# Patient Record
Sex: MALE | Race: WHITE | NOT HISPANIC OR LATINO | Employment: FULL TIME | URBAN - METROPOLITAN AREA
[De-identification: names, ages, dates, MRNs, and addresses within clinical notes are randomized per-mention and may not be internally consistent; named-entity substitution may affect disease eponyms.]

---

## 2017-01-02 ENCOUNTER — THERAPY VISIT (OUTPATIENT)
Dept: PHYSICAL THERAPY | Facility: CLINIC | Age: 42
End: 2017-01-02
Payer: COMMERCIAL

## 2017-01-02 ENCOUNTER — OFFICE VISIT (OUTPATIENT)
Dept: ORTHOPEDICS | Facility: CLINIC | Age: 42
End: 2017-01-02
Payer: COMMERCIAL

## 2017-01-02 VITALS
DIASTOLIC BLOOD PRESSURE: 76 MMHG | SYSTOLIC BLOOD PRESSURE: 122 MMHG | HEIGHT: 70 IN | BODY MASS INDEX: 25.4 KG/M2 | WEIGHT: 177.4 LBS

## 2017-01-02 DIAGNOSIS — M75.02 ADHESIVE CAPSULITIS OF LEFT SHOULDER: Primary | ICD-10-CM

## 2017-01-02 PROCEDURE — 20610 DRAIN/INJ JOINT/BURSA W/O US: CPT | Mod: LT | Performed by: FAMILY MEDICINE

## 2017-01-02 PROCEDURE — 97161 PT EVAL LOW COMPLEX 20 MIN: CPT | Mod: GP | Performed by: PHYSICAL THERAPIST

## 2017-01-02 PROCEDURE — 97110 THERAPEUTIC EXERCISES: CPT | Mod: GP | Performed by: PHYSICAL THERAPIST

## 2017-01-02 PROCEDURE — 99214 OFFICE O/P EST MOD 30 MIN: CPT | Mod: 25 | Performed by: FAMILY MEDICINE

## 2017-01-02 RX ORDER — CYCLOBENZAPRINE HCL 5 MG
TABLET ORAL
Qty: 42 TABLET | Refills: 0 | Status: SHIPPED | OUTPATIENT
Start: 2017-01-02 | End: 2017-02-17

## 2017-01-02 RX ORDER — METHYLPREDNISOLONE ACETATE 40 MG/ML
40 INJECTION, SUSPENSION INTRA-ARTICULAR; INTRALESIONAL; INTRAMUSCULAR; SOFT TISSUE ONCE
Qty: 1 ML | Refills: 0 | OUTPATIENT
Start: 2017-01-02 | End: 2017-01-02

## 2017-01-02 ASSESSMENT — ENCOUNTER SYMPTOMS: FOCAL WEAKNESS: 0

## 2017-01-02 NOTE — NURSING NOTE
"Chief Complaint   Patient presents with     Shoulder Pain     Left        Initial /76 mmHg  Ht 5' 9.75\" (1.772 m)  Wt 177 lb 6.4 oz (80.468 kg)  BMI 25.63 kg/m2 Estimated body mass index is 25.63 kg/(m^2) as calculated from the following:    Height as of this encounter: 5' 9.75\" (1.772 m).    Weight as of this encounter: 177 lb 6.4 oz (80.468 kg).  BP completed using cuff size: asiya Lara ATC      "

## 2017-01-02 NOTE — PROGRESS NOTES
Subjective:    Christiano Molina is a 41 year old male with a left shoulder condition.  Condition occurred with:  Unknown cause (maybe side effect of flue shot).  Condition occurred: other.  This is a new condition  September 2016  .    Patient reports pain:  Anterior, lateral and upper arm.    Pain is described as aching and is intermittent and reported as 2/10.  Associated symptoms:  Loss of motion/stiffness and loss of strength. Pain is the same all the time.  Symptoms are exacerbated by using arm behind back, using arm at shoulder level and using arm overhead and relieved by rest.  Since onset symptoms are gradually improving.    Previous treatment: 2 steroid injections.    General health as reported by patient is good.  Pertinent medical history includes:  None.      Medication history: Ibuprofen.  Current occupation is   .    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                              Objective:    Standing Alignment:      Shoulder/UE:  Rounded shoulders and scapular winging L                  Flexibility/Screens:   Positive screens:  Shoulder                             Shoulder Evaluation:  ROM:  AROM:    Flexion:  Left:  110    Right:  170    Abduction:  Left: 115   Right:  180    Internal Rotation:  Left:  L3    Right:  T12  External Rotation:  Left:  35    Right:  80                PROM:    Flexion:  Left:  110          Abduction:  Left:  65        Internal Rotation:  Left:  22      External Rotation:  Left:  25                        Strength:    Flexion: Left:4+/5   Pain:        Abduction:  Left: 4+/5  Pain:        Internal Rotation:  Left:5/5     Pain:      External Rotation:   Left:4+/5     Pain:                 Special Tests:    Left shoulder positive for the following special tests:  Impingement  Left shoulder negative for the following special tests:  Rotator cuff tear    Palpation:  not assessed                                          General     ROS    Assessment/Plan:      Patient is a 41 year old male with left side shoulder complaints.    Patient has the following significant findings with corresponding treatment plan.                Diagnosis 1:  Left Shoulder Adhesive Capsulitis  Pain -  hot/cold therapy, electric stimulation, manual therapy, splint/taping/bracing/orthotics, self management, education and home program  Decreased ROM/flexibility - manual therapy and therapeutic exercise  Decreased strength - therapeutic exercise and therapeutic activities  Impaired muscle performance - neuro re-education  Decreased function - therapeutic activities  Impaired posture - neuro re-education    Therapy Evaluation Codes:   1) History comprised of:   Personal factors that impact the plan of care:      None.    Comorbidity factors that impact the plan of care are:      None.     Medications impacting care: None.  2) Examination of Body Systems comprised of:   Body structures and functions that impact the plan of care:      Shoulder.   Activity limitations that impact the plan of care are:      Bathing, Cooking, Driving, Dressing, Lifting, Reading/Computer work and Sleeping.   Clinical presentation characteristics are:    Stable/Uncomplicated.  3) Presentation comprised of:   Presentation scored as Low complexity with uncomplicated characteristics..  4) Decision-Making    Low complexity using standardized patient assessment instrument and/or measureable assessment of functional outcome.  Cumulative Therapy Evaluation is: Low complexity.    Previous and current functional limitations:  (See Goal Flow Sheet for this information)    Short term and Long term goals: (See Goal Flow Sheet for this information)     Communication ability:  Patient appears to be able to clearly communicate and understand verbal and written communication and follow directions correctly.  Treatment Explanation - The following has been discussed with the patient:   RX  ordered/plan of care  Anticipated outcomes  Possible risks and side effects  This patient would benefit from PT intervention to resume normal activities.   Rehab potential is good.    Frequency:  1 X week, once daily  Duration:  for 6 weeks tapering to 2 x month for 3 months  Discharge Plan:  Achieve all LTG.  Independent in home treatment program.  Reach maximal therapeutic benefit.    Please refer to the daily flowsheet for treatment today, total treatment time and time spent performing 1:1 timed codes.

## 2017-01-02 NOTE — PROGRESS NOTES
[facility]   Follow-up Visit s Jan 2, 2017    PCP: Layton Weir      Subjective:  Christiano is a 41 year old male who is seen in follow up for evaluation of   Chief Complaint   Patient presents with     Shoulder Pain     Left      His last visit was on 10/21/2016.  Since that time, symptoms have been unchanged. Stretching has not helped and patient feels it worsened condition. Patient stated prednisone did not change symptoms. Left shoulder feels looser that right. ROM limited in flexion, abduction, external rotation, internal rotation. Has consistent pain 7 days a week. Pain is 8/10 at worst and 2/10 at best. Reported clicking/popping with movement.     Patient's past medical, surgical, social and family histories are reviewed today.    Injury: Patient describes injury as 20 years ago, skiing injury.  Fell on outstretched arm and reports posterior dislocation.  Has had some persistent weakness with weightbearing, i.e. Yoga.  Got a flu shot about 2 weeks prior to onset and had persistent soreness, has had 2 fevers over that time and now has pain with overhead motions, limited ROM, and pain with rotational motions against resistance.    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      Social History     Social History     Marital Status: Single     Spouse Name: N/A     Number of Children: N/A     Years of Education: N/A     Social History Main Topics     Smoking status: Never Smoker      Smokeless tobacco: Not on file     Alcohol Use: Not on  "file     PAST SURGICAL HISTORY  No surgeries reported.     Review of Systems   Musculoskeletal: Positive for joint pain.   Neurological: Negative for focal weakness.   All other systems reviewed and are negative.        Physical Exam  /76 mmHg  Ht 5' 9.75\" (1.772 m)  Wt 177 lb 6.4 oz (80.468 kg)  BMI 25.63 kg/m2  Constitutional:well-developed, well-nourished, and in no distress.   Cardiovascular: Intact distal pulses.    Neurological: alert. Gait normal.   Skin: Skin is warm and dry.   Psychiatric: Mood and affect normal.   Respiratory: unlabored, speaks in full sentences  Lymph: no LAD, no lymphangitis      Left Shoulder Exam     Tenderness   None    Range of Motion   Active Abduction:                       160  Passive Abduction:                    90  Extension:                                  Normal  Forward Flexion:                        90  External Rotation:                      30  Internal Rotation 0 degrees:      Sacrum  Internal Rotation 90 degrees:    N/t    Muscle Strength   Abduction:            5/5  Internal Rotation:  5/5  External Rotation: 5/5  Supraspinatus:     5/5  Subscapularis:     5/5  Biceps:                 5/5    Tests   Impingement:   Positive  Norton:          Positive  Cross Arm:      Negative  Drop Arm:        Negative  Apprehension: Negative  Sulcus:            Negative    Comments:  Active pt motion intact, absolute strength testing limited by pain.      Right Shoulder Exam     Tenderness   None    Range of Motion   Active Abduction:                       Normal  Passive Abduction:                    Normal  Extension:                                  Normal  Forward Flexion:                        Normal  External Rotation:                      Normal  Internal Rotation 0 degrees:      Normal  Internal Rotation 90 degrees:    Normal    Muscle Strength   Abduction:            5/5  Internal Rotation:  5/5  External Rotation: 5/5  Supraspinatus:     5/5  Subscapularis:     " 5/5  Biceps:                 5/5    Tests   Impingement:   Negative  Norton:          Negative  Cross Arm:      Negative  Drop Arm:        Negative  Apprehension: Negative  Sulcus:            Negative            ICD-10-CM    1. Adhesive capsulitis of left shoulder M75.02 cyclobenzaprine (FLEXERIL) 5 MG tablet     [facility], [facility]IR[facility]RACT[facility] REFERRAL     More clearly evident to be frozen shoulder today, cause idiopathic, but correlation to flu vaccine curious.   Overall more restricted today than 2 months ago.     lengthy discussion regarding difficult nature of this. Discussed natural history. Reviewed treatment options which are limited. Discussed physical therapy home exercises, medication, and cortisone injections. Noted gradual resolution over several months.  He elected to proceed with cortisone injection today, followed by formal PT.     Pt opts for left shoulder glenohumeral injection.  PROCEDURE:  JOINT INJECTION.         After a discussion of risks, benefits and side effects of procedure, informed patient consent was obtained, and form signed by patient and physician.       The left shoulder was prepped with Chloroprep.      INJECTION:  Using 9 cc of 1% lidocaine mixed with 40 mg of depomedrol, the glenohumeral space was successfully injected using a postero-lateral approach with the patient lying on the unaffected side, without complication.  Patient tolerated the procedure well with minimal discomfort. Bandaid applied.

## 2017-01-02 NOTE — MR AVS SNAPSHOT
After Visit Summary   1/2/2017    Christiano Molina    MRN: [id number]           Patient Information     Date Of Birth          1975        Visit Information        Provider Department      1/2/2017 8:20 AM Isaac Osborne MD [facility] [facility]        Today's Diagnoses     Adhesive capsulitis of left shoulder    -  1        Follow-ups after your visit        Additional Services     [facility] HAND, AND CHIROPRACTIC REFERRAL       **This order will print in the [facility] Scheduling Office**    Physical Therapy, Hand Therapy and Chiropractic Care are available through:    *[facility]  *[facility]  *[facility]    Call one number to schedule at any of the above locations: [phone].    Your provider has referred you to: Physical Therapy at [facility] or [facility]    Indication/Reason for Referral: Adhesive capsulitis of left shoulder    Onset of Illness:   Therapy Orders: Evaluate and Treat  Special Programs: None  Special Request: None    Amy Reyes      Additional Comments for the Therapist or Chiropractor:     Please be aware that coverage of these services is subject to the terms and limitations of your health insurance plan.  Call member services at your health plan with any benefit or coverage questions.      Please bring the following to your appointment:    *Your personal calendar for scheduling future appointments  *Comfortable clothing                  Your next 10 appointments already scheduled     Jan 09, 2017  7:40 AM   [facility] Extremity with Ephraim Campbell PT   [facility] [place] [facility] ([facility] [place])    [address] [place] [address]  [address]  [place] [address]   [phone]            Jan 16, 2017  7:40 AM   CINDA Extremity with Ephraim Campbell PT   [facility] [place] [facility] ([facility] [place])    [address] [place] [address]  [address]  [place]  "[place] [address]   [phone]            2017  7:40 AM   CINDA Extremity with Ephraim Campbell PT   [facility] [place] [facility] [place]    [address] [place] [address]  [address]  [place] [address]   [phone]              Who to contact     If you have questions or need follow up information about today's clinic visit or your schedule please contact [facility] directly at [phone].  Normal or non-critical lab and imaging results will be communicated to you by [email or web address], letter or phone within 4 business days after the clinic has received the results. If you do not hear from us within 7 days, please contact the clinic through [email or web address] or phone. If you have a critical or abnormal lab result, we will notify you by phone as soon as possible.  Submit refill requests through [employer] or call your pharmacy and they will forward the refill request to us. Please allow 3 business days for your refill to be completed.          Additional Information About Your Visit        [email or web address] Information     [employer] lets you send messages to your doctor, view your test results, renew your prescriptions, schedule appointments and more. To sign up, go to [email or web address] . Click on \"Log in\" on the left side of the screen, which will take you to the Welcome page. Then click on \"Sign up Now\" on the right side of the page.     You will be asked to enter the access code listed below, as well as some personal information. Please follow the directions to create your username and password.     Your access code is: [email or web address]  Expires: 2017 11:22 AM     Your access code will  in 90 days. If you need help or a new code, please call your [place] clinic or [phone].        Your Vitals Were     Height BMI (Body Mass Index)                5' 9.75\" (1.772 m) 25.63 kg/m2           Blood Pressure from Last 3 Encounters:   17 " 122/76   10/21/16 115/78   10/31/05 108/66    Weight from Last 3 Encounters:   01/02/17 177 lb 6.4 oz (80.468 kg)   10/21/16 180 lb (81.647 kg)   10/31/05 178 lb 12 oz (81.08 kg)              We Performed the Following     Depo Medrol 10 MG,  20 MG , or 40 MG    [] (Same charge for all MG)     [facility] PT, HAND, AND CHIROPRACTIC REFERRAL     Large Joint/Bursa injection and/or drainage (Shoulder, Knee)     Lidocaine 1% or 2%     []          Today's Medication Changes          These changes are accurate as of: 1/2/17 11:22 AM.  If you have any questions, ask your nurse or doctor.               Start taking these medicines.        Dose/Directions    cyclobenzaprine 5 MG tablet   Commonly known as:  FLEXERIL   Used for:  Adhesive capsulitis of left shoulder   Started by:  Isaac Osborne MD        Take 1-2 tabs as needed at bedtime for pain interrupting sleep   Quantity:  42 tablet   Refills:  0       methylPREDNISolone acetate 40 MG/ML injection   Commonly known as:  DEPO-MEDROL   Used for:  Adhesive capsulitis of left shoulder   Started by:  Isaac Osborne MD        Dose:  40 mg   1 mL (40 mg) by INTRA-ARTICULAR route once for 1 dose   Quantity:  1 mL   Refills:  0         Stop taking these medicines if you haven't already. Please contact your care team if you have questions.     predniSONE 20 MG tablet   Commonly known as:  DELTASONE   Stopped by:  Isaac Osborne MD                Where to get your medicines      These medications were sent to [id number] [place] [facility] [place] [facility] [address]  [address] [facility] [address]     Phone:  [phone]    - cyclobenzaprine 5 MG tablet      Some of these will need a paper prescription and others can be bought over the counter.  Ask your nurse if you have questions.     You don't need a prescription for these medications    - methylPREDNISolone acetate 40 MG/ML injection             Primary Care Provider  Office Phone # Fax #    Layton Weir [phone]       [facility] [address] [place] [address]        Thank you!     Thank you for choosing [facility]  for your care. Our goal is always to provide you with excellent care. Hearing back from our patients is one way we can continue to improve our services. Please take a few minutes to complete the written survey that you may receive in the mail after your visit with us. Thank you!             Your Updated Medication List - Protect others around you: Learn how to safely use, store and throw away your medicines at www.disposemymeds.org.          This list is accurate as of: 1/2/17 11:22 AM.  Always use your most recent med list.                   Brand Name Dispense Instructions for use    cyclobenzaprine 5 MG tablet    FLEXERIL    42 tablet    Take 1-2 tabs as needed at bedtime for pain interrupting sleep       IBUPROFEN PO          methylPREDNISolone acetate 40 MG/ML injection    DEPO-MEDROL    1 mL    1 mL (40 mg) by INTRA-ARTICULAR route once for 1 dose       multivitamin per tablet      1 TABLET DAILY PO       TUMS 500 MG chewable tablet   Generic drug:  calcium carbonate     90    1 TABLET  PO       TYLENOL PO

## 2017-01-03 NOTE — PROGRESS NOTES
Subjective:                                             Current medications:  Pain medication and muscle relaxants (Ibuprofin,).  Current occupation is [employer] . .    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                              Objective:    System    Physical Exam    General     ROS    Assessment/Plan:

## 2017-01-09 ENCOUNTER — THERAPY VISIT (OUTPATIENT)
Dept: PHYSICAL THERAPY | Facility: CLINIC | Age: 42
End: 2017-01-09
Payer: COMMERCIAL

## 2017-01-09 DIAGNOSIS — M75.02 ADHESIVE CAPSULITIS OF LEFT SHOULDER: Primary | ICD-10-CM

## 2017-01-09 PROCEDURE — 97112 NEUROMUSCULAR REEDUCATION: CPT | Mod: GP

## 2017-01-09 PROCEDURE — 97110 THERAPEUTIC EXERCISES: CPT | Mod: GP

## 2017-02-17 ENCOUNTER — OFFICE VISIT (OUTPATIENT)
Dept: ORTHOPEDICS | Facility: CLINIC | Age: 42
End: 2017-02-17
Payer: COMMERCIAL

## 2017-02-17 VITALS
DIASTOLIC BLOOD PRESSURE: 64 MMHG | BODY MASS INDEX: 25.34 KG/M2 | WEIGHT: 177 LBS | HEIGHT: 70 IN | SYSTOLIC BLOOD PRESSURE: 115 MMHG

## 2017-02-17 DIAGNOSIS — M75.02 ADHESIVE CAPSULITIS OF LEFT SHOULDER: ICD-10-CM

## 2017-02-17 PROCEDURE — 20610 DRAIN/INJ JOINT/BURSA W/O US: CPT | Mod: LT | Performed by: FAMILY MEDICINE

## 2017-02-17 PROCEDURE — 99212 OFFICE O/P EST SF 10 MIN: CPT | Mod: 25 | Performed by: FAMILY MEDICINE

## 2017-02-17 RX ORDER — CYCLOBENZAPRINE HCL 5 MG
TABLET ORAL
Qty: 42 TABLET | Refills: 0 | Status: SHIPPED | OUTPATIENT
Start: 2017-02-17 | End: 2017-09-08

## 2017-02-17 ASSESSMENT — ENCOUNTER SYMPTOMS: FOCAL WEAKNESS: 0

## 2017-02-17 NOTE — MR AVS SNAPSHOT
"              After Visit Summary   2/17/2017    Christiano Molina    MRN: [id number]           Patient Information     Date Of Birth          1975        Visit Information        Provider Department      2/17/2017 8:20 AM Isaac Osborne MD [facility]        Care Instructions      Plan:  Steroid injection of the left shoulder: intra-articular  was performed today in clinic  Icing for the next 1-2 days may be helpful for pain. Injection may take 10-14 days to see the full effect.    Continue pushing shoulder ROM    May return for another injection if needed for pain control in 4-6 weeks              Follow-ups after your visit        Who to contact     If you have questions or need follow up information about today's clinic visit or your schedule please contact [facility] directly at [phone].  Normal or non-critical lab and imaging results will be communicated to you by MyChart, letter or phone within 4 business days after the clinic has received the results. If you do not hear from us within 7 days, please contact the clinic through [employer] or phone. If you have a critical or abnormal lab result, we will notify you by phone as soon as possible.  Submit refill requests through [employer] or call your pharmacy and they will forward the refill request to us. Please allow 3 business days for your refill to be completed.          Additional Information About Your Visit        MyChart Information     [employer] lets you send messages to your doctor, view your test results, renew your prescriptions, schedule appointments and more. To sign up, go to [email or web address] . Click on \"Log in\" on the left side of the screen, which will take you to the Welcome page. Then click on \"Sign up Now\" on the right side of the page.     You will be asked to enter the access code listed below, as well as some personal " "information. Please follow the directions to create your username and password.     Your access code is: [email or web address]  Expires: 2017 11:22 AM     Your access code will  in 90 days. If you need help or a new code, please call your [place] clinic or [phone].        Care EveryWhere ID     This is your Care EveryWhere ID. This could be used by other organizations to access your [place] medical records  [id number]        Your Vitals Were     Height BMI (Body Mass Index)                5' 9.75\" (1.772 m) 25.58 kg/m2           Blood Pressure from Last 3 Encounters:   17 115/64   17 122/76   10/21/16 115/78    Weight from Last 3 Encounters:   17 177 lb (80.3 kg)   17 177 lb 6.4 oz (80.5 kg)   10/21/16 180 lb (81.6 kg)              Today, you had the following     No orders found for display       Primary Care Provider Office Phone # Fax #    Layton Weir [phone]       [facility] [address] [place] [address]        Thank you!     Thank you for choosing [facility]  for your care. Our goal is always to provide you with excellent care. Hearing back from our patients is one way we can continue to improve our services. Please take a few minutes to complete the written survey that you may receive in the mail after your visit with us. Thank you!             Your Updated Medication List - Protect others around you: Learn how to safely use, store and throw away your medicines at www.disposemymeds.org.          This list is accurate as of: 17  8:56 AM.  Always use your most recent med list.                   Brand Name Dispense Instructions for use    IBUPROFEN PO          multivitamin per tablet      1 TABLET DAILY PO       TUMS 500 MG chewable tablet   Generic drug:  calcium carbonate     90    1 TABLET  PO       TYLENOL PO            "

## 2017-02-17 NOTE — PATIENT INSTRUCTIONS
Plan:  Steroid injection of the left shoulder: intra-articular  was performed today in clinic  Icing for the next 1-2 days may be helpful for pain. Injection may take 10-14 days to see the full effect.    Flexeril refilled for sleeping    Continue gently pushing shoulder ROM    May return for another injection if needed for pain control in 4-6 weeks

## 2017-02-17 NOTE — NURSING NOTE
"Chief Complaint   Patient presents with     RECHECK     L shoulder decreased ROM       Initial /64  Ht 5' 9.75\" (1.772 m)  Wt 177 lb (80.3 kg)  BMI 25.58 kg/m2 Estimated body mass index is 25.58 kg/(m^2) as calculated from the following:    Height as of this encounter: 5' 9.75\" (1.772 m).    Weight as of this encounter: 177 lb (80.3 kg).  Medication Reconciliation: complete     Stewart Antonio, ATC      "

## 2017-02-17 NOTE — NURSING NOTE
"Chief Complaint   Patient presents with     RECHECK     L shoulder decreased ROM       Initial Ht 5' 9.75\" (1.772 m)  Wt 177 lb (80.3 kg)  BMI 25.58 kg/m2 Estimated body mass index is 25.58 kg/(m^2) as calculated from the following:    Height as of this encounter: 5' 9.75\" (1.772 m).    Weight as of this encounter: 177 lb (80.3 kg).  Medication Reconciliation: complete   Stewart Antonio, ATC    "

## 2017-02-19 RX ORDER — METHYLPREDNISOLONE ACETATE 40 MG/ML
40 INJECTION, SUSPENSION INTRA-ARTICULAR; INTRALESIONAL; INTRAMUSCULAR; SOFT TISSUE ONCE
Qty: 1 ML | Refills: 0 | OUTPATIENT
Start: 2017-02-19 | End: 2017-02-19

## 2017-02-19 RX ORDER — LIDOCAINE HYDROCHLORIDE 10 MG/ML
9 INJECTION, SOLUTION INFILTRATION; PERINEURAL ONCE
Qty: 9 ML | Refills: 0 | OUTPATIENT
Start: 2017-02-19 | End: 2017-02-19

## 2017-09-08 ENCOUNTER — OFFICE VISIT (OUTPATIENT)
Dept: FAMILY MEDICINE | Facility: CLINIC | Age: 42
End: 2017-09-08
Payer: COMMERCIAL

## 2017-09-08 DIAGNOSIS — L72.11 PILAR CYSTS: Primary | ICD-10-CM

## 2017-09-08 DIAGNOSIS — L72.0 EPIDERMOID CYST OF NECK: ICD-10-CM

## 2017-09-08 PROCEDURE — 11421 EXC H-F-NK-SP B9+MARG 0.6-1: CPT | Mod: 59 | Performed by: FAMILY MEDICINE

## 2017-09-08 PROCEDURE — 11421 EXC H-F-NK-SP B9+MARG 0.6-1: CPT | Performed by: FAMILY MEDICINE

## 2017-09-08 NOTE — MR AVS SNAPSHOT
After Visit Summary   9/8/2017    Christiano Molina    MRN: [id number]           Patient Information     Date Of Birth          1975        Visit Information        Provider Department      9/8/2017 8:20 AM Jaymie Iraheta MD [facility]        Today's Diagnoses     Pilar cysts    -  1    Epidermoid cyst of neck          Care Instructions    FUTURE APPOINTMENTS  Follow up in 7-10 days for Suture Removal, with the nurse at any [facility]. If you go elsewhere, make sure to call ahead of time to get on their schedule.    SCALP POST-TREATMENT CARE INSTRUCTIONS  Do not go swimming, until the wound is healed.    However, showering is encouraged. The next time you shower, remove the dressing and clean the area with soap and water. Neither soap, water nor shampoo will hurt the surgical area. Dry the area well with a towel or hairdryer and then apply a small amount of Vaseline over the wound. Then, cover again with a new dressing.    Signs of Infection:  Infection can occur in any area where skin has been disrupted.  If you notice persistent redness, swelling, colored drainage, increasing pain, fever or other signs of infection, please call us at: [phone] and ask to have me or my colleague paged. We will call you back to discuss.          Follow-ups after your visit        Who to contact     If you have questions or need follow up information about today's clinic visit or your schedule please contact [facility] directly at [phone].  Normal or non-critical lab and imaging results will be communicated to you by MyChart, letter or phone within 4 business days after the clinic has received the results. If you do not hear from us within 7 days, please contact the clinic through [employer] or phone. If you have a critical or abnormal lab result, we will notify you by phone as soon as possible.  Submit refill requests through [employer] or  "call your pharmacy and they will forward the refill request to us. Please allow 3 business days for your refill to be completed.          Additional Information About Your Visit        MyChart Information     [employer] lets you send messages to your doctor, view your test results, renew your prescriptions, schedule appointments and more. To sign up, go to [email or web address] . Click on \"Log in\" on the left side of the screen, which will take you to the Welcome page. Then click on \"Sign up Now\" on the right side of the page.     You will be asked to enter the access code listed below, as well as some personal information. Please follow the directions to create your username and password.     Your access code is: -[email or web address]  Expires: 2017  8:21 AM     Your access code will  in 90 days. If you need help or a new code, please call your [facility] clinic or [phone].        Care EveryWhere ID     This is your Care EveryWhere ID. This could be used by other organizations to access your [facility] medical records  [id number]         Blood Pressure from Last 3 Encounters:   17 115/64   17 122/76   10/21/16 115/78    Weight from Last 3 Encounters:   17 177 lb (80.3 kg)   17 177 lb 6.4 oz (80.5 kg)   10/21/16 180 lb (81.6 kg)              Today, you had the following     No orders found for display       Primary Care Provider Office Phone # Fax #    Layton Weir [phone]       [facility] [address]  [facility] [address]        Equal Access to Services     [facility] Hospital[facility]ENDY : Hadbee Waters, darshana reyes, jesi rodriguez. So [facility] [phone].    ATENCIÓN: Si habla español, tiene a dick disposición servicios gratuitos de asistencia lingüística. Jairo al [phone].    We comply with applicable federal civil rights laws and [place] laws. We do not discriminate on the basis of " race, color, national origin, age, disability sex, sexual orientation or gender identity.            Thank you!     Thank you for choosing [facility] - [facility] Critical access hospital  for your care. Our goal is always to provide you with excellent care. Hearing back from our patients is one way we can continue to improve our services. Please take a few minutes to complete the written survey that you may receive in the mail after your visit with us. Thank you!             Your Updated Medication List - Protect others around you: Learn how to safely use, store and throw away your medicines at www.disposemymeds.org.          This list is accurate as of: 9/8/17  8:21 AM.  Always use your most recent med list.                   Brand Name Dispense Instructions for use Diagnosis    IBUPROFEN PO           multivitamin per tablet      1 TABLET DAILY PO        TUMS 500 MG chewable tablet   Generic drug:  calcium carbonate     90    1 TABLET  PO        TYLENOL PO

## 2017-09-08 NOTE — PATIENT INSTRUCTIONS
FUTURE APPOINTMENTS  Follow up in 7-10 days for Suture Removal, with the nurse at any [place] clinic. If you go elsewhere, make sure to call ahead of time to get on their schedule.    SCALP POST-TREATMENT CARE INSTRUCTIONS  Do not go swimming, until the wound is healed.    However, showering is encouraged. The next time you shower, remove the dressing and clean the area with soap and water. Neither soap, water nor shampoo will hurt the surgical area. Dry the area well with a towel or hairdryer and then apply a small amount of Vaseline over the wound. Then, cover again with a new dressing.    Signs of Infection:  Infection can occur in any area where skin has been disrupted.  If you notice persistent redness, swelling, colored drainage, increasing pain, fever or other signs of infection, please call us at: [phone] and ask to have me or my colleague paged. We will call you back to discuss.

## 2017-09-08 NOTE — LETTER
Dr. Layton Weir    9/8/2017    Dr. Weir,         I had the opportunity to see Christiano Molina in my office for pilar cysts on the scalp. I have enclosed a copy of my office note .      RE: Christian[address] Molina  [address]  [place] [facility] [place] [place] [address]        [facility] - [facility]    CC : cyst   SUBJECTIVE:                                                    Christiano Molina is a 42 year old male who presents to clinic today because of cysts on the scalp, back of head and on the neck.    Enlarging : YES.  Tenderness : YES - tender when combing hair.  History of previous epidermoid cysts : YES - cysts have been present for years.    Personal history of skin cancer : NO.  Family history of skin cancer : NO.    Occupation : office work (indoor).    Refer to electronic medical record (EMR) for past medical history and medications.    INTEGUMENTARY/SKIN: POSITIVE for lumps or bumps  ROS : 14 point review of systems was negative except the symptoms listed above in the HPI.    This document serves as a record of the services and decisions personally performed and made by Fabiola Iraheta MD. It was created on her behalf by Bernardino Choi, a trained medical scribe.  The creation of this document is based on the scribe's personal observations and the provider's statements to the medical scribe.  Bernardino Choi, September 8, 2017 8:18 AM      OBJECTIVE:                                                    GENERAL: healthy, alert and no distress  SKIN: [place] Skin Type - I.  Scalp and Neck were examined. The dermatoscope was used to help evaluate pigmented lesions.  Skin Pertinent Findings:  Right occipital scalp : 1 cm in size subepidermal mass most consistent with pilar cyst     Left parietal scalp : 7 cm in size subepidermal mass most consistent with pilar cyst     Posterior neck, over C6 : 2 mm in size subepidermal mass most consistent with epidermoid cyst     Diagnostic Test Results:  None.    MDM : . Discussion  regarding treatment options for epidermoid cysts, including observation and excision. It was explained that the cyst can reoccur, especially if it has become inflamed or infected prior to removal. Discussed risks of the excision procedure, including infection and scarring.      ASSESSMENT:                                                      Encounter Diagnoses   Name Primary?     Pilar cysts Yes     Epidermoid cyst of neck          PLAN:                                                    Patient Instructions   FUTURE APPOINTMENTS  Follow up in 7-10 days for Suture Removal, with the nurse at any [place] clinic. If you go elsewhere, make sure to call ahead of time to get on their schedule.    SCALP POST-TREATMENT CARE INSTRUCTIONS  Do not go swimming, until the wound is healed.    However, showering is encouraged. The next time you shower, remove the dressing and clean the area with soap and water. Neither soap, water nor shampoo will hurt the surgical area. Dry the area well with a towel or hairdryer and then apply a small amount of Vaseline over the wound. Then, cover again with a new dressing.    Signs of Infection:  Infection can occur in any area where skin has been disrupted.  If you notice persistent redness, swelling, colored drainage, increasing pain, fever or other signs of infection, please call us at: [phone] and ask to have me or my colleague paged. We will call you back to discuss.        PROCEDURES:                                                    Name : Excision  Indication : Excision of irritated/enlarging pilar cyst.  Location(s) : Right occipital scalp : 1 cm in size subepidermal mass most consistent with pilar cyst.  Completed by : Fabiola Iraheta MD  Photo Taken : no.  Anesthesia : Patient was anesthetized by infiltrating the area surrounding the lesion with 1% lidocaine.   epinephrine 1:948378 : Yes.  Buffered with bicarbonate : Yes.  Note : Discussed risks of the excision procedure,  including pain, infection, scarring, hypopigmentation, hyperpigmentation and potential for recurrence or need for retreatment. Benefits of treatment and alternative treatments were also discussed.    During this procedure, the universal protocol was utilized. The patient's identity was confirmed by no less than two patient identifiers, correct procedure was verified, correct site was verified and marked as applicable and a final pause was completed.    Sterile technique was used throughout the procedure. The skin was cleaned and prepped with surgical cleanser. Once adequate anesthesia was obtained, lesion excision was performed using a 15 blade. The cyst lining was identified, then dissected out with a Metzenbaum and a curved Adelaide. The lining was removed in total    Culture was not obtained.    Tissue samples submitted : NO.    Irrigated with sterile saline: No.    Direct pressure was applied for hemostasis.   Edges were approximated with 3-0 Prolene interrupted simple stitch.    No bleeding was present upon the completion of the procedure. A dry sterile dressing was applied. Patient tolerated the procedure well and was discharged in satisfactory condition.  Total number of stitches in closure of epidermis : 3    Suture removal : 7-10 days.    Name : Excision  Indication : Excision of irritated/enlarging pilar cyst.  Location(s) : Left parietal scalp : 7 cm in size subepidermal mass most consistent with pilar cyst   Completed by : Fabiola Iraheta MD  Photo Taken : no.  Anesthesia : Patient was anesthetized by infiltrating the area surrounding the lesion with 1% lidocaine.   epinephrine 1:113932 : Yes.  Buffered with bicarbonate : Yes.  Note : Discussed risks of the excision procedure, including pain, infection, scarring, hypopigmentation, hyperpigmentation and potential for recurrence or need for retreatment. Benefits of treatment and alternative treatments were also discussed.    During this procedure, the universal  protocol was utilized. The patient's identity was confirmed by no less than two patient identifiers, correct procedure was verified, correct site was verified and marked as applicable and a final pause was completed.    Sterile technique was used throughout the procedure. The skin was cleaned and prepped with surgical cleanser. Once adequate anesthesia was obtained, lesion excision was performed using a 15 blade. The cyst lining was identified, then dissected out with a Metzenbaum and a curved Adelaide. The lining was removed in total    Culture was not obtained.    Tissue samples submitted : NO.    Irrigated with sterile saline: No.    Direct pressure was applied for hemostasis.   Edges were approximated with 3-0 Prolene interrupted simple stitch.    No bleeding was present upon the completion of the procedure. A dry sterile dressing was applied. Patient tolerated the procedure well and was discharged in satisfactory condition.  Total number of stitches in closure of epidermis : 3    Suture removal : 7-10 days.    The information in this document, created by the medical scribe for me, accurately reflects the services I personally performed and the decisions made by me. I have reviewed and approved this document for accuracy prior to leaving the patient care area.  Fabiola Iraheta MD September 8, 2017 8:18 AM  [facility] - [facility]      If you should have any questions, please feel free to contact me. Thank for the opportunity to be involved in this patient's health care.    Sincerely,          Jaymie Iraheta MD  [facility]

## 2017-09-08 NOTE — PROGRESS NOTES
[facility] - PRIMARY [facility] SK[facility]    CC : cyst   SUBJECTIVE:                                                    Christiano Molina is a 42 year old male who presents to clinic today because of cysts on the scalp, back of head and on the neck.    Enlarging : YES.  Tenderness : YES - tender when combing hair.  History of previous epidermoid cysts : YES - cysts have been present for years.    Personal history of skin cancer : NO.  Family history of skin cancer : NO.    Occupation : office work (indoor).    Refer to electronic medical record (EMR) for past medical history and medications.    INTEGUMENTARY/SKIN: POSITIVE for lumps or bumps  ROS : 14 point review of systems was negative except the symptoms listed above in the HPI.    This document serves as a record of the services and decisions personally performed and made by Fabiola Iraheta MD. It was created on her behalf by Bernardino Choi, a trained medical scribe.  The creation of this document is based on the scribe's personal observations and the provider's statements to the medical scribe.  Bernardino Choi, September 8, 2017 8:18 AM      OBJECTIVE:                                                    GENERAL: healthy, alert and no distress  SKIN: [place] Skin Type - I.  Scalp and Neck were examined. The dermatoscope was used to help evaluate pigmented lesions.  Skin Pertinent Findings:  Right occipital scalp : 1 cm in size subepidermal mass most consistent with pilar cyst     Left parietal scalp : 7 cm in size subepidermal mass most consistent with pilar cyst     Posterior neck, over C6 : 2 mm in size subepidermal mass most consistent with epidermoid cyst     Diagnostic Test Results:  None.    MDM : . Discussion regarding treatment options for epidermoid cysts, including observation and excision. It was explained that the cyst can reoccur, especially if it has become inflamed or infected prior to removal. Discussed risks of the excision procedure, including infection and  scarring.      ASSESSMENT:                                                      Encounter Diagnoses   Name Primary?     Pilar cysts Yes     Epidermoid cyst of neck          PLAN:                                                    Patient Instructions   FUTURE APPOINTMENTS  Follow up in 7-10 days for Suture Removal, with the nurse at any [place] clinic. If you go elsewhere, make sure to call ahead of time to get on their schedule.    SCALP POST-TREATMENT CARE INSTRUCTIONS  Do not go swimming, until the wound is healed.    However, showering is encouraged. The next time you shower, remove the dressing and clean the area with soap and water. Neither soap, water nor shampoo will hurt the surgical area. Dry the area well with a towel or hairdryer and then apply a small amount of Vaseline over the wound. Then, cover again with a new dressing.    Signs of Infection:  Infection can occur in any area where skin has been disrupted.  If you notice persistent redness, swelling, colored drainage, increasing pain, fever or other signs of infection, please call us at: [phone] and ask to have me or my colleague paged. We will call you back to discuss.        PROCEDURES:                                                    Name : Excision  Indication : Excision of irritated/enlarging pilar cyst.  Location(s) : Right occipital scalp : 1 cm in size subepidermal mass most consistent with pilar cyst.  Completed by : Fabiola Iraheta MD  Photo Taken : no.  Anesthesia : Patient was anesthetized by infiltrating the area surrounding the lesion with 1% lidocaine.   epinephrine 1:110666 : Yes.  Buffered with bicarbonate : Yes.  Note : Discussed risks of the excision procedure, including pain, infection, scarring, hypopigmentation, hyperpigmentation and potential for recurrence or need for retreatment. Benefits of treatment and alternative treatments were also discussed.    During this procedure, the universal protocol was utilized. The  patient's identity was confirmed by no less than two patient identifiers, correct procedure was verified, correct site was verified and marked as applicable and a final pause was completed.    Sterile technique was used throughout the procedure. The skin was cleaned and prepped with surgical cleanser. Once adequate anesthesia was obtained, lesion excision was performed using a 15 blade. The cyst lining was identified, then dissected out with a Metzenbaum and a curved Adelaide. The lining was removed in total    Culture was not obtained.    Tissue samples submitted : NO.    Irrigated with sterile saline: No.    Direct pressure was applied for hemostasis.   Edges were approximated with 3-0 Prolene interrupted simple stitch.    No bleeding was present upon the completion of the procedure. A dry sterile dressing was applied. Patient tolerated the procedure well and was discharged in satisfactory condition.  Total number of stitches in closure of epidermis : 3    Suture removal : 7-10 days.    Name : Excision  Indication : Excision of irritated/enlarging pilar cyst.  Location(s) : Left parietal scalp : 7 cm in size subepidermal mass most consistent with pilar cyst   Completed by : Fabiola Iraheta MD  Photo Taken : no.  Anesthesia : Patient was anesthetized by infiltrating the area surrounding the lesion with 1% lidocaine.   epinephrine 1:191150 : Yes.  Buffered with bicarbonate : Yes.  Note : Discussed risks of the excision procedure, including pain, infection, scarring, hypopigmentation, hyperpigmentation and potential for recurrence or need for retreatment. Benefits of treatment and alternative treatments were also discussed.    During this procedure, the universal protocol was utilized. The patient's identity was confirmed by no less than two patient identifiers, correct procedure was verified, correct site was verified and marked as applicable and a final pause was completed.    Sterile technique was used throughout the  procedure. The skin was cleaned and prepped with surgical cleanser. Once adequate anesthesia was obtained, lesion excision was performed using a 15 blade. The cyst lining was identified, then dissected out with a Metzenbaum and a curved Adelaide. The lining was removed in total    Culture was not obtained.    Tissue samples submitted : NO.    Irrigated with sterile saline: No.    Direct pressure was applied for hemostasis.   Edges were approximated with 3-0 Prolene interrupted simple stitch.    No bleeding was present upon the completion of the procedure. A dry sterile dressing was applied. Patient tolerated the procedure well and was discharged in satisfactory condition.  Total number of stitches in closure of epidermis : 3    Suture removal : 7-10 days.    The information in this document, created by the medical scribe for me, accurately reflects the services I personally performed and the decisions made by me. I have reviewed and approved this document for accuracy prior to leaving the patient care area.  Fabiola Iraheta MD September 8, 2017 8:18 AM  [facility] - PRIMARY CARE SKIN

## 2017-09-15 ENCOUNTER — ALLIED HEALTH/NURSE VISIT (OUTPATIENT)
Dept: NURSING | Facility: CLINIC | Age: 42
End: 2017-09-15
Payer: COMMERCIAL

## 2017-09-15 DIAGNOSIS — Z48.02 ENCOUNTER FOR REMOVAL OF SUTURES: Primary | ICD-10-CM

## 2017-09-15 PROCEDURE — 99207 ZZC NO CHARGE NURSE ONLY: CPT

## 2017-09-15 NOTE — NURSING NOTE
Christiano presents to the clinic today for  removal of sutures.  The patient has had the sutures in place for 7 days.    There has been no history of infection or drainage.    O: 3 sutures, each location, are seen located on the Occipital and Parietal scalp.  The wound is healing well with no signs of infection.    Tetanus status is up to date.    A: Suture removal.    P:  All sutures were easily removed today.  Routine wound care discussed.  The patient will follow up as needed.  Ute Messina RN

## 2017-09-15 NOTE — MR AVS SNAPSHOT
"              After Visit Summary   9/15/2017    Christiano Molina    MRN: [id number]           Patient Information     Date Of Birth          1975        Visit Information        Provider Department      9/15/2017 9:00 AM EC RN [facility] [place]        Today's Diagnoses     Encounter for removal of sutures    -  1       Follow-ups after your visit        Who to contact     If you have questions or need follow up information about today's clinic visit or your schedule please contact [facility] [place] directly at [phone].  Normal or non-critical lab and imaging results will be communicated to you by [employer], letter or phone within 4 business days after the clinic has received the results. If you do not hear from us within 7 days, please contact the clinic through [employer] or phone. If you have a critical or abnormal lab result, we will notify you by phone as soon as possible.  Submit refill requests through [employer] or call your pharmacy and they will forward the refill request to us. Please allow 3 business days for your refill to be completed.          Additional Information About Your Visit        MyChart Information     [employer] lets you send messages to your doctor, view your test results, renew your prescriptions, schedule appointments and more. To sign up, go to [email or web address] . Click on \"Log in\" on the left side of the screen, which will take you to the Welcome page. Then click on \"Sign up Now\" on the right side of the page.     You will be asked to enter the access code listed below, as well as some personal information. Please follow the directions to create your username and password.     Your access code is: [email or web address]  Expires: 2017  8:21 AM     Your access code will  in 90 days. If you need help or a new code, please call your [facility] or [phone].        Care EveryWhere ID     This is your Care EveryWhere ID. This could be used by other " organizations to access your [place] medical records  [id number]         Blood Pressure from Last 3 Encounters:   02/17/17 115/64   01/02/17 122/76   10/21/16 115/78    Weight from Last 3 Encounters:   02/17/17 177 lb (80.3 kg)   01/02/17 177 lb 6.4 oz (80.5 kg)   10/21/16 180 lb (81.6 kg)              Today, you had the following     No orders found for display       Primary Care Provider Office Phone # Fax #    Layton Weir [phone]       [facility] [address] [place] [address]        Equal Access to Services     : Hadii keerthi ndiaye Sobrent, waaxda luqadaha, qaybta kaalmada ednayada, jesi castro. So [facility] [phone].    ATENCIÓN: Si habla español, tiene a dick disposición servicios gratuitos de asistencia lingüística. Llame al [phone].    We comply with applicable federal civil rights laws and [place] laws. We do not discriminate on the basis of race, color, national origin, age, disability sex, sexual orientation or gender identity.            Thank you!     Thank you for choosing [facility] [place]  for your care. Our goal is always to provide you with excellent care. Hearing back from our patients is one way we can continue to improve our services. Please take a few minutes to complete the written survey that you may receive in the mail after your visit with us. Thank you!             Your Updated Medication List - Protect others around you: Learn how to safely use, store and throw away your medicines at www.disposemymeds.org.          This list is accurate as of: 9/15/17  9:36 AM.  Always use your most recent med list.                   Brand Name Dispense Instructions for use Diagnosis    IBUPROFEN PO           multivitamin per tablet      1 TABLET DAILY PO        TUMS 500 MG chewable tablet   Generic drug:  calcium carbonate     90    1 TABLET  PO        TYLENOL PO

## 2019-03-15 ENCOUNTER — TRANSFERRED RECORDS (OUTPATIENT)
Dept: HEALTH INFORMATION MANAGEMENT | Facility: CLINIC | Age: 44
End: 2019-03-15

## 2019-03-20 ENCOUNTER — TELEPHONE (OUTPATIENT)
Dept: TRANSPLANT | Facility: CLINIC | Age: 44
End: 2019-03-20

## 2019-03-20 NOTE — PROGRESS NOTES
[facility]      Referring Provider: self   CC: Lymphoma  HPI: 43-year-old man with a 2 through 3-week history of right axillary mass. Ultrasound showed a 4.6 x 2.9 x 6.2 cm enlarged lymph node and several smaller adjacent lymph nodes as well.  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  Neoplastic B cells were positive for CD20, CD10, BCL-6, and myc (strong) on flow/IHC, and negative for BCL-2 and MUM1. CD30 was negative.  CBC normal.  HIV negative. Myc FISH was rearranged, Myc-IgH neg. BCL2 and BCL6 not rearranged. PET 3/21/19 only 1 right axillary mass 5.4 cm, with SUV 25. Appendicolith and benign looking mesenteric node.   PMH: strasibusmus   FHx: Mother with breast cancer at age 55. One child had aplastic anemia and had a bone marrow transplant.   SHx: , non-smoker, social drinking, no illicit drug use. Grew up in [place], [place]. 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 3 kids: 4, 7, and 8 year old. Enjoys gardening, being outside. Lives in [facility].   Allergies: None   ROS: Pos for right axillary mass and 4-5 lb weight loss since Dx, otherwise negative on a 12-system review.   Medications: none    Ph/E:   Vitals: /85 (BP Location: Right arm, Patient Position: Sitting, Cuff Size: Adult Regular)   Pulse 108   Temp 96.7  F (35.9  C) (Oral)   Wt 79.2 kg (174 lb 9.6 oz)   SpO2 97%   BMI 25.23 kg/m    BMI= Body mass index is 25.23 kg/m .  ECOG PS: 0  General: NAD; H&N: no jaundice or mucosal lesions; Lungs clear; Heart RRR; Abdomen; Soft, No organomegaly; Extremities: No edema; Skin: No rash; Neuro: Nonfocal; Mood/Affect: appropriate; Lymph: subcm right axillary node, deep and central    Assessment and Plan:  Christiano is a 43-year-old man with what  appears to be likely Burkitt's lymphoma.  So far I believe his stage is IA, low risk, although the radiologist has raised a potential concern for involvement of a mesenteric node.  I do not have access to the images themselves.  His LDH is elevated and we will complete the staging with a bone marrow biopsy and LP.  He will have a transthoracic echocardiogram soon.  And we will start his treatment as soon as possible after his final pathology has been read locally and preferably here as well.  The other alternative diagnosis although I believe less likely, are diffuse large B-cell lymphoma with an MYC rearrangemen I do not think his genetic rearrangements are consistent with double hit or triple hit lymphoma.  If his diagnosis is established to be low risk Burkitt lymphoma or any of the other high risk non-Burkitt's lymphomas, we will treat him with dose adjusted R-EPOCH and depending on the stage we may give him 4 or 6 cycles along with CNS prophylaxis.  If his disease is high risk Burkitt lymphoma, we will give him the Magrath regimen.  Considering the unclear diagnosis at this point we discussed general side effects of these 2 chemotherapy regimens including the risk of infertility, hair loss, fatigue, infection, bleeding, mouth sores, nausea vomiting, diarrhea, sensory and motor neuropathy, other organ toxicity, and death.  We discussed that if he has Burkitt's we have over 90% chance of achieving a complete remission and his cure.  With other lymphomas our chance of cure is lower but still high.  If we do not manage to cure him with the first attempt then he will need a stem cell transplant for cure.  He and his wife are not planning for any more children.  We discussed that part of the treatment will be given inpatient but we will try to do as much as possible outpatient.  We will use either a Port-A-Cath or a PICC line.  He prefers to be treated here and we will follow his wish.  We will read about the unusual  "history of cancers in his family including mother with breast cancer, himself with possible Burkitt's, and the  son with aplastic anemia on either the stem cell transplant.    Parts of this note were dictated using [employer].     Addendum: Discussion with pathology reveals a still unclear diagnosis, either Burkitt (less likely) or high-grade lymphoma NOS (more likely). Marrow and LP neg, PET showing stage I. LDH high. Pathology will be getting the blocks to do additional testing on, but it will take longer than we can wait off treatment. Although the (slightly) elevated LDH would formally make Burkitt \"high-risk\", overall pathology and myself favor a Dx of high-grade B-cell lymphoma, NOS unless further details indicate otherwise. So, we will start treatment on 3/27 with da-R-EPOCH. SEs were discussed on phone, including fever, infection, neuropathy, heart failure, organ toxicity, and death.   1. PICC before admission, pull before discharge.   2. Neulasta outpatient, twice weekly labs  3. DVT prophylaxis during admission  4. Acyclovir, fluconazole.   5. Levaquin when neutropenic.    6. IT Meth on days 1 and 5 of cycles 3 through 6.   7. PET after C2  Vern Ramirez    "

## 2019-03-20 NOTE — TELEPHONE ENCOUNTER
RECORDS STATUS - DIAGNOSIS: LYMPHOMA      RECORDS RECEIVED FROM: [facility]   DATE RECEIVED: 3/20/2019   NOTES STATUS DETAILS   OFFICE NOTE from referring provider IN CARE EVERYWHERE - [facility] 3/6/2019   OFFICE NOTE from medical oncologist IN CARE EVERYWHERE - [facility] 3/8/2019   DISCHARGE SUMMARY from hospital     DISCHARGE REPORT from the ER     OPERATIVE REPORT     MEDICATION LIST     CLINICAL TRIAL TREATMENTS TO DATE     LABS     PATHOLOGY REPORTS Reports in Care Everywhere  Slides - Pending Reports - 3/15/2019    Slides Requested - 3/20/2019   ANYTHING RELATED TO DIAGNOSIS     GENONOMIC TESTING     TYPE:     IMAGING (NEED IMAGES & REPORT)     CT SCANS     MRI     MAMMO Reports in Care Everywhere    Images - Pending Reports - 3/8/2019, 3/19/2019    Images Requested - 3/20/2019   ULTRASOUND     PET

## 2019-03-20 NOTE — TELEPHONE ENCOUNTER
ONCOLOGY INTAKE: Records Information      APPT INFORMATION: 3/21/19 at 6:00PM  Referring provider:  Self Referred  Referring provider s clinic:  N/A  Reason for visit/diagnosis:  2nd Opinion on New Lymphoma    Were the records received with the referral (via [employer])? No    Has patient been seen for any external appt for this diagnosis (enter clinic/location)? Yes - per pt he was recently diagnosed at [facility]. CE has been updated. Please obtain all imaging and biopsy asap for review.    ADDITIONAL INFORMATION:  Pt was transferred to medical records, but the wait was over 3 minutes for records confirmation.

## 2019-03-20 NOTE — TELEPHONE ENCOUNTER
Faxed request for imaging and pathology sent to [facility] on 3/20 at 9:30AM.    Per Beth at radiology, images will be pushed to PACS asap.    Per Hallie at pathology lab, they are beginning to work on slide prep this morning. They will follow up with me once they are ready for .

## 2019-03-21 ENCOUNTER — PRE VISIT (OUTPATIENT)
Dept: TRANSPLANT | Facility: CLINIC | Age: 44
End: 2019-03-21

## 2019-03-21 ENCOUNTER — OFFICE VISIT (OUTPATIENT)
Dept: TRANSPLANT | Facility: CLINIC | Age: 44
End: 2019-03-21
Attending: INTERNAL MEDICINE
Payer: COMMERCIAL

## 2019-03-21 VITALS
TEMPERATURE: 96.7 F | OXYGEN SATURATION: 97 % | WEIGHT: 174.6 LBS | HEART RATE: 108 BPM | SYSTOLIC BLOOD PRESSURE: 136 MMHG | BODY MASS INDEX: 25.23 KG/M2 | DIASTOLIC BLOOD PRESSURE: 85 MMHG

## 2019-03-21 DIAGNOSIS — C85.14 B-CELL LYMPHOMA OF LYMPH NODES OF AXILLA, UNSPECIFIED B-CELL LYMPHOMA TYPE (H): Primary | ICD-10-CM

## 2019-03-21 DIAGNOSIS — C85.10 HIGH GRADE B-CELL LYMPHOMA (H): ICD-10-CM

## 2019-03-21 PROCEDURE — 36415 COLL VENOUS BLD VENIPUNCTURE: CPT

## 2019-03-21 PROCEDURE — G0463 HOSPITAL OUTPT CLINIC VISIT: HCPCS | Mod: ZF

## 2019-03-21 ASSESSMENT — PAIN SCALES - GENERAL: PAINLEVEL: NO PAIN (0)

## 2019-03-21 NOTE — NURSING NOTE
"Oncology Rooming Note    March 21, 2019 5:04 PM   Christiano Molina is a 43 year old male who presents for:    Chief Complaint   Patient presents with     Oncology Clinic Visit     RTN- New Eval     Initial Vitals: /85 (BP Location: Right arm, Patient Position: Sitting, Cuff Size: Adult Regular)   Pulse 108   Temp 96.7  F (35.9  C) (Oral)   Wt 79.2 kg (174 lb 9.6 oz)   SpO2 97%   BMI 25.23 kg/m   Estimated body mass index is 25.23 kg/m  as calculated from the following:    Height as of 2/17/17: 1.772 m (5' 9.75\").    Weight as of this encounter: 79.2 kg (174 lb 9.6 oz). Body surface area is 1.97 meters squared.  No Pain (0) Comment: Data Unavailable   No LMP for male patient.  Allergies reviewed: Yes  Medications reviewed: Yes    Medications: Medication refills not needed today.  Pharmacy name entered into [facility]:    [id number] - [place], [place] - [address] [place] [address] PHARMACY # [id number] - [place] - [address] [id number] - [place], [place] - [address] AT [facility] [address] [place] [address]    Clinical concerns: Next step in process    Zoie Moulton CMA                "

## 2019-03-21 NOTE — TELEPHONE ENCOUNTER
Per Dionne at [facility], they were waiting on final results before sending out the slides. Dr. Domingo will be in tomorrow morning to sign them out, and they will call me on my direct line so I can send an  asap.

## 2019-03-21 NOTE — LETTER
3/21/2019       RE: Christiano Molina  [address]  [place] [address]     Dear Colleague,    Thank you for referring your patient, Christiano Molina, to the [facility]LOOD AND MARROW TRANSPLANT at [facility]. Please see a copy of my visit note below.    [facility]      Referring Provider: self   CC: Lymphoma  HPI: 43-year-old man with a 2 through 3-week history of right axillary mass. Ultrasound showed a 4.6 x 2.9 x 6.2 cm enlarged lymph node and several smaller adjacent lymph nodes as well.  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  Neoplastic B cells were positive for CD20, CD10, BCL-6, and myc (strong) on flow/IHC, and negative for BCL-2 and MUM1. CD30 was negative.  CBC normal.  HIV negative. Myc FISH was rearranged, Myc-IgH neg. BCL2 and BCL6 not rearranged. PET 3/21/19 only 1 right axillary mass 5.4 cm, with SUV 25. Appendicolith and benign looking mesenteric node.   PMH: strasibusmus   FHx: Mother with breast cancer at age 55. One child had aplastic anemia and had a bone marrow transplant.   SHx: , non-smoker, social drinking, no illicit drug use. Grew up in [place], [place]. 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 3 kids: 4, 7, and 8 year old. Enjoys gardening, being outside. [facility].   Allergies: None   ROS: Pos for right axillary mass and 4-5 lb weight loss since Dx, otherwise negative on a 12-system review.   Medications: none    Ph/E:   Vitals: /85 (BP Location: Right arm, Patient Position: Sitting, Cuff Size: Adult Regular)   Pulse 108   Temp 96.7  F (35.9  C) (Oral)   Wt 79.2 kg (174 lb 9.6 oz)   SpO2 97%   BMI 25.23 kg/m     BMI= Body mass index is 25.23 kg/m .  ECOG PS: 0  General:  NAD; H&N: no jaundice or mucosal lesions; Lungs clear; Heart RRR; Abdomen; Soft, No organomegaly; Extremities: No edema; Skin: No rash; Neuro: Nonfocal; Mood/Affect: appropriate; Lymph: subcm right axillary node, deep and central    Assessment and Plan:  Christiano is a 43-year-old man with what appears to be likely Burkitt's lymphoma.  So far I believe his stage is IA, low risk, although the radiologist has raised a potential concern for involvement of a mesenteric node.  I do not have access to the images themselves.  His LDH is elevated and we will complete the staging with a bone marrow biopsy and LP.  He will have a transthoracic echocardiogram soon.  And we will start his treatment as soon as possible after his final pathology has been read locally and preferably here as well.  The other alternative diagnosis although I believe less likely, are diffuse large B-cell lymphoma with an MYC rearrangemen I do not think his genetic rearrangements are consistent with double hit or triple hit lymphoma.  If his diagnosis is established to be low risk Burkitt lymphoma or any of the other high risk non-Burkitt's lymphomas, we will treat him with dose adjusted R-EPOCH and depending on the stage we may give him 4 or 6 cycles along with CNS prophylaxis.  If his disease is high risk Burkitt lymphoma, we will give him the Magrath regimen.  Considering the unclear diagnosis at this point we discussed general side effects of these 2 chemotherapy regimens including the risk of infertility, hair loss, fatigue, infection, bleeding, mouth sores, nausea vomiting, diarrhea, sensory and motor neuropathy, other organ toxicity, and death.  We discussed that if he has Burkitt's we have over 90% chance of achieving a complete remission and his cure.  With other lymphomas our chance of cure is lower but still high.  If we do not manage to cure him with the first attempt then he will need a stem cell transplant for cure.  He and his wife are not  planning for any more children.  We discussed that part of the treatment will be given inpatient but we will try to do as much as possible outpatient.  We will use either a Port-A-Cath or a PICC line.  He prefers to be treated here and we will follow his wish.  We will read about the unusual history of cancers in his family including mother with breast cancer, himself with possible Burkitt's, and the  son with aplastic anemia on either the stem cell transplant.      Parts of this note were dictated using [employer].     Again, thank you for allowing me to participate in the care of your patient.      Sincerely,    Vern Ramirez MD

## 2019-03-22 ENCOUNTER — ANCILLARY PROCEDURE (OUTPATIENT)
Dept: CARDIOLOGY | Facility: CLINIC | Age: 44
End: 2019-03-22
Attending: INTERNAL MEDICINE
Payer: COMMERCIAL

## 2019-03-22 NOTE — TELEPHONE ENCOUNTER
13 slides and 2 reports received from [facility]. I personally walked them over to the [facility] pathology dept at 4:45PM. In basket sent to Dr. Ramirez so he knows they've been received.

## 2019-03-25 ENCOUNTER — OFFICE VISIT (OUTPATIENT)
Dept: ONCOLOGY | Facility: CLINIC | Age: 44
End: 2019-03-25
Attending: PHYSICIAN ASSISTANT
Payer: COMMERCIAL

## 2019-03-25 ENCOUNTER — OFFICE VISIT (OUTPATIENT)
Dept: ONCOLOGY | Facility: CLINIC | Age: 44
End: 2019-03-25
Attending: INTERNAL MEDICINE
Payer: COMMERCIAL

## 2019-03-25 ENCOUNTER — APPOINTMENT (OUTPATIENT)
Dept: LAB | Facility: CLINIC | Age: 44
End: 2019-03-25
Attending: PHYSICIAN ASSISTANT
Payer: COMMERCIAL

## 2019-03-25 VITALS
BODY MASS INDEX: 25.74 KG/M2 | RESPIRATION RATE: 16 BRPM | WEIGHT: 173.8 LBS | SYSTOLIC BLOOD PRESSURE: 132 MMHG | HEART RATE: 78 BPM | TEMPERATURE: 98.2 F | HEIGHT: 69 IN | OXYGEN SATURATION: 94 % | DIASTOLIC BLOOD PRESSURE: 77 MMHG

## 2019-03-25 VITALS
SYSTOLIC BLOOD PRESSURE: 132 MMHG | WEIGHT: 173 LBS | HEART RATE: 78 BPM | TEMPERATURE: 98.2 F | OXYGEN SATURATION: 94 % | BODY MASS INDEX: 25.62 KG/M2 | DIASTOLIC BLOOD PRESSURE: 77 MMHG | RESPIRATION RATE: 16 BRPM | HEIGHT: 69 IN

## 2019-03-25 DIAGNOSIS — R22.31 AXILLARY MASS, RIGHT: Primary | ICD-10-CM

## 2019-03-25 DIAGNOSIS — C85.90 LYMPHOMA, UNSPECIFIED BODY REGION, UNSPECIFIED LYMPHOMA TYPE (H): Primary | ICD-10-CM

## 2019-03-25 DIAGNOSIS — C85.14 B-CELL LYMPHOMA OF LYMPH NODES OF AXILLA, UNSPECIFIED B-CELL LYMPHOMA TYPE (H): ICD-10-CM

## 2019-03-25 LAB
APPEARANCE CSF: CLEAR
BASOPHILS # BLD AUTO: 0 10E9/L (ref 0–0.2)
BASOPHILS NFR BLD AUTO: 0.6 %
COLOR CSF: COLORLESS
COPATH REPORT: NORMAL
COPATH REPORT: NORMAL
DIFFERENTIAL METHOD BLD: NORMAL
EOSINOPHIL # BLD AUTO: 0 10E9/L (ref 0–0.7)
EOSINOPHIL NFR BLD AUTO: 0.4 %
ERYTHROCYTE [DISTWIDTH] IN BLOOD BY AUTOMATED COUNT: 12.2 % (ref 10–15)
GLUCOSE CSF-MCNC: 60 MG/DL (ref 40–70)
GRAM STN SPEC: NORMAL
HCT VFR BLD AUTO: 42.6 % (ref 40–53)
HGB BLD-MCNC: 14.3 G/DL (ref 13.3–17.7)
IMM GRANULOCYTES # BLD: 0 10E9/L (ref 0–0.4)
IMM GRANULOCYTES NFR BLD: 0.4 %
LYMPHOCYTES # BLD AUTO: 1.4 10E9/L (ref 0.8–5.3)
LYMPHOCYTES NFR BLD AUTO: 28.6 %
MCH RBC QN AUTO: 29.7 PG (ref 26.5–33)
MCHC RBC AUTO-ENTMCNC: 33.6 G/DL (ref 31.5–36.5)
MCV RBC AUTO: 88 FL (ref 78–100)
MONOCYTES # BLD AUTO: 0.3 10E9/L (ref 0–1.3)
MONOCYTES NFR BLD AUTO: 6.5 %
NEUTROPHILS # BLD AUTO: 3.1 10E9/L (ref 1.6–8.3)
NEUTROPHILS NFR BLD AUTO: 63.5 %
NRBC # BLD AUTO: 0 10*3/UL
NRBC BLD AUTO-RTO: 0 /100
PLATELET # BLD AUTO: 182 10E9/L (ref 150–450)
PROT CSF-MCNC: 42 MG/DL (ref 15–60)
RBC # BLD AUTO: 4.82 10E12/L (ref 4.4–5.9)
RBC # CSF MANUAL: 0 /UL (ref 0–2)
SPECIMEN SOURCE: NORMAL
TUBE # CSF: 3 #
WBC # BLD AUTO: 4.9 10E9/L (ref 4–11)
WBC # CSF MANUAL: 1 /UL (ref 0–5)

## 2019-03-25 PROCEDURE — 38222 DX BONE MARROW BX & ASPIR: CPT | Mod: ZF | Performed by: PHYSICIAN ASSISTANT

## 2019-03-25 PROCEDURE — 87070 CULTURE OTHR SPECIMN AEROBIC: CPT | Performed by: PHYSICIAN ASSISTANT

## 2019-03-25 PROCEDURE — 84157 ASSAY OF PROTEIN OTHER: CPT | Performed by: PHYSICIAN ASSISTANT

## 2019-03-25 PROCEDURE — 85025 COMPLETE CBC W/AUTO DIFF WBC: CPT | Performed by: PHYSICIAN ASSISTANT

## 2019-03-25 PROCEDURE — 88237 TISSUE CULTURE BONE MARROW: CPT | Performed by: INTERNAL MEDICINE

## 2019-03-25 PROCEDURE — 88184 FLOWCYTOMETRY/ TC 1 MARKER: CPT | Performed by: PHYSICIAN ASSISTANT

## 2019-03-25 PROCEDURE — 88185 FLOWCYTOMETRY/TC ADD-ON: CPT | Performed by: INTERNAL MEDICINE

## 2019-03-25 PROCEDURE — 88342 IMHCHEM/IMCYTCHM 1ST ANTB: CPT | Performed by: INTERNAL MEDICINE

## 2019-03-25 PROCEDURE — 88305 TISSUE EXAM BY PATHOLOGIST: CPT | Performed by: PHYSICIAN ASSISTANT

## 2019-03-25 PROCEDURE — 88313 SPECIAL STAINS GROUP 2: CPT | Performed by: PHYSICIAN ASSISTANT

## 2019-03-25 PROCEDURE — 87015 SPECIMEN INFECT AGNT CONCNTJ: CPT | Performed by: PHYSICIAN ASSISTANT

## 2019-03-25 PROCEDURE — 00000058 ZZHCL STATISTIC BONE MARROW ASP PERF TC 38220: Performed by: PHYSICIAN ASSISTANT

## 2019-03-25 PROCEDURE — 40001004 ZZHCL STATISTIC FLOW INT 9-15 ABY TC 88188: Performed by: INTERNAL MEDICINE

## 2019-03-25 PROCEDURE — 25000128 H RX IP 250 OP 636: Mod: ZF | Performed by: PHYSICIAN ASSISTANT

## 2019-03-25 PROCEDURE — 00000161 ZZHCL STATISTIC H-SPHEME PROCESS B/S: Performed by: PHYSICIAN ASSISTANT

## 2019-03-25 PROCEDURE — 40001004 ZZHCL STATISTIC FLOW INT 9-15 ABY TC 88188: Performed by: PHYSICIAN ASSISTANT

## 2019-03-25 PROCEDURE — 88271 CYTOGENETICS DNA PROBE: CPT | Performed by: INTERNAL MEDICINE

## 2019-03-25 PROCEDURE — 88275 CYTOGENETICS 100-300: CPT | Performed by: INTERNAL MEDICINE

## 2019-03-25 PROCEDURE — 96374 THER/PROPH/DIAG INJ IV PUSH: CPT | Mod: 59

## 2019-03-25 PROCEDURE — G0463 HOSPITAL OUTPT CLINIC VISIT: HCPCS | Mod: ZF

## 2019-03-25 PROCEDURE — 89050 BODY FLUID CELL COUNT: CPT | Performed by: PHYSICIAN ASSISTANT

## 2019-03-25 PROCEDURE — 00000346 ZZHCL STATISTIC REVIEW OUTSIDE SLIDES TC 88321: Performed by: INTERNAL MEDICINE

## 2019-03-25 PROCEDURE — 62270 DX LMBR SPI PNXR: CPT | Mod: ZF | Performed by: PHYSICIAN ASSISTANT

## 2019-03-25 PROCEDURE — 88264 CHROMOSOME ANALYSIS 20-25: CPT | Performed by: INTERNAL MEDICINE

## 2019-03-25 PROCEDURE — 88365 INSITU HYBRIDIZATION (FISH): CPT | Performed by: INTERNAL MEDICINE

## 2019-03-25 PROCEDURE — 82945 GLUCOSE OTHER FLUID: CPT | Performed by: PHYSICIAN ASSISTANT

## 2019-03-25 PROCEDURE — 88184 FLOWCYTOMETRY/ TC 1 MARKER: CPT | Performed by: INTERNAL MEDICINE

## 2019-03-25 PROCEDURE — 88185 FLOWCYTOMETRY/TC ADD-ON: CPT | Performed by: PHYSICIAN ASSISTANT

## 2019-03-25 PROCEDURE — 88161 CYTOPATH SMEAR OTHER SOURCE: CPT | Performed by: PHYSICIAN ASSISTANT

## 2019-03-25 PROCEDURE — 88341 IMHCHEM/IMCYTCHM EA ADD ANTB: CPT | Performed by: INTERNAL MEDICINE

## 2019-03-25 PROCEDURE — 40000951 ZZHCL STATISTIC BONE MARROW INTERP TC 85097: Performed by: PHYSICIAN ASSISTANT

## 2019-03-25 PROCEDURE — 88311 DECALCIFY TISSUE: CPT | Performed by: PHYSICIAN ASSISTANT

## 2019-03-25 PROCEDURE — 87205 SMEAR GRAM STAIN: CPT | Performed by: PHYSICIAN ASSISTANT

## 2019-03-25 PROCEDURE — 40000424 ZZHCL STATISTIC BONE MARROW CORE PERF TC 38221: Performed by: PHYSICIAN ASSISTANT

## 2019-03-25 PROCEDURE — 88280 CHROMOSOME KARYOTYPE STUDY: CPT | Performed by: INTERNAL MEDICINE

## 2019-03-25 PROCEDURE — 40000795 ZZHCL STATISTIC DNA PROCESS AND HOLD: Performed by: PHYSICIAN ASSISTANT

## 2019-03-25 PROCEDURE — 40000611 ZZHCL STATISTIC MORPHOLOGY W/INTERP HEMEPATH TC 85060: Performed by: PHYSICIAN ASSISTANT

## 2019-03-25 RX ORDER — LIDOCAINE HYDROCHLORIDE 10 MG/ML
4 INJECTION, SOLUTION EPIDURAL; INFILTRATION; INTRACAUDAL; PERINEURAL ONCE
Status: DISCONTINUED | OUTPATIENT
Start: 2019-03-25 | End: 2019-03-25 | Stop reason: HOSPADM

## 2019-03-25 RX ADMIN — MIDAZOLAM 2 MG: 1 INJECTION INTRAMUSCULAR; INTRAVENOUS at 08:07

## 2019-03-25 ASSESSMENT — MIFFLIN-ST. JEOR
SCORE: 1675.1
SCORE: 1678.73

## 2019-03-25 ASSESSMENT — PAIN SCALES - GENERAL
PAINLEVEL: NO PAIN (0)
PAINLEVEL: NO PAIN (0)

## 2019-03-25 NOTE — LETTER
3/25/2019       RE: Christiano Molina  [address]  [place] [address]     Dear Colleague,    Thank you for referring your patient, Christiano Molina, to the [facility]. Please see a copy of my visit note below.    BMT ONC Adult Lumbar Puncture Procedure Note    March 25, 2019    Procedure: Lumbar Puncture     Diagnosis: Lymphoma    Learning needs assessment complete within 12 months: YES     Vitals reviewed:  YES    Informed Consent: Procedure, benefits, risks, and alternatives explained to the patient who voiced understanding of the information and agreed to proceed with lumbar puncture. Risks include bleeding, headache, and or infection.   Patient safety checklist completed.    Labs: Reviewed:     No results found for: INR, PLT    Description:. The patient was seated at the bedside with knees dangling. The L4-5 disc space was prepped and draped in a sterile fashion. Local anesthesia with 5mL 1% preservative-free lidocaine was used. A 22 gauge, 4 inch needle was placed on the first  attempt.  7 mL spinal fluid collected. Specimen appears clear. Specimen sent for cell count and differential, protein, glucose, immunophenotyping, gram stain and culture.     Post-procedure pain assessment: 0 out of 10 on the numeric pain rating scale    Interventions: No    Complications: No     Disposition: Patient will remain on back for 1 hour post procedure. Avoid soaking in a tub tonight.  Call if develops headaches, fevers and or chills.     Performed by:   Mary Chavis    Checked band aid after lumbar puncture and Biopsy, both looked dry, no bleeding.   IV removed successfully without any complications. Zoie Moulton CMA      Again, thank you for allowing me to participate in the care of your patient.      Sincerely,    Mary Chavis PA-C

## 2019-03-25 NOTE — NURSING NOTE
"Oncology Rooming Note    March 25, 2019 7:10 AM   Chrsitiano Molina is a 43 year old male who presents for:    Chief Complaint   Patient presents with     Oncology Clinic Visit     BMBX     Initial Vitals: /77   Pulse 78   Temp 98.2  F (36.8  C) (Oral)   Resp 16   Ht 1.753 m (5' 9\")   Wt 78.8 kg (173 lb 12.8 oz)   SpO2 94%   BMI 25.67 kg/m   Estimated body mass index is 25.67 kg/m  as calculated from the following:    Height as of this encounter: 1.753 m (5' 9\").    Weight as of this encounter: 78.8 kg (173 lb 12.8 oz). Body surface area is 1.96 meters squared.  No Pain (0) Comment: Data Unavailable   No LMP for male patient.  Allergies reviewed: Yes  Medications reviewed: Yes    Medications: Medication refills not needed today.  Pharmacy name entered into [employer]:    [id number] - [place], [place] - [address] [place] [address] PHARMACY # [id number] - [place] - [address] [facility] - [place], [place] - [address] AT [facility] OF [place] [address]    Clinical concerns: No New Concerns    DONNA Mason  "

## 2019-03-25 NOTE — PROGRESS NOTES
BMT ONC Adult Lumbar Puncture Procedure Note    March 25, 2019    Procedure: Lumbar Puncture     Diagnosis: Lymphoma    Learning needs assessment complete within 12 months: YES     Vitals reviewed:  YES    Informed Consent: Procedure, benefits, risks, and alternatives explained to the patient who voiced understanding of the information and agreed to proceed with lumbar puncture. Risks include bleeding, headache, and or infection.   Patient safety checklist completed.    Labs: Reviewed:     No results found for: INR, PLT    Description:. The patient was seated at the bedside with knees dangling. The L4-5 disc space was prepped and draped in a sterile fashion. Local anesthesia with 5mL 1% preservative-free lidocaine was used. A 22 gauge, 4 inch needle was placed on the first  attempt.  7 mL spinal fluid collected. Specimen appears clear. Specimen sent for cell count and differential, protein, glucose, immunophenotyping, gram stain and culture.     Post-procedure pain assessment: 0 out of 10 on the numeric pain rating scale    Interventions: No    Complications: No     Disposition: Patient will remain on back for 1 hour post procedure. Avoid soaking in a tub tonight.  Call if develops headaches, fevers and or chills.     Performed by:   Mary Chavis

## 2019-03-25 NOTE — LETTER
"3/25/2019      RE: Christiano Molina  [address]  [place] [address]       BMT ONC Adult Bone Marrow Biopsy Procedure Note  March 25, 2019  /77   Pulse 78   Temp 98.2  F (36.8  C) (Oral)   Resp 16   Ht 1.753 m (5' 9\")   Wt 78.8 kg (173 lb 12.8 oz)   SpO2 94%   BMI 25.67 kg/m        Learning needs assessment complete within 12 months? YES    DIAGNOSIS: lymphoma    PROCEDURE: Unilateral Bone Marrow Biopsy and Unilateral Aspirate    LOCATION: [facility] 2nd Floor    Patient s identification was positively verified by verbal identification and invasive procedure safety checklist was completed. Informed consent was obtained. Following the administration of Midazolam  2 mg as pre-medication, patient was placed in the prone position and prepped and draped in a sterile manner. Approximately 10 cc of 1% Lidocaine was used over the left posterior iliac spine. Following this a 3 mm incision was made. Trephine bone marrow core(s) was (were) obtained from the [facility]. Bone marrow aspirates were obtained from the [facility]. Aspirates were sent for morphology, immunophenotyping, cytogenetics and molecular diagnostics. A total of approximately 30 ml of marrow was aspirated. Following this procedure a sterile dressing was applied to the bone marrow biopsy site(s). The patient was placed in the supine position to maintain pressure on the biopsy site. Post-procedure wound care instructions were given.     Complications: NO    Pre-procedural pain: 0 out of 10 on the numeric pain rating scale.     Procedural pain: 3 out of 10 on the numeric pain rating scale.     Post-procedural pain assessment: 0 out of 10 on the numeric pain rating scale.     Interventions: NO    Length of procedure:20 minutes or less      Procedure performed by: Dorina Olivia PA-C      "

## 2019-03-25 NOTE — PROGRESS NOTES
"BMT ONC Adult Bone Marrow Biopsy Procedure Note  March 25, 2019  /77   Pulse 78   Temp 98.2  F (36.8  C) (Oral)   Resp 16   Ht 1.753 m (5' 9\")   Wt 78.8 kg (173 lb 12.8 oz)   SpO2 94%   BMI 25.67 kg/m       Learning needs assessment complete within 12 months? YES    DIAGNOSIS: lymphoma    PROCEDURE: Unilateral Bone Marrow Biopsy and Unilateral Aspirate    LOCATION: [facility] [place] [facility]    Patient s identification was positively verified by verbal identification and invasive procedure safety checklist was completed. Informed consent was obtained. Following the administration of Midazolam  2 mg as pre-medication, patient was placed in the prone position and prepped and draped in a sterile manner. Approximately 10 cc of 1% Lidocaine was used over the left posterior iliac spine. Following this a 3 mm incision was made. Trephine bone marrow core(s) was (were) obtained from the [facility]. Bone marrow aspirates were obtained from the [facility]. Aspirates were sent for morphology, immunophenotyping, cytogenetics and molecular diagnostics. A total of approximately 30 ml of marrow was aspirated. Following this procedure a sterile dressing was applied to the bone marrow biopsy site(s). The patient was placed in the supine position to maintain pressure on the biopsy site. Post-procedure wound care instructions were given.     Complications: NO    Pre-procedural pain: 0 out of 10 on the numeric pain rating scale.     Procedural pain: 3 out of 10 on the numeric pain rating scale.     Post-procedural pain assessment: 0 out of 10 on the numeric pain rating scale.     Interventions: NO    Length of procedure:20 minutes or less      Procedure performed by: Dorina Olivia PA-C      "

## 2019-03-25 NOTE — NURSING NOTE
"Oncology Rooming Note    March 25, 2019 10:57 AM   Christiano Molina is a 43 year old male who presents for:    Chief Complaint   Patient presents with     Oncology Clinic Visit     LUMBAR PUNCTURE     Initial Vitals: /77   Pulse 78   Temp 98.2  F (36.8  C) (Oral)   Resp 16   Ht 1.753 m (5' 9\")   Wt 78.5 kg (173 lb)   SpO2 94%   BMI 25.55 kg/m   Estimated body mass index is 25.55 kg/m  as calculated from the following:    Height as of this encounter: 1.753 m (5' 9\").    Weight as of this encounter: 78.5 kg (173 lb). Body surface area is 1.95 meters squared.  No Pain (0) Comment: Data Unavailable   No LMP for male patient.  Allergies reviewed: Yes  Medications reviewed: Yes    Medications: Medication refills not needed today.  Pharmacy name entered into EPIC:    [id number] - [place], [place] - [address] [place] [address]  [id number] - [place] - [address] [facility] - [place], [place] - [address] AT [facility] [address] [place] [address]    Clinical concerns: No New Concerns    DONNA Mason  "

## 2019-03-25 NOTE — NURSING NOTE
BMT Teaching Flowsheet    Christiano Molina is a 43 year old male  There were no encounter diagnoses.    Teaching Topic: pre and post care for bone marrow biopsy including signs and symptoms of infection, pain control, and wound care. Patient knows to keep dressing clean, dry, and intact, as well as when to call or go to the hospital. Patient knows no driving today after procedure as well.     Person(s) involved in teaching: Patient  Motivation Level  Asks Questions: Yes  Eager to Learn: Yes  Cooperative: Yes  Receptive (willing/able to accept information): Yes  Any cultural factors/[place] beliefs that may influence understanding or compliance? No    Patient demonstrates understanding of the following:  - Reason for the appointment, diagnosis and treatment plan: Yes  - Knowledge of proper use of medications and conditions for which they are ordered (with special attention to potential side effects or drug interactions): Yes  - Which situations necessitate calling provider and whom to contact: Yes    Teaching concerns addressed: all questions and concerns were answered for the patient    Proper use and care of (medical equipment, care aids, etc.) Yes  Pain management techniques: Yes  Patient instructed on hand hygiene: Yes  How and/when to access community resources: Yes    Infection Control:  Patient demonstrates understanding of the following:  Surgical procedure site care taught Yes  Signs and symptoms of infection taught Yes  Wound care taught Yes  Central venous catheter care taught Yes    Instructional Materials Used/Given: verbal and written instructions were given to the patient      Patient was given Iv Versad with provider at bedside, call light within reach, side rails up, and wheels locked.     Patient supine for 30 minutes following biopsy. After 30 minutes, dressing clean, dry and intact. Vital signs stable. See DOC flow sheets for details. Left ambulatory with family member.

## 2019-03-26 ENCOUNTER — MYC MEDICAL ADVICE (OUTPATIENT)
Dept: ONCOLOGY | Facility: CLINIC | Age: 44
End: 2019-03-26

## 2019-03-26 PROBLEM — C85.10 HIGH GRADE B-CELL LYMPHOMA (H): Status: ACTIVE | Noted: 2019-03-26

## 2019-03-26 LAB
COPATH REPORT: NORMAL
COPATH REPORT: NORMAL

## 2019-03-26 RX ORDER — AMOXICILLIN 250 MG
2 CAPSULE ORAL 2 TIMES DAILY
Status: CANCELLED | OUTPATIENT
Start: 2019-03-27

## 2019-03-26 RX ORDER — LORAZEPAM 2 MG/ML
.5-1 INJECTION INTRAMUSCULAR EVERY 6 HOURS PRN
Status: CANCELLED | OUTPATIENT
Start: 2019-03-27

## 2019-03-26 RX ORDER — LORAZEPAM 0.5 MG/1
.5-1 TABLET ORAL EVERY 6 HOURS PRN
Status: CANCELLED
Start: 2019-03-27

## 2019-03-26 RX ORDER — METHYLPREDNISOLONE SODIUM SUCCINATE 125 MG/2ML
125 INJECTION, POWDER, LYOPHILIZED, FOR SOLUTION INTRAMUSCULAR; INTRAVENOUS
Status: CANCELLED
Start: 2019-03-27

## 2019-03-26 RX ORDER — ACETAMINOPHEN 325 MG/1
650 TABLET ORAL ONCE
Status: CANCELLED
Start: 2019-03-27

## 2019-03-26 RX ORDER — MEPERIDINE HYDROCHLORIDE 25 MG/ML
25 INJECTION INTRAMUSCULAR; INTRAVENOUS; SUBCUTANEOUS EVERY 30 MIN PRN
Status: CANCELLED | OUTPATIENT
Start: 2019-03-27

## 2019-03-26 RX ORDER — ONDANSETRON 8 MG/1
16 TABLET, FILM COATED ORAL EVERY 24 HOURS
Status: CANCELLED
Start: 2019-03-28

## 2019-03-26 RX ORDER — ALBUTEROL SULFATE 0.83 MG/ML
2.5 SOLUTION RESPIRATORY (INHALATION)
Status: CANCELLED | OUTPATIENT
Start: 2019-03-27

## 2019-03-26 RX ORDER — DEXAMETHASONE 4 MG/1
8 TABLET ORAL DAILY
Status: CANCELLED
Start: 2019-04-02

## 2019-03-26 RX ORDER — ALLOPURINOL 300 MG/1
300 TABLET ORAL DAILY
Status: CANCELLED | OUTPATIENT
Start: 2019-03-27

## 2019-03-26 RX ORDER — DIPHENHYDRAMINE HYDROCHLORIDE 50 MG/ML
50 INJECTION INTRAMUSCULAR; INTRAVENOUS
Status: CANCELLED
Start: 2019-03-27

## 2019-03-26 RX ORDER — DIPHENHYDRAMINE HCL 25 MG
50 CAPSULE ORAL ONCE
Status: CANCELLED
Start: 2019-03-27

## 2019-03-26 RX ORDER — ALBUTEROL SULFATE 90 UG/1
1-2 AEROSOL, METERED RESPIRATORY (INHALATION)
Status: CANCELLED
Start: 2019-03-27

## 2019-03-26 RX ORDER — SODIUM CHLORIDE 9 MG/ML
1000 INJECTION, SOLUTION INTRAVENOUS CONTINUOUS PRN
Status: CANCELLED
Start: 2019-03-27

## 2019-03-26 RX ORDER — EPINEPHRINE 1 MG/ML
0.3 INJECTION, SOLUTION INTRAMUSCULAR; SUBCUTANEOUS EVERY 5 MIN PRN
Status: CANCELLED | OUTPATIENT
Start: 2019-03-27

## 2019-03-26 RX ORDER — PROCHLORPERAZINE MALEATE 10 MG
10 TABLET ORAL EVERY 6 HOURS PRN
Status: CANCELLED
Start: 2019-03-27

## 2019-03-26 NOTE — TELEPHONE ENCOUNTER
Writer discussed symptomatic MyChart with ADRIANO Young. Per Dorina, recommend pt push fluids and continue drinking caffeine. Recommend remaining in lying position as much as possible today. If needed tomorrow when admitted, can receive IV caffeine. If pain is uncontrolled patient can proceed to ER. Writer contacted pt with recommendations, agrees to plan.     Barbara Dumont RN   [facility] Triage

## 2019-03-27 ENCOUNTER — HOSPITAL ENCOUNTER (INPATIENT)
Facility: CLINIC | Age: 44
LOS: 4 days | Discharge: HOME | DRG: 847 | End: 2019-03-31
Attending: INTERNAL MEDICINE | Admitting: INTERNAL MEDICINE
Payer: COMMERCIAL

## 2019-03-27 ENCOUNTER — HOSPITAL ENCOUNTER (OUTPATIENT)
Dept: GENERAL RADIOLOGY | Facility: CLINIC | Age: 44
DRG: 847 | End: 2019-03-27
Attending: INTERNAL MEDICINE | Admitting: INTERNAL MEDICINE
Payer: COMMERCIAL

## 2019-03-27 ENCOUNTER — HOSPITAL ENCOUNTER (OUTPATIENT)
Dept: VASCULAR ULTRASOUND | Facility: CLINIC | Age: 44
DRG: 847 | End: 2019-03-27
Attending: INTERNAL MEDICINE
Payer: COMMERCIAL

## 2019-03-27 ENCOUNTER — DOCUMENTATION ONLY (OUTPATIENT)
Dept: OTHER | Facility: CLINIC | Age: 44
End: 2019-03-27

## 2019-03-27 DIAGNOSIS — C85.14 B-CELL LYMPHOMA OF LYMPH NODES OF AXILLA, UNSPECIFIED B-CELL LYMPHOMA TYPE (H): ICD-10-CM

## 2019-03-27 DIAGNOSIS — C85.10 HIGH GRADE B-CELL LYMPHOMA (H): Primary | ICD-10-CM

## 2019-03-27 DIAGNOSIS — C85.90: ICD-10-CM

## 2019-03-27 LAB
ALBUMIN SERPL-MCNC: 3.7 G/DL (ref 3.4–5)
ALP SERPL-CCNC: 44 U/L (ref 40–150)
ALT SERPL W P-5'-P-CCNC: 18 U/L (ref 0–70)
ANION GAP SERPL CALCULATED.3IONS-SCNC: 7 MMOL/L (ref 3–14)
AST SERPL W P-5'-P-CCNC: 13 U/L (ref 0–45)
BASOPHILS # BLD AUTO: 0 10E9/L (ref 0–0.2)
BASOPHILS NFR BLD AUTO: 0.5 %
BILIRUB SERPL-MCNC: 0.3 MG/DL (ref 0.2–1.3)
BUN SERPL-MCNC: 13 MG/DL (ref 7–30)
CALCIUM SERPL-MCNC: 9.1 MG/DL (ref 8.5–10.1)
CHLORIDE SERPL-SCNC: 106 MMOL/L (ref 94–109)
CO2 SERPL-SCNC: 27 MMOL/L (ref 20–32)
CREAT SERPL-MCNC: 1.08 MG/DL (ref 0.66–1.25)
DIFFERENTIAL METHOD BLD: NORMAL
EOSINOPHIL # BLD AUTO: 0 10E9/L (ref 0–0.7)
EOSINOPHIL NFR BLD AUTO: 0.4 %
ERYTHROCYTE [DISTWIDTH] IN BLOOD BY AUTOMATED COUNT: 12.3 % (ref 10–15)
GFR SERPL CREATININE-BSD FRML MDRD: 84 ML/MIN/{1.73_M2}
GLUCOSE SERPL-MCNC: 96 MG/DL (ref 70–99)
HCT VFR BLD AUTO: 42.6 % (ref 40–53)
HGB BLD-MCNC: 14.2 G/DL (ref 13.3–17.7)
IMM GRANULOCYTES # BLD: 0 10E9/L (ref 0–0.4)
IMM GRANULOCYTES NFR BLD: 0.2 %
LDH SERPL L TO P-CCNC: 241 U/L (ref 85–227)
LYMPHOCYTES # BLD AUTO: 1 10E9/L (ref 0.8–5.3)
LYMPHOCYTES NFR BLD AUTO: 17.8 %
MAGNESIUM SERPL-MCNC: 2.1 MG/DL (ref 1.6–2.3)
MCH RBC QN AUTO: 30.4 PG (ref 26.5–33)
MCHC RBC AUTO-ENTMCNC: 33.3 G/DL (ref 31.5–36.5)
MCV RBC AUTO: 91 FL (ref 78–100)
MONOCYTES # BLD AUTO: 0.4 10E9/L (ref 0–1.3)
MONOCYTES NFR BLD AUTO: 7.6 %
NEUTROPHILS # BLD AUTO: 4.1 10E9/L (ref 1.6–8.3)
NEUTROPHILS NFR BLD AUTO: 73.5 %
NRBC # BLD AUTO: 0 10*3/UL
NRBC BLD AUTO-RTO: 0 /100
PHOSPHATE SERPL-MCNC: 2.3 MG/DL (ref 2.5–4.5)
PLATELET # BLD AUTO: 167 10E9/L (ref 150–450)
POTASSIUM SERPL-SCNC: 4 MMOL/L (ref 3.4–5.3)
PROT SERPL-MCNC: 7.4 G/DL (ref 6.8–8.8)
RBC # BLD AUTO: 4.67 10E12/L (ref 4.4–5.9)
SODIUM SERPL-SCNC: 139 MMOL/L (ref 133–144)
URATE SERPL-MCNC: 3.6 MG/DL (ref 3.5–7.2)
WBC # BLD AUTO: 5.5 10E9/L (ref 4–11)

## 2019-03-27 PROCEDURE — 85025 COMPLETE CBC W/AUTO DIFF WBC: CPT | Performed by: PHYSICIAN ASSISTANT

## 2019-03-27 PROCEDURE — 80053 COMPREHEN METABOLIC PANEL: CPT | Performed by: PHYSICIAN ASSISTANT

## 2019-03-27 PROCEDURE — 25000132 ZZH RX MED GY IP 250 OP 250 PS 637: Performed by: INTERNAL MEDICINE

## 2019-03-27 PROCEDURE — 25000131 ZZH RX MED GY IP 250 OP 636 PS 637: Performed by: INTERNAL MEDICINE

## 2019-03-27 PROCEDURE — 83735 ASSAY OF MAGNESIUM: CPT | Performed by: PHYSICIAN ASSISTANT

## 2019-03-27 PROCEDURE — 36569 INSJ PICC 5 YR+ W/O IMAGING: CPT

## 2019-03-27 PROCEDURE — 25000125 ZZHC RX 250: Performed by: PHYSICIAN ASSISTANT

## 2019-03-27 PROCEDURE — 36592 COLLECT BLOOD FROM PICC: CPT | Performed by: PHYSICIAN ASSISTANT

## 2019-03-27 PROCEDURE — 25000128 H RX IP 250 OP 636: Performed by: INTERNAL MEDICINE

## 2019-03-27 PROCEDURE — 25800030 ZZH RX IP 258 OP 636: Performed by: PHYSICIAN ASSISTANT

## 2019-03-27 PROCEDURE — 80053 COMPREHEN METABOLIC PANEL: CPT | Performed by: INTERNAL MEDICINE

## 2019-03-27 PROCEDURE — 25000132 ZZH RX MED GY IP 250 OP 250 PS 637: Performed by: PHYSICIAN ASSISTANT

## 2019-03-27 PROCEDURE — 83615 LACTATE (LD) (LDH) ENZYME: CPT | Performed by: PHYSICIAN ASSISTANT

## 2019-03-27 PROCEDURE — 3E04305 INTRODUCTION OF OTHER ANTINEOPLASTIC INTO CENTRAL VEIN, PERCUTANEOUS APPROACH: ICD-10-PCS | Performed by: INTERNAL MEDICINE

## 2019-03-27 PROCEDURE — 27211417 ZZ H KIT, VPS RHYTHM STYLET

## 2019-03-27 PROCEDURE — 84100 ASSAY OF PHOSPHORUS: CPT | Performed by: PHYSICIAN ASSISTANT

## 2019-03-27 PROCEDURE — 25800030 ZZH RX IP 258 OP 636: Performed by: INTERNAL MEDICINE

## 2019-03-27 PROCEDURE — 84550 ASSAY OF BLOOD/URIC ACID: CPT | Performed by: PHYSICIAN ASSISTANT

## 2019-03-27 PROCEDURE — 27211389 ZZ H KIT, 5 FR DL BIOFLO OPEN ENDED PICC

## 2019-03-27 PROCEDURE — 12000001 ZZH R&B MED SURG/OB UMMC

## 2019-03-27 PROCEDURE — 3E0430M INTRODUCTION OF MONOCLONAL ANTIBODY INTO CENTRAL VEIN, PERCUTANEOUS APPROACH: ICD-10-PCS | Performed by: INTERNAL MEDICINE

## 2019-03-27 PROCEDURE — 25000125 ZZHC RX 250: Performed by: INTERNAL MEDICINE

## 2019-03-27 PROCEDURE — 25000128 H RX IP 250 OP 636: Performed by: PHYSICIAN ASSISTANT

## 2019-03-27 PROCEDURE — 40000986 XR CHEST 1 VW

## 2019-03-27 RX ORDER — MAGNESIUM SULFATE HEPTAHYDRATE 40 MG/ML
4 INJECTION, SOLUTION INTRAVENOUS EVERY 4 HOURS PRN
Status: DISCONTINUED | OUTPATIENT
Start: 2019-03-27 | End: 2019-03-31 | Stop reason: HOSPADM

## 2019-03-27 RX ORDER — POTASSIUM CHLORIDE 29.8 MG/ML
20 INJECTION INTRAVENOUS
Status: DISCONTINUED | OUTPATIENT
Start: 2019-03-27 | End: 2019-03-31 | Stop reason: HOSPADM

## 2019-03-27 RX ORDER — AMOXICILLIN 250 MG
2 CAPSULE ORAL 2 TIMES DAILY
Status: DISCONTINUED | OUTPATIENT
Start: 2019-03-27 | End: 2019-03-31 | Stop reason: HOSPADM

## 2019-03-27 RX ORDER — LANOLIN ALCOHOL/MO/W.PET/CERES
3 CREAM (GRAM) TOPICAL
Status: DISCONTINUED | OUTPATIENT
Start: 2019-03-27 | End: 2019-03-31 | Stop reason: HOSPADM

## 2019-03-27 RX ORDER — DIPHENHYDRAMINE HYDROCHLORIDE 50 MG/ML
50 INJECTION INTRAMUSCULAR; INTRAVENOUS
Status: DISCONTINUED | OUTPATIENT
Start: 2019-03-27 | End: 2019-03-31 | Stop reason: HOSPADM

## 2019-03-27 RX ORDER — HEPARIN SODIUM,PORCINE 10 UNIT/ML
2-5 VIAL (ML) INTRAVENOUS
Status: DISCONTINUED | OUTPATIENT
Start: 2019-03-27 | End: 2019-03-27

## 2019-03-27 RX ORDER — HEPARIN SODIUM (PORCINE) LOCK FLUSH IV SOLN 100 UNIT/ML 100 UNIT/ML
5 SOLUTION INTRAVENOUS
Status: DISCONTINUED | OUTPATIENT
Start: 2019-03-27 | End: 2019-03-31 | Stop reason: HOSPADM

## 2019-03-27 RX ORDER — EPINEPHRINE 1 MG/ML
0.3 INJECTION, SOLUTION, CONCENTRATE INTRAVENOUS EVERY 5 MIN PRN
Status: DISCONTINUED | OUTPATIENT
Start: 2019-03-27 | End: 2019-03-31 | Stop reason: HOSPADM

## 2019-03-27 RX ORDER — ALBUTEROL SULFATE 90 UG/1
1-2 AEROSOL, METERED RESPIRATORY (INHALATION)
Status: DISCONTINUED | OUTPATIENT
Start: 2019-03-27 | End: 2019-03-31 | Stop reason: HOSPADM

## 2019-03-27 RX ORDER — POTASSIUM CL/LIDO/0.9 % NACL 10MEQ/0.1L
10 INTRAVENOUS SOLUTION, PIGGYBACK (ML) INTRAVENOUS
Status: DISCONTINUED | OUTPATIENT
Start: 2019-03-27 | End: 2019-03-31 | Stop reason: HOSPADM

## 2019-03-27 RX ORDER — MEPERIDINE HYDROCHLORIDE 25 MG/ML
25 INJECTION INTRAMUSCULAR; INTRAVENOUS; SUBCUTANEOUS EVERY 30 MIN PRN
Status: DISCONTINUED | OUTPATIENT
Start: 2019-03-27 | End: 2019-03-31 | Stop reason: HOSPADM

## 2019-03-27 RX ORDER — POTASSIUM CHLORIDE 1.5 G/1.58G
20-40 POWDER, FOR SOLUTION ORAL
Status: DISCONTINUED | OUTPATIENT
Start: 2019-03-27 | End: 2019-03-31 | Stop reason: HOSPADM

## 2019-03-27 RX ORDER — DIPHENHYDRAMINE HCL 50 MG
50 CAPSULE ORAL ONCE
Status: COMPLETED | OUTPATIENT
Start: 2019-03-27 | End: 2019-03-27

## 2019-03-27 RX ORDER — HEPARIN SODIUM,PORCINE 10 UNIT/ML
5-10 VIAL (ML) INTRAVENOUS EVERY 24 HOURS
Status: DISCONTINUED | OUTPATIENT
Start: 2019-03-27 | End: 2019-03-27

## 2019-03-27 RX ORDER — SODIUM CHLORIDE 9 MG/ML
1000 INJECTION, SOLUTION INTRAVENOUS CONTINUOUS PRN
Status: DISCONTINUED | OUTPATIENT
Start: 2019-03-27 | End: 2019-03-31 | Stop reason: HOSPADM

## 2019-03-27 RX ORDER — LORAZEPAM 0.5 MG/1
.5-1 TABLET ORAL EVERY 6 HOURS PRN
Status: DISCONTINUED | OUTPATIENT
Start: 2019-03-27 | End: 2019-03-31 | Stop reason: HOSPADM

## 2019-03-27 RX ORDER — POTASSIUM CHLORIDE 7.45 MG/ML
10 INJECTION INTRAVENOUS
Status: DISCONTINUED | OUTPATIENT
Start: 2019-03-27 | End: 2019-03-31 | Stop reason: HOSPADM

## 2019-03-27 RX ORDER — ACETAMINOPHEN 325 MG/1
650 TABLET ORAL EVERY 4 HOURS PRN
Status: DISCONTINUED | OUTPATIENT
Start: 2019-03-27 | End: 2019-03-31 | Stop reason: HOSPADM

## 2019-03-27 RX ORDER — ACYCLOVIR 200 MG/1
400 CAPSULE ORAL 2 TIMES DAILY
Status: DISCONTINUED | OUTPATIENT
Start: 2019-03-27 | End: 2019-03-31 | Stop reason: HOSPADM

## 2019-03-27 RX ORDER — ALLOPURINOL 300 MG/1
300 TABLET ORAL DAILY
Status: DISCONTINUED | OUTPATIENT
Start: 2019-03-27 | End: 2019-03-31 | Stop reason: HOSPADM

## 2019-03-27 RX ORDER — POTASSIUM CHLORIDE 750 MG/1
20-40 TABLET, EXTENDED RELEASE ORAL
Status: DISCONTINUED | OUTPATIENT
Start: 2019-03-27 | End: 2019-03-31 | Stop reason: HOSPADM

## 2019-03-27 RX ORDER — NALOXONE HYDROCHLORIDE 0.4 MG/ML
.1-.4 INJECTION, SOLUTION INTRAMUSCULAR; INTRAVENOUS; SUBCUTANEOUS
Status: DISCONTINUED | OUTPATIENT
Start: 2019-03-27 | End: 2019-03-31 | Stop reason: HOSPADM

## 2019-03-27 RX ORDER — ONDANSETRON 8 MG/1
16 TABLET, FILM COATED ORAL
Status: COMPLETED | OUTPATIENT
Start: 2019-03-27 | End: 2019-03-31

## 2019-03-27 RX ORDER — DEXAMETHASONE 4 MG/1
8 TABLET ORAL DAILY
Status: DISCONTINUED | OUTPATIENT
Start: 2019-04-01 | End: 2019-03-31 | Stop reason: HOSPADM

## 2019-03-27 RX ORDER — PANTOPRAZOLE SODIUM 40 MG/1
40 TABLET, DELAYED RELEASE ORAL
Status: DISCONTINUED | OUTPATIENT
Start: 2019-03-28 | End: 2019-03-31 | Stop reason: HOSPADM

## 2019-03-27 RX ORDER — LIDOCAINE 40 MG/G
CREAM TOPICAL
Status: DISCONTINUED | OUTPATIENT
Start: 2019-03-27 | End: 2019-03-31 | Stop reason: HOSPADM

## 2019-03-27 RX ORDER — PROCHLORPERAZINE MALEATE 10 MG
10 TABLET ORAL EVERY 6 HOURS PRN
Status: DISCONTINUED | OUTPATIENT
Start: 2019-03-27 | End: 2019-03-30

## 2019-03-27 RX ORDER — HEPARIN SODIUM,PORCINE 10 UNIT/ML
5-10 VIAL (ML) INTRAVENOUS
Status: DISCONTINUED | OUTPATIENT
Start: 2019-03-27 | End: 2019-03-27

## 2019-03-27 RX ORDER — LIDOCAINE 40 MG/G
CREAM TOPICAL
Status: DISCONTINUED | OUTPATIENT
Start: 2019-03-27 | End: 2019-03-27

## 2019-03-27 RX ORDER — LORAZEPAM 2 MG/ML
.5-1 INJECTION INTRAMUSCULAR EVERY 6 HOURS PRN
Status: DISCONTINUED | OUTPATIENT
Start: 2019-03-27 | End: 2019-03-31 | Stop reason: HOSPADM

## 2019-03-27 RX ORDER — METHYLPREDNISOLONE SODIUM SUCCINATE 125 MG/2ML
125 INJECTION, POWDER, LYOPHILIZED, FOR SOLUTION INTRAMUSCULAR; INTRAVENOUS
Status: DISCONTINUED | OUTPATIENT
Start: 2019-03-27 | End: 2019-03-31 | Stop reason: HOSPADM

## 2019-03-27 RX ORDER — ALBUTEROL SULFATE 0.83 MG/ML
2.5 SOLUTION RESPIRATORY (INHALATION)
Status: DISCONTINUED | OUTPATIENT
Start: 2019-03-27 | End: 2019-03-31 | Stop reason: HOSPADM

## 2019-03-27 RX ORDER — ACETAMINOPHEN 325 MG/1
650 TABLET ORAL ONCE
Status: COMPLETED | OUTPATIENT
Start: 2019-03-27 | End: 2019-03-27

## 2019-03-27 RX ORDER — FLUCONAZOLE 100 MG/1
100 TABLET ORAL DAILY
Status: DISCONTINUED | OUTPATIENT
Start: 2019-03-27 | End: 2019-03-31 | Stop reason: HOSPADM

## 2019-03-27 RX ORDER — ALLOPURINOL 300 MG/1
300 TABLET ORAL DAILY
Status: DISCONTINUED | OUTPATIENT
Start: 2019-03-27 | End: 2019-03-27

## 2019-03-27 RX ORDER — HEPARIN SODIUM,PORCINE 10 UNIT/ML
5-10 VIAL (ML) INTRAVENOUS EVERY 24 HOURS
Status: DISCONTINUED | OUTPATIENT
Start: 2019-03-27 | End: 2019-03-31 | Stop reason: HOSPADM

## 2019-03-27 RX ORDER — HEPARIN SODIUM,PORCINE 10 UNIT/ML
5-10 VIAL (ML) INTRAVENOUS
Status: DISCONTINUED | OUTPATIENT
Start: 2019-03-27 | End: 2019-03-31 | Stop reason: HOSPADM

## 2019-03-27 RX ADMIN — LIDOCAINE HYDROCHLORIDE 1 ML: 10 INJECTION, SOLUTION EPIDURAL; INFILTRATION; INTRACAUDAL; PERINEURAL at 09:02

## 2019-03-27 RX ADMIN — FLUCONAZOLE 100 MG: 100 TABLET ORAL at 14:01

## 2019-03-27 RX ADMIN — PREDNISONE 60 MG: 10 TABLET ORAL at 17:35

## 2019-03-27 RX ADMIN — RITUXIMAB 700 MG: 10 INJECTION, SOLUTION INTRAVENOUS at 14:50

## 2019-03-27 RX ADMIN — ONDANSETRON HYDROCHLORIDE 16 MG: 8 TABLET, FILM COATED ORAL at 18:46

## 2019-03-27 RX ADMIN — POTASSIUM PHOSPHATE, MONOBASIC AND POTASSIUM PHOSPHATE, DIBASIC 15 MMOL: 224; 236 INJECTION, SOLUTION INTRAVENOUS at 21:09

## 2019-03-27 RX ADMIN — ETOPOSIDE 100 MG: 20 INJECTION, SOLUTION, CONCENTRATE INTRAVENOUS at 18:49

## 2019-03-27 RX ADMIN — SODIUM CHLORIDE 1500 ML: 9 INJECTION, SOLUTION INTRAVENOUS at 14:02

## 2019-03-27 RX ADMIN — PREDNISONE 60 MG: 10 TABLET ORAL at 11:52

## 2019-03-27 RX ADMIN — ACETAMINOPHEN 650 MG: 325 TABLET, FILM COATED ORAL at 14:11

## 2019-03-27 RX ADMIN — ACYCLOVIR 400 MG: 200 CAPSULE ORAL at 19:07

## 2019-03-27 RX ADMIN — CAFFEINE AND SODIUM BENZOATE 500 MG: 125 INJECTION, SOLUTION INTRAMUSCULAR; INTRAVENOUS at 11:27

## 2019-03-27 RX ADMIN — ALLOPURINOL 300 MG: 300 TABLET ORAL at 10:31

## 2019-03-27 RX ADMIN — DIPHENHYDRAMINE HYDROCHLORIDE 50 MG: 50 CAPSULE ORAL at 14:11

## 2019-03-27 RX ADMIN — VINCRISTINE SULFATE 0.78 MG: 1 INJECTION, SOLUTION INTRAVENOUS at 18:56

## 2019-03-27 RX ADMIN — MELATONIN TAB 3 MG 3 MG: 3 TAB at 23:24

## 2019-03-27 RX ADMIN — ACETAMINOPHEN 650 MG: 325 TABLET, FILM COATED ORAL at 10:31

## 2019-03-27 ASSESSMENT — MIFFLIN-ST. JEOR: SCORE: 1677.82

## 2019-03-27 ASSESSMENT — ACTIVITIES OF DAILY LIVING (ADL)
ADLS_ACUITY_SCORE: 10
ADLS_ACUITY_SCORE: 12
ADLS_ACUITY_SCORE: 10

## 2019-03-27 ASSESSMENT — PAIN DESCRIPTION - DESCRIPTORS
DESCRIPTORS: HEADACHE
DESCRIPTORS: HEADACHE

## 2019-03-27 NOTE — PROVIDER NOTIFICATION
03/27/19 0930   PICC Double Lumen 03/27/19 Right Basilic   Placement Date/Time: 03/27/19 0930   Catheter Brand: [employer]  Size (Fr): 5 Fr  Lot #: [id number]  Full barrier precautions done: Yes, hand hygiene, sterile gown, sterile gloves, mask, cap, full body drape, chlorhexidine scrub  Consent Signed: Yes  Time Ou...   Site Assessment WDL   External Cath Length (cm) 1 cm   Extremity Circumference (cm) 32 cm   Dressing Intervention Chlorhexidine patch;Transparent;Securing device;New dressing   Dressing Change Due 04/03/19   PICC Comment PICC insertion done   Lumen A - Color GRAY   Lumen A - Status blood return noted;heparin locked   Lumen A - Cap Change Due 03/31/19   Lumen B - Color PURPLE   Lumen B - Status blood return noted;heparin locked   Lumen B - Cap Change Due 03/31/19   Extravasation? No

## 2019-03-27 NOTE — PLAN OF CARE
[id number]: VSS. Post LP headache still lingering; tylenol and caffeine drip given. 1.5 liter NS bolus given after. Wife at bedside and very supportive.     [id number]: VSS. HA improved this evening; refused need for additional caffeine dose. Rituxan tolerated well; two small hives have disappeared. CIVI chemo infusing with good blood return. Patient and his wife educated on new chemo and given education sheets. Up walking in halls independently. Phos 2.3; needs dose when it arrives from pharmacy. Continue with plan of care.

## 2019-03-27 NOTE — PROVIDER NOTIFICATION
DATE/TIME  (DOT-TD, DOT-NOW) CHEMO CHECK ACTIVITY (REGIMEN & DOSE CHECK, DAY, DOSE #, NAME OF CHEMO #1)  CHEMO DRUG #2  CHEMO DRUG #3 NAME OF RN #1 (USE DOT-ME HERE) NAME OF RN#2 (2ND RN TO LOG IN SEPARATELY)   3/27/2019  10:07 AM   R-EPOCH protocol double check    Bam Avilesie John     3/27/2019  2:17 PM   Day 1 Rituxan double check   Bam Walker     3/27/2019  5:54 PM   Day 1 Etoposide double check Day 1 Vincristine/Doxorubicin double check  Bam CRUZ)   3/28/2019  12:32 PM   Day 2 Etoposide double check Day 2 Vincristine/Doxorubicin double check  Bam Mcmahon     3/29/2019  3:16 PM   Day 3 Etoposide double check Day 3 Vincristine/Doxorubicin double check  Bam Garcia     03/30/19  1:50 PM   Day 4 etoposide double check  Day 4 vinc/dox double check   Mary Nath Score    03/31/19  11:37 AM   Day 1 Cytoxan   Mary Nath Score

## 2019-03-27 NOTE — PROGRESS NOTES
The patient was discussed on rounds with the nurses, mid level provider, and house staff and seen and examined by me. All labs and imaging were reviewed. I reviewed events over the last 24 hours including vitals and flow sheets. I agree with the ANTHONY note and have been responsible for the care plan and interpretation of progress.     Overnight events, vital signs, labs and meds reviewed.    Christiano Molina is a 43 year old male with newly diagnosed high-grade B-cell lymphoma who is admitted for C1 DA-R-EPOCH.    HBCL -  morphologically large cells (unlikle Burkitts with small to medium cells but can have large cells). Myc rearrangement positive but t(8;14) for burkitt negative. Myc probles for kappa and lmabda rearrangements not done and hence we dont know whether there will be a variant. T(14;18) negative and negative for bcl6 rearrangement.   FISH studies pending for myc to kappa/lambda and bcl2 breakapart. I think it is very important to distinguish Burkitt from HBCL as best as possible with further path which is pending. This would be important from a prognostic and a therapeutic standpoint in a young man like this.    I agree with starting R-DA-EPOCH.  If this is Burkitts it is curative. In HBCL it is beneficial but no clear superiority over R-CHOP but this would be a reasonable choice.    -Start allopurinol  -Start C1D1 DA-R-EPOCH    Horacio Burleson MD

## 2019-03-27 NOTE — H&P
[facility], [place]  History and Physical  Hematology / Oncology     Date of Admission:  3/27/2019    Assessment & Plan   Christiano Molina is a 43 year old male with newly diagnosed high-grade B-cell lymphoma who is admitted for C1 DA-R-EPOCH.    #High-grade B-cell lymphoma  -Follows with Dr. Ramirez  -Presented with R-sided axillary mass, pathology indicates high-grade B-cell lymphoma. PET/CT indicates stage IA disease, BMBx and LP from 3/25 are both negative for lymphoma involvement. Awaiting additional testing by pathology to determine whether this is Burkitt vs DLBCL. Per Dr. Ramirez, plan is to give 2 cycles DA-R-EPOCH while awaiting pathology followed by PET/CT.  -PICC placed on admission, plan to remove before discharge  -Pre-treatment echo with EF 60%    Treatment plan: C1 DA-R-EPOCH (D1=3/27/19). Today is D1.  -Rituxan 375 mg/m2 D0.   -Prednisone 30 mg/m2 BID D1-5.   -Etoposide 100 mg/m2 CIVI D1-4.   -Doxorubicin 10 mg/m2 CIVI D1-4.  -Vincristine 0.4 mg/m2 D1-4.  -Cyclophosphamide 750 mg/m2 D5.   Premeds: Tylenol, Benadryl, Zofran D1-5, Emend, Dex D6-7 (needs at discharge w/ taper).  -Will need Neulasta at discharge.    #Post-LP headache  -Will give Tylenol, IV caffeine, and IV hydration  -May need to consider blood patch if not improving with treatment above    #ID Prophy  -Acyclovir 400mg BID  -Fluconazole 100mg daily  -Start Levaquin 250mg daily when ANC <1000    #FEN  -MIVF per treatment protocol  -Lyte replacement PRN per protocol  -RDAT    #Prophy  -Ambulate -- will hold Lovenox due to potential need for blood patch if no improvement in headaches with IV caffeine  -Protonix 40mg daily    Code Status   Full Code    Disposition Plan   Expected discharge: 4 - 7 days, recommended to prior living arrangement once chemotherapy completed.       Patient and plan of care discussed with staff attending, Dr. Burleson.     Geraldine Myers,  [place]  Hematology/Oncology  [phone]    Primary Care Physician   Layton Weir    Chief Complaint   Lymphoma    History is obtained from the patient and review of electronic medical record.    History of Present Illness   Christiano Molina is a 43 year old male with newly diagnosed high-grade B-cell lymphoma who is being admitted for C1 DA-R-EPOCH. Overall he is doing well today. His main complaint is an occipital headache that started yesterday morning. He had a diagnostic LP on 3/25. He states it is worst with sitting upright and goes away with lying flat. He has tried Tylenol and some black tea without much relief, is typically pretty sensitive to caffeine. No nausea or vomiting. He also reports a ringing/muffled sensation to hearing and some neck stiffness. No fevers.     He otherwise denies cold symptoms, chest pain, SOB, cough, or bowel changes. He typically has a BM 1-2 times per day and last BM was this morning. Urinating normally. No rashes. R axillary mass is stable, no other lumps/masses that he has noticed.    Past Medical History    I have reviewed this patient's medical history and updated it with pertinent information if needed.   Past Medical History:   Diagnosis Date     Fourth CraniaNerve Palsy 2008       Past Surgical History   I have reviewed this patient's surgical history and updated it with pertinent information if needed.  Past Surgical History:   Procedure Laterality Date     PICC INSERTION Right 2019    5Fr - 42cm, Basilic vein, mid SVC       Prior to Admission Medications   Prior to Admission Medications   Prescriptions Last Dose Informant Patient Reported? Taking?   Acetaminophen (TYLENOL PO) 3/26/2019 at Unknown time  Yes Yes   IBUPROFEN PO Unknown at Unknown time  Yes No   MULTIVITAMINS OR TABS Unknown at Unknown time  Yes No   Si TABLET DAILY PO   TUMS 500 MG OR CHEW Past Week at Unknown time  Yes Yes   Si TABLET  PO      Facility-Administered Medications: None      Allergies   No Known Allergies    Social History   I have reviewed this patient's social history and updated it with pertinent information if needed. Christiano Molina  reports that  has never smoked. he has never used smokeless tobacco.    Family History   I have reviewed this patient's family history and updated it with pertinent information if needed.     Review of Systems   The 10 point Review of Systems is negative other than noted in the HPI.    Physical Exam   Temp: 99.4  F (37.4  C) Temp src: Oral BP: 139/85     Resp: 16 SpO2: 99 % O2 Device: None (Room air)    Vital Signs with Ranges  Temp:  [99.4  F (37.4  C)] 99.4  F (37.4  C)  Resp:  [16] 16  BP: (139)/(85) 139/85  SpO2:  [99 %] 99 %  170 lbs 1.6 oz    Constitutional: Awake, alert, cooperative, no apparent distress, and appears stated age.  Eyes: Lids and lashes normal, pupils equal, round and reactive to light, extra ocular muscles intact, sclera clear, conjunctiva normal. Baseline mild strabismus noted.  ENT: Normocephalic, without obvious abnormality, atraumatic, sinuses nontender on palpation, external ears without lesions, oral pharynx with moist mucus membranes, tonsils without erythema or exudates, gums normal and good dentition.  Respiratory: No increased work of breathing, good air exchange, clear to auscultation bilaterally, no crackles or wheezing.  Cardiovascular: Regular rate and rhythm, normal S1 and S2, and no murmur noted.  GI: Normal bowel sounds, soft, non-distended, non-tender.  Lymph/Hematologic: R-sided axillary adenopathy palpated, non-tender.  Neurologic: Awake, alert, oriented to name, place and time.  Cranial nerves II-XII are grossly intact.  Neuropsychiatric: Calm, normal eye contact, alert, normal affect, oriented to self, place, time and situation, memory for past and recent events intact and thought process normal.    Data   Results for orders placed or performed during the hospital encounter of 03/27/19 (from the past 24  hour(s))   Comprehensive metabolic panel   Result Value Ref Range    Sodium 139 133 - 144 mmol/L    Potassium 4.0 3.4 - 5.3 mmol/L    Chloride 106 94 - 109 mmol/L    Carbon Dioxide 27 20 - 32 mmol/L    Anion Gap 7 3 - 14 mmol/L    Glucose 96 70 - 99 mg/dL    Urea Nitrogen 13 7 - 30 mg/dL    Creatinine 1.08 0.66 - 1.25 mg/dL    GFR Estimate 84 >60 mL/min/[1.73_m2]    GFR Estimate If Black >90 >60 mL/min/[1.73_m2]    Calcium 9.1 8.5 - 10.1 mg/dL    Bilirubin Total 0.3 0.2 - 1.3 mg/dL    Albumin 3.7 3.4 - 5.0 g/dL    Protein Total 7.4 6.8 - 8.8 g/dL    Alkaline Phosphatase 44 40 - 150 U/L    ALT 18 0 - 70 U/L    AST 13 0 - 45 U/L   Magnesium   Result Value Ref Range    Magnesium 2.1 1.6 - 2.3 mg/dL   Phosphorus   Result Value Ref Range    Phosphorus 2.3 (L) 2.5 - 4.5 mg/dL   CBC with platelets differential   Result Value Ref Range    WBC 5.5 4.0 - 11.0 10e9/L    RBC Count 4.67 4.4 - 5.9 10e12/L    Hemoglobin 14.2 13.3 - 17.7 g/dL    Hematocrit 42.6 40.0 - 53.0 %    MCV 91 78 - 100 fl    MCH 30.4 26.5 - 33.0 pg    MCHC 33.3 31.5 - 36.5 g/dL    RDW 12.3 10.0 - 15.0 %    Platelet Count 167 150 - 450 10e9/L    Diff Method Automated Method     % Neutrophils 73.5 %    % Lymphocytes 17.8 %    % Monocytes 7.6 %    % Eosinophils 0.4 %    % Basophils 0.5 %    % Immature Granulocytes 0.2 %    Nucleated RBCs 0 0 /100    Absolute Neutrophil 4.1 1.6 - 8.3 10e9/L    Absolute Lymphocytes 1.0 0.8 - 5.3 10e9/L    Absolute Monocytes 0.4 0.0 - 1.3 10e9/L    Absolute Eosinophils 0.0 0.0 - 0.7 10e9/L    Absolute Basophils 0.0 0.0 - 0.2 10e9/L    Abs Immature Granulocytes 0.0 0 - 0.4 10e9/L    Absolute Nucleated RBC 0.0    Lactate Dehydrogenase   Result Value Ref Range    Lactate Dehydrogenase 241 (H) 85 - 227 U/L   Uric acid   Result Value Ref Range    Uric Acid 3.6 3.5 - 7.2 mg/dL

## 2019-03-27 NOTE — PROGRESS NOTES
Rituximab    D: Baseline vital signs and temperature were temp 99.5; HR 65; /71, O2 sats 97%. Blood return was postive per PICC line.      I: Rituximab infusion started at 50 mL/hr. Rate adjusted by 50 mL/ hr every 30 minutes according to protocol and patient tolerance. Pre-medications included tylenol and benadryl.    A: Maximum rate of rituximab infusion tolerated was 400 mL/hr.    P: Tolerated rituxan well. Scalp intermittently itchy and two hives noticed on chest and back but not itchy in this area.      no

## 2019-03-28 LAB
ANION GAP SERPL CALCULATED.3IONS-SCNC: 9 MMOL/L (ref 3–14)
BASOPHILS # BLD AUTO: 0 10E9/L (ref 0–0.2)
BASOPHILS NFR BLD AUTO: 0.2 %
BUN SERPL-MCNC: 10 MG/DL (ref 7–30)
CALCIUM SERPL-MCNC: 7.7 MG/DL (ref 8.5–10.1)
CHLORIDE SERPL-SCNC: 113 MMOL/L (ref 94–109)
CO2 SERPL-SCNC: 23 MMOL/L (ref 20–32)
CREAT SERPL-MCNC: 0.9 MG/DL (ref 0.66–1.25)
DIFFERENTIAL METHOD BLD: ABNORMAL
EOSINOPHIL # BLD AUTO: 0 10E9/L (ref 0–0.7)
EOSINOPHIL NFR BLD AUTO: 0 %
ERYTHROCYTE [DISTWIDTH] IN BLOOD BY AUTOMATED COUNT: 12.4 % (ref 10–15)
GFR SERPL CREATININE-BSD FRML MDRD: >90 ML/MIN/{1.73_M2}
GLUCOSE SERPL-MCNC: 96 MG/DL (ref 70–99)
HCT VFR BLD AUTO: 36.2 % (ref 40–53)
HGB BLD-MCNC: 12 G/DL (ref 13.3–17.7)
IMM GRANULOCYTES # BLD: 0.1 10E9/L (ref 0–0.4)
IMM GRANULOCYTES NFR BLD: 0.7 %
LYMPHOCYTES # BLD AUTO: 0.6 10E9/L (ref 0.8–5.3)
LYMPHOCYTES NFR BLD AUTO: 6 %
MCH RBC QN AUTO: 30.5 PG (ref 26.5–33)
MCHC RBC AUTO-ENTMCNC: 33.1 G/DL (ref 31.5–36.5)
MCV RBC AUTO: 92 FL (ref 78–100)
MONOCYTES # BLD AUTO: 0.6 10E9/L (ref 0–1.3)
MONOCYTES NFR BLD AUTO: 5.7 %
NEUTROPHILS # BLD AUTO: 9.4 10E9/L (ref 1.6–8.3)
NEUTROPHILS NFR BLD AUTO: 87.4 %
NRBC # BLD AUTO: 0 10*3/UL
NRBC BLD AUTO-RTO: 0 /100
PHOSPHATE SERPL-MCNC: 2.6 MG/DL (ref 2.5–4.5)
PLATELET # BLD AUTO: 151 10E9/L (ref 150–450)
POTASSIUM SERPL-SCNC: 3.5 MMOL/L (ref 3.4–5.3)
RBC # BLD AUTO: 3.93 10E12/L (ref 4.4–5.9)
SODIUM SERPL-SCNC: 144 MMOL/L (ref 133–144)
WBC # BLD AUTO: 10.7 10E9/L (ref 4–11)

## 2019-03-28 PROCEDURE — 80048 BASIC METABOLIC PNL TOTAL CA: CPT | Performed by: PHYSICIAN ASSISTANT

## 2019-03-28 PROCEDURE — 25000128 H RX IP 250 OP 636: Performed by: INTERNAL MEDICINE

## 2019-03-28 PROCEDURE — 25800030 ZZH RX IP 258 OP 636: Performed by: PHYSICIAN ASSISTANT

## 2019-03-28 PROCEDURE — 25000132 ZZH RX MED GY IP 250 OP 250 PS 637: Performed by: INTERNAL MEDICINE

## 2019-03-28 PROCEDURE — 25800030 ZZH RX IP 258 OP 636: Performed by: INTERNAL MEDICINE

## 2019-03-28 PROCEDURE — 25000125 ZZHC RX 250: Performed by: PHYSICIAN ASSISTANT

## 2019-03-28 PROCEDURE — 12000001 ZZH R&B MED SURG/OB UMMC

## 2019-03-28 PROCEDURE — 25000132 ZZH RX MED GY IP 250 OP 250 PS 637: Performed by: PHYSICIAN ASSISTANT

## 2019-03-28 PROCEDURE — 85025 COMPLETE CBC W/AUTO DIFF WBC: CPT | Performed by: PHYSICIAN ASSISTANT

## 2019-03-28 PROCEDURE — 84100 ASSAY OF PHOSPHORUS: CPT | Performed by: PHYSICIAN ASSISTANT

## 2019-03-28 PROCEDURE — 36592 COLLECT BLOOD FROM PICC: CPT | Performed by: PHYSICIAN ASSISTANT

## 2019-03-28 PROCEDURE — 25000125 ZZHC RX 250: Performed by: INTERNAL MEDICINE

## 2019-03-28 PROCEDURE — 25000128 H RX IP 250 OP 636: Performed by: PHYSICIAN ASSISTANT

## 2019-03-28 PROCEDURE — 40000802 ZZH SITE CHECK

## 2019-03-28 PROCEDURE — 25000131 ZZH RX MED GY IP 250 OP 636 PS 637: Performed by: INTERNAL MEDICINE

## 2019-03-28 RX ADMIN — ONDANSETRON HYDROCHLORIDE 16 MG: 8 TABLET, FILM COATED ORAL at 17:21

## 2019-03-28 RX ADMIN — FLUCONAZOLE 100 MG: 100 TABLET ORAL at 07:48

## 2019-03-28 RX ADMIN — PREDNISONE 60 MG: 10 TABLET ORAL at 16:05

## 2019-03-28 RX ADMIN — PREDNISONE 60 MG: 10 TABLET ORAL at 07:46

## 2019-03-28 RX ADMIN — ACYCLOVIR 400 MG: 200 CAPSULE ORAL at 20:28

## 2019-03-28 RX ADMIN — PROCHLORPERAZINE MALEATE 10 MG: 10 TABLET, FILM COATED ORAL at 16:06

## 2019-03-28 RX ADMIN — VINCRISTINE SULFATE 0.78 MG: 1 INJECTION, SOLUTION INTRAVENOUS at 17:17

## 2019-03-28 RX ADMIN — ETOPOSIDE 100 MG: 20 INJECTION, SOLUTION, CONCENTRATE INTRAVENOUS at 17:17

## 2019-03-28 RX ADMIN — SENNOSIDES AND DOCUSATE SODIUM 2 TABLET: 8.6; 5 TABLET ORAL at 20:28

## 2019-03-28 RX ADMIN — SENNOSIDES AND DOCUSATE SODIUM 1 TABLET: 8.6; 5 TABLET ORAL at 07:48

## 2019-03-28 RX ADMIN — ACYCLOVIR 400 MG: 200 CAPSULE ORAL at 07:48

## 2019-03-28 RX ADMIN — PANTOPRAZOLE SODIUM 40 MG: 40 TABLET, DELAYED RELEASE ORAL at 07:47

## 2019-03-28 RX ADMIN — Medication 5 ML: at 17:30

## 2019-03-28 RX ADMIN — MELATONIN TAB 3 MG 3 MG: 3 TAB at 22:11

## 2019-03-28 RX ADMIN — ALLOPURINOL 300 MG: 300 TABLET ORAL at 07:48

## 2019-03-28 RX ADMIN — CAFFEINE AND SODIUM BENZOATE 500 MG: 125 INJECTION, SOLUTION INTRAMUSCULAR; INTRAVENOUS at 10:27

## 2019-03-28 RX ADMIN — SODIUM CHLORIDE 1000 ML: 9 INJECTION, SOLUTION INTRAVENOUS at 12:11

## 2019-03-28 ASSESSMENT — ACTIVITIES OF DAILY LIVING (ADL)
ADLS_ACUITY_SCORE: 10

## 2019-03-28 ASSESSMENT — MIFFLIN-ST. JEOR: SCORE: 1695.06

## 2019-03-28 NOTE — PROGRESS NOTES
The patient was discussed on rounds with the nurses, mid level provider, and house staff and seen and examined by me. All labs and imaging were reviewed. I reviewed events over the last 24 hours including vitals and flow sheets. I agree with the ANTHONY note and have been responsible for the care plan and interpretation of progress.      Overnight events, vital signs, labs and meds reviewed.     Christiano Molina is a 43 year old male with newly diagnosed high-grade B-cell lymphoma who is admitted for C1 DA-R-EPOCH.     HBCL -  morphologically large cells (unlikle Burkitts with small to medium cells but can have large cells). Myc rearrangement positive but t(8;14) for burkitt negative. Myc probles for kappa and lmabda rearrangements not done and hence we dont know whether there will be a variant. T(14;18) negative and negative for bcl6 rearrangement.   FISH studies pending for myc to kappa/lambda and bcl2 breakapart. I think it is very important to distinguish Burkitt from HBCL as best as possible with further path which is pending. This would be important from a prognostic and a therapeutic standpoint in a young man like this and Dr. Loja from path is doing additional FISH     I agree with starting R-DA-EPOCH.  If this is Burkitts it is curative. In HBCL it is beneficial but no clear superiority over R-CHOP but this would be a reasonable choice.     -Doing well  -Started allopurinol  -Started C1D2 DA-R-EPOCH     Horacio Burleson MD

## 2019-03-28 NOTE — PROGRESS NOTES
Nursing Focus: Chemotherapy    D: Positive blood return via PICC. Insertion site is clean/dry/intact, dressing intact with no complaints of pain.  Urine output is recorded in intake in Doc Flowsheet.      I: Zofran premedications given per order (see electronic medical administration record). Etoposide, vincristine, doxorubicin dose #1 of 4 started to infuse over 22 hours. Reviewed pt teaching on chemotherapy side effects.  Pt denies need for further teaching. Chemotherapy double checked per protocol by two chemotherapy competent RN's.     A: Tolerating chemo well. Denies nausea and or pain.     P: Continue to monitor urine output and symptoms of nausea. Screen for symptoms of toxicity.

## 2019-03-28 NOTE — PROGRESS NOTES
Nursing Focus: Admission    D: Arrived at 0945 from clinic. Patient accompanied by wife. Admitted for R-EPOCH.       I: Admission process began.  Patient oriented to room, enviroment, call light.  MD notified of patient's arrival on unit.     A: Vital signs stable, afebrile.  Patient stable at this time.  Complaining of post-LP headache.     P: Implement plan of care when available. Continue to monitor patient. Nursing interventions as appropriate. Notify MD with changes in pt status.

## 2019-03-28 NOTE — PLAN OF CARE
[id number]: AVSS on RA. CIVI etoposide and vincristine/doxorubicin infusing via PICC, brisk blood return noted q4h. Denies headache. Denies nausea. Melatonin given for sleep. Phosphorus recheck with AM labs. Voiding adequately. Up independently, ambulates frequently. Continue with POC.

## 2019-03-28 NOTE — PROGRESS NOTES
[facility], [place]  Hematology / Oncology Progress Note     Assessment & Plan   Christiano Moilna is a 43 year old male with newly diagnosed high-grade B-cell lymphoma who is admitted for C1 DA-R-EPOCH.     #High-grade B-cell lymphoma  -Follows with Dr. Ramirez  -Presented with R-sided axillary mass, pathology indicates high-grade B-cell lymphoma. PET/CT indicates stage IA disease, BMBx and LP from 3/25 are both negative for lymphoma involvement. Awaiting additional testing by pathology to determine whether this is Burkitt vs DLBCL. Per Dr. Ramirez, plan is to give 2 cycles DA-R-EPOCH while awaiting pathology followed by PET/CT.  -PICC placed on admission, plan to remove before discharge  -Pre-treatment echo with EF 60%     Treatment plan: C1 DA-R-EPOCH (D1=3/27/19). Today is D2.  -Rituxan 375 mg/m2 D0. Tolerated well aside from mild hives and facial itching.  -Prednisone 30 mg/m2 BID D1-5.   -Etoposide 100 mg/m2 CIVI D1-4.   -Doxorubicin 10 mg/m2 CIVI D1-4.  -Vincristine 0.4 mg/m2 D1-4.  -Cyclophosphamide 750 mg/m2 D5.   Premeds: Tylenol, Benadryl, Zofran D1-5, Emend, Dex D6-7 (needs at discharge w/ taper).  -Will need Neulasta at discharge.     #Post-LP headache  -Significantly improved but not resolved; will repeat dose of IV caffeine with IV hydration today  -May need to consider blood patch if not improving with treatment above     #ID Prophy  -Acyclovir 400mg BID  -Fluconazole 100mg daily  -Start Levaquin 250mg daily when ANC <1000     #FEN  -MIVF per treatment protocol  -Lyte replacement PRN per protocol  -RDAT     #Prophy  -Ambulate -- will hold Lovenox due to potential need for blood patch if no improvement in headaches with IV caffeine, frequently walking the unit  -Protonix 40mg daily    Disposition Plan  Expected discharge: Alexander 3/31, recommended to prior living arrangement once chemotherapy completed.       Patient and plan of care discussed with staff attending,   Benjy.     Geraldine Myers PA-C  Hematology/Oncology  [phone]    Interval History   Prudencio is doing well this morning. He reports headaches have significantly improved, is able to walk with just a mild headache (rates at a 1-2/10). However, when sitting straight up the headache is more severe. He wants to try 1 more dose of IV caffeine today and some more fluids. Appetite is good. Had mild nasal, neck, and ear itching with Rituxan as well as 2 hives on trunk, but no shortness of breath or other reactions. No chest pain. No cough. Denies nausea, vomiting. No BM yet today, but had 2 yesterday. Urinating normally. No leg swelling. No paraesthesias.     Physical Exam   Temp: 98.5  F (36.9  C) Temp src: Oral BP: 116/65 Pulse: 66 Heart Rate: 76 Resp: 20 SpO2: 95 % O2 Device: None (Room air)    Vitals:    03/27/19 0945 03/28/19 0742   Weight: 77.2 kg (170 lb 1.6 oz) 78.9 kg (173 lb 14.4 oz)     Vital Signs with Ranges  Temp:  [96.9  F (36.1  C)-99.5  F (37.5  C)] 98.5  F (36.9  C)  Pulse:  [66] 66  Heart Rate:  [65-82] 76  Resp:  [16-20] 20  BP: (102-130)/(56-71) 116/65  SpO2:  [95 %-98 %] 95 %  I/O last 3 completed shifts:  In: 4310.8 [P.O.:480; I.V.:750; IV Piggyback:3080.8]  Out: 3350 [Urine:3350]    Constitutional: Awake, alert, cooperative, no apparent distress, and appears stated age.  Eyes: Lids and lashes normal, pupils equal, round and reactive to light, extra ocular muscles intact, sclera clear, conjunctiva normal. Baseline mild strabismus noted.  ENT: Normocephalic, without obvious abnormality, atraumatic, sinuses nontender on palpation, external ears without lesions, oral pharynx with moist mucus membranes, tonsils without erythema or exudates, gums normal and good dentition.  Respiratory: No increased work of breathing, good air exchange, clear to auscultation bilaterally, no crackles or wheezing.  Cardiovascular: Regular rate and rhythm, normal S1 and S2, and no murmur noted.  GI: Normal bowel sounds,  soft, non-distended, non-tender.  Lymph/Hematologic: R-sided axillary adenopathy palpated, non-tender.  Neurologic: Awake, alert, oriented to name, place and time.  Cranial nerves II-XII are grossly intact.  Neuropsychiatric: Calm, normal eye contact, alert, normal affect, oriented to self, place, time and situation, memory for past and recent events intact and thought process normal.      Medications     - MEDICATION INSTRUCTIONS -       - MEDICATION INSTRUCTIONS -       sodium chloride         sodium chloride 0.9%  1,000 mL Intravenous Once     acyclovir  400 mg Oral BID     allopurinol  300 mg Oral Daily     Chemotherapy Infusing-Continuous Infusion   Does not apply Q8H     [START ON 3/31/2019] cyclophosphamide (CYTOXAN) chemo infusion  750 mg/m2 (Treatment Plan Recorded) Intravenous Once     [START ON 4/1/2019] dexamethasone  8 mg Oral Daily     etoposide (TOPOSAR) chemo infusion  50 mg/m2 (Treatment Plan Recorded) Intravenous Q22H     fluconazole  100 mg Oral Daily     [START ON 3/31/2019] fosaprepitant (EMEND) 150 mg intermittent infusion  150 mg Intravenous Once     heparin  5 mL Intracatheter Q28 Days     heparin lock flush  5-10 mL Intracatheter Q24H     ondansetron  16 mg Oral Q22H     pantoprazole  40 mg Oral QAM AC     predniSONE  30 mg/m2 (Treatment Plan Recorded) Oral BID     senna-docusate  2 tablet Oral BID     sodium chloride (PF)  10 mL Intracatheter Q8H     vinCRIStine/DOXOrubicin (ONCOVIN/ADRIAMYCIN) chemo infusion  0.4 mg/m2 (Treatment Plan Recorded) Intravenous Q22H       Data   Results for orders placed or performed during the hospital encounter of 03/27/19 (from the past 24 hour(s))   CBC with platelets differential   Result Value Ref Range    WBC 10.7 4.0 - 11.0 10e9/L    RBC Count 3.93 (L) 4.4 - 5.9 10e12/L    Hemoglobin 12.0 (L) 13.3 - 17.7 g/dL    Hematocrit 36.2 (L) 40.0 - 53.0 %    MCV 92 78 - 100 fl    MCH 30.5 26.5 - 33.0 pg    MCHC 33.1 31.5 - 36.5 g/dL    RDW 12.4 10.0 - 15.0 %     Platelet Count 151 150 - 450 10e9/L    Diff Method Automated Method     % Neutrophils 87.4 %    % Lymphocytes 6.0 %    % Monocytes 5.7 %    % Eosinophils 0.0 %    % Basophils 0.2 %    % Immature Granulocytes 0.7 %    Nucleated RBCs 0 0 /100    Absolute Neutrophil 9.4 (H) 1.6 - 8.3 10e9/L    Absolute Lymphocytes 0.6 (L) 0.8 - 5.3 10e9/L    Absolute Monocytes 0.6 0.0 - 1.3 10e9/L    Absolute Eosinophils 0.0 0.0 - 0.7 10e9/L    Absolute Basophils 0.0 0.0 - 0.2 10e9/L    Abs Immature Granulocytes 0.1 0 - 0.4 10e9/L    Absolute Nucleated RBC 0.0    Basic metabolic panel   Result Value Ref Range    Sodium 144 133 - 144 mmol/L    Potassium 3.5 3.4 - 5.3 mmol/L    Chloride 113 (H) 94 - 109 mmol/L    Carbon Dioxide 23 20 - 32 mmol/L    Anion Gap 9 3 - 14 mmol/L    Glucose 96 70 - 99 mg/dL    Urea Nitrogen 10 7 - 30 mg/dL    Creatinine 0.90 0.66 - 1.25 mg/dL    GFR Estimate >90 >60 mL/min/[1.73_m2]    GFR Estimate If Black >90 >60 mL/min/[1.73_m2]    Calcium 7.7 (L) 8.5 - 10.1 mg/dL   Phosphorus   Result Value Ref Range    Phosphorus 2.6 2.5 - 4.5 mg/dL

## 2019-03-28 NOTE — PROGRESS NOTES
Nursing Focus: Chemotherapy    D: Positive blood return via PICC. Insertion site is clean/dry/intact, dressing intact with no complaints of pain.  Urine output is recorded in intake in Doc Flowsheet.      I: Zofran premedications given per order (see electronic medical administration record).Etoposide, vincristine, doxorubicin  Dose #2 of 4 started to infuse over 22 hours. Reviewed pt teaching on chemotherapy side effects. Chemotherapy double checked per protocol by two chemotherapy competent RN's.     A: Tolerating chemotherapy well. Denies nausea and or pain.     P: Continue to monitor urine output and symptoms of nausea. Screen for symptoms of toxicity.

## 2019-03-28 NOTE — PROGRESS NOTES
"SPIRITUAL HEALTH SERVICES  SPIRITUAL ASSESSMENT Progress Note  [facility] ([place]) 7D     PRIMARY FOCUS:     Goals of care    Symptom/pain management    Emotional/spiritual/Muslim distress    Support for coping    REFERRAL SOURCE: Request upon admission    ILLNESS CIRCUMSTANCES:   Reviewed documentation. Reflective conversation shared with Christiano \"Prudencio\" Tracy and his wife Halie which integrated elements of illness and family narratives.     Context of Serious Illness/Symptom(s) - Per chart review Prudencio was recently diagnosed with B Cell Lymphoma. He is at [facility] to begin his chemotherapy.    Resources for Support - Marcel report having support from their home Confucianism, family and nuymeorus friends.     DISTRESS:     Emotional/Spiritual/Existential Distress - Prudencio and Halie report they were initially \"in shock\" and \"overwhelmed\" by Prudencio's \"cancer\" diagnosis but since arriving at [facility] and meeting the medical team feel more \"hopeful\" and \"at peace.\" They are both focused on keeping things as \"scheduled and routine\" as possible for their 3 children ages 4. 7 and 8.    Muslim Distress - None noted    Social/Cultural/Economic Distress - None discussed    SPIRITUAL/Protestant COPING:     Moravian/Bailey - Marcel attend an [employer] near their home in [place]. The  and Confucianism members have been in contact with Prudencio and Halie.     Spiritual Practice(s) - Prayer, Communion, Scripture     Emotional/Relational/Existential Connections - Marcel have a strong marriage and they are focused on keeping things going smoothly for their children who seem to be doing well with Prudencio's diagnosis. Halie reports the hardest thing for the kids is \"missing dad.\"    GOALS OF CARE:    Goals of Care - Prudencio is focused on making it through his \"6 rounds of chemotherapy.\"     Meaning/Sense-Making - Prudencio's bailey is an important source of coping and meaning making him for him. He would like to receive " Jehovah's witness Communion each time he is hospitalized.     PLAN: I will continue to follow Prudencio for spiritual support during his stay on unit 7D    Stephanie [facility]  Oncology   Pager [phone]

## 2019-03-29 LAB
ANION GAP SERPL CALCULATED.3IONS-SCNC: 10 MMOL/L (ref 3–14)
BASOPHILS # BLD AUTO: 0 10E9/L (ref 0–0.2)
BASOPHILS NFR BLD AUTO: 0 %
BUN SERPL-MCNC: 13 MG/DL (ref 7–30)
CALCIUM SERPL-MCNC: 8.4 MG/DL (ref 8.5–10.1)
CHLORIDE SERPL-SCNC: 107 MMOL/L (ref 94–109)
CO2 SERPL-SCNC: 24 MMOL/L (ref 20–32)
CREAT SERPL-MCNC: 1 MG/DL (ref 0.66–1.25)
DIFFERENTIAL METHOD BLD: ABNORMAL
EOSINOPHIL # BLD AUTO: 0 10E9/L (ref 0–0.7)
EOSINOPHIL NFR BLD AUTO: 0 %
ERYTHROCYTE [DISTWIDTH] IN BLOOD BY AUTOMATED COUNT: 12.6 % (ref 10–15)
GFR SERPL CREATININE-BSD FRML MDRD: >90 ML/MIN/{1.73_M2}
GLUCOSE SERPL-MCNC: 139 MG/DL (ref 70–99)
HCT VFR BLD AUTO: 38.5 % (ref 40–53)
HGB BLD-MCNC: 12.7 G/DL (ref 13.3–17.7)
LYMPHOCYTES # BLD AUTO: 1.2 10E9/L (ref 0.8–5.3)
LYMPHOCYTES NFR BLD AUTO: 10.1 %
MCH RBC QN AUTO: 30.3 PG (ref 26.5–33)
MCHC RBC AUTO-ENTMCNC: 33 G/DL (ref 31.5–36.5)
MCV RBC AUTO: 92 FL (ref 78–100)
MONOCYTES # BLD AUTO: 0.6 10E9/L (ref 0–1.3)
MONOCYTES NFR BLD AUTO: 5.5 %
NEUTROPHILS # BLD AUTO: 9.8 10E9/L (ref 1.6–8.3)
NEUTROPHILS NFR BLD AUTO: 84.4 %
PLATELET # BLD AUTO: 163 10E9/L (ref 150–450)
PLATELET # BLD EST: ABNORMAL 10*3/UL
POTASSIUM SERPL-SCNC: 3.5 MMOL/L (ref 3.4–5.3)
RBC # BLD AUTO: 4.19 10E12/L (ref 4.4–5.9)
RBC MORPH BLD: NORMAL
SODIUM SERPL-SCNC: 141 MMOL/L (ref 133–144)
WBC # BLD AUTO: 11.6 10E9/L (ref 4–11)

## 2019-03-29 PROCEDURE — 12000001 ZZH R&B MED SURG/OB UMMC

## 2019-03-29 PROCEDURE — 85025 COMPLETE CBC W/AUTO DIFF WBC: CPT | Performed by: PHYSICIAN ASSISTANT

## 2019-03-29 PROCEDURE — 25000131 ZZH RX MED GY IP 250 OP 636 PS 637: Performed by: INTERNAL MEDICINE

## 2019-03-29 PROCEDURE — 36592 COLLECT BLOOD FROM PICC: CPT | Performed by: PHYSICIAN ASSISTANT

## 2019-03-29 PROCEDURE — 40000802 ZZH SITE CHECK

## 2019-03-29 PROCEDURE — 25000125 ZZHC RX 250: Performed by: INTERNAL MEDICINE

## 2019-03-29 PROCEDURE — 25000132 ZZH RX MED GY IP 250 OP 250 PS 637: Performed by: PHYSICIAN ASSISTANT

## 2019-03-29 PROCEDURE — 25000128 H RX IP 250 OP 636: Performed by: INTERNAL MEDICINE

## 2019-03-29 PROCEDURE — 80048 BASIC METABOLIC PNL TOTAL CA: CPT | Performed by: PHYSICIAN ASSISTANT

## 2019-03-29 PROCEDURE — 25000132 ZZH RX MED GY IP 250 OP 250 PS 637: Performed by: INTERNAL MEDICINE

## 2019-03-29 PROCEDURE — 25800030 ZZH RX IP 258 OP 636: Performed by: INTERNAL MEDICINE

## 2019-03-29 PROCEDURE — 40000558 ZZH STATISTIC CVC DRESSING CHANGE

## 2019-03-29 RX ORDER — DEXAMETHASONE 4 MG/1
8 TABLET ORAL DAILY
Qty: 4 TABLET | Refills: 0 | Status: SHIPPED | OUTPATIENT
Start: 2019-04-01 | End: 2019-04-17

## 2019-03-29 RX ORDER — ONDANSETRON 8 MG/1
8 TABLET, ORALLY DISINTEGRATING ORAL EVERY 8 HOURS PRN
Qty: 30 TABLET | Refills: 0 | Status: SHIPPED | OUTPATIENT
Start: 2019-03-29 | End: 2019-03-31

## 2019-03-29 RX ORDER — FLUCONAZOLE 100 MG/1
100 TABLET ORAL DAILY
Qty: 30 TABLET | Refills: 0 | Status: SHIPPED | OUTPATIENT
Start: 2019-03-30 | End: 2019-05-02

## 2019-03-29 RX ORDER — ALLOPURINOL 300 MG/1
300 TABLET ORAL DAILY
Qty: 30 TABLET | Refills: 0 | Status: SHIPPED | OUTPATIENT
Start: 2019-03-30 | End: 2019-05-02

## 2019-03-29 RX ORDER — PROCHLORPERAZINE MALEATE 10 MG
10 TABLET ORAL EVERY 6 HOURS PRN
Qty: 30 TABLET | Refills: 0 | Status: SHIPPED | OUTPATIENT
Start: 2019-03-29 | End: 2019-08-15

## 2019-03-29 RX ORDER — AMOXICILLIN 250 MG
2 CAPSULE ORAL 2 TIMES DAILY PRN
COMMUNITY
Start: 2019-03-29 | End: 2019-08-15

## 2019-03-29 RX ORDER — ACYCLOVIR 200 MG/1
400 CAPSULE ORAL 2 TIMES DAILY
Qty: 60 CAPSULE | Refills: 0 | Status: SHIPPED | OUTPATIENT
Start: 2019-03-29 | End: 2019-04-17

## 2019-03-29 RX ADMIN — FLUCONAZOLE 100 MG: 100 TABLET ORAL at 08:11

## 2019-03-29 RX ADMIN — PREDNISONE 60 MG: 10 TABLET ORAL at 15:04

## 2019-03-29 RX ADMIN — PROCHLORPERAZINE MALEATE 10 MG: 10 TABLET, FILM COATED ORAL at 18:19

## 2019-03-29 RX ADMIN — SENNOSIDES AND DOCUSATE SODIUM 2 TABLET: 8.6; 5 TABLET ORAL at 08:10

## 2019-03-29 RX ADMIN — PANTOPRAZOLE SODIUM 40 MG: 40 TABLET, DELAYED RELEASE ORAL at 08:11

## 2019-03-29 RX ADMIN — VINCRISTINE SULFATE 0.78 MG: 1 INJECTION, SOLUTION INTRAVENOUS at 16:15

## 2019-03-29 RX ADMIN — ONDANSETRON HYDROCHLORIDE 16 MG: 8 TABLET, FILM COATED ORAL at 15:04

## 2019-03-29 RX ADMIN — ETOPOSIDE 100 MG: 20 INJECTION, SOLUTION, CONCENTRATE INTRAVENOUS at 16:15

## 2019-03-29 RX ADMIN — SENNOSIDES AND DOCUSATE SODIUM 2 TABLET: 8.6; 5 TABLET ORAL at 20:05

## 2019-03-29 RX ADMIN — Medication 5 ML: at 12:16

## 2019-03-29 RX ADMIN — PREDNISONE 60 MG: 10 TABLET ORAL at 08:11

## 2019-03-29 RX ADMIN — Medication 5 ML: at 07:21

## 2019-03-29 RX ADMIN — ACYCLOVIR 400 MG: 200 CAPSULE ORAL at 08:10

## 2019-03-29 RX ADMIN — ALLOPURINOL 300 MG: 300 TABLET ORAL at 08:10

## 2019-03-29 RX ADMIN — ACYCLOVIR 400 MG: 200 CAPSULE ORAL at 20:05

## 2019-03-29 RX ADMIN — Medication 5 ML: at 15:06

## 2019-03-29 ASSESSMENT — ACTIVITIES OF DAILY LIVING (ADL)
ADLS_ACUITY_SCORE: 10

## 2019-03-29 ASSESSMENT — MIFFLIN-ST. JEOR: SCORE: 1698.68

## 2019-03-29 NOTE — PROGRESS NOTES
The patient was discussed on rounds with the nurses, mid level provider, and house staff and seen and examined by me. All labs and imaging were reviewed. I reviewed events over the last 24 hours including vitals and flow sheets. I agree with the ANTHONY note and have been responsible for the care plan and interpretation of progress.      Overnight events, vital signs, labs and meds reviewed.     Christiano Molina is a 43 year old male with newly diagnosed high-grade B-cell lymphoma who is admitted for C1 DA-R-EPOCH.     HBCL -  morphologically large cells (unlikle Burkitts with small to medium cells but can have large cells). Myc rearrangement positive but t(8;14) for burkitt negative. Myc probles for kappa and lmabda rearrangements not done and hence we dont know whether there will be a variant. T(14;18) negative and negative for bcl6 rearrangement.   FISH studies pending for myc to kappa/lambda and bcl2 breakapart. I think it is very important to distinguish Burkitt from HBCL as best as possible with further path which is pending. This would be important from a prognostic and a therapeutic standpoint in a young man like this and Dr. Loja from path is doing additional FISH     I agree with starting R-DA-EPOCH.  If this is Burkitts his cure rate is high. In HBCL it is beneficial but no clear superiority over R-CHOP but this would be a reasonable choice.     -Doing well without much toxicities  -On allopurinol  -Started C1D3 DA-R-EPOCH     Horacio Burleson MD

## 2019-03-29 NOTE — PROGRESS NOTES
SPIRITUAL HEALTH SERVICES  SPIRITUAL ASSESSMENT Progress Note  [facility] ([place]) 7D     REFERRAL SOURCE: Follow Up Visit for Communion    I provided Bahai Communion, Scripture reading and prayer for Prudencio Molina and his wife Halie.    PLAN: I will continue to follow for spiritual support while Prudencio is on unit 7D.    Stephanie Stoddard  Oncology   Pager [phone]

## 2019-03-29 NOTE — PLAN OF CARE
15:00-19:30  Chemo day 3 out of 4 continuous infusion of  Vincristine w/ doxorubicin & Etoposide was hung this evening. Pt c/o nausea this evening during dinner. Compazine 10mg PO given.

## 2019-03-29 NOTE — PROGRESS NOTES
Nursing Focus: Chemotherapy    D: Positive blood return via PICC. Insertion site is clean/dry/intact, dressing intact with no complaints of pain.  Urine output is recorded in intake in Doc Flowsheet.      I: Zofran premedication given per order (see electronic medical administration record). Etoposide, Doxorubicin, Vincristine dose #3 of 4 started to infuse over 22 hours. Reviewed pt teaching on chemotherapy side effects.  Pt denies need for further teaching. Chemotherapy double checked per protocol by two chemotherapy competent RN's.     A: Tolerating chemo well. Denies pain. Nausea controlled with scheduled zofran.     P: Continue to monitor urine output and symptoms of nausea. Screen for symptoms of toxicity.

## 2019-03-29 NOTE — PLAN OF CARE
"/67   Pulse 66   Temp 98.2  F (36.8  C) (Oral)   Resp 20   Ht 1.778 m (5' 10\")   Wt 78.9 kg (173 lb 14.4 oz)   SpO2 98%   BMI 24.95 kg/m      VSS. Afebrile. Patient states his headache has greatly improved at rest when his HOB is around 30-45 degrees. It seems when he is at a 90 degree angle, his headache intensifies per pt statement. Patient is resting in room, able to make needs known. Will continue to monitor.     "

## 2019-03-29 NOTE — PLAN OF CARE
Pt continue to tolerating day 2 continuous chemo infusion of  Etoposide @ rate 25.2ml/hr and  Vincristine w/ Doxorubicin @ rate of 48.2ml/hr good so far. Chemo infusing over 22 hrs. Pt having intermittent headache (r/t LP procedure few days ago). He stated that his headache is slowly improving and he only gets the headache with certain position. Good appetite. Good energy level. Denies nausea.

## 2019-03-29 NOTE — PLAN OF CARE
3084-0312: VSS. HA lingering today; caffeine drip given with NS bolus following. HA worse when in sitting position. Reports no HA when lying flat or walking, however overall pain significantly improved.     7153-1143: VSS. Compazine given for nausea. Day 2 CIVI chemo infusing. Educated on blood counts. Continue with plan of care.

## 2019-03-29 NOTE — PROGRESS NOTES
[facility], [place]  Hematology / Oncology Progress Note     Assessment & Plan   Christiano Molina is a 43 year old male with newly diagnosed high-grade B-cell lymphoma who is admitted for C1 DA-R-EPOCH.     #High-grade B-cell lymphoma  -Follows with Dr. Ramirez  -Presented with R-sided axillary mass, pathology indicates high-grade B-cell lymphoma. PET/CT indicates stage IA disease, BMBx and LP from 3/25 are both negative for lymphoma involvement. Awaiting additional testing by pathology to determine whether this is Burkitt vs DLBCL. Per Dr. Ramirez, plan is to give 2 cycles DA-R-EPOCH while awaiting pathology followed by PET/CT.  -PICC placed on admission, plan to remove before discharge  -Pre-treatment echo with EF 60%  -Started allopurinol 300mg daily, continue at discharge     Treatment plan: C1 DA-R-EPOCH (D1=3/27/19). Today is D3.  -Rituxan 375 mg/m2 D0. Tolerated well aside from mild hives and facial itching.  -Prednisone 30 mg/m2 BID D1-5.   -Etoposide 100 mg/m2 CIVI D1-4.   -Doxorubicin 10 mg/m2 CIVI D1-4.  -Vincristine 0.4 mg/m2 D1-4.  -Cyclophosphamide 750 mg/m2 D5.   Premeds: Tylenol, Benadryl, Zofran D1-5, Emend, Dex D6-7 (needs at discharge w/ taper).  -Will need Neulasta at discharge - scheduled with labs on Tuesday 4/1, also scheduled labs with ANTHONY visit on Friday 4/5.     #Post-LP headache  -Significantly improved s/p 2 doses of IV caffeine. Will continue to monitor, but no blood patch felt to be indicated at this time.     #ID Prophy  -Acyclovir 400mg BID  -Fluconazole 100mg daily  -Start Levaquin 250mg daily when ANC <1000     #FEN  -MIVF per treatment protocol  -Lyte replacement PRN per protocol  -RDAT     #Prophy  -Ambulating frequently  -Protonix 40mg daily    Disposition Plan  Expected discharge: Alexander 3/31, recommended to prior living arrangement once chemotherapy completed.       Patient and plan of care discussed with staff attending, Dr. Burleson.     Geraldine  WINSTON Myers  Hematology/Oncology  [phone]    Interval History   Prudencio is doing well this morning. Headaches are significantly improved. He has been up and walking the halls frequently. No fevers overnight. Good appetite. Tolerating chemo well. No chest pain. No cough. Denies nausea, vomiting. Had a BM this morning. Urinating normally. No leg swelling. No paraesthesias.     Physical Exam   Temp: 97.6  F (36.4  C) Temp src: Oral BP: 123/71   Heart Rate: 67 Resp: 18 SpO2: 98 % O2 Device: None (Room air)    Vitals:    03/27/19 0945 03/28/19 0742 03/29/19 0759   Weight: 77.2 kg (170 lb 1.6 oz) 78.9 kg (173 lb 14.4 oz) 79.2 kg (174 lb 11.2 oz)     Vital Signs with Ranges  Temp:  [97.6  F (36.4  C)-98.4  F (36.9  C)] 97.6  F (36.4  C)  Heart Rate:  [67-74] 67  Resp:  [18-20] 18  BP: (111-131)/(48-75) 123/71  SpO2:  [96 %-100 %] 98 %  I/O last 3 completed shifts:  In: 3208 [P.O.:240; I.V.:500; IV Piggyback:2468]  Out: 2425 [Urine:2425]    Constitutional: Awake, alert, cooperative, no apparent distress, and appears stated age.  Eyes: Lids and lashes normal, pupils equal, round and reactive to light, extra ocular muscles intact, sclera clear, conjunctiva normal. Baseline mild strabismus noted.  ENT: Normocephalic, without obvious abnormality, atraumatic, sinuses nontender on palpation, external ears without lesions, oral pharynx with moist mucus membranes, tonsils without erythema or exudates, gums normal and good dentition.  Respiratory: No increased work of breathing, good air exchange, clear to auscultation bilaterally, no crackles or wheezing.  Cardiovascular: Regular rate and rhythm, normal S1 and S2, and no murmur noted.  GI: Normal bowel sounds, soft, non-distended, non-tender.  Neurologic: Awake, alert, oriented to name, place and time.  Cranial nerves II-XII are grossly intact.  Neuropsychiatric: Calm, normal eye contact, alert, normal affect, oriented to self, place, time and situation, memory for past and  recent events intact and thought process normal.    Medications     - MEDICATION INSTRUCTIONS -       - MEDICATION INSTRUCTIONS -       sodium chloride         acyclovir  400 mg Oral BID     allopurinol  300 mg Oral Daily     Chemotherapy Infusing-Continuous Infusion   Does not apply Q8H     [START ON 3/31/2019] cyclophosphamide (CYTOXAN) chemo infusion  750 mg/m2 (Treatment Plan Recorded) Intravenous Once     [START ON 4/1/2019] dexamethasone  8 mg Oral Daily     etoposide (TOPOSAR) chemo infusion  50 mg/m2 (Treatment Plan Recorded) Intravenous Q22H     fluconazole  100 mg Oral Daily     [START ON 3/31/2019] fosaprepitant (EMEND) 150 mg intermittent infusion  150 mg Intravenous Once     heparin  5 mL Intracatheter Q28 Days     heparin lock flush  5-10 mL Intracatheter Q24H     ondansetron  16 mg Oral Q22H     pantoprazole  40 mg Oral QAM AC     predniSONE  30 mg/m2 (Treatment Plan Recorded) Oral BID     senna-docusate  2 tablet Oral BID     sodium chloride (PF)  10 mL Intracatheter Q8H     vinCRIStine/DOXOrubicin (ONCOVIN/ADRIAMYCIN) chemo infusion  0.4 mg/m2 (Treatment Plan Recorded) Intravenous Q22H       Data   Results for orders placed or performed during the hospital encounter of 03/27/19 (from the past 24 hour(s))   CBC with platelets differential   Result Value Ref Range    WBC 11.6 (H) 4.0 - 11.0 10e9/L    RBC Count 4.19 (L) 4.4 - 5.9 10e12/L    Hemoglobin 12.7 (L) 13.3 - 17.7 g/dL    Hematocrit 38.5 (L) 40.0 - 53.0 %    MCV 92 78 - 100 fl    MCH 30.3 26.5 - 33.0 pg    MCHC 33.0 31.5 - 36.5 g/dL    RDW 12.6 10.0 - 15.0 %    Platelet Count 163 150 - 450 10e9/L    Diff Method Manual Differential     % Neutrophils 84.4 %    % Lymphocytes 10.1 %    % Monocytes 5.5 %    % Eosinophils 0.0 %    % Basophils 0.0 %    Absolute Neutrophil 9.8 (H) 1.6 - 8.3 10e9/L    Absolute Lymphocytes 1.2 0.8 - 5.3 10e9/L    Absolute Monocytes 0.6 0.0 - 1.3 10e9/L    Absolute Eosinophils 0.0 0.0 - 0.7 10e9/L    Absolute Basophils  0.0 0.0 - 0.2 10e9/L    RBC Morphology Normal     Platelet Estimate Confirming automated cell count    Basic metabolic panel   Result Value Ref Range    Sodium 141 133 - 144 mmol/L    Potassium 3.5 3.4 - 5.3 mmol/L    Chloride 107 94 - 109 mmol/L    Carbon Dioxide 24 20 - 32 mmol/L    Anion Gap 10 3 - 14 mmol/L    Glucose 139 (H) 70 - 99 mg/dL    Urea Nitrogen 13 7 - 30 mg/dL    Creatinine 1.00 0.66 - 1.25 mg/dL    GFR Estimate >90 >60 mL/min/[1.73_m2]    GFR Estimate If Black >90 >60 mL/min/[1.73_m2]    Calcium 8.4 (L) 8.5 - 10.1 mg/dL

## 2019-03-30 LAB
ANION GAP SERPL CALCULATED.3IONS-SCNC: 7 MMOL/L (ref 3–14)
BACTERIA SPEC CULT: NO GROWTH
BASOPHILS # BLD AUTO: 0 10E9/L (ref 0–0.2)
BASOPHILS NFR BLD AUTO: 0.1 %
BUN SERPL-MCNC: 14 MG/DL (ref 7–30)
CALCIUM SERPL-MCNC: 8.7 MG/DL (ref 8.5–10.1)
CHLORIDE SERPL-SCNC: 108 MMOL/L (ref 94–109)
CO2 SERPL-SCNC: 27 MMOL/L (ref 20–32)
CREAT SERPL-MCNC: 0.97 MG/DL (ref 0.66–1.25)
DIFFERENTIAL METHOD BLD: ABNORMAL
EOSINOPHIL # BLD AUTO: 0 10E9/L (ref 0–0.7)
EOSINOPHIL NFR BLD AUTO: 0 %
ERYTHROCYTE [DISTWIDTH] IN BLOOD BY AUTOMATED COUNT: 12.4 % (ref 10–15)
GFR SERPL CREATININE-BSD FRML MDRD: >90 ML/MIN/{1.73_M2}
GLUCOSE SERPL-MCNC: 88 MG/DL (ref 70–99)
HCT VFR BLD AUTO: 38.9 % (ref 40–53)
HGB BLD-MCNC: 13.1 G/DL (ref 13.3–17.7)
IMM GRANULOCYTES # BLD: 0.1 10E9/L (ref 0–0.4)
IMM GRANULOCYTES NFR BLD: 0.8 %
LYMPHOCYTES # BLD AUTO: 0.7 10E9/L (ref 0.8–5.3)
LYMPHOCYTES NFR BLD AUTO: 8.5 %
MCH RBC QN AUTO: 30.3 PG (ref 26.5–33)
MCHC RBC AUTO-ENTMCNC: 33.7 G/DL (ref 31.5–36.5)
MCV RBC AUTO: 90 FL (ref 78–100)
MONOCYTES # BLD AUTO: 0.6 10E9/L (ref 0–1.3)
MONOCYTES NFR BLD AUTO: 6.8 %
NEUTROPHILS # BLD AUTO: 7.3 10E9/L (ref 1.6–8.3)
NEUTROPHILS NFR BLD AUTO: 83.8 %
NRBC # BLD AUTO: 0 10*3/UL
NRBC BLD AUTO-RTO: 0 /100
PLATELET # BLD AUTO: 178 10E9/L (ref 150–450)
POTASSIUM SERPL-SCNC: 3.8 MMOL/L (ref 3.4–5.3)
RBC # BLD AUTO: 4.32 10E12/L (ref 4.4–5.9)
SODIUM SERPL-SCNC: 142 MMOL/L (ref 133–144)
SPECIMEN SOURCE: NORMAL
WBC # BLD AUTO: 8.7 10E9/L (ref 4–11)

## 2019-03-30 PROCEDURE — 25000131 ZZH RX MED GY IP 250 OP 636 PS 637: Performed by: NURSE PRACTITIONER

## 2019-03-30 PROCEDURE — 93010 ELECTROCARDIOGRAM REPORT: CPT | Performed by: INTERNAL MEDICINE

## 2019-03-30 PROCEDURE — 12000001 ZZH R&B MED SURG/OB UMMC

## 2019-03-30 PROCEDURE — 36592 COLLECT BLOOD FROM PICC: CPT | Performed by: PHYSICIAN ASSISTANT

## 2019-03-30 PROCEDURE — 80048 BASIC METABOLIC PNL TOTAL CA: CPT | Performed by: PHYSICIAN ASSISTANT

## 2019-03-30 PROCEDURE — 25000132 ZZH RX MED GY IP 250 OP 250 PS 637: Performed by: PHYSICIAN ASSISTANT

## 2019-03-30 PROCEDURE — 25000132 ZZH RX MED GY IP 250 OP 250 PS 637: Performed by: NURSE PRACTITIONER

## 2019-03-30 PROCEDURE — 25000131 ZZH RX MED GY IP 250 OP 636 PS 637: Performed by: INTERNAL MEDICINE

## 2019-03-30 PROCEDURE — 85025 COMPLETE CBC W/AUTO DIFF WBC: CPT | Performed by: PHYSICIAN ASSISTANT

## 2019-03-30 PROCEDURE — 93005 ELECTROCARDIOGRAM TRACING: CPT

## 2019-03-30 PROCEDURE — 25000128 H RX IP 250 OP 636: Performed by: INTERNAL MEDICINE

## 2019-03-30 PROCEDURE — 25000132 ZZH RX MED GY IP 250 OP 250 PS 637: Performed by: INTERNAL MEDICINE

## 2019-03-30 PROCEDURE — 25000125 ZZHC RX 250: Performed by: INTERNAL MEDICINE

## 2019-03-30 PROCEDURE — 25800030 ZZH RX IP 258 OP 636: Performed by: INTERNAL MEDICINE

## 2019-03-30 RX ORDER — ONDANSETRON 4 MG/1
4 TABLET, FILM COATED ORAL EVERY 6 HOURS
Status: DISCONTINUED | OUTPATIENT
Start: 2019-03-30 | End: 2019-03-31 | Stop reason: HOSPADM

## 2019-03-30 RX ORDER — PROCHLORPERAZINE MALEATE 10 MG
10 TABLET ORAL EVERY 6 HOURS
Status: DISCONTINUED | OUTPATIENT
Start: 2019-03-30 | End: 2019-03-31 | Stop reason: HOSPADM

## 2019-03-30 RX ADMIN — FLUCONAZOLE 100 MG: 100 TABLET ORAL at 08:42

## 2019-03-30 RX ADMIN — PROCHLORPERAZINE MALEATE 10 MG: 10 TABLET, FILM COATED ORAL at 09:20

## 2019-03-30 RX ADMIN — ALLOPURINOL 300 MG: 300 TABLET ORAL at 08:42

## 2019-03-30 RX ADMIN — VINCRISTINE SULFATE 0.78 MG: 1 INJECTION, SOLUTION INTRAVENOUS at 14:41

## 2019-03-30 RX ADMIN — PREDNISONE 60 MG: 10 TABLET ORAL at 08:42

## 2019-03-30 RX ADMIN — ACYCLOVIR 400 MG: 200 CAPSULE ORAL at 08:42

## 2019-03-30 RX ADMIN — ONDANSETRON HYDROCHLORIDE 4 MG: 4 TABLET, FILM COATED ORAL at 18:03

## 2019-03-30 RX ADMIN — ONDANSETRON HYDROCHLORIDE 4 MG: 4 TABLET, FILM COATED ORAL at 11:49

## 2019-03-30 RX ADMIN — ACETAMINOPHEN 650 MG: 325 TABLET, FILM COATED ORAL at 20:18

## 2019-03-30 RX ADMIN — PREDNISONE 60 MG: 10 TABLET ORAL at 16:35

## 2019-03-30 RX ADMIN — Medication 5 ML: at 20:09

## 2019-03-30 RX ADMIN — PROCHLORPERAZINE MALEATE 10 MG: 10 TABLET, FILM COATED ORAL at 21:57

## 2019-03-30 RX ADMIN — PROCHLORPERAZINE MALEATE 10 MG: 10 TABLET, FILM COATED ORAL at 16:35

## 2019-03-30 RX ADMIN — PANTOPRAZOLE SODIUM 40 MG: 40 TABLET, DELAYED RELEASE ORAL at 08:42

## 2019-03-30 RX ADMIN — ETOPOSIDE 100 MG: 20 INJECTION, SOLUTION, CONCENTRATE INTRAVENOUS at 14:41

## 2019-03-30 RX ADMIN — ACYCLOVIR 400 MG: 200 CAPSULE ORAL at 20:08

## 2019-03-30 RX ADMIN — ONDANSETRON HYDROCHLORIDE 16 MG: 8 TABLET, FILM COATED ORAL at 13:37

## 2019-03-30 RX ADMIN — SENNOSIDES AND DOCUSATE SODIUM 1 TABLET: 8.6; 5 TABLET ORAL at 08:42

## 2019-03-30 ASSESSMENT — ACTIVITIES OF DAILY LIVING (ADL)
ADLS_ACUITY_SCORE: 10

## 2019-03-30 ASSESSMENT — MIFFLIN-ST. JEOR: SCORE: 1685.08

## 2019-03-30 ASSESSMENT — PAIN DESCRIPTION - DESCRIPTORS: DESCRIPTORS: HEADACHE

## 2019-03-30 NOTE — PLAN OF CARE
Nursing Focus: Chemotherapy  D: Positive blood return via PICC. Insertion site is clean/dry/intact, dressing intact with no complaints of pain.  Urine output is recorded in intake in Doc Flowsheet.  Complained of increasing nausea.  Zofran 4mg prior to meals added to regimen.  I: Premedications given per order (see electronic medical administration record). Dose #4 of Etoposide and Dose #4 of Vincristine with doxorubicin started to infuse over 22 hours. Reviewed pt teaching on chemotherapy side effects.  Pt denies need for further teaching. Chemotherapy double checked per protocol by two chemotherapy competent RN's.   A: Tolerating chemotherapy well albeit with some nausea which is better now with increased zofran dosing.   P: Continue to monitor urine output and symptoms of nausea. Screen for symptoms of toxicity.

## 2019-03-30 NOTE — PLAN OF CARE
AVSS. Denied pain and nausea. Walked halls frequently. Day 3 of 4 CIVI chemo infusing, good blood return via PICC q4h. Plan for discharge on tomorrow pending chemo completion. Continue with POC.

## 2019-03-30 NOTE — PLAN OF CARE
15:00-19:30  Pt on schedule antiemetics, compazine and zofran for continuous chemo infusion. So far, he is doing well

## 2019-03-30 NOTE — PROGRESS NOTES
The patient was discussed on rounds with the nurses, mid level provider, and house staff and seen and examined by me. All labs and imaging were reviewed. I reviewed events over the last 24 hours including vitals and flow sheets. I agree with the ANTHONY note and have been responsible for the care plan and interpretation of progress.      Overnight events, vital signs, labs and meds reviewed.     Christiano Molina is a 43 year old male with newly diagnosed high-grade B-cell lymphoma who is admitted for C1 DA-R-EPOCH.     -mild to moderate nausea on anti emetics  -On allopurinol  -Started C1D4 DA-R-EPOCH     Horacio Burleson MD

## 2019-03-30 NOTE — PROGRESS NOTES
[facility], [place]  Hematology / Oncology Progress Note     Assessment & Plan   Christiano Molina is a 43 year old male with newly diagnosed high-grade B-cell lymphoma who is admitted for C1 DA-R-EPOCH.     #High-grade B-cell lymphoma  - Follows with Dr. Ramirez  - Presented with R-sided axillary mass on 3/15/19 with the morphology suggesting an aggressive B cell lymphoma, with a MYC   translocation and GC immunophenotype. The differential diagnosis includes high grade B cell lymphoma, however cannot be classified as double hit because no BCL6 or BCL2 rearrangement was detected, the caveat is that BCL2 rearrangement was not tested by break-apart probes, rather by an IgH-BCL2 fusion probes.   Classification as High Grade B cell lymphoma NOS (WHO rev. 4th edition) is favored.  Burkitt lymphoma is also in the differential diagnosis, but the morphology with many nuclei containing  prominent single nucleoli is not perfect for that diagnosis, the fusion partner is unknown (it's not IgH, but  [place] and Lambda light chain loci were not tested). With the presentation of a single axillary lymph node and no other areas of involvement, and based on PET and a slightly elevated LDH (<300), this is not consistent with clinical presentation of a Burkitt lymphoma. Patients case was discussed case was reviewed at the [facility] division of hematopathology consensus conference due to this complex case. Pathology is requesting the tissue block to test for  EBV and  Ki-67 to determine if this is Burkitts vs DLBCL.   - BMBx and LP from 3/25 are both negative for lymphoma involvement.  PET/CT indicates stage IA disease.   - In the interim, Dr. Ramirez's, plan is to give 2 cycles DA-R-EPOCH and further decisions will be made once additional tests are known.  Patient will also undergo restaging with PET/CT.  -PICC placed on admission, plan to remove before discharge  -Pre-treatment echo  On 3/22/19  EF 60%  -Started allopurinol 300mg daily, continue at discharge     Treatment plan: C1 DA-R-EPOCH (D1=3/27/19). Today is D4.  -Rituxan 375 mg/m2 D0. Tolerated well aside from mild hives and facial itching.  -Prednisone 30 mg/m2 BID D1-5.   -Etoposide 100 mg/m2 CIVI D1-4.   -Doxorubicin 10 mg/m2 CIVI D1-4.  -Vincristine 0.4 mg/m2 D1-4.  -Cyclophosphamide 750 mg/m2 D5.   Premeds: Tylenol, Benadryl, Zofran D1-5, Emend, Dex D6-7 (needs at discharge w/ taper).  -Will need Neulasta at discharge - scheduled with labs on Tuesday 4/1, also scheduled labs with ANTHONY visit on Friday 4/5.     #Uncontrolled Acute Nausea  - Will add scheduled antiemetics due to uncontrolled nausea as follows:   Zofran 4 mg po before meals and at bedtime, scheduled and Compazine 10 mg po every 6 hours scheduled.  - Will check EKG today to evaluated QTc due to adding aforementioned medications.    #Post-LP headache  -Significantly improved s/p 2 doses of IV caffeine. Will continue to monitor, but no blood patch felt to be indicated at this time.       #ID Prophy  -Acyclovir 400mg BID  -Fluconazole 100mg daily  -Start Levaquin 250mg daily when ANC <1000     #FEN  -MIVF per treatment protocol  -Lyte replacement PRN per protocol  -RDAT     #Prophy  -Ambulating frequently  -Protonix 40mg daily    Disposition Plan  Expected discharge: [place] 3/31, recommended to prior living arrangement once chemotherapy completed.       Nida Diallo ANP-BC, AOCNP  Hematology/Oncology  [phone]      Interval History   Prudencio continues to do well with chemotherapy.  He states the nausea is now increasing in frequency.  He has antiemetics scheduled PRN, but waits too late in requesting them. He is drinking fluids and states his appetite is diminished.  He also states his LP induced headache continues to improve daily.  He is urinating without difficulties.  His bowels are moving daily.     Physical Exam   Temp: 96.6  F (35.9  C) Temp src: Oral BP: 123/68   Heart Rate: 68  Resp: 16 SpO2: 98 % O2 Device: None (Room air)    Vitals:    03/28/19 0742 03/29/19 0759 03/30/19 0726   Weight: 78.9 kg (173 lb 14.4 oz) 79.2 kg (174 lb 11.2 oz) 77.9 kg (171 lb 11.2 oz)     Vital Signs with Ranges  Temp:  [96.6  F (35.9  C)-97.8  F (36.6  C)] 96.6  F (35.9  C)  Heart Rate:  [58-68] 68  Resp:  [16-20] 16  BP: (113-132)/(54-74) 123/68  SpO2:  [96 %-98 %] 98 %  I/O last 3 completed shifts:  In: 1508 [I.V.:40; IV Piggyback:1468]  Out: 2750 [Urine:2750]    Constitutional: Awake, alert, cooperative, no apparent distress, and appears stated age.  Eyes: Lids and lashes normal, pupils equal, round and reactive to light, extra ocular muscles intact, sclera clear, conjunctiva normal. Baseline mild strabismus noted.  ENT: Normocephalic, without obvious abnormality, atraumatic, sinuses nontender on palpation, external ears without lesions, oral pharynx with moist mucus membranes, tonsils without erythema or exudates, gums normal and good dentition.  Respiratory: No increased work of breathing, good air exchange, clear to auscultation bilaterally, no crackles or wheezing.  Cardiovascular: Regular rate and rhythm, normal S1 and S2, and no murmur noted.  GI: Normal bowel sounds, soft, non-distended, non-tender.  Neurologic: Awake, alert, oriented to name, place and time.  Cranial nerves II-XII are grossly intact.  Neuropsychiatric: Calm, normal eye contact, alert, normal affect, oriented to self, place, time and situation, memory for past and recent events intact and thought process normal.  Vascular access: Upper extremity PICC line without s/s infection    Medications       acyclovir  400 mg Oral BID     allopurinol  300 mg Oral Daily     Chemotherapy Infusing-Continuous Infusion   Does not apply Q8H     [START ON 3/31/2019] cyclophosphamide (CYTOXAN) chemo infusion  750 mg/m2 (Treatment Plan Recorded) Intravenous Once     [START ON 4/1/2019] dexamethasone  8 mg Oral Daily     etoposide (TOPOSAR) chemo infusion   50 mg/m2 (Treatment Plan Recorded) Intravenous Q22H     fluconazole  100 mg Oral Daily     [START ON 3/31/2019] fosaprepitant (EMEND) 150 mg intermittent infusion  150 mg Intravenous Once     heparin  5 mL Intracatheter Q28 Days     heparin lock flush  5-10 mL Intracatheter Q24H     ondansetron  16 mg Oral Q22H     pantoprazole  40 mg Oral QAM AC     predniSONE  30 mg/m2 (Treatment Plan Recorded) Oral BID     senna-docusate  2 tablet Oral BID     sodium chloride (PF)  10 mL Intracatheter Q8H     vinCRIStine/DOXOrubicin (ONCOVIN/ADRIAMYCIN) chemo infusion  0.4 mg/m2 (Treatment Plan Recorded) Intravenous Q22H       Data   Results for orders placed or performed during the hospital encounter of 03/27/19 (from the past 24 hour(s))   Basic metabolic panel   Result Value Ref Range    Sodium 142 133 - 144 mmol/L    Potassium 3.8 3.4 - 5.3 mmol/L    Chloride 108 94 - 109 mmol/L    Carbon Dioxide 27 20 - 32 mmol/L    Anion Gap 7 3 - 14 mmol/L    Glucose 88 70 - 99 mg/dL    Urea Nitrogen 14 7 - 30 mg/dL    Creatinine 0.97 0.66 - 1.25 mg/dL    GFR Estimate >90 >60 mL/min/[1.73_m2]    GFR Estimate If Black >90 >60 mL/min/[1.73_m2]    Calcium 8.7 8.5 - 10.1 mg/dL   CBC with platelets differential   Result Value Ref Range    WBC 8.7 4.0 - 11.0 10e9/L    RBC Count 4.32 (L) 4.4 - 5.9 10e12/L    Hemoglobin 13.1 (L) 13.3 - 17.7 g/dL    Hematocrit 38.9 (L) 40.0 - 53.0 %    MCV 90 78 - 100 fl    MCH 30.3 26.5 - 33.0 pg    MCHC 33.7 31.5 - 36.5 g/dL    RDW 12.4 10.0 - 15.0 %    Platelet Count 178 150 - 450 10e9/L    Diff Method Automated Method     % Neutrophils 83.8 %    % Lymphocytes 8.5 %    % Monocytes 6.8 %    % Eosinophils 0.0 %    % Basophils 0.1 %    % Immature Granulocytes 0.8 %    Nucleated RBCs 0 0 /100    Absolute Neutrophil 7.3 1.6 - 8.3 10e9/L    Absolute Lymphocytes 0.7 (L) 0.8 - 5.3 10e9/L    Absolute Monocytes 0.6 0.0 - 1.3 10e9/L    Absolute Eosinophils 0.0 0.0 - 0.7 10e9/L    Absolute Basophils 0.0 0.0 - 0.2 10e9/L     Abs Immature Granulocytes 0.1 0 - 0.4 10e9/L    Absolute Nucleated RBC 0.0

## 2019-03-31 VITALS
SYSTOLIC BLOOD PRESSURE: 130 MMHG | OXYGEN SATURATION: 96 % | TEMPERATURE: 97.9 F | DIASTOLIC BLOOD PRESSURE: 75 MMHG | BODY MASS INDEX: 24.64 KG/M2 | HEART RATE: 66 BPM | HEIGHT: 70 IN | RESPIRATION RATE: 18 BRPM | WEIGHT: 172.1 LBS

## 2019-03-31 LAB
ANION GAP SERPL CALCULATED.3IONS-SCNC: 8 MMOL/L (ref 3–14)
BASOPHILS # BLD AUTO: 0 10E9/L (ref 0–0.2)
BASOPHILS NFR BLD AUTO: 0 %
BUN SERPL-MCNC: 15 MG/DL (ref 7–30)
CALCIUM SERPL-MCNC: 8.5 MG/DL (ref 8.5–10.1)
CHLORIDE SERPL-SCNC: 106 MMOL/L (ref 94–109)
CO2 SERPL-SCNC: 26 MMOL/L (ref 20–32)
CREAT SERPL-MCNC: 0.95 MG/DL (ref 0.66–1.25)
DIFFERENTIAL METHOD BLD: ABNORMAL
EOSINOPHIL # BLD AUTO: 0 10E9/L (ref 0–0.7)
EOSINOPHIL NFR BLD AUTO: 0 %
ERYTHROCYTE [DISTWIDTH] IN BLOOD BY AUTOMATED COUNT: 12.1 % (ref 10–15)
GFR SERPL CREATININE-BSD FRML MDRD: >90 ML/MIN/{1.73_M2}
GLUCOSE SERPL-MCNC: 104 MG/DL (ref 70–99)
HCT VFR BLD AUTO: 39 % (ref 40–53)
HGB BLD-MCNC: 13 G/DL (ref 13.3–17.7)
LYMPHOCYTES # BLD AUTO: 0.4 10E9/L (ref 0.8–5.3)
LYMPHOCYTES NFR BLD AUTO: 5.5 %
MCH RBC QN AUTO: 29.7 PG (ref 26.5–33)
MCHC RBC AUTO-ENTMCNC: 33.3 G/DL (ref 31.5–36.5)
MCV RBC AUTO: 89 FL (ref 78–100)
MONOCYTES # BLD AUTO: 0.2 10E9/L (ref 0–1.3)
MONOCYTES NFR BLD AUTO: 3.6 %
NEUTROPHILS # BLD AUTO: 5.9 10E9/L (ref 1.6–8.3)
NEUTROPHILS NFR BLD AUTO: 90.9 %
PLATELET # BLD AUTO: 185 10E9/L (ref 150–450)
PLATELET # BLD EST: ABNORMAL 10*3/UL
POTASSIUM SERPL-SCNC: 3.6 MMOL/L (ref 3.4–5.3)
RBC # BLD AUTO: 4.38 10E12/L (ref 4.4–5.9)
RBC MORPH BLD: NORMAL
SODIUM SERPL-SCNC: 140 MMOL/L (ref 133–144)
WBC # BLD AUTO: 6.5 10E9/L (ref 4–11)

## 2019-03-31 PROCEDURE — 85025 COMPLETE CBC W/AUTO DIFF WBC: CPT | Performed by: INTERNAL MEDICINE

## 2019-03-31 PROCEDURE — 25000128 H RX IP 250 OP 636: Performed by: INTERNAL MEDICINE

## 2019-03-31 PROCEDURE — 25000132 ZZH RX MED GY IP 250 OP 250 PS 637: Performed by: NURSE PRACTITIONER

## 2019-03-31 PROCEDURE — 25800030 ZZH RX IP 258 OP 636: Performed by: INTERNAL MEDICINE

## 2019-03-31 PROCEDURE — 25000131 ZZH RX MED GY IP 250 OP 636 PS 637: Performed by: NURSE PRACTITIONER

## 2019-03-31 PROCEDURE — 80048 BASIC METABOLIC PNL TOTAL CA: CPT | Performed by: INTERNAL MEDICINE

## 2019-03-31 PROCEDURE — 36592 COLLECT BLOOD FROM PICC: CPT | Performed by: INTERNAL MEDICINE

## 2019-03-31 PROCEDURE — 25000132 ZZH RX MED GY IP 250 OP 250 PS 637: Performed by: PHYSICIAN ASSISTANT

## 2019-03-31 PROCEDURE — 25000131 ZZH RX MED GY IP 250 OP 636 PS 637: Performed by: INTERNAL MEDICINE

## 2019-03-31 PROCEDURE — 25000132 ZZH RX MED GY IP 250 OP 250 PS 637: Performed by: INTERNAL MEDICINE

## 2019-03-31 PROCEDURE — 25000125 ZZHC RX 250: Performed by: INTERNAL MEDICINE

## 2019-03-31 RX ORDER — ONDANSETRON 8 MG/1
8 TABLET, ORALLY DISINTEGRATING ORAL EVERY 8 HOURS
Qty: 30 TABLET | Refills: 0 | Status: SHIPPED | OUTPATIENT
Start: 2019-03-31 | End: 2019-08-15

## 2019-03-31 RX ADMIN — CYCLOPHOSPHAMIDE 1465 MG: 2 INJECTION, POWDER, FOR SOLUTION INTRAVENOUS; ORAL at 13:02

## 2019-03-31 RX ADMIN — ALLOPURINOL 300 MG: 300 TABLET ORAL at 08:27

## 2019-03-31 RX ADMIN — FLUCONAZOLE 100 MG: 100 TABLET ORAL at 08:27

## 2019-03-31 RX ADMIN — ONDANSETRON HYDROCHLORIDE 16 MG: 8 TABLET, FILM COATED ORAL at 11:48

## 2019-03-31 RX ADMIN — PROCHLORPERAZINE MALEATE 10 MG: 10 TABLET, FILM COATED ORAL at 03:57

## 2019-03-31 RX ADMIN — SENNOSIDES AND DOCUSATE SODIUM 1 TABLET: 8.6; 5 TABLET ORAL at 08:26

## 2019-03-31 RX ADMIN — PANTOPRAZOLE SODIUM 40 MG: 40 TABLET, DELAYED RELEASE ORAL at 08:27

## 2019-03-31 RX ADMIN — PROCHLORPERAZINE MALEATE 10 MG: 10 TABLET, FILM COATED ORAL at 10:46

## 2019-03-31 RX ADMIN — ONDANSETRON HYDROCHLORIDE 4 MG: 4 TABLET, FILM COATED ORAL at 06:35

## 2019-03-31 RX ADMIN — ONDANSETRON HYDROCHLORIDE 4 MG: 4 TABLET, FILM COATED ORAL at 12:59

## 2019-03-31 RX ADMIN — PREDNISONE 60 MG: 10 TABLET ORAL at 08:42

## 2019-03-31 RX ADMIN — SODIUM CHLORIDE 150 MG: 9 INJECTION, SOLUTION INTRAVENOUS at 12:16

## 2019-03-31 RX ADMIN — ACYCLOVIR 400 MG: 200 CAPSULE ORAL at 08:26

## 2019-03-31 ASSESSMENT — ACTIVITIES OF DAILY LIVING (ADL)
ADLS_ACUITY_SCORE: 10

## 2019-03-31 ASSESSMENT — MIFFLIN-ST. JEOR: SCORE: 1686.89

## 2019-03-31 NOTE — DISCHARGE SUMMARY
[facility], [place] -- Discharge Summary -- Hematology / Oncology  Date of Admission:  3/27/2019  Date of Discharge:  3/31/2019  Discharging Provider: Emily Frafan  Date of Service (when I saw the patient): 03/31/19    DISCHARGE DIAGNOSES  Active Problems:    High grade malignant lymphoma (H)    HISTORY OF PRESENT ILLNESS Christiano Molina is a 43 year old male with newly diagnosed high-grade B-cell lymphoma who is being admitted for C1 DA-R-EPOCH. Overall he is doing well today. His main complaint is an occipital headache that started yesterday morning. He had a diagnostic LP on 3/25. He states it is worst with sitting upright and goes away with lying flat. He has tried Tylenol and some black tea without much relief, is typically pretty sensitive to caffeine. No nausea or vomiting. He also reports a ringing/muffled sensation to hearing and some neck stiffness. No fevers. He otherwise denies cold symptoms, chest pain, SOB, cough, or bowel changes. He typically has a BM 1-2 times per day and last BM was this morning. Urinating normally. No rashes. R axillary mass is stable, no other lumps/masses that he has noticed.    HOSPITAL COURSE  Prudencio was admitted 3/27/19 - 3/31/19 for Cycle 1 DA-R-EPOCH. Tolerated well other than nausea. Required scheduled antiemetics. Will discharge on allopurinol, anti-infective prophylaxis. Will get Neulasta on Tuesday. Will follow up with ANTHONY Friday.    The following problems were addressed:  #High-grade B-cell lymphoma  - Follows with Dr. Ramirez  - Presented with R-sided axillary mass on 3/15/19 with the morphology suggesting an aggressive B cell lymphoma, with a MYC   translocation and GC immunophenotype. The differential diagnosis includes high grade B cell lymphoma, however cannot be classified as double hit because no BCL6 or BCL2 rearrangement was detected, the caveat is that BCL2 rearrangement was not tested by break-apart probes, rather by an  IgH-BCL2 fusion probes.   Classification as High Grade B cell lymphoma NOS (WHO rev. 4th edition) is favored.  Burkitt lymphoma is also in the differential diagnosis, but the morphology with many nuclei containing  prominent single nucleoli is not perfect for that diagnosis, the fusion partner is unknown (it's not IgH, but  [place] and Lambda light chain loci were not tested). With the presentation of a single axillary lymph node and no other areas of involvement, and based on PET and a slightly elevated LDH (<300), this is not consistent with clinical presentation of a Burkitt lymphoma. Patients case was discussed case was reviewed at the [facility] division of hematopathology consensus conference due to this complex case. Pathology is requesting the tissue block to test for  EBV and  Ki-67 to determine if this is Burkitts vs DLBCL.   - BMBx and LP from 3/25 are both negative for lymphoma involvement.  PET/CT indicates stage IA disease.   - In the interim, Dr. Ramirez's, plan is to give 2 cycles DA-R-EPOCH and further decisions will be made once additional tests are known.  Patient will also undergo restaging with PET/CT.  -PICC placed on admission, plan to remove before discharge  -Pre-treatment echo  On 3/22/19 EF 60%  -Started allopurinol 300mg daily, continue at discharge     Treatment plan: C1 DA-R-EPOCH (D1=3/27/19). Today is D5.  -Rituxan 375 mg/m2 D0. Tolerated well aside from mild hives and facial itching.  -Prednisone 30 mg/m2 BID D1-5.   -Etoposide 100 mg/m2 CIVI D1-4.   -Doxorubicin 10 mg/m2 CIVI D1-4.  -Vincristine 0.4 mg/m2 D1-4.  -Cyclophosphamide 750 mg/m2 D5.   Premeds: Tylenol, Benadryl, Zofran D1-5, Emend, Dex D6-7 (needs at discharge w/ taper).  -Will need Neulasta at discharge - scheduled with labs on Tuesday 4/1, also scheduled labs with ANTHONY visit on Friday 4/5.     #Uncontrolled Acute Nausea  - Will add scheduled antiemetics due to uncontrolled nausea as follows:   Zofran 4 mg po  before meals and at bedtime, scheduled and Compazine 10 mg po every 6 hours scheduled.  - Will check EKG today to evaluated QTc due to adding aforementioned medications.     #Post-LP headache  -Significantly improved s/p 2 doses of IV caffeine. Will continue to monitor, but no blood patch felt to be indicated at this time.     Emily Farfan  AGACNP-BC  [phone]  Hematology/Oncology  March 31, 2019    PENDING RESULTS   Unresulted Labs Ordered in the Past 30 Days of this Admission     Date and Time Order Name Status Description    3/25/2019 0829 PROCESS AND HOLD DNA In process     3/25/2019 0805 LEUKEMIA LYMPHOMA EVALUATION (FLOW CYTOMETRY) In process     3/25/2019 0805 FISH In process     3/25/2019 0805 CHROMOSOME BONE MARROW In process         CODE STATUS FULL  PCP Layton Weir    PHYSICAL EXAM  Vital Signs with Ranges  Temp:  [96.8  F (36  C)-98.3  F (36.8  C)] 96.8  F (36  C)  Heart Rate:  [57-73] 69  Resp:  [16-18] 18  BP: (104-138)/(62-74) 115/62  SpO2:  [95 %-98 %] 96 %  Constitutional: Awake, alert, cooperative, no apparent distress, and appears stated age. Walking halls regularly.   Eyes: PERRL, EOMI, sclera clear, conjunctiva normal.  ENT: Normocephalic, without obvious abnormality, atraumatic, oral pharynx with moist mucus membranes, no ulcerations or thrush  Respiratory: Non-labored breathing on RA, CTAB, no crackles or wheezing.  Cardiovascular: RRR, no murmur noted.  GI: + bowel sounds, soft, non-distended, non-tender.  Skin: No rash or concerning lesions on exposed areas.   Musculoskeletal: There is no redness, warmth, or swelling of the joints. No swelling R armpit - deep LN palpated, small.     Neurologic: A&O x 3. Cranial nerves II-XII are grossly intact.    Neuropsychiatric: Calm, affect normal.  Vascular Access: PICC is clean and dry, without erythema, swelling, or tenderness.    DISCHARGE DISPOSITION  Home & in stable condition.    DISCHARGE ORDERS     Reason for your hospital stay     You were hospitalized for chemotherapy for lymphoma with DA-R-EPOCH (dose-adjusted Rituxan, Etoposide, Vincristine, Prednisone, Doxorubicin, and Cyclophosphamide)     Adult [facility] Follow-up and recommended labs and tests    I have requested the following appointments:  Tuesday, April 2 for Neulasta (white blood cell boosting shot) and labwork  Friday, April 5 for labwork and visit with ANTHONY    Appointments on [place] and/or [facility] (with [facility] or [facility] provider or service). Call [phone] if you haven't heard regarding these appointments within 7 days of discharge.     Activity    Your activity upon discharge: activity as tolerated     When to contact your care team    Please call the [facility] at [phone] for fever (temp >100.4), chills, uncontrolled nausea, vomiting, constipation, or diarrhea, unrelieved pain, bleeding not relieved with pressure, dizziness, chest pain, shortness of breath, loss of consciousness, and any new or concerning symptoms.     Full Code     Diet    Follow this diet upon discharge: Regular     Discharge Medications   Current Discharge Medication List      START taking these medications    Details   acyclovir (ZOVIRAX) 200 MG capsule Take 2 capsules (400 mg) by mouth 2 times daily  Qty: 60 capsule, Refills: 0    Associated Diagnoses: High grade malignant lymphoma (H)      allopurinol (ZYLOPRIM) 300 MG tablet Take 1 tablet (300 mg) by mouth daily  Qty: 30 tablet, Refills: 0    Associated Diagnoses: High grade malignant lymphoma (H)      dexamethasone (DECADRON) 4 MG tablet Take 8 mg by mouth daily Take on 4/1 and 4/2..  Qty: 4 tablet, Refills: 0    Comments: Please include the quantity of tablets and (strength) per dose in sig.  Associated Diagnoses: High grade B-cell lymphoma (H)      fluconazole (DIFLUCAN) 100 MG tablet Take 1 tablet (100 mg) by mouth daily  Qty: 30 tablet, Refills: 0    Associated Diagnoses: High grade  malignant lymphoma (H)      ondansetron (ZOFRAN-ODT) 8 MG ODT tab Take 1 tablet (8 mg) by mouth every 8 hours At first continue scheduled (but can back off as nausea improves)  Qty: 30 tablet, Refills: 0    Associated Diagnoses: High grade malignant lymphoma (H)      prochlorperazine (COMPAZINE) 10 MG tablet Take 1 tablet (10 mg) by mouth every 6 hours as needed for nausea or vomiting (Try second.)  Qty: 30 tablet, Refills: 0    Associated Diagnoses: High grade B-cell lymphoma (H)      senna-docusate (SENOKOT-S/PERICOLACE) 8.6-50 MG tablet Take 2 tablets by mouth 2 times daily as needed for constipation    Associated Diagnoses: High grade B-cell lymphoma (H)         CONTINUE these medications which have NOT CHANGED    Details   Acetaminophen (TYLENOL PO)       TUMS 500 MG OR CHEW 1 TABLET  PO  Qty: 90, Refills: 0      MULTIVITAMINS OR TABS 1 TABLET DAILY PO  Refills: 0         STOP taking these medications       IBUPROFEN PO Comments:   Reason for Stopping:             DATA  CBC RECENT  Recent Labs   Lab Test 03/31/19  0610 03/30/19  0621 03/29/19  0725   WBC 6.5 8.7 11.6*   HGB 13.0* 13.1* 12.7*   MCV 89 90 92    178 163      BMP RECENT   Recent Labs   Lab Test 03/31/19  0610 03/30/19  0621 03/29/19  0725    142 141   POTASSIUM 3.6 3.8 3.5   CHLORIDE 106 108 107   CO2 26 27 24   BUN 15 14 13   CR 0.95 0.97 1.00   ANIONGAP 8 7 10   TAMMY 8.5 8.7 8.4*   * 88 139*     LFT RECENT  Recent Labs   Lab Test 03/27/19  1019 03/21/19  1840   AST 13 16   ALT 18 23   ALKPHOS 44 52   BILITOTAL 0.3 0.5     Most Recent 6 Bacteria Isolates From Any Culture   Recent Labs   Lab Test 03/25/19  1120   CULT No growth     IMAGING Xr Chest 1 View    Result Date: 3/27/2019  Exam: Chest x-ray, 1 view, 3/27/2018. COMPARISON: No similar study. HISTORY: Verify PICC placement. FINDINGS: PA view of the chest was obtained. No acute airspace opacities. Distinct pulmonary vasculature. Cardiomediastinal silhouette within normal  limits. No pleural effusions. No pneumothorax. Right upper extremity PICC with its tip projecting over the mid SVC.     IMPRESSION: Right upper extremity PICC projecting over the mid SVC. No acute airspace opacities. CHELSEA DONNELLY MD    [facility]    Result Date: 3/28/2019  This exam was marked as non-reportable because it will not be read by a radiologist or a [facility] non-radiologist provider.     ALLERGIES  No Known Allergies

## 2019-03-31 NOTE — PLAN OF CARE
Bradycardic at baseline, asymptomatic. OVSS. C/O headache with minimal visual disturbance. Tylenol x 1, with complete relief. Nausea well controlled with scheduled antiemetics. CIVI chemo infusing with good blood return q4h. Plan for discharge today after cytoxan infusion. Continue with POC.

## 2019-03-31 NOTE — PROGRESS NOTES
"SPIRITUAL HEALTH SERVICES  [facility] ([place] 7D  ON-CALL VISIT     REFERRAL SOURCE: On-call  visit with pt Christiano, per request for hospital  visit as noted in initial nursing assessment. Pt in particular was requesting Holy Communion.     Pt very pleasant, welcomed  support today, shared that \"I'll be getting discharged most likely today. My wife and family are all at [employer] this morning ([place], [place]). Of course I'm not able to be with them - I thought it would be helpeful if I could receive communion today.\" Pt said he has strong support from his [employer]. I celebrated Holy Communion with pt, shared prayers and sung blessing.      PLAN: will inform unit  of visit.     Miah Alston M.Div (Bill)., [facility]  Staff   Pager [phone]                                                                                          "

## 2019-03-31 NOTE — PLAN OF CARE
Discharge  D: Orders for discharge and outpatient medications written.  I: Home medications and return to clinic schedule reviewed with patient. Discharge instructions and parameters for calling Health Care Provider reviewed. Patient left at 1450 accompanied by his wife. Reviewed plan for nausea management.  A: Patient/family verbalized understanding and was ready for discharge.   P: Patient instructed to  medications in Pharmacy. Follow up as scheduled Tuesday, 4/2 in clinic for neulasta.

## 2019-04-01 LAB — INTERPRETATION ECG - MUSE: NORMAL

## 2019-04-02 ENCOUNTER — ALLIED HEALTH/NURSE VISIT (OUTPATIENT)
Dept: ONCOLOGY | Facility: CLINIC | Age: 44
End: 2019-04-02
Attending: INTERNAL MEDICINE
Payer: COMMERCIAL

## 2019-04-02 ENCOUNTER — APPOINTMENT (OUTPATIENT)
Dept: LAB | Facility: CLINIC | Age: 44
End: 2019-04-02
Attending: INTERNAL MEDICINE
Payer: COMMERCIAL

## 2019-04-02 VITALS
OXYGEN SATURATION: 95 % | HEART RATE: 80 BPM | BODY MASS INDEX: 24.94 KG/M2 | DIASTOLIC BLOOD PRESSURE: 82 MMHG | TEMPERATURE: 98.7 F | SYSTOLIC BLOOD PRESSURE: 134 MMHG | WEIGHT: 173.8 LBS

## 2019-04-02 DIAGNOSIS — C85.10 HIGH GRADE B-CELL LYMPHOMA (H): Primary | ICD-10-CM

## 2019-04-02 LAB
BASOPHILS # BLD AUTO: 0 10E9/L (ref 0–0.2)
BASOPHILS NFR BLD AUTO: 0.1 %
DIFFERENTIAL METHOD BLD: ABNORMAL
EOSINOPHIL # BLD AUTO: 0 10E9/L (ref 0–0.7)
EOSINOPHIL NFR BLD AUTO: 0.2 %
ERYTHROCYTE [DISTWIDTH] IN BLOOD BY AUTOMATED COUNT: 11.8 % (ref 10–15)
HCT VFR BLD AUTO: 40.3 % (ref 40–53)
HGB BLD-MCNC: 14.6 G/DL (ref 13.3–17.7)
IMM GRANULOCYTES # BLD: 0.1 10E9/L (ref 0–0.4)
IMM GRANULOCYTES NFR BLD: 0.7 %
LYMPHOCYTES # BLD AUTO: 0.2 10E9/L (ref 0.8–5.3)
LYMPHOCYTES NFR BLD AUTO: 1.9 %
MCH RBC QN AUTO: 30.6 PG (ref 26.5–33)
MCHC RBC AUTO-ENTMCNC: 36.2 G/DL (ref 31.5–36.5)
MCV RBC AUTO: 85 FL (ref 78–100)
MONOCYTES # BLD AUTO: 0.1 10E9/L (ref 0–1.3)
MONOCYTES NFR BLD AUTO: 0.6 %
NEUTROPHILS # BLD AUTO: 10.2 10E9/L (ref 1.6–8.3)
NEUTROPHILS NFR BLD AUTO: 96.5 %
NRBC # BLD AUTO: 0 10*3/UL
NRBC BLD AUTO-RTO: 0 /100
PLATELET # BLD AUTO: 209 10E9/L (ref 150–450)
RBC # BLD AUTO: 4.77 10E12/L (ref 4.4–5.9)
WBC # BLD AUTO: 10.6 10E9/L (ref 4–11)

## 2019-04-02 PROCEDURE — 36415 COLL VENOUS BLD VENIPUNCTURE: CPT

## 2019-04-02 PROCEDURE — 25000128 H RX IP 250 OP 636: Mod: ZF | Performed by: INTERNAL MEDICINE

## 2019-04-02 PROCEDURE — 96377 APPLICATON ON-BODY INJECTOR: CPT

## 2019-04-02 PROCEDURE — 85025 COMPLETE CBC W/AUTO DIFF WBC: CPT | Performed by: INTERNAL MEDICINE

## 2019-04-02 RX ADMIN — PEGFILGRASTIM 6 MG: 6 INJECTION SUBCUTANEOUS at 11:48

## 2019-04-02 ASSESSMENT — PAIN SCALES - GENERAL: PAINLEVEL: NO PAIN (0)

## 2019-04-02 NOTE — PROGRESS NOTES
Chief Complaint   Patient presents with     Lab Only     labs drawn via  by RN in lab, vitals completed     Imm/Inj     Patient with High grade B-cell lymphoma - here for a Neulasta injection     Patient arrived to clinic for a Neulasta injection today.  Patient declined to speak with an RN today.   No results needed for treatment, ok to proceed with treatment.  Neulasta injection given to right arm without incident.  Copy of AVS reviewed with patient and/or family.  Patient will return Friday for next appointment.    Education materials given to patient and all questions answered before administering medication.  Patient waited 10 minutes post injection with no reaction present and instructed to call triage if he experiences any adverse reactions.

## 2019-04-04 DIAGNOSIS — C85.10 HIGH GRADE B-CELL LYMPHOMA (H): Primary | ICD-10-CM

## 2019-04-05 ENCOUNTER — APPOINTMENT (OUTPATIENT)
Dept: LAB | Facility: CLINIC | Age: 44
End: 2019-04-05
Attending: INTERNAL MEDICINE
Payer: COMMERCIAL

## 2019-04-05 ENCOUNTER — ONCOLOGY VISIT (OUTPATIENT)
Dept: ONCOLOGY | Facility: CLINIC | Age: 44
End: 2019-04-05
Attending: PHYSICIAN ASSISTANT
Payer: COMMERCIAL

## 2019-04-05 VITALS
RESPIRATION RATE: 16 BRPM | TEMPERATURE: 97.7 F | BODY MASS INDEX: 25.04 KG/M2 | HEIGHT: 70 IN | HEART RATE: 97 BPM | DIASTOLIC BLOOD PRESSURE: 77 MMHG | WEIGHT: 174.9 LBS | SYSTOLIC BLOOD PRESSURE: 127 MMHG | OXYGEN SATURATION: 99 %

## 2019-04-05 DIAGNOSIS — L25.8 CONTACT DERMATITIS DUE TO OTHER AGENT, UNSPECIFIED CONTACT DERMATITIS TYPE: ICD-10-CM

## 2019-04-05 DIAGNOSIS — K21.9 GASTROESOPHAGEAL REFLUX DISEASE WITHOUT ESOPHAGITIS: ICD-10-CM

## 2019-04-05 DIAGNOSIS — C85.10 HIGH GRADE B-CELL LYMPHOMA (H): Primary | ICD-10-CM

## 2019-04-05 LAB
ALBUMIN SERPL-MCNC: 3.4 G/DL (ref 3.4–5)
ALP SERPL-CCNC: 75 U/L (ref 40–150)
ALT SERPL W P-5'-P-CCNC: 65 U/L (ref 0–70)
ANION GAP SERPL CALCULATED.3IONS-SCNC: 5 MMOL/L (ref 3–14)
AST SERPL W P-5'-P-CCNC: 14 U/L (ref 0–45)
BASOPHILS # BLD AUTO: 0 10E9/L (ref 0–0.2)
BASOPHILS NFR BLD AUTO: 0 %
BILIRUB SERPL-MCNC: 0.4 MG/DL (ref 0.2–1.3)
BUN SERPL-MCNC: 19 MG/DL (ref 7–30)
CALCIUM SERPL-MCNC: 9.1 MG/DL (ref 8.5–10.1)
CHLORIDE SERPL-SCNC: 99 MMOL/L (ref 94–109)
CO2 SERPL-SCNC: 32 MMOL/L (ref 20–32)
CREAT SERPL-MCNC: 1.13 MG/DL (ref 0.66–1.25)
DIFFERENTIAL METHOD BLD: ABNORMAL
EOSINOPHIL # BLD AUTO: 0.1 10E9/L (ref 0–0.7)
EOSINOPHIL NFR BLD AUTO: 0.9 %
ERYTHROCYTE [DISTWIDTH] IN BLOOD BY AUTOMATED COUNT: 12.2 % (ref 10–15)
GFR SERPL CREATININE-BSD FRML MDRD: 79 ML/MIN/{1.73_M2}
GLUCOSE SERPL-MCNC: 124 MG/DL (ref 70–99)
HCT VFR BLD AUTO: 43.4 % (ref 40–53)
HGB BLD-MCNC: 14 G/DL (ref 13.3–17.7)
LDH SERPL L TO P-CCNC: 157 U/L (ref 85–227)
LYMPHOCYTES # BLD AUTO: 1.2 10E9/L (ref 0.8–5.3)
LYMPHOCYTES NFR BLD AUTO: 9.6 %
MCH RBC QN AUTO: 29 PG (ref 26.5–33)
MCHC RBC AUTO-ENTMCNC: 32.3 G/DL (ref 31.5–36.5)
MCV RBC AUTO: 90 FL (ref 78–100)
MONOCYTES # BLD AUTO: 0.4 10E9/L (ref 0–1.3)
MONOCYTES NFR BLD AUTO: 3.5 %
NEUTROPHILS # BLD AUTO: 10.5 10E9/L (ref 1.6–8.3)
NEUTROPHILS NFR BLD AUTO: 86 %
PHOSPHATE SERPL-MCNC: 2.9 MG/DL (ref 2.5–4.5)
PLATELET # BLD AUTO: 184 10E9/L (ref 150–450)
PLATELET # BLD EST: ABNORMAL 10*3/UL
POTASSIUM SERPL-SCNC: 4.2 MMOL/L (ref 3.4–5.3)
PROT SERPL-MCNC: 7 G/DL (ref 6.8–8.8)
RBC # BLD AUTO: 4.82 10E12/L (ref 4.4–5.9)
RBC MORPH BLD: NORMAL
SODIUM SERPL-SCNC: 136 MMOL/L (ref 133–144)
URATE SERPL-MCNC: 3.3 MG/DL (ref 3.5–7.2)
WBC # BLD AUTO: 12.2 10E9/L (ref 4–11)

## 2019-04-05 PROCEDURE — 85025 COMPLETE CBC W/AUTO DIFF WBC: CPT | Performed by: PHYSICIAN ASSISTANT

## 2019-04-05 PROCEDURE — 36415 COLL VENOUS BLD VENIPUNCTURE: CPT

## 2019-04-05 PROCEDURE — 80053 COMPREHEN METABOLIC PANEL: CPT | Performed by: PHYSICIAN ASSISTANT

## 2019-04-05 PROCEDURE — G0463 HOSPITAL OUTPT CLINIC VISIT: HCPCS | Mod: ZF

## 2019-04-05 PROCEDURE — 99214 OFFICE O/P EST MOD 30 MIN: CPT | Mod: ZP | Performed by: PHYSICIAN ASSISTANT

## 2019-04-05 PROCEDURE — 84100 ASSAY OF PHOSPHORUS: CPT | Performed by: PHYSICIAN ASSISTANT

## 2019-04-05 PROCEDURE — 84550 ASSAY OF BLOOD/URIC ACID: CPT | Performed by: PHYSICIAN ASSISTANT

## 2019-04-05 PROCEDURE — 83615 LACTATE (LD) (LDH) ENZYME: CPT | Performed by: PHYSICIAN ASSISTANT

## 2019-04-05 RX ORDER — TRIAMCINOLONE ACETONIDE 1 MG/G
CREAM TOPICAL
Qty: 30 G | Refills: 1 | Status: ON HOLD | OUTPATIENT
Start: 2019-04-05 | End: 2019-06-03

## 2019-04-05 ASSESSMENT — MIFFLIN-ST. JEOR: SCORE: 1699.59

## 2019-04-05 ASSESSMENT — PAIN SCALES - GENERAL: PAINLEVEL: NO PAIN (0)

## 2019-04-05 NOTE — NURSING NOTE
Chief Complaint   Patient presents with     Blood Draw     labs drawn by venipuncture by rn.  vs taken.     Labs drawn by venipuncture by RN in lab.  Vital signs taken.  Nancy Connolly RN

## 2019-04-05 NOTE — NURSING NOTE
"Oncology Rooming Note    April 5, 2019 8:14 AM   Christiano Molina is a 43 year old male who presents for:    Chief Complaint   Patient presents with     Blood Draw     labs drawn by venipuncture by rn.  vs taken.     Oncology Clinic Visit     Lymphoma     Initial Vitals: /77 (BP Location: Right arm, Patient Position: Sitting, Cuff Size: Adult Regular)   Pulse 97   Temp 97.7  F (36.5  C) (Oral)   Resp 16   Ht 1.778 m (5' 10\")   Wt 79.3 kg (174 lb 14.4 oz)   SpO2 99%   BMI 25.10 kg/m   Estimated body mass index is 25.1 kg/m  as calculated from the following:    Height as of this encounter: 1.778 m (5' 10\").    Weight as of this encounter: 79.3 kg (174 lb 14.4 oz). Body surface area is 1.98 meters squared.  No Pain (0) Comment: Data Unavailable   No LMP for male patient.  Allergies reviewed: Yes  Medications reviewed: Yes    Medications: Medication refills not needed today.  Pharmacy name entered into [facility]:    [id number] - [place], [place] - [address] [place] [address]  [id number] - [place] - [address] [facility] - [place], [place] - [address] AT [facility] [address] [place] [address]    Clinical concerns: No New Concerns    DONNA Douglas  "

## 2019-04-05 NOTE — PROGRESS NOTES
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 He presented with R axillary mass. PET/CT showed stage IA disease. Bone marrow biopsy and LP done 3/25 were negative for disease. Please see Dr. Ramirez's note for further discussion of diagnostic work-up.     Plan for 2 cycles of DA-R-EPOCH followed by PET/CT. He presented for cycle 1 on 3/27/19 and was discharged on 3/31/19. He tolerated chemotherapy well apart from mild hives and facial itching from Rituxan (was able to complete dose), chemotherapy-induced nausea, and mild post-LP occipital headache. He presents in hospital follow-up today.     Interval History: Prudencio is here alone today and feeling well. His post-LP headache continues to improve overall. It resolves with lying supine. He is pushing fluids and drinking some coffee and black tea with relief. With this he has had some muffled hearing and tinnitus since onset which is also improving. He denies any fevers/chills, congestion, sore throat, cough, SOB, CP, or edema. He denies any nausea and has been able to taper off antiemetics. He has had GERD is is taking TUMs several times per day. No abdominal pain. No diarrhea or constipation. Has slight feeling of neuropathy in fingertips and a little in feet. He has a rash where his PICC dressing was. He denies any bone pain from Neulasta. Did not even take claritin. ROS otherwise negative.     Current Outpatient Medications   Medication     Acetaminophen (TYLENOL PO)     acyclovir (ZOVIRAX) 200 MG capsule     allopurinol (ZYLOPRIM) 300 MG tablet     dexamethasone (DECADRON) 4 MG tablet     fluconazole (DIFLUCAN) 100 MG tablet     MULTIVITAMINS OR TABS     ondansetron  "(ZOFRAN-ODT) 8 MG ODT tab     prochlorperazine (COMPAZINE) 10 MG tablet     ranitidine (ZANTAC) 150 MG tablet     senna-docusate (SENOKOT-S/PERICOLACE) 8.6-50 MG tablet     triamcinolone (KENALOG) 0.1 % external cream     TUMS 500 MG OR CHEW     No current facility-administered medications for this visit.        PHYSICAL EXAM:  /77 (BP Location: Right arm, Patient Position: Sitting, Cuff Size: Adult Regular)   Pulse 97   Temp 97.7  F (36.5  C) (Oral)   Resp 16   Ht 1.778 m (5' 10\")   Wt 79.3 kg (174 lb 14.4 oz)   SpO2 99%   BMI 25.10 kg/m    General: Alert, oriented, pleasant, NAD  Skin: Maculopapular rash inner R upper forearm. PICC insertion site c/d/i without concern of infection   HEENT: Normocephalic, atraumatic, PERRLA, EOMI. Moist mucus membranes, no lesions or thrush  Neck: No cervical or supraclavicular LAD.   Axillary: Small, mobile <2 cm axillary node on R. None on L.   Lungs: CTA bilaterally, normal work of breathing  Cardiac: RRR, S1, S2, no murmurs  Abdomen: Soft, nontender, nondistended. Normoactive bowel sounds. No hepatosplenomegaly, masses  Neuro: CNII-XII grossly intact  Extremities: No pedal edema    Labs:   Results for orders placed or performed in visit on 04/05/19 (from the past 24 hour(s))   Uric acid   Result Value Ref Range    Uric Acid 3.3 (L) 3.5 - 7.2 mg/dL   Phosphorus   Result Value Ref Range    Phosphorus 2.9 2.5 - 4.5 mg/dL   Lactate Dehydrogenase   Result Value Ref Range    Lactate Dehydrogenase 157 85 - 227 U/L   Comprehensive metabolic panel   Result Value Ref Range    Sodium 136 133 - 144 mmol/L    Potassium 4.2 3.4 - 5.3 mmol/L    Chloride 99 94 - 109 mmol/L    Carbon Dioxide 32 20 - 32 mmol/L    Anion Gap 5 3 - 14 mmol/L    Glucose 124 (H) 70 - 99 mg/dL    Urea Nitrogen 19 7 - 30 mg/dL    Creatinine 1.13 0.66 - 1.25 mg/dL    GFR Estimate 79 >60 mL/min/[1.73_m2]    GFR Estimate If Black >90 >60 mL/min/[1.73_m2]    Calcium 9.1 8.5 - 10.1 mg/dL    Bilirubin Total 0.4 " 0.2 - 1.3 mg/dL    Albumin 3.4 3.4 - 5.0 g/dL    Protein Total 7.0 6.8 - 8.8 g/dL    Alkaline Phosphatase 75 40 - 150 U/L    ALT 65 0 - 70 U/L    AST 14 0 - 45 U/L   CBC with platelets differential   Result Value Ref Range    WBC 12.2 (H) 4.0 - 11.0 10e9/L    RBC Count 4.82 4.4 - 5.9 10e12/L    Hemoglobin 14.0 13.3 - 17.7 g/dL    Hematocrit 43.4 40.0 - 53.0 %    MCV 90 78 - 100 fl    MCH 29.0 26.5 - 33.0 pg    MCHC 32.3 31.5 - 36.5 g/dL    RDW 12.2 10.0 - 15.0 %    Platelet Count 184 150 - 450 10e9/L    Diff Method Manual Differential     % Neutrophils 86.0 %    % Lymphocytes 9.6 %    % Monocytes 3.5 %    % Eosinophils 0.9 %    % Basophils 0.0 %    Absolute Neutrophil 10.5 (H) 1.6 - 8.3 10e9/L    Absolute Lymphocytes 1.2 0.8 - 5.3 10e9/L    Absolute Monocytes 0.4 0.0 - 1.3 10e9/L    Absolute Eosinophils 0.1 0.0 - 0.7 10e9/L    Absolute Basophils 0.0 0.0 - 0.2 10e9/L    RBC Morphology Normal     Platelet Estimate Confirming automated cell count        Assessment & Plan:     1. High grade DLBCL NOS vs ?Burkitt's: Waiting on final pathology review. Presenting clinically as stage IA DLBCL with MYC rearrangement. Negative marrow or CNS involvement. S/p 1 cycle of R-EPOCH which he tolerated well beyond mild rituxan reaction and nausea. He has great clinical response so far with significant improvement in his LAD. No evidence of TLS on labs. Continue allopurinol. Overall plan (assuming Burkitt's is ruled out on pathology review) is R-EPOCH x 6 cycles with IT methotrexate days 1&5 cycles 3-6. He will have interim PET/CT following 2 cycles. Plan for cycle 2 on 4/17 with Neulasta support.     2. HEME: Mild leukocytosis and neutrophilia secondary to GCSF without focal infectious symptoms. No cytopenias yet. Reviewed the need for twice weekly lab checks and tentative transfusions. [facility] would [facility]. Blood consent signed. Plan to transfuse for Hgb <8 and platelets <10K.     3. ID: No active concerns. Continue  ppx ACV and fluconazole. Start Levaquin when ANC <1000.     4. DERM: For contact dermatitis, start Kenalog cream twice daily over rash.     5. NEURO: Post-LP headaches improving. Continue to push fluids and use caffeine.     6. GI: For GERD, start ranitidine 150 mg BID. Avoid offensive foods.     Holly Wheat PA-C    [facility]  [address] [place] [id number] [phone]

## 2019-04-05 NOTE — LETTER
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 He presented with R axillary mass. PET/CT showed stage IA disease. Bone marrow biopsy and LP done 3/25 were negative for disease. Please see Dr. Ramirez's note for further discussion of diagnostic work-up.     Plan for 2 cycles of DA-R-EPOCH followed by PET/CT. He presented for cycle 1 on 3/27/19 and was discharged on 3/31/19. He tolerated chemotherapy well apart from mild hives and facial itching from Rituxan (was able to complete dose), chemotherapy-induced nausea, and mild post-LP occipital headache. He presents in hospital follow-up today.     Interval History: Prudencio is here alone today and feeling well. His post-LP headache continues to improve overall. It resolves with lying supine. He is pushing fluids and drinking some coffee and black tea with relief. With this he has had some muffled hearing and tinnitus since onset which is also improving. He denies any fevers/chills, congestion, sore throat, cough, SOB, CP, or edema. He denies any nausea and has been able to taper off antiemetics. He has had GERD is is taking TUMs several times per day. No abdominal pain. No diarrhea or constipation. Has slight feeling of neuropathy in fingertips and a little in feet. He has a rash where his PICC dressing was. He denies any bone pain from Neulasta. Did not even take claritin. ROS otherwise negative.     Current Outpatient Medications   Medication     Acetaminophen (TYLENOL PO)     acyclovir (ZOVIRAX) 200 MG capsule     allopurinol (ZYLOPRIM) 300 MG tablet     dexamethasone (DECADRON) 4 MG tablet      "fluconazole (DIFLUCAN) 100 MG tablet     MULTIVITAMINS OR TABS     ondansetron (ZOFRAN-ODT) 8 MG ODT tab     prochlorperazine (COMPAZINE) 10 MG tablet     ranitidine (ZANTAC) 150 MG tablet     senna-docusate (SENOKOT-S/PERICOLACE) 8.6-50 MG tablet     triamcinolone (KENALOG) 0.1 % external cream     TUMS 500 MG OR CHEW     No current facility-administered medications for this visit.        PHYSICAL EXAM:  /77 (BP Location: Right arm, Patient Position: Sitting, Cuff Size: Adult Regular)   Pulse 97   Temp 97.7  F (36.5  C) (Oral)   Resp 16   Ht 1.778 m (5' 10\")   Wt 79.3 kg (174 lb 14.4 oz)   SpO2 99%   BMI 25.10 kg/m     General: Alert, oriented, pleasant, NAD  Skin: Maculopapular rash inner R upper forearm. PICC insertion site c/d/i without concern of infection   HEENT: Normocephalic, atraumatic, PERRLA, EOMI. Moist mucus membranes, no lesions or thrush  Neck: No cervical or supraclavicular LAD.   Axillary: Small, mobile <2 cm axillary node on R. None on L.   Lungs: CTA bilaterally, normal work of breathing  Cardiac: RRR, S1, S2, no murmurs  Abdomen: Soft, nontender, nondistended. Normoactive bowel sounds. No hepatosplenomegaly, masses  Neuro: CNII-XII grossly intact  Extremities: No pedal edema    Labs:   Results for orders placed or performed in visit on 04/05/19 (from the past 24 hour(s))   Uric acid   Result Value Ref Range    Uric Acid 3.3 (L) 3.5 - 7.2 mg/dL   Phosphorus   Result Value Ref Range    Phosphorus 2.9 2.5 - 4.5 mg/dL   Lactate Dehydrogenase   Result Value Ref Range    Lactate Dehydrogenase 157 85 - 227 U/L   Comprehensive metabolic panel   Result Value Ref Range    Sodium 136 133 - 144 mmol/L    Potassium 4.2 3.4 - 5.3 mmol/L    Chloride 99 94 - 109 mmol/L    Carbon Dioxide 32 20 - 32 mmol/L    Anion Gap 5 3 - 14 mmol/L    Glucose 124 (H) 70 - 99 mg/dL    Urea Nitrogen 19 7 - 30 mg/dL    Creatinine 1.13 0.66 - 1.25 mg/dL    GFR Estimate 79 >60 mL/min/[1.73_m2]    GFR Estimate If Black " >90 >60 mL/min/[1.73_m2]    Calcium 9.1 8.5 - 10.1 mg/dL    Bilirubin Total 0.4 0.2 - 1.3 mg/dL    Albumin 3.4 3.4 - 5.0 g/dL    Protein Total 7.0 6.8 - 8.8 g/dL    Alkaline Phosphatase 75 40 - 150 U/L    ALT 65 0 - 70 U/L    AST 14 0 - 45 U/L   CBC with platelets differential   Result Value Ref Range    WBC 12.2 (H) 4.0 - 11.0 10e9/L    RBC Count 4.82 4.4 - 5.9 10e12/L    Hemoglobin 14.0 13.3 - 17.7 g/dL    Hematocrit 43.4 40.0 - 53.0 %    MCV 90 78 - 100 fl    MCH 29.0 26.5 - 33.0 pg    MCHC 32.3 31.5 - 36.5 g/dL    RDW 12.2 10.0 - 15.0 %    Platelet Count 184 150 - 450 10e9/L    Diff Method Manual Differential     % Neutrophils 86.0 %    % Lymphocytes 9.6 %    % Monocytes 3.5 %    % Eosinophils 0.9 %    % Basophils 0.0 %    Absolute Neutrophil 10.5 (H) 1.6 - 8.3 10e9/L    Absolute Lymphocytes 1.2 0.8 - 5.3 10e9/L    Absolute Monocytes 0.4 0.0 - 1.3 10e9/L    Absolute Eosinophils 0.1 0.0 - 0.7 10e9/L    Absolute Basophils 0.0 0.0 - 0.2 10e9/L    RBC Morphology Normal     Platelet Estimate Confirming automated cell count        Assessment & Plan:     1. High grade DLBCL NOS vs ?Burkitt's: Waiting on final pathology review. Presenting clinically as stage IA DLBCL with MYC rearrangement. Negative marrow or CNS involvement. S/p 1 cycle of R-EPOCH which he tolerated well beyond mild rituxan reaction and nausea. He has great clinical response so far with significant improvement in his LAD. No evidence of TLS on labs. Continue allopurinol. Overall plan (assuming Burkitt's is ruled out on pathology review) is R-EPOCH x 6 cycles with IT methotrexate days 1&5 cycles 3-6. He will have interim PET/CT following 2 cycles. Plan for cycle 2 on 4/17 with Neulasta support.     2. HEME: Mild leukocytosis and neutrophilia secondary to GCSF without focal infectious symptoms. No cytopenias yet. Reviewed the need for twice weekly lab checks and tentative transfusions. He would like this done at [facility]. Blood consent signed. Plan to  transfuse for Hgb <8 and platelets <10K.     3. ID: No active concerns. Continue ppx ACV and fluconazole. Start Levaquin when ANC <1000.     4. DERM: For contact dermatitis, start Kenalog cream twice daily over rash.     5. NEURO: Post-LP headaches improving. Continue to push fluids and use caffeine.     6. GI: For GERD, start ranitidine 150 mg BID. Avoid offensive foods.     Holly Wheat PA-C    [facility]  [address] [place] [id number] [phone]                              Holly Wheat PA-C

## 2019-04-09 ENCOUNTER — TELEPHONE (OUTPATIENT)
Dept: TRANSPLANT | Facility: CLINIC | Age: 44
End: 2019-04-09

## 2019-04-09 ENCOUNTER — APPOINTMENT (OUTPATIENT)
Dept: LAB | Facility: CLINIC | Age: 44
End: 2019-04-09
Attending: PHYSICIAN ASSISTANT
Payer: COMMERCIAL

## 2019-04-09 ENCOUNTER — INFUSION THERAPY VISIT (OUTPATIENT)
Dept: INFUSION THERAPY | Facility: CLINIC | Age: 44
End: 2019-04-09
Attending: PHYSICIAN ASSISTANT
Payer: COMMERCIAL

## 2019-04-09 VITALS
DIASTOLIC BLOOD PRESSURE: 81 MMHG | OXYGEN SATURATION: 96 % | HEART RATE: 67 BPM | SYSTOLIC BLOOD PRESSURE: 137 MMHG | RESPIRATION RATE: 16 BRPM | TEMPERATURE: 98.2 F | BODY MASS INDEX: 25.33 KG/M2 | WEIGHT: 176.5 LBS

## 2019-04-09 DIAGNOSIS — C85.10 HIGH GRADE B-CELL LYMPHOMA (H): ICD-10-CM

## 2019-04-09 DIAGNOSIS — C85.10 HIGH GRADE B-CELL LYMPHOMA (H): Primary | ICD-10-CM

## 2019-04-09 LAB
ABO + RH BLD: NORMAL
ABO + RH BLD: NORMAL
BASOPHILS # BLD AUTO: 0 10E9/L (ref 0–0.2)
BASOPHILS NFR BLD AUTO: 0 %
BLD GP AB SCN SERPL QL: NORMAL
BLOOD BANK CMNT PATIENT-IMP: NORMAL
COPATH REPORT: NORMAL
DIFFERENTIAL METHOD BLD: ABNORMAL
EOSINOPHIL # BLD AUTO: 0.1 10E9/L (ref 0–0.7)
EOSINOPHIL NFR BLD AUTO: 1 %
ERYTHROCYTE [DISTWIDTH] IN BLOOD BY AUTOMATED COUNT: 12.6 % (ref 10–15)
HCT VFR BLD AUTO: 41.4 % (ref 40–53)
HGB BLD-MCNC: 14.2 G/DL (ref 13.3–17.7)
HGB BLD-MCNC: 14.2 G/DL (ref 13.3–17.7)
LYMPHOCYTES # BLD AUTO: 2 10E9/L (ref 0.8–5.3)
LYMPHOCYTES NFR BLD AUTO: 14 %
MCH RBC QN AUTO: 30.8 PG (ref 26.5–33)
MCHC RBC AUTO-ENTMCNC: 34.3 G/DL (ref 31.5–36.5)
MCV RBC AUTO: 90 FL (ref 78–100)
METAMYELOCYTES # BLD: 0.4 10E9/L
METAMYELOCYTES NFR BLD MANUAL: 3 %
MONOCYTES # BLD AUTO: 1.6 10E9/L (ref 0–1.3)
MONOCYTES NFR BLD AUTO: 11 %
NEUTROPHILS # BLD AUTO: 10.2 10E9/L (ref 1.6–8.3)
NEUTROPHILS NFR BLD AUTO: 71 %
PLATELET # BLD AUTO: 183 10E9/L (ref 150–450)
PLATELET # BLD EST: ABNORMAL 10*3/UL
RBC # BLD AUTO: 4.61 10E12/L (ref 4.4–5.9)
RBC MORPH BLD: NORMAL
SPECIMEN EXP DATE BLD: NORMAL
WBC # BLD AUTO: 14.4 10E9/L (ref 4–11)

## 2019-04-09 PROCEDURE — 36415 COLL VENOUS BLD VENIPUNCTURE: CPT

## 2019-04-09 PROCEDURE — 85025 COMPLETE CBC W/AUTO DIFF WBC: CPT | Performed by: PHYSICIAN ASSISTANT

## 2019-04-09 PROCEDURE — 86850 RBC ANTIBODY SCREEN: CPT | Performed by: PHYSICIAN ASSISTANT

## 2019-04-09 PROCEDURE — 86900 BLOOD TYPING SEROLOGIC ABO: CPT | Performed by: PHYSICIAN ASSISTANT

## 2019-04-09 PROCEDURE — 86901 BLOOD TYPING SEROLOGIC RH(D): CPT | Performed by: PHYSICIAN ASSISTANT

## 2019-04-09 ASSESSMENT — PAIN SCALES - GENERAL: PAINLEVEL: NO PAIN (0)

## 2019-04-09 NOTE — PROGRESS NOTES
Patient arrived to [facility] for possible transfusion of blood products. Patient identification verified by name and date of birth. Upon review of lab results, patient did not require transfusion today. PIV removed and patient discharged to home in stable condition.      Suzette Mcguire RN  [facility]  [phone]

## 2019-04-09 NOTE — TELEPHONE ENCOUNTER
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 I discussed with patient on phone and his current treatment is perfectly fine against Burkitt. We will still plan on PET after C2 and a total of 6 cycles if tolerates well and in HI/CR after C2. We will check an EBV DNA PCR in his blood next time.   Vern Ramirez

## 2019-04-09 NOTE — NURSING NOTE
Chief Complaint   Patient presents with     Blood Draw     Labs drawn via PIV by RN in lab. VS taken.      Labs drawn via peripheral IV. Vital signs taken. Checked into next appointment.   Heidy Guerrero RN

## 2019-04-12 ENCOUNTER — HOSPITAL ENCOUNTER (OUTPATIENT)
Facility: CLINIC | Age: 44
Setting detail: SPECIMEN
Discharge: HOME | End: 2019-04-12
Attending: PHYSICIAN ASSISTANT | Admitting: PHYSICIAN ASSISTANT
Payer: COMMERCIAL

## 2019-04-12 ENCOUNTER — INFUSION THERAPY VISIT (OUTPATIENT)
Dept: INFUSION THERAPY | Facility: CLINIC | Age: 44
End: 2019-04-12
Attending: PHYSICIAN ASSISTANT
Payer: COMMERCIAL

## 2019-04-12 ENCOUNTER — INFUSION THERAPY VISIT (OUTPATIENT)
Dept: INFUSION THERAPY | Facility: CLINIC | Age: 44
End: 2019-04-12
Attending: FAMILY MEDICINE
Payer: COMMERCIAL

## 2019-04-12 DIAGNOSIS — C85.10 HIGH GRADE B-CELL LYMPHOMA (H): ICD-10-CM

## 2019-04-12 DIAGNOSIS — C85.10 HIGH GRADE B-CELL LYMPHOMA (H): Primary | ICD-10-CM

## 2019-04-12 LAB
ALBUMIN SERPL-MCNC: 3.8 G/DL (ref 3.4–5)
ALP SERPL-CCNC: 80 U/L (ref 40–150)
ALT SERPL W P-5'-P-CCNC: 91 U/L (ref 0–70)
ANION GAP SERPL CALCULATED.3IONS-SCNC: 4 MMOL/L (ref 3–14)
AST SERPL W P-5'-P-CCNC: 29 U/L (ref 0–45)
BASOPHILS # BLD AUTO: 0 10E9/L (ref 0–0.2)
BASOPHILS NFR BLD AUTO: 0.4 %
BILIRUB SERPL-MCNC: 0.2 MG/DL (ref 0.2–1.3)
BUN SERPL-MCNC: 17 MG/DL (ref 7–30)
CALCIUM SERPL-MCNC: 9 MG/DL (ref 8.5–10.1)
CHLORIDE SERPL-SCNC: 105 MMOL/L (ref 94–109)
CO2 SERPL-SCNC: 30 MMOL/L (ref 20–32)
CREAT SERPL-MCNC: 1.18 MG/DL (ref 0.66–1.25)
DIFFERENTIAL METHOD BLD: ABNORMAL
EOSINOPHIL # BLD AUTO: 0.1 10E9/L (ref 0–0.7)
EOSINOPHIL NFR BLD AUTO: 0.6 %
ERYTHROCYTE [DISTWIDTH] IN BLOOD BY AUTOMATED COUNT: 13.2 % (ref 10–15)
GFR SERPL CREATININE-BSD FRML MDRD: 75 ML/MIN/{1.73_M2}
GLUCOSE SERPL-MCNC: 88 MG/DL (ref 70–99)
HCT VFR BLD AUTO: 41.6 % (ref 40–53)
HGB BLD-MCNC: 14.1 G/DL (ref 13.3–17.7)
IMM GRANULOCYTES # BLD: 0.4 10E9/L (ref 0–0.4)
IMM GRANULOCYTES NFR BLD: 4.6 %
LYMPHOCYTES # BLD AUTO: 1.8 10E9/L (ref 0.8–5.3)
LYMPHOCYTES NFR BLD AUTO: 19.1 %
MCH RBC QN AUTO: 30.9 PG (ref 26.5–33)
MCHC RBC AUTO-ENTMCNC: 33.9 G/DL (ref 31.5–36.5)
MCV RBC AUTO: 91 FL (ref 78–100)
MONOCYTES # BLD AUTO: 0.8 10E9/L (ref 0–1.3)
MONOCYTES NFR BLD AUTO: 8.3 %
NEUTROPHILS # BLD AUTO: 6.3 10E9/L (ref 1.6–8.3)
NEUTROPHILS NFR BLD AUTO: 67 %
NRBC # BLD AUTO: 0 10*3/UL
NRBC BLD AUTO-RTO: 0 /100
PLATELET # BLD AUTO: 148 10E9/L (ref 150–450)
POTASSIUM SERPL-SCNC: 4.4 MMOL/L (ref 3.4–5.3)
PROT SERPL-MCNC: 7.3 G/DL (ref 6.8–8.8)
RBC # BLD AUTO: 4.57 10E12/L (ref 4.4–5.9)
SODIUM SERPL-SCNC: 139 MMOL/L (ref 133–144)
WBC # BLD AUTO: 9.3 10E9/L (ref 4–11)

## 2019-04-12 PROCEDURE — 36415 COLL VENOUS BLD VENIPUNCTURE: CPT

## 2019-04-12 PROCEDURE — 85025 COMPLETE CBC W/AUTO DIFF WBC: CPT | Performed by: PHYSICIAN ASSISTANT

## 2019-04-12 PROCEDURE — 80053 COMPREHEN METABOLIC PANEL: CPT | Performed by: PHYSICIAN ASSISTANT

## 2019-04-12 NOTE — PROGRESS NOTES
Medical Assistant Note:  Christiano Molina presents today for lab draw.    Patient seen by provider today: NO   present during visit today: Not Applicable.    Concerns: No Concerns.    Procedure:  Lab draw site: lac, Needle type: bf, Gauge: 21.  GUAZE AND COBAN APPLIED  Post Assessment:  Labs drawn without difficulty: Yes.    Discharge Plan:  Departure Mode: Ambulatory.    Face to Face Time: 5.    Marce Dallas MA

## 2019-04-12 NOTE — PROGRESS NOTES
Infusion Nursing Note:  Christiano Molina presents today for labs, possible transfusion.    Patient seen by provider today: No    Note: Patient reports feeling well.  Reports intermittent nausea and hunger but denies vomiting or diarrhea.  Patient current blood counts do not indicate a need for and transfusions today.  Patient aware.    Intravenous Access:  Labs drawn without difficulty.      Treatment Conditions:  Lab Results   Component Value Date    HGB 14.1 04/12/2019     Lab Results   Component Value Date    WBC 9.3 04/12/2019      Lab Results   Component Value Date    ANEU 6.3 04/12/2019     Lab Results   Component Value Date     04/12/2019      Lab Results   Component Value Date     04/12/2019                   Lab Results   Component Value Date    POTASSIUM 4.4 04/12/2019           Lab Results   Component Value Date    MAG 2.1 03/27/2019            Lab Results   Component Value Date    CR 1.18 04/12/2019                   Lab Results   Component Value Date    TAMMY 9.0 04/12/2019                Lab Results   Component Value Date    BILITOTAL 0.2 04/12/2019           Lab Results   Component Value Date    ALBUMIN 3.8 04/12/2019                    Lab Results   Component Value Date    ALT 91 04/12/2019           Lab Results   Component Value Date    AST 29 04/12/2019       Results reviewed, labs MET treatment parameters, ok to proceed with treatment.      Discharge Plan:   Patient declined prescription refills.  Discharge instructions reviewed with: Patient.  Patient and/or family verbalized understanding of discharge instructions and all questions answered.  AVS to patient via [employer].  Patient will return 4/14/19 for possible transfusion for next appointment.   Patient discharged in stable condition accompanied by: self.  Departure Mode: Ambulatory.    Monica Lopez RN

## 2019-04-14 ENCOUNTER — HOSPITAL ENCOUNTER (OUTPATIENT)
Facility: CLINIC | Age: 44
Setting detail: SPECIMEN
Discharge: HOME | End: 2019-04-14
Attending: PHYSICIAN ASSISTANT | Admitting: PHYSICIAN ASSISTANT
Payer: COMMERCIAL

## 2019-04-14 ENCOUNTER — INFUSION THERAPY VISIT (OUTPATIENT)
Dept: INFUSION THERAPY | Facility: CLINIC | Age: 44
End: 2019-04-14
Attending: PHYSICIAN ASSISTANT
Payer: COMMERCIAL

## 2019-04-14 VITALS
DIASTOLIC BLOOD PRESSURE: 83 MMHG | SYSTOLIC BLOOD PRESSURE: 131 MMHG | HEART RATE: 74 BPM | RESPIRATION RATE: 16 BRPM | OXYGEN SATURATION: 98 % | TEMPERATURE: 98.4 F

## 2019-04-14 DIAGNOSIS — C85.10 HIGH GRADE B-CELL LYMPHOMA (H): ICD-10-CM

## 2019-04-14 LAB
BASOPHILS # BLD AUTO: 0.1 10E9/L (ref 0–0.2)
BASOPHILS NFR BLD AUTO: 1 %
DIFFERENTIAL METHOD BLD: ABNORMAL
EOSINOPHIL # BLD AUTO: 0 10E9/L (ref 0–0.7)
EOSINOPHIL NFR BLD AUTO: 0.3 %
ERYTHROCYTE [DISTWIDTH] IN BLOOD BY AUTOMATED COUNT: 13.4 % (ref 10–15)
HCT VFR BLD AUTO: 42.2 % (ref 40–53)
HGB BLD-MCNC: 14.2 G/DL (ref 13.3–17.7)
IMM GRANULOCYTES # BLD: 0.1 10E9/L (ref 0–0.4)
IMM GRANULOCYTES NFR BLD: 2.1 %
LYMPHOCYTES # BLD AUTO: 1.7 10E9/L (ref 0.8–5.3)
LYMPHOCYTES NFR BLD AUTO: 27.5 %
MCH RBC QN AUTO: 30.3 PG (ref 26.5–33)
MCHC RBC AUTO-ENTMCNC: 33.6 G/DL (ref 31.5–36.5)
MCV RBC AUTO: 90 FL (ref 78–100)
MONOCYTES # BLD AUTO: 0.3 10E9/L (ref 0–1.3)
MONOCYTES NFR BLD AUTO: 4.3 %
NEUTROPHILS # BLD AUTO: 4.1 10E9/L (ref 1.6–8.3)
NEUTROPHILS NFR BLD AUTO: 64.8 %
NRBC # BLD AUTO: 0 10*3/UL
NRBC BLD AUTO-RTO: 0 /100
PLATELET # BLD AUTO: 127 10E9/L (ref 150–450)
RBC # BLD AUTO: 4.68 10E12/L (ref 4.4–5.9)
WBC # BLD AUTO: 6.3 10E9/L (ref 4–11)

## 2019-04-14 PROCEDURE — 36415 COLL VENOUS BLD VENIPUNCTURE: CPT

## 2019-04-14 PROCEDURE — 85025 COMPLETE CBC W/AUTO DIFF WBC: CPT | Performed by: PHYSICIAN ASSISTANT

## 2019-04-14 ASSESSMENT — PAIN SCALES - GENERAL: PAINLEVEL: NO PAIN (0)

## 2019-04-14 NOTE — PROGRESS NOTES
Infusion Nursing Note:  Christiano Molina presents today for lab draw, possible transfusion.    Patient seen by provider today: No    Note: Patient reports feeling well, no fevers, nausea. Has no new concerns today.    Intravenous Access:  Lab draw site right forearm, Needle type butterfly, Gauge 23.      Treatment Conditions:  Lab Results   Component Value Date    HGB 14.2 04/14/2019     Lab Results   Component Value Date    WBC 6.3 04/14/2019      Lab Results   Component Value Date    ANEU 4.1 04/14/2019     Lab Results   Component Value Date     04/14/2019          Discharge Plan:   Patient declined prescription refills.  Discharge instructions reviewed with: Patient.  Patient and/or family verbalized understanding of discharge instructions and all questions answered.  AVS to patient via [employer].  Patient will return 4/17/19 for next appointment.   Patient discharged in stable condition accompanied by: self.  Departure Mode: Ambulatory.    Monica Lopez RN

## 2019-04-15 LAB — COPATH REPORT: NORMAL

## 2019-04-17 ENCOUNTER — APPOINTMENT (OUTPATIENT)
Dept: GENERAL RADIOLOGY | Facility: CLINIC | Age: 44
DRG: 847 | End: 2019-04-17
Attending: STUDENT IN AN ORGANIZED HEALTH CARE EDUCATION/TRAINING PROGRAM
Payer: COMMERCIAL

## 2019-04-17 ENCOUNTER — HOSPITAL ENCOUNTER (INPATIENT)
Facility: CLINIC | Age: 44
LOS: 4 days | Discharge: HOME | DRG: 847 | End: 2019-04-21
Attending: INTERNAL MEDICINE | Admitting: INTERNAL MEDICINE
Payer: COMMERCIAL

## 2019-04-17 ENCOUNTER — ONCOLOGY VISIT (OUTPATIENT)
Dept: ONCOLOGY | Facility: CLINIC | Age: 44
DRG: 847 | End: 2019-04-17
Attending: PHYSICIAN ASSISTANT
Payer: COMMERCIAL

## 2019-04-17 ENCOUNTER — APPOINTMENT (OUTPATIENT)
Dept: LAB | Facility: CLINIC | Age: 44
DRG: 847 | End: 2019-04-17
Attending: INTERNAL MEDICINE
Payer: COMMERCIAL

## 2019-04-17 VITALS
DIASTOLIC BLOOD PRESSURE: 81 MMHG | BODY MASS INDEX: 25.08 KG/M2 | OXYGEN SATURATION: 100 % | WEIGHT: 175.2 LBS | HEART RATE: 81 BPM | TEMPERATURE: 97.9 F | RESPIRATION RATE: 16 BRPM | SYSTOLIC BLOOD PRESSURE: 135 MMHG | HEIGHT: 70 IN

## 2019-04-17 DIAGNOSIS — C83.74 BURKITT LYMPHOMA OF LYMPH NODES OF AXILLA (H): ICD-10-CM

## 2019-04-17 DIAGNOSIS — C85.10 HIGH GRADE B-CELL LYMPHOMA (H): Primary | ICD-10-CM

## 2019-04-17 DIAGNOSIS — C85.90: ICD-10-CM

## 2019-04-17 DIAGNOSIS — C85.10 HIGH GRADE B-CELL LYMPHOMA (H): ICD-10-CM

## 2019-04-17 PROBLEM — C83.70 BURKITT LYMPHOMA (H): Status: ACTIVE | Noted: 2019-04-17

## 2019-04-17 LAB
ALBUMIN SERPL-MCNC: 3.6 G/DL (ref 3.4–5)
ALBUMIN SERPL-MCNC: 3.8 G/DL (ref 3.4–5)
ALP SERPL-CCNC: 60 U/L (ref 40–150)
ALP SERPL-CCNC: 66 U/L (ref 40–150)
ALT SERPL W P-5'-P-CCNC: 65 U/L (ref 0–70)
ALT SERPL W P-5'-P-CCNC: 67 U/L (ref 0–70)
ANION GAP SERPL CALCULATED.3IONS-SCNC: 3 MMOL/L (ref 3–14)
ANION GAP SERPL CALCULATED.3IONS-SCNC: 7 MMOL/L (ref 3–14)
AST SERPL W P-5'-P-CCNC: 19 U/L (ref 0–45)
AST SERPL W P-5'-P-CCNC: 21 U/L (ref 0–45)
BASOPHILS # BLD AUTO: 0.1 10E9/L (ref 0–0.2)
BASOPHILS # BLD AUTO: 0.1 10E9/L (ref 0–0.2)
BASOPHILS NFR BLD AUTO: 1 %
BASOPHILS NFR BLD AUTO: 1.4 %
BILIRUB SERPL-MCNC: 0.3 MG/DL (ref 0.2–1.3)
BILIRUB SERPL-MCNC: 0.3 MG/DL (ref 0.2–1.3)
BUN SERPL-MCNC: 16 MG/DL (ref 7–30)
BUN SERPL-MCNC: 17 MG/DL (ref 7–30)
CALCIUM SERPL-MCNC: 8.8 MG/DL (ref 8.5–10.1)
CALCIUM SERPL-MCNC: 9.4 MG/DL (ref 8.5–10.1)
CHLORIDE SERPL-SCNC: 107 MMOL/L (ref 94–109)
CHLORIDE SERPL-SCNC: 107 MMOL/L (ref 94–109)
CO2 SERPL-SCNC: 28 MMOL/L (ref 20–32)
CO2 SERPL-SCNC: 29 MMOL/L (ref 20–32)
CREAT SERPL-MCNC: 0.98 MG/DL (ref 0.66–1.25)
CREAT SERPL-MCNC: 1.12 MG/DL (ref 0.66–1.25)
DIFFERENTIAL METHOD BLD: ABNORMAL
DIFFERENTIAL METHOD BLD: NORMAL
EOSINOPHIL # BLD AUTO: 0 10E9/L (ref 0–0.7)
EOSINOPHIL # BLD AUTO: 0 10E9/L (ref 0–0.7)
EOSINOPHIL NFR BLD AUTO: 0.1 %
EOSINOPHIL NFR BLD AUTO: 0.3 %
ERYTHROCYTE [DISTWIDTH] IN BLOOD BY AUTOMATED COUNT: 13.1 % (ref 10–15)
ERYTHROCYTE [DISTWIDTH] IN BLOOD BY AUTOMATED COUNT: 13.1 % (ref 10–15)
GFR SERPL CREATININE-BSD FRML MDRD: 80 ML/MIN/{1.73_M2}
GFR SERPL CREATININE-BSD FRML MDRD: >90 ML/MIN/{1.73_M2}
GLUCOSE SERPL-MCNC: 54 MG/DL (ref 70–99)
GLUCOSE SERPL-MCNC: 97 MG/DL (ref 70–99)
HCT VFR BLD AUTO: 40.6 % (ref 40–53)
HCT VFR BLD AUTO: 40.8 % (ref 40–53)
HGB BLD-MCNC: 13.2 G/DL (ref 13.3–17.7)
HGB BLD-MCNC: 13.7 G/DL (ref 13.3–17.7)
IMM GRANULOCYTES # BLD: 0.1 10E9/L (ref 0–0.4)
IMM GRANULOCYTES # BLD: 0.1 10E9/L (ref 0–0.4)
IMM GRANULOCYTES NFR BLD: 1.3 %
IMM GRANULOCYTES NFR BLD: 1.7 %
LYMPHOCYTES # BLD AUTO: 1.4 10E9/L (ref 0.8–5.3)
LYMPHOCYTES # BLD AUTO: 1.5 10E9/L (ref 0.8–5.3)
LYMPHOCYTES NFR BLD AUTO: 16.7 %
LYMPHOCYTES NFR BLD AUTO: 22.9 %
MCH RBC QN AUTO: 30 PG (ref 26.5–33)
MCH RBC QN AUTO: 30.2 PG (ref 26.5–33)
MCHC RBC AUTO-ENTMCNC: 32.4 G/DL (ref 31.5–36.5)
MCHC RBC AUTO-ENTMCNC: 33.7 G/DL (ref 31.5–36.5)
MCV RBC AUTO: 89 FL (ref 78–100)
MCV RBC AUTO: 93 FL (ref 78–100)
MONOCYTES # BLD AUTO: 0.5 10E9/L (ref 0–1.3)
MONOCYTES # BLD AUTO: 0.6 10E9/L (ref 0–1.3)
MONOCYTES NFR BLD AUTO: 6.3 %
MONOCYTES NFR BLD AUTO: 9.8 %
NEUTROPHILS # BLD AUTO: 4 10E9/L (ref 1.6–8.3)
NEUTROPHILS # BLD AUTO: 6.2 10E9/L (ref 1.6–8.3)
NEUTROPHILS NFR BLD AUTO: 63.9 %
NEUTROPHILS NFR BLD AUTO: 74.6 %
NRBC # BLD AUTO: 0 10*3/UL
NRBC # BLD AUTO: 0 10*3/UL
NRBC BLD AUTO-RTO: 0 /100
NRBC BLD AUTO-RTO: 0 /100
PLATELET # BLD AUTO: 195 10E9/L (ref 150–450)
PLATELET # BLD AUTO: 204 10E9/L (ref 150–450)
POTASSIUM SERPL-SCNC: 3.7 MMOL/L (ref 3.4–5.3)
POTASSIUM SERPL-SCNC: 4 MMOL/L (ref 3.4–5.3)
PROT SERPL-MCNC: 7 G/DL (ref 6.8–8.8)
PROT SERPL-MCNC: 7.3 G/DL (ref 6.8–8.8)
RBC # BLD AUTO: 4.4 10E12/L (ref 4.4–5.9)
RBC # BLD AUTO: 4.54 10E12/L (ref 4.4–5.9)
SODIUM SERPL-SCNC: 139 MMOL/L (ref 133–144)
SODIUM SERPL-SCNC: 141 MMOL/L (ref 133–144)
WBC # BLD AUTO: 6.3 10E9/L (ref 4–11)
WBC # BLD AUTO: 8.3 10E9/L (ref 4–11)

## 2019-04-17 PROCEDURE — G0463 HOSPITAL OUTPT CLINIC VISIT: HCPCS | Mod: 25

## 2019-04-17 PROCEDURE — 99214 OFFICE O/P EST MOD 30 MIN: CPT | Mod: ZP | Performed by: PHYSICIAN ASSISTANT

## 2019-04-17 PROCEDURE — 36569 INSJ PICC 5 YR+ W/O IMAGING: CPT

## 2019-04-17 PROCEDURE — 87799 DETECT AGENT NOS DNA QUANT: CPT | Performed by: PHYSICIAN ASSISTANT

## 2019-04-17 PROCEDURE — 25800030 ZZH RX IP 258 OP 636: Performed by: PHYSICIAN ASSISTANT

## 2019-04-17 PROCEDURE — 25000132 ZZH RX MED GY IP 250 OP 250 PS 637: Performed by: PHYSICIAN ASSISTANT

## 2019-04-17 PROCEDURE — 25000128 H RX IP 250 OP 636: Performed by: PHYSICIAN ASSISTANT

## 2019-04-17 PROCEDURE — 85025 COMPLETE CBC W/AUTO DIFF WBC: CPT | Performed by: PHYSICIAN ASSISTANT

## 2019-04-17 PROCEDURE — 36592 COLLECT BLOOD FROM PICC: CPT | Performed by: PHYSICIAN ASSISTANT

## 2019-04-17 PROCEDURE — 12000001 ZZH R&B MED SURG/OB UMMC

## 2019-04-17 PROCEDURE — 25000131 ZZH RX MED GY IP 250 OP 636 PS 637: Performed by: PHYSICIAN ASSISTANT

## 2019-04-17 PROCEDURE — 25000128 H RX IP 250 OP 636: Performed by: STUDENT IN AN ORGANIZED HEALTH CARE EDUCATION/TRAINING PROGRAM

## 2019-04-17 PROCEDURE — 3E04305 INTRODUCTION OF OTHER ANTINEOPLASTIC INTO CENTRAL VEIN, PERCUTANEOUS APPROACH: ICD-10-PCS | Performed by: INTERNAL MEDICINE

## 2019-04-17 PROCEDURE — 99222 1ST HOSP IP/OBS MODERATE 55: CPT | Mod: GC | Performed by: INTERNAL MEDICINE

## 2019-04-17 PROCEDURE — 80053 COMPREHEN METABOLIC PANEL: CPT | Performed by: PHYSICIAN ASSISTANT

## 2019-04-17 PROCEDURE — G0463 HOSPITAL OUTPT CLINIC VISIT: HCPCS | Mod: ZF

## 2019-04-17 PROCEDURE — 25000132 ZZH RX MED GY IP 250 OP 250 PS 637: Performed by: STUDENT IN AN ORGANIZED HEALTH CARE EDUCATION/TRAINING PROGRAM

## 2019-04-17 PROCEDURE — 27210577 ZZ H INTRODUCER MICRO SET

## 2019-04-17 PROCEDURE — 25000125 ZZHC RX 250: Performed by: STUDENT IN AN ORGANIZED HEALTH CARE EDUCATION/TRAINING PROGRAM

## 2019-04-17 PROCEDURE — 40000986 XR CHEST 1 VW

## 2019-04-17 PROCEDURE — 25000132 ZZH RX MED GY IP 250 OP 250 PS 637: Performed by: INTERNAL MEDICINE

## 2019-04-17 PROCEDURE — 27211389 ZZ H KIT, 5 FR DL BIOFLO OPEN ENDED PICC

## 2019-04-17 RX ORDER — PROCHLORPERAZINE MALEATE 10 MG
10 TABLET ORAL EVERY 6 HOURS PRN
Status: DISCONTINUED | OUTPATIENT
Start: 2019-04-17 | End: 2019-04-18

## 2019-04-17 RX ORDER — LIDOCAINE 40 MG/G
CREAM TOPICAL
Status: DISCONTINUED | OUTPATIENT
Start: 2019-04-17 | End: 2019-04-21 | Stop reason: HOSPADM

## 2019-04-17 RX ORDER — LANOLIN ALCOHOL/MO/W.PET/CERES
3 CREAM (GRAM) TOPICAL
Status: DISCONTINUED | OUTPATIENT
Start: 2019-04-17 | End: 2019-04-21 | Stop reason: HOSPADM

## 2019-04-17 RX ORDER — MEPERIDINE HYDROCHLORIDE 25 MG/ML
25 INJECTION INTRAMUSCULAR; INTRAVENOUS; SUBCUTANEOUS EVERY 30 MIN PRN
Status: CANCELLED | OUTPATIENT
Start: 2019-04-17

## 2019-04-17 RX ORDER — AMOXICILLIN 250 MG
2 CAPSULE ORAL 2 TIMES DAILY PRN
Status: DISCONTINUED | OUTPATIENT
Start: 2019-04-17 | End: 2019-04-21 | Stop reason: HOSPADM

## 2019-04-17 RX ORDER — EPINEPHRINE 1 MG/ML
0.3 INJECTION, SOLUTION INTRAMUSCULAR; SUBCUTANEOUS EVERY 5 MIN PRN
Status: CANCELLED | OUTPATIENT
Start: 2019-04-17

## 2019-04-17 RX ORDER — ACETAMINOPHEN 325 MG/1
325 TABLET ORAL EVERY 4 HOURS PRN
Status: DISCONTINUED | OUTPATIENT
Start: 2019-04-17 | End: 2019-04-21 | Stop reason: HOSPADM

## 2019-04-17 RX ORDER — ALBUTEROL SULFATE 90 UG/1
1-2 AEROSOL, METERED RESPIRATORY (INHALATION)
Status: CANCELLED
Start: 2019-04-17

## 2019-04-17 RX ORDER — MEPERIDINE HYDROCHLORIDE 25 MG/ML
25 INJECTION INTRAMUSCULAR; INTRAVENOUS; SUBCUTANEOUS EVERY 30 MIN PRN
Status: DISCONTINUED | OUTPATIENT
Start: 2019-04-17 | End: 2019-04-21 | Stop reason: HOSPADM

## 2019-04-17 RX ORDER — PROCHLORPERAZINE MALEATE 10 MG
10 TABLET ORAL EVERY 6 HOURS PRN
Status: CANCELLED
Start: 2019-04-17

## 2019-04-17 RX ORDER — DIPHENHYDRAMINE HCL 50 MG
50 CAPSULE ORAL ONCE
Status: COMPLETED | OUTPATIENT
Start: 2019-04-17 | End: 2019-04-17

## 2019-04-17 RX ORDER — ALLOPURINOL 300 MG/1
300 TABLET ORAL DAILY
Status: CANCELLED | OUTPATIENT
Start: 2019-04-17

## 2019-04-17 RX ORDER — DEXAMETHASONE 4 MG/1
8 TABLET ORAL DAILY
Status: CANCELLED
Start: 2019-04-23

## 2019-04-17 RX ORDER — SODIUM CHLORIDE 9 MG/ML
1000 INJECTION, SOLUTION INTRAVENOUS CONTINUOUS PRN
Status: DISCONTINUED | OUTPATIENT
Start: 2019-04-17 | End: 2019-04-21 | Stop reason: HOSPADM

## 2019-04-17 RX ORDER — DIPHENHYDRAMINE HYDROCHLORIDE 50 MG/ML
50 INJECTION INTRAMUSCULAR; INTRAVENOUS
Status: CANCELLED
Start: 2019-04-17

## 2019-04-17 RX ORDER — ALBUTEROL SULFATE 90 UG/1
1-2 AEROSOL, METERED RESPIRATORY (INHALATION)
Status: DISCONTINUED | OUTPATIENT
Start: 2019-04-17 | End: 2019-04-21 | Stop reason: HOSPADM

## 2019-04-17 RX ORDER — ONDANSETRON 8 MG/1
16 TABLET, FILM COATED ORAL
Status: COMPLETED | OUTPATIENT
Start: 2019-04-17 | End: 2019-04-21

## 2019-04-17 RX ORDER — LORAZEPAM 0.5 MG/1
.5-1 TABLET ORAL EVERY 6 HOURS PRN
Status: CANCELLED
Start: 2019-04-17

## 2019-04-17 RX ORDER — NALOXONE HYDROCHLORIDE 0.4 MG/ML
.1-.4 INJECTION, SOLUTION INTRAMUSCULAR; INTRAVENOUS; SUBCUTANEOUS
Status: DISCONTINUED | OUTPATIENT
Start: 2019-04-17 | End: 2019-04-21 | Stop reason: HOSPADM

## 2019-04-17 RX ORDER — ALBUTEROL SULFATE 0.83 MG/ML
2.5 SOLUTION RESPIRATORY (INHALATION)
Status: CANCELLED | OUTPATIENT
Start: 2019-04-17

## 2019-04-17 RX ORDER — LORAZEPAM 2 MG/ML
.5-1 INJECTION INTRAMUSCULAR EVERY 6 HOURS PRN
Status: DISCONTINUED | OUTPATIENT
Start: 2019-04-17 | End: 2019-04-17

## 2019-04-17 RX ORDER — HEPARIN SODIUM,PORCINE 10 UNIT/ML
2-5 VIAL (ML) INTRAVENOUS
Status: COMPLETED | OUTPATIENT
Start: 2019-04-17 | End: 2019-04-18

## 2019-04-17 RX ORDER — ALLOPURINOL 300 MG/1
300 TABLET ORAL DAILY
Status: DISCONTINUED | OUTPATIENT
Start: 2019-04-17 | End: 2019-04-17

## 2019-04-17 RX ORDER — DEXAMETHASONE 4 MG/1
8 TABLET ORAL DAILY
Status: DISCONTINUED | OUTPATIENT
Start: 2019-04-22 | End: 2019-04-21 | Stop reason: HOSPADM

## 2019-04-17 RX ORDER — ACYCLOVIR 200 MG/1
400 CAPSULE ORAL 2 TIMES DAILY
Status: DISCONTINUED | OUTPATIENT
Start: 2019-04-17 | End: 2019-04-17

## 2019-04-17 RX ORDER — LORAZEPAM 2 MG/ML
.5-1 INJECTION INTRAMUSCULAR EVERY 6 HOURS PRN
Status: DISCONTINUED | OUTPATIENT
Start: 2019-04-17 | End: 2019-04-21 | Stop reason: HOSPADM

## 2019-04-17 RX ORDER — ACETAMINOPHEN 325 MG/1
650 TABLET ORAL ONCE
Status: COMPLETED | OUTPATIENT
Start: 2019-04-17 | End: 2019-04-17

## 2019-04-17 RX ORDER — ONDANSETRON 2 MG/ML
4 INJECTION INTRAMUSCULAR; INTRAVENOUS EVERY 6 HOURS PRN
Status: DISCONTINUED | OUTPATIENT
Start: 2019-04-17 | End: 2019-04-21 | Stop reason: HOSPADM

## 2019-04-17 RX ORDER — METHYLPREDNISOLONE SODIUM SUCCINATE 125 MG/2ML
125 INJECTION, POWDER, LYOPHILIZED, FOR SOLUTION INTRAMUSCULAR; INTRAVENOUS
Status: CANCELLED
Start: 2019-04-17

## 2019-04-17 RX ORDER — AMOXICILLIN 250 MG
2 CAPSULE ORAL 2 TIMES DAILY
Status: CANCELLED | OUTPATIENT
Start: 2019-04-17

## 2019-04-17 RX ORDER — ACYCLOVIR 200 MG/1
400 CAPSULE ORAL 2 TIMES DAILY
Status: DISCONTINUED | OUTPATIENT
Start: 2019-04-17 | End: 2019-04-21 | Stop reason: HOSPADM

## 2019-04-17 RX ORDER — LORAZEPAM 0.5 MG/1
.5-1 TABLET ORAL EVERY 6 HOURS PRN
Status: DISCONTINUED | OUTPATIENT
Start: 2019-04-17 | End: 2019-04-17

## 2019-04-17 RX ORDER — ACETAMINOPHEN 325 MG/1
650 TABLET ORAL ONCE
Status: CANCELLED
Start: 2019-04-17

## 2019-04-17 RX ORDER — ALBUTEROL SULFATE 0.83 MG/ML
2.5 SOLUTION RESPIRATORY (INHALATION)
Status: DISCONTINUED | OUTPATIENT
Start: 2019-04-17 | End: 2019-04-21 | Stop reason: HOSPADM

## 2019-04-17 RX ORDER — LORAZEPAM 0.5 MG/1
.5-1 TABLET ORAL EVERY 6 HOURS PRN
Status: DISCONTINUED | OUTPATIENT
Start: 2019-04-17 | End: 2019-04-21 | Stop reason: HOSPADM

## 2019-04-17 RX ORDER — ACYCLOVIR 200 MG/1
400 CAPSULE ORAL 2 TIMES DAILY
Qty: 60 CAPSULE | Refills: 1 | Status: SHIPPED | OUTPATIENT
Start: 2019-04-17 | End: 2019-05-24

## 2019-04-17 RX ORDER — AMOXICILLIN 250 MG
2 CAPSULE ORAL 2 TIMES DAILY
Status: DISCONTINUED | OUTPATIENT
Start: 2019-04-17 | End: 2019-04-21 | Stop reason: HOSPADM

## 2019-04-17 RX ORDER — ONDANSETRON 8 MG/1
16 TABLET, FILM COATED ORAL EVERY 24 HOURS
Status: CANCELLED
Start: 2019-04-18

## 2019-04-17 RX ORDER — SODIUM CHLORIDE 9 MG/ML
1000 INJECTION, SOLUTION INTRAVENOUS CONTINUOUS PRN
Status: CANCELLED
Start: 2019-04-17

## 2019-04-17 RX ORDER — FLUCONAZOLE 100 MG/1
100 TABLET ORAL DAILY
Status: DISCONTINUED | OUTPATIENT
Start: 2019-04-17 | End: 2019-04-21 | Stop reason: HOSPADM

## 2019-04-17 RX ORDER — METHYLPREDNISOLONE SODIUM SUCCINATE 125 MG/2ML
125 INJECTION, POWDER, LYOPHILIZED, FOR SOLUTION INTRAMUSCULAR; INTRAVENOUS
Status: DISCONTINUED | OUTPATIENT
Start: 2019-04-17 | End: 2019-04-21 | Stop reason: HOSPADM

## 2019-04-17 RX ORDER — DIPHENHYDRAMINE HYDROCHLORIDE 50 MG/ML
50 INJECTION INTRAMUSCULAR; INTRAVENOUS
Status: DISCONTINUED | OUTPATIENT
Start: 2019-04-17 | End: 2019-04-21 | Stop reason: HOSPADM

## 2019-04-17 RX ORDER — LORAZEPAM 2 MG/ML
.5-1 INJECTION INTRAMUSCULAR EVERY 6 HOURS PRN
Status: CANCELLED | OUTPATIENT
Start: 2019-04-17

## 2019-04-17 RX ORDER — DIPHENHYDRAMINE HCL 25 MG
50 CAPSULE ORAL ONCE
Status: CANCELLED
Start: 2019-04-17

## 2019-04-17 RX ORDER — EPINEPHRINE 1 MG/ML
0.3 INJECTION, SOLUTION, CONCENTRATE INTRAVENOUS EVERY 5 MIN PRN
Status: DISCONTINUED | OUTPATIENT
Start: 2019-04-17 | End: 2019-04-21 | Stop reason: HOSPADM

## 2019-04-17 RX ORDER — HEPARIN SODIUM 5000 [USP'U]/.5ML
5000 INJECTION, SOLUTION INTRAVENOUS; SUBCUTANEOUS EVERY 8 HOURS
Status: DISCONTINUED | OUTPATIENT
Start: 2019-04-17 | End: 2019-04-19

## 2019-04-17 RX ORDER — ALLOPURINOL 300 MG/1
300 TABLET ORAL DAILY
Status: DISCONTINUED | OUTPATIENT
Start: 2019-04-17 | End: 2019-04-21 | Stop reason: HOSPADM

## 2019-04-17 RX ADMIN — VINCRISTINE SULFATE 0.78 MG: 1 INJECTION, SOLUTION INTRAVENOUS at 19:46

## 2019-04-17 RX ADMIN — RANITIDINE 150 MG: 150 TABLET ORAL at 14:39

## 2019-04-17 RX ADMIN — ACETAMINOPHEN 650 MG: 325 TABLET, FILM COATED ORAL at 14:38

## 2019-04-17 RX ADMIN — PREDNISONE 60 MG: 50 TABLET ORAL at 19:52

## 2019-04-17 RX ADMIN — ALLOPURINOL 300 MG: 300 TABLET ORAL at 12:57

## 2019-04-17 RX ADMIN — PREDNISONE 60 MG: 50 TABLET ORAL at 14:39

## 2019-04-17 RX ADMIN — MELATONIN TAB 3 MG 3 MG: 3 TAB at 21:29

## 2019-04-17 RX ADMIN — LIDOCAINE HYDROCHLORIDE 5 ML: 10 INJECTION, SOLUTION EPIDURAL; INFILTRATION; INTRACAUDAL; PERINEURAL at 11:21

## 2019-04-17 RX ADMIN — ONDANSETRON HYDROCHLORIDE 16 MG: 8 TABLET, FILM COATED ORAL at 18:43

## 2019-04-17 RX ADMIN — SENNOSIDES AND DOCUSATE SODIUM 2 TABLET: 8.6; 5 TABLET ORAL at 19:52

## 2019-04-17 RX ADMIN — RANITIDINE 150 MG: 150 TABLET ORAL at 19:52

## 2019-04-17 RX ADMIN — ACYCLOVIR 400 MG: 200 CAPSULE ORAL at 14:38

## 2019-04-17 RX ADMIN — ACYCLOVIR 400 MG: 200 CAPSULE ORAL at 19:52

## 2019-04-17 RX ADMIN — SODIUM CHLORIDE 150 MG: 900 INJECTION, SOLUTION INTRAVENOUS at 18:44

## 2019-04-17 RX ADMIN — ETOPOSIDE 115 MG: 20 INJECTION, SOLUTION, CONCENTRATE INTRAVENOUS at 19:46

## 2019-04-17 RX ADMIN — DIPHENHYDRAMINE HYDROCHLORIDE 50 MG: 50 CAPSULE ORAL at 14:39

## 2019-04-17 RX ADMIN — RITUXIMAB 700 MG: 10 INJECTION, SOLUTION INTRAVENOUS at 15:01

## 2019-04-17 ASSESSMENT — ACTIVITIES OF DAILY LIVING (ADL)
ADLS_ACUITY_SCORE: 10

## 2019-04-17 ASSESSMENT — MIFFLIN-ST. JEOR
SCORE: 1692.33
SCORE: 1700.95

## 2019-04-17 ASSESSMENT — PAIN SCALES - GENERAL: PAINLEVEL: NO PAIN (0)

## 2019-04-17 NOTE — PLAN OF CARE
Rituximab  D: Baseline vital signs and temperature were Afebrile and stable, see epic. Blood return was brisk per PICC.    I: Rituximab infusion started at 1500. Rate adjusted according to protocol and patient tolerance. Pre-medications included tylenol, benadryl and ranitidine.  A:Rate of rituximab infusion increased to 100cc/hr as patient  tolerated initial 30min well.  P: Recheck VS in 30 min as well as assessing for hives.  May increase to 150cc/hr if patient tolerating infusion well.

## 2019-04-17 NOTE — PROGRESS NOTES
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 He presented with R axillary mass. PET/CT showed stage IA disease. Bone marrow biopsy and LP done 3/25 were negative for disease. Please see Dr. Ramirez's note for further discussion of diagnostic work-up.     Plan for 2 cycles of DA-R-EPOCH followed by PET/CT. He presented for cycle 1 on 3/27/19 and was discharged on 3/31/19. He tolerated chemotherapy well apart from mild hives and facial itching from Rituxan (was able to complete dose), chemotherapy-induced nausea, and mild post-LP occipital headache.     Following discharge, his pathology was finalized here with Ki-67 positive in 95-99% of lymphoma cells. TDT negative, EBV was strongly positive. The positive findings for CD20, CD10, BCL6, very high Ki-67 with negative BCL2, as well as MYC translocation supports diagnoses of Burkitt's lymphoma.     He presents today prior to cycle 2 of R-EPOCH.     Interval History: Prudencio is here alone today and feeling well. He has continued neck stiffness and very mild headache which has continued to slowly improve but is notable with sitting and resolves very quickly with lying supine and walking. He denies any fevers/chills, nausea, or vomiting. His bowel movements have been regular for the past week without using stool softeners. He is eating and drinking well. No cough, sore throat, SOB, CP, or edema. His urine was \"green\" color for a few days without odor or dysuria, now completely resolved. He has been drinking 64 oz of fluids daily. Has faint n/t in first three fingers bilaterally. His rash from PICC dressing has resolved. No further GERD. ROS otherwise negative.     No current " facility-administered medications for this visit.      No current outpatient medications on file.     Facility-Administered Medications Ordered in Other Visits   Medication     acetaminophen (TYLENOL) tablet 325 mg     acyclovir (ZOVIRAX) capsule 400 mg     albuterol (PROAIR HFA/PROVENTIL HFA/VENTOLIN HFA) 108 (90 Base) MCG/ACT inhaler 1-2 puff     albuterol (PROVENTIL) neb solution 2.5 mg     allopurinol (ZYLOPRIM) tablet 300 mg     Chemotherapy Infusing-Continuous Infusion     [START ON 4/21/2019] cyclophosphamide (CYTOXAN) 1,755 mg in sodium chloride 0.9 % 638 mL CHEMOTHERAPY     [START ON 4/22/2019] dexamethasone (DECADRON) tablet 8 mg     diphenhydrAMINE (BENADRYL) injection 50 mg     EPINEPHrine PF (ADRENALIN) injection 0.3 mg     etoposide (TOPOSAR) 115 mg in sodium chloride 0.9 % 556 mL CHEMOTHERAPY     fluconazole (DIFLUCAN) tablet 100 mg     fosaprepitant (EMEND) 150 mg in sodium chloride 0.9 % intermittent infusion     [START ON 4/21/2019] fosaprepitant (EMEND) 150 mg in sodium chloride 0.9 % intermittent infusion     heparin lock flush 10 UNIT/ML injection 2-5 mL     heparin sodium injection 5,000 Units     lidocaine (LMX4) cream     LORazepam (ATIVAN) injection 0.5-1 mg     LORazepam (ATIVAN) tablet 0.5-1 mg     Medication Instruction     MEDICATION INSTRUCTION     meperidine (DEMEROL) injection 25 mg     methylPREDNISolone sodium succinate (solu-MEDROL) injection 125 mg     naloxone (NARCAN) injection 0.1-0.4 mg     ondansetron (ZOFRAN) injection 4 mg     ondansetron (ZOFRAN) tablet 16 mg     predniSONE (DELTASONE) tablet 60 mg     prochlorperazine (COMPAZINE) injection 10 mg     prochlorperazine (COMPAZINE) tablet 10 mg     ranitidine (ZANTAC) tablet 150 mg     senna-docusate (SENOKOT-S/PERICOLACE) 8.6-50 MG per tablet 2 tablet     senna-docusate (SENOKOT-S/PERICOLACE) 8.6-50 MG per tablet 2 tablet     sodium chloride 0.9% infusion     vinCRIStine (ONCOVIN) 0.78 mg, DOXOrubicin (ADRIAMYCIN) 23 mg in  "sodium chloride 0.9 % 1,062 mL CHEMOTHERAPY       PHYSICAL EXAM:  /81 (BP Location: Right arm, Patient Position: Sitting, Cuff Size: Adult Regular)   Pulse 81   Temp 97.9  F (36.6  C) (Oral)   Resp 16   Ht 1.778 m (5' 10\")   Wt 79.5 kg (175 lb 3.2 oz)   SpO2 100%   BMI 25.14 kg/m    General: Alert, oriented, pleasant, NAD  Skin: No longer any rash on inner R upper forearm.   HEENT: Normocephalic, atraumatic, PERRLA, EOMI. Moist mucus membranes, no lesions or thrush  Neck: No cervical or supraclavicular LAD.   Axillary: No longer any palpable nodes on R. None on L   Lungs: CTA bilaterally, normal work of breathing  Cardiac: RRR, S1, S2, no murmurs  Abdomen: Soft, nontender, nondistended. Normoactive bowel sounds. No hepatosplenomegaly, masses  Neuro: CNII-XII grossly intact  Extremities: No pedal edema    Labs:   Results for orders placed or performed in visit on 04/17/19 (from the past 24 hour(s))   Comprehensive metabolic panel   Result Value Ref Range    Sodium 139 133 - 144 mmol/L    Potassium 3.7 3.4 - 5.3 mmol/L    Chloride 107 94 - 109 mmol/L    Carbon Dioxide 29 20 - 32 mmol/L    Anion Gap 3 3 - 14 mmol/L    Glucose 54 (L) 70 - 99 mg/dL    Urea Nitrogen 16 7 - 30 mg/dL    Creatinine 1.12 0.66 - 1.25 mg/dL    GFR Estimate 80 >60 mL/min/[1.73_m2]    GFR Estimate If Black >90 >60 mL/min/[1.73_m2]    Calcium 9.4 8.5 - 10.1 mg/dL    Bilirubin Total 0.3 0.2 - 1.3 mg/dL    Albumin 3.8 3.4 - 5.0 g/dL    Protein Total 7.3 6.8 - 8.8 g/dL    Alkaline Phosphatase 66 40 - 150 U/L    ALT 67 0 - 70 U/L    AST 19 0 - 45 U/L   CBC with platelets differential   Result Value Ref Range    WBC 6.3 4.0 - 11.0 10e9/L    RBC Count 4.54 4.4 - 5.9 10e12/L    Hemoglobin 13.7 13.3 - 17.7 g/dL    Hematocrit 40.6 40.0 - 53.0 %    MCV 89 78 - 100 fl    MCH 30.2 26.5 - 33.0 pg    MCHC 33.7 31.5 - 36.5 g/dL    RDW 13.1 10.0 - 15.0 %    Platelet Count 195 150 - 450 10e9/L    Diff Method Automated Method     % Neutrophils 63.9 % "    % Lymphocytes 22.9 %    % Monocytes 9.8 %    % Eosinophils 0.3 %    % Basophils 1.4 %    % Immature Granulocytes 1.7 %    Nucleated RBCs 0 0 /100    Absolute Neutrophil 4.0 1.6 - 8.3 10e9/L    Absolute Lymphocytes 1.5 0.8 - 5.3 10e9/L    Absolute Monocytes 0.6 0.0 - 1.3 10e9/L    Absolute Eosinophils 0.0 0.0 - 0.7 10e9/L    Absolute Basophils 0.1 0.0 - 0.2 10e9/L    Abs Immature Granulocytes 0.1 0 - 0.4 10e9/L    Absolute Nucleated RBC 0.0        Assessment & Plan:     1. Stage Ia Burkitt's lymphoma: Negative marrow or CNS involvement. S/p 1 cycle of R-EPOCH which he tolerated well beyond mild rituxan reaction and nausea. He has great clinical response with resolution of his LAD. Overall plan is R-EPOCH x 6 cycles with IT methotrexate days 1&5 cycles 3-6. He will have interim PET/CT following 2 cycles. He is feeling well and counts are adequate to admit for cycle 2 today. His interim counts qualify for dose-escalation with 20% increase of etoposide, doxorubicin, and cyclophosphamide. Has grade 1 neuropathy which will need to be monitored with vincristine. Plan for hospital follow-up at discharge, Neulasta, twice weekly count checks.     2. HEME: Hgb 13.2, platelets 204, WBC 8.3/ANC 6.2.  Plan to transfuse for Hgb <8 and platelets <10K.     3. ID: No active concerns. Continue ppx ACV and fluconazole. Start Levaquin when ANC <1000.     4. Vascular access: PICC placement needed with admission. Please pull PICC line at discharge.     5. NEURO: Post-LP headaches slowly improving. Continue to push fluids and use caffeine.     6. GI: For GERD, continue ranitidine 150 mg BID. Avoid offensive foods. For history of nausea on day 2 of chemo, will add Emend on day 1 in addition to his zofran and dex. Emend on day 5 as well.     Holly Wheat PA-C    [facility] [address] [address] [phone]

## 2019-04-17 NOTE — PROGRESS NOTES
"SPIRITUAL HEALTH SERVICES  SPIRITUAL ASSESSMENT Progress Note  [facility] ([place]) 7D     REFERRAL SOURCE: Request Upon Admission    I met with Christiano \"Prudencio\" Tracy and his wife Jes as they were walking in the hallway. Receiving [employer] has been an important way that Prudencio edenilson with being hospitalized for his cneoterhapy treatments. Prudencio requests [employer] Commun[employer] tomorrow am which I will provided. No other spiritual needs at this time.    PLAN: I will return on 4.18.19 to offer Prudencio and Jes Aníbaly Communion.    Stephanie Stoddard  Oncology   Pager [phone]    [facility] remains available 24/7 for emergent requests/referrals, either by having the switchboard page the on-call  or by entering an ASAP/STAT consult in Epic (this will also page the on-call ).      "

## 2019-04-17 NOTE — PLAN OF CARE
Nursing Focus: Admission  D: Arrived at 1000 from home via clinic. Patient accompanied by his wife. Admitted for course 2 of DA-R-EPOCH.States he has felt well at home.      I: Admission process began.  Patient oriented to room, enviroment, call light.  Md. notified of patients arrival on unit. PICC placed this morning.    A: Vital signs stable, afebrile.  Patient stable at this time.     P: Implement plan of care when available. Sill receive Rituximab this afternoon to evening.  Continue to monitor patient. Nursing interventions as appropriate. Notify md with changes in pt status.

## 2019-04-17 NOTE — H&P
"I have reviewed today's vital signs, medications, labs and imaging results. I have seen, evaluated and examined the patient independently and discussed the plan with the patient and team. The associated note has been read and corrected by me.  My independent findings and assessment are below.    HPI: Mr. Molina is a 42 yo man with Burkitt lymphoma, stage I status post 1 cycle of DA-EPOCH-R who is admitted for cycle 2.  He feels well.  He denies any fevers, chills or night sweats.  He has a good energy and denies any fatigue.  He has good appetite and denies any weight loss.  He denies any new or worsening lymphadenopathy.  He reports resolution of the right axillary lymphadenopathy.      PMH/SH/FH/ALL/MEDS/ROS reviewed and per ANTHONY note    Exam:   /80   Pulse 73   Temp 97.4  F (36.3  C) (Oral)   Resp 18   Ht 1.778 m (5' 10\")   Wt 78.6 kg (173 lb 4.8 oz)   SpO2 99%   BMI 24.87 kg/m    General appearance: pleasant man, not in acute distress  Eyes, Ears, Nose, Throat & Mouth:pupils equal and reactive to light and accommodation, extraocular movements intact, no icterus, injection or pallor. Oropharynx is clear.  Neck: supple, see hem  Respiratory: clear to auscultation bilaterally  Cardiovascular: regular, no murmurs, rubs, or gallops  Gastrointenstinal: soft, non-tender, non-distended, normal bowel sounds, no hepatosplenomegaly  Extremities: warm, well perfused, no edema  Neurologic: Alert and oriented to person, place and time, Cranial nerves 2-12 intact, intact sensation to light touch, muscle strength 5/5 in 4 extremities, reflexes +2   Skin: no rash  Hematologic/Lymph: no cervical, axillary or inguinal lymphadenopathy    Labs:  Sodium 141  Creatinine 1.98  White blood cells 8.3  Hemoglobin 13.2  Platelets 204    Assessment and Plan:  42 yo man with Burkitt lymphoma, stage I status post 1 cycle of DA-EPOCH-R who is admitted for cycle 2, today day 1.  We will start the chemotherapy as per protocol.  Plan " for a PET/CT after cycle 2 to evaluate for response. Remainder of supportive care as per Rhea Jacques MD note.    Dung Carcamo MD  Pager: [phone]

## 2019-04-17 NOTE — NURSING NOTE
Chief Complaint   Patient presents with     Blood Draw     Labs drawn via VPT by RN in lab. Vs taken. Pt checked in for next appt     Labs collected from venipuncture by RN. Vitals taken. Checked in for appointment(s).    Salma VALDEZ RN PHN BSN  BMT/Oncology Lab

## 2019-04-17 NOTE — LETTER
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 He presented with R axillary mass. PET/CT showed stage IA disease. Bone marrow biopsy and LP done 3/25 were negative for disease. Please see Dr. Ramirez's note for further discussion of diagnostic work-up.     Plan for 2 cycles of DA-R-EPOCH followed by PET/CT. He presented for cycle 1 on 3/27/19 and was discharged on 3/31/19. He tolerated chemotherapy well apart from mild hives and facial itching from Rituxan (was able to complete dose), chemotherapy-induced nausea, and mild post-LP occipital headache.     Following discharge, his pathology was finalized here with Ki-67 positive in 95-99% of lymphoma cells. TDT negative, EBV was strongly positive. The positive findings for CD20, CD10, BCL6, very high Ki-67 with negative BCL2, as well as MYC translocation supports diagnoses of Burkitt's lymphoma.     He presents today prior to cycle 2 of R-EPOCH.     Interval History: Prudencio is here alone today and feeling well. He has continued neck stiffness and very mild headache which has continued to slowly improve but is notable with sitting and resolves very quickly with lying supine and walking. He denies any fevers/chills, nausea, or vomiting. His bowel movements have been regular for the past week without using stool softeners. He is eating and drinking well. No cough, sore throat, SOB, CP, or edema. His urine was \"green\" color for a few days without odor or dysuria, now completely resolved. He has been drinking 64 oz of fluids daily. Has faint n/t in first three fingers bilaterally. His rash from " PICC dressing has resolved. No further GERD. ROS otherwise negative.     No current facility-administered medications for this visit.      No current outpatient medications on file.     Facility-Administered Medications Ordered in Other Visits   Medication     acetaminophen (TYLENOL) tablet 325 mg     acyclovir (ZOVIRAX) capsule 400 mg     albuterol (PROAIR HFA/PROVENTIL HFA/VENTOLIN HFA) 108 (90 Base) MCG/ACT inhaler 1-2 puff     albuterol (PROVENTIL) neb solution 2.5 mg     allopurinol (ZYLOPRIM) tablet 300 mg     Chemotherapy Infusing-Continuous Infusion     [START ON 4/21/2019] cyclophosphamide (CYTOXAN) 1,755 mg in sodium chloride 0.9 % 638 mL CHEMOTHERAPY     [START ON 4/22/2019] dexamethasone (DECADRON) tablet 8 mg     diphenhydrAMINE (BENADRYL) injection 50 mg     EPINEPHrine PF (ADRENALIN) injection 0.3 mg     etoposide (TOPOSAR) 115 mg in sodium chloride 0.9 % 556 mL CHEMOTHERAPY     fluconazole (DIFLUCAN) tablet 100 mg     fosaprepitant (EMEND) 150 mg in sodium chloride 0.9 % intermittent infusion     [START ON 4/21/2019] fosaprepitant (EMEND) 150 mg in sodium chloride 0.9 % intermittent infusion     heparin lock flush 10 UNIT/ML injection 2-5 mL     heparin sodium injection 5,000 Units     lidocaine (LMX4) cream     LORazepam (ATIVAN) injection 0.5-1 mg     LORazepam (ATIVAN) tablet 0.5-1 mg     Medication Instruction     MEDICATION INSTRUCTION     meperidine (DEMEROL) injection 25 mg     methylPREDNISolone sodium succinate (solu-MEDROL) injection 125 mg     naloxone (NARCAN) injection 0.1-0.4 mg     ondansetron (ZOFRAN) injection 4 mg     ondansetron (ZOFRAN) tablet 16 mg     predniSONE (DELTASONE) tablet 60 mg     prochlorperazine (COMPAZINE) injection 10 mg     prochlorperazine (COMPAZINE) tablet 10 mg     ranitidine (ZANTAC) tablet 150 mg     senna-docusate (SENOKOT-S/PERICOLACE) 8.6-50 MG per tablet 2 tablet     senna-docusate (SENOKOT-S/PERICOLACE) 8.6-50 MG per tablet 2 tablet     sodium chloride  "0.9% infusion     vinCRIStine (ONCOVIN) 0.78 mg, DOXOrubicin (ADRIAMYCIN) 23 mg in sodium chloride 0.9 % 1,062 mL CHEMOTHERAPY       PHYSICAL EXAM:  /81 (BP Location: Right arm, Patient Position: Sitting, Cuff Size: Adult Regular)   Pulse 81   Temp 97.9  F (36.6  C) (Oral)   Resp 16   Ht 1.778 m (5' 10\")   Wt 79.5 kg (175 lb 3.2 oz)   SpO2 100%   BMI 25.14 kg/m     General: Alert, oriented, pleasant, NAD  Skin: No longer any rash on inner R upper forearm.   HEENT: Normocephalic, atraumatic, PERRLA, EOMI. Moist mucus membranes, no lesions or thrush  Neck: No cervical or supraclavicular LAD.   Axillary: No longer any palpable nodes on R. None on L   Lungs: CTA bilaterally, normal work of breathing  Cardiac: RRR, S1, S2, no murmurs  Abdomen: Soft, nontender, nondistended. Normoactive bowel sounds. No hepatosplenomegaly, masses  Neuro: CNII-XII grossly intact  Extremities: No pedal edema    Labs:   Results for orders placed or performed in visit on 04/17/19 (from the past 24 hour(s))   Comprehensive metabolic panel   Result Value Ref Range    Sodium 139 133 - 144 mmol/L    Potassium 3.7 3.4 - 5.3 mmol/L    Chloride 107 94 - 109 mmol/L    Carbon Dioxide 29 20 - 32 mmol/L    Anion Gap 3 3 - 14 mmol/L    Glucose 54 (L) 70 - 99 mg/dL    Urea Nitrogen 16 7 - 30 mg/dL    Creatinine 1.12 0.66 - 1.25 mg/dL    GFR Estimate 80 >60 mL/min/[1.73_m2]    GFR Estimate If Black >90 >60 mL/min/[1.73_m2]    Calcium 9.4 8.5 - 10.1 mg/dL    Bilirubin Total 0.3 0.2 - 1.3 mg/dL    Albumin 3.8 3.4 - 5.0 g/dL    Protein Total 7.3 6.8 - 8.8 g/dL    Alkaline Phosphatase 66 40 - 150 U/L    ALT 67 0 - 70 U/L    AST 19 0 - 45 U/L   CBC with platelets differential   Result Value Ref Range    WBC 6.3 4.0 - 11.0 10e9/L    RBC Count 4.54 4.4 - 5.9 10e12/L    Hemoglobin 13.7 13.3 - 17.7 g/dL    Hematocrit 40.6 40.0 - 53.0 %    MCV 89 78 - 100 fl    MCH 30.2 26.5 - 33.0 pg    MCHC 33.7 31.5 - 36.5 g/dL    RDW 13.1 10.0 - 15.0 %    Platelet " Count 195 150 - 450 10e9/L    Diff Method Automated Method     % Neutrophils 63.9 %    % Lymphocytes 22.9 %    % Monocytes 9.8 %    % Eosinophils 0.3 %    % Basophils 1.4 %    % Immature Granulocytes 1.7 %    Nucleated RBCs 0 0 /100    Absolute Neutrophil 4.0 1.6 - 8.3 10e9/L    Absolute Lymphocytes 1.5 0.8 - 5.3 10e9/L    Absolute Monocytes 0.6 0.0 - 1.3 10e9/L    Absolute Eosinophils 0.0 0.0 - 0.7 10e9/L    Absolute Basophils 0.1 0.0 - 0.2 10e9/L    Abs Immature Granulocytes 0.1 0 - 0.4 10e9/L    Absolute Nucleated RBC 0.0        Assessment & Plan:     1. Stage Ia Burkitt's lymphoma: Negative marrow or CNS involvement. S/p 1 cycle of R-EPOCH which he tolerated well beyond mild rituxan reaction and nausea. He has great clinical response with resolution of his LAD. Overall plan is R-EPOCH x 6 cycles with IT methotrexate days 1&5 cycles 3-6. He will have interim PET/CT following 2 cycles. He is feeling well and counts are adequate to admit for cycle 2 today. His interim counts qualify for dose-escalation with 20% increase of etoposide, doxorubicin, and cyclophosphamide. Has grade 1 neuropathy which will need to be monitored with vincristine. Plan for hospital follow-up at discharge, Neulasta, twice weekly count checks.     2. HEME: Hgb 13.2, platelets 204, WBC 8.3/ANC 6.2.  Plan to transfuse for Hgb <8 and platelets <10K.     3. ID: No active concerns. Continue ppx ACV and fluconazole. Start Levaquin when ANC <1000.     4. Vascular access: PICC placement needed with admission. Please pull PICC line at discharge.     5. NEURO: Post-LP headaches slowly improving. Continue to push fluids and use caffeine.     6. GI: For GERD, continue ranitidine 150 mg BID. Avoid offensive foods. For history of nausea on day 2 of chemo, will add Emend on day 1 in addition to his zofran and dex. Emend on day 5 as well.     Holly Wheat PA-C    [facility] [address]  [address]  [phone]

## 2019-04-17 NOTE — H&P
HEMATOLOGY / ONCOLOGY History and Physical  Date of Admission:  4/17/2019   Date of Service: 04/17/19    ASSESSMENT & PLAN  Christiano Molina is a 43 year old male with Burkitt lymphoma diagnosed in March, s/p Cycle 1 of DA-R-EPOCH, now presenting for cycle 2, with dose increase, overall doing well.     #Burkitt Lymphoma IA   Follows with Dr. Ramirez. Initially presented in mid-march with r-axillary mass, found to have high-grade B-cell lymphoma. Initial pathology was not fully clear whether DLBCL or Burkitt, but more recently this was finalized as Burkitt lymphoma. Cycle 1 was 3/27. Plan is to do 6 cycles of DA-R-EPOCH. Intrathecal chemo will start after cycle 3. Bone marrow biopsy and LP on 3/25 are negative for lymphoma involvement, and PET CT indicates stage IA disease. Patient tolerated cycle 1 well, with some nausea. Today 4/17 is Day 1. Rituximab this afternoon, and starting Day 1 tonight. 20% dose increase.  - PICC to be place on admission, and removed upon discharge  - Allopurinol 300mg daily  - Acyclovir BID  - Fluconazole daily  - If neutropenic, levaquin (has not been)   - PET CT after this cycle to assess response.     Treatment plan:   Rituximab Day 0 (giving this afternoon)  Prednisone 60mg BID Day 1-5  Etoposide Day 1-4  Vincristine Day 1-4  Cyclophosphamide Day 5  Emend, Benadryl, Tylenol, Zofran, Decadron    FEN: regular diet  PPX: SQH  LINES/DRAINS: PICC being placed 4/17. Should get it removed upon discharge.   CODE: FULL  DISPO: pending chemo completion    HISTORY OF PRESENT ILLNESS  History obtained from chart review and discussion with the patient.     Initially patient presented with R-sided axillary mass on 3/15/19 with the morphology suggesting an aggressive B cell lymphoma, with a MYC   translocation and GC immunophenotype. The differential diagnosis included high grade B cell lymphoma, however could not be classified as double hit because no BCL6 or BCL2 rearrangement was detected, the  caveat is that BCL2 rearrangement was not tested by break-apart probes, rather by an IgH-BCL2 fusion probes.   Although this was not classic for Burkitt lymphoma, due to low LDH and PET CT findings, and single lymph node, after further pathology analysis with tissue block, ultimately found to be Burkitt lymphoma. This was after the first cycle was completed, but no change in chemotherapy treatment was needed in response to this.     Echo normal prior to first chemo. LP negative for disease. Bone marrow biopsy also negative for disease.     First cycle of chemotherapy was uneventful Cycle 1=3/27, only had headache post LP which was not severe but protracted, and nausea after first day of chemotherapy.     Today presents for 2nd cycle, and feels well. Does have some neck tightness but otherwise no fevers, chills, rashes, ulcers, shortness of breath, has good energy, no lumps/bumps, and actually cannot feel the mall in his right axilla anymore.     ROS  10 pt ROS negative unless otherwise noted in above HPI.    PMH  Past Medical History:   Diagnosis Date     Fourth CraniaNerve Palsy 2008       PSH  Past Surgical History:   Procedure Laterality Date     IR PICC VASCULAR  3/28/2019     PICC INSERTION Right 2019    5Fr - 42cm, Basilic vein, mid SVC       Prior to Admission MEDICATIONS  Prior to Admission Medications   Prescriptions Last Dose Informant Patient Reported? Taking?   Acetaminophen (TYLENOL PO)   Yes No   MULTIVITAMINS OR TABS   Yes No   Si TABLET DAILY PO   TUMS 500 MG OR CHEW   Yes No   Si TABLET  PO   acyclovir (ZOVIRAX) 200 MG capsule   No No   Sig: Take 2 capsules (400 mg) by mouth 2 times daily   allopurinol (ZYLOPRIM) 300 MG tablet   No No   Sig: Take 1 tablet (300 mg) by mouth daily   fluconazole (DIFLUCAN) 100 MG tablet   No No   Sig: Take 1 tablet (100 mg) by mouth daily   ondansetron (ZOFRAN-ODT) 8 MG ODT tab   No No   Sig: Take 1 tablet (8 mg) by mouth every 8 hours At first  continue scheduled (but can back off as nausea improves)   prochlorperazine (COMPAZINE) 10 MG tablet   No No   Sig: Take 1 tablet (10 mg) by mouth every 6 hours as needed for nausea or vomiting (Try second.)   ranitidine (ZANTAC) 150 MG tablet   No No   Sig: Take 1 tablet (150 mg) by mouth 2 times daily   senna-docusate (SENOKOT-S/PERICOLACE) 8.6-50 MG tablet   No No   Sig: Take 2 tablets by mouth 2 times daily as needed for constipation   triamcinolone (KENALOG) 0.1 % external cream   No No   Sig: Apply a thin layer over rash twice daily      Facility-Administered Medications: None       ALLERGIES  No Known Allergies    Social History  Social History     Socioeconomic History     Marital status:      Spouse name: Not on file     Number of children: Not on file     Years of education: Not on file     Highest education level: Not on file   Occupational History     Not on file   Social Needs     Financial resource strain: Not on file     Food insecurity:     Worry: Not on file     Inability: Not on file     Transportation needs:     Medical: Not on file     Non-medical: Not on file   Tobacco Use     Smoking status: Never Smoker     Smokeless tobacco: Never Used   Substance and Sexual Activity     Alcohol use: Not on file     Drug use: Not on file     Sexual activity: Not on file   Lifestyle     Physical activity:     Days per week: Not on file     Minutes per session: Not on file     Stress: Not on file   Relationships     Social connections:     Talks on phone: Not on file     Gets together: Not on file     Attends Yazidi service: Not on file     Active member of club or organization: Not on file     Attends meetings of clubs or organizations: Not on file     Relationship status: Not on file     Intimate partner violence:     Fear of current or ex partner: Not on file     Emotionally abused: Not on file     Physically abused: Not on file     Forced sexual activity: Not on file   Other Topics Concern      Parent/sibling w/ CABG, MI or angioplasty before 65F 55M? Not Asked   Social History Narrative     Not on file       Family History  No family history on file.  - Family history reviewed & no other pertinent oncologic/hematologic malignancy noted    DATA  Results for orders placed or performed during the hospital encounter of 04/17/19 (from the past 24 hour(s))   XR Chest 1 View    Narrative    Exam: XR CHEST 1 VW, 4/17/2019 11:35 AM    Indication: PICC placement    Comparison: 3/27/2019    Findings: Single PA chest radiograph. Right upper extremity PICC has  been removed. Left upper extremity PICC tip at the level of the low  SVC. The cardiomediastinal silhouette is within normal limits. Lungs  are clear. No pleural effusion or pneumothorax.      Impression    Impression:   1. Left upper extremity PICC tip at the level of the superior  cavoatrial junction. Right upper extremity PICC has been removed.  2. Clear lungs.    I have personally reviewed the examination and initial interpretation  and I agree with the findings.    ZAYRA PINA MD   CBC with platelets differential   Result Value Ref Range    WBC 8.3 4.0 - 11.0 10e9/L    RBC Count 4.40 4.4 - 5.9 10e12/L    Hemoglobin 13.2 (L) 13.3 - 17.7 g/dL    Hematocrit 40.8 40.0 - 53.0 %    MCV 93 78 - 100 fl    MCH 30.0 26.5 - 33.0 pg    MCHC 32.4 31.5 - 36.5 g/dL    RDW 13.1 10.0 - 15.0 %    Platelet Count 204 150 - 450 10e9/L    Diff Method Automated Method     % Neutrophils 74.6 %    % Lymphocytes 16.7 %    % Monocytes 6.3 %    % Eosinophils 0.1 %    % Basophils 1.0 %    % Immature Granulocytes 1.3 %    Nucleated RBCs 0 0 /100    Absolute Neutrophil 6.2 1.6 - 8.3 10e9/L    Absolute Lymphocytes 1.4 0.8 - 5.3 10e9/L    Absolute Monocytes 0.5 0.0 - 1.3 10e9/L    Absolute Eosinophils 0.0 0.0 - 0.7 10e9/L    Absolute Basophils 0.1 0.0 - 0.2 10e9/L    Abs Immature Granulocytes 0.1 0 - 0.4 10e9/L    Absolute Nucleated RBC 0.0    Comprehensive metabolic panel   Result  Value Ref Range    Sodium 141 133 - 144 mmol/L    Potassium 4.0 3.4 - 5.3 mmol/L    Chloride 107 94 - 109 mmol/L    Carbon Dioxide 28 20 - 32 mmol/L    Anion Gap 7 3 - 14 mmol/L    Glucose 97 70 - 99 mg/dL    Urea Nitrogen 17 7 - 30 mg/dL    Creatinine 0.98 0.66 - 1.25 mg/dL    GFR Estimate >90 >60 mL/min/[1.73_m2]    GFR Estimate If Black >90 >60 mL/min/[1.73_m2]    Calcium 8.8 8.5 - 10.1 mg/dL    Bilirubin Total 0.3 0.2 - 1.3 mg/dL    Albumin 3.6 3.4 - 5.0 g/dL    Protein Total 7.0 6.8 - 8.8 g/dL    Alkaline Phosphatase 60 40 - 150 U/L    ALT 65 0 - 70 U/L    AST 21 0 - 45 U/L       Physical Exam  Temp:  [97.4  F (36.3  C)-97.9  F (36.6  C)] 97.4  F (36.3  C)  Pulse:  [73-81] 73  Resp:  [16-18] 18  BP: (127-135)/(80-81) 127/80  SpO2:  [99 %-100 %] 99 %  173 lbs 4.8 oz  Constitutional: Awake, alert, cooperative, in NAD.  Eyes: PERRL, EOMI, sclera clear, conjunctiva normal.  ENT: Normocephalic, without obvious abnormality, sinuses nontender on palpation, oral pharynx with moist mucus membranes  Respiratory: Non-labored breathing, good air exchange, clear to auscultation bilaterally, no crackles or wheezing.  Cardiovascular: RRR, no murmur noted.  GI: + bowel sounds, soft, non-distended, non-tender, no masses palpated, no hepatosplenomegaly.  Skin: No concerning lesions or rash on exposed areas.  Musculoskeletal: No edema mckenna LEs.  Neurologic: Awake, alert & oriented x3.  Cranial nerves II-XII are grossly intact.   Psych: appropriate affect  Lymph: No LAD palpated in axilla or groin.     Jahaira Jacques  PGY 2 Internal Medicine  Pager [phone]    Hematology Oncology

## 2019-04-17 NOTE — PROVIDER NOTIFICATION
DATE/TIME  (DOT-TD, DOT-NOW) CHEMO CHECK ACTIVITY (REGIMEN & DOSE CHECK, DAY, DOSE #, NAME OF CHEMO #1)  CHEMO DRUG #2  CHEMO DRUG #3 NAME OF RN #1 (USE DOT-ME HERE) NAME OF RN#2 (2ND RN TO LOG IN SEPARATELY)   4/17/2019   12:16 PM  Cycle 2 DA-R-EPOCH protocol double check    Mary Youngblood    4/17/2019  2:45 PM   Rituximab   Mary DELGADO   4/17/2019  7:33 PM   Dose #1 etoposide   Dose #1 vincristine/doxorubicin   Gustavo Ayon     4/18/2019  5:47 PM   Dose #2 etoposide Dose #2 vincristine/doxorubicin  Gustavo Badillo     04/19/19  4:28 PM       Dose #3 etoposide Dose #3 dox/vinc   Pham Carla  (nikole)   4/20/2019  2:33 PM   day4 etoposide/vcr/dox   Raghu mattson   4/21/2019 .now Cytoxan x1 dose double check   Belle Lieberman

## 2019-04-17 NOTE — NURSING NOTE
"Oncology Rooming Note    April 17, 2019 8:16 AM   Christiano Molina is a 43 year old male who presents for:    Chief Complaint   Patient presents with     Blood Draw     Labs drawn via VPT by RN in lab. Vs taken. Pt checked in for next appt     Oncology Clinic Visit     Return visit related to High-Grade B Cell Lymphoma     Initial Vitals: /81 (BP Location: Right arm, Patient Position: Sitting, Cuff Size: Adult Regular)   Pulse 81   Temp 97.9  F (36.6  C) (Oral)   Resp 16   Ht 1.778 m (5' 10\")   Wt 79.5 kg (175 lb 3.2 oz)   SpO2 100%   BMI 25.14 kg/m   Estimated body mass index is 25.14 kg/m  as calculated from the following:    Height as of this encounter: 1.778 m (5' 10\").    Weight as of this encounter: 79.5 kg (175 lb 3.2 oz). Body surface area is 1.98 meters squared.  No Pain (0) Comment: Data Unavailable   No LMP for male patient.  Allergies reviewed: Yes  Medications reviewed: Yes    Medications: Medication refills not needed today.  Pharmacy name entered into [employer]:    [id number] - [place], [place] - [address] [place] [address]  [id number] - [place] - [address] [facility] - [place], [place] - [address] AT [facility] [address] [place] [address]    Clinical concerns: Patient complains of \" rash from pic line glue.\" Provider was notified.      Tessa Fernandez LPN            "

## 2019-04-18 LAB
ANION GAP SERPL CALCULATED.3IONS-SCNC: 8 MMOL/L (ref 3–14)
BASOPHILS # BLD AUTO: 0 10E9/L (ref 0–0.2)
BASOPHILS NFR BLD AUTO: 0.1 %
BUN SERPL-MCNC: 15 MG/DL (ref 7–30)
CALCIUM SERPL-MCNC: 8.2 MG/DL (ref 8.5–10.1)
CHLORIDE SERPL-SCNC: 108 MMOL/L (ref 94–109)
CO2 SERPL-SCNC: 24 MMOL/L (ref 20–32)
CREAT SERPL-MCNC: 0.88 MG/DL (ref 0.66–1.25)
DIFFERENTIAL METHOD BLD: ABNORMAL
EBV DNA # SPEC NAA+PROBE: NORMAL {COPIES}/ML
EBV DNA SPEC NAA+PROBE-LOG#: NORMAL {LOG_COPIES}/ML
EOSINOPHIL # BLD AUTO: 0 10E9/L (ref 0–0.7)
EOSINOPHIL NFR BLD AUTO: 0 %
ERYTHROCYTE [DISTWIDTH] IN BLOOD BY AUTOMATED COUNT: 13.2 % (ref 10–15)
GFR SERPL CREATININE-BSD FRML MDRD: >90 ML/MIN/{1.73_M2}
GLUCOSE SERPL-MCNC: 121 MG/DL (ref 70–99)
HCT VFR BLD AUTO: 38.6 % (ref 40–53)
HGB BLD-MCNC: 12.6 G/DL (ref 13.3–17.7)
IMM GRANULOCYTES # BLD: 0.1 10E9/L (ref 0–0.4)
IMM GRANULOCYTES NFR BLD: 0.9 %
LYMPHOCYTES # BLD AUTO: 0.6 10E9/L (ref 0.8–5.3)
LYMPHOCYTES NFR BLD AUTO: 4.1 %
MCH RBC QN AUTO: 30.2 PG (ref 26.5–33)
MCHC RBC AUTO-ENTMCNC: 32.6 G/DL (ref 31.5–36.5)
MCV RBC AUTO: 93 FL (ref 78–100)
MONOCYTES # BLD AUTO: 0.2 10E9/L (ref 0–1.3)
MONOCYTES NFR BLD AUTO: 1.5 %
NEUTROPHILS # BLD AUTO: 14 10E9/L (ref 1.6–8.3)
NEUTROPHILS NFR BLD AUTO: 93.4 %
NRBC # BLD AUTO: 0 10*3/UL
NRBC BLD AUTO-RTO: 0 /100
PLATELET # BLD AUTO: 191 10E9/L (ref 150–450)
POTASSIUM SERPL-SCNC: 4.1 MMOL/L (ref 3.4–5.3)
RBC # BLD AUTO: 4.17 10E12/L (ref 4.4–5.9)
SODIUM SERPL-SCNC: 140 MMOL/L (ref 133–144)
URATE SERPL-MCNC: 2.4 MG/DL (ref 3.5–7.2)
WBC # BLD AUTO: 15 10E9/L (ref 4–11)

## 2019-04-18 PROCEDURE — 25000128 H RX IP 250 OP 636: Performed by: STUDENT IN AN ORGANIZED HEALTH CARE EDUCATION/TRAINING PROGRAM

## 2019-04-18 PROCEDURE — 93005 ELECTROCARDIOGRAM TRACING: CPT

## 2019-04-18 PROCEDURE — 80048 BASIC METABOLIC PNL TOTAL CA: CPT | Performed by: INTERNAL MEDICINE

## 2019-04-18 PROCEDURE — 25800030 ZZH RX IP 258 OP 636: Performed by: PHYSICIAN ASSISTANT

## 2019-04-18 PROCEDURE — 85025 COMPLETE CBC W/AUTO DIFF WBC: CPT | Performed by: INTERNAL MEDICINE

## 2019-04-18 PROCEDURE — 25000132 ZZH RX MED GY IP 250 OP 250 PS 637: Performed by: INTERNAL MEDICINE

## 2019-04-18 PROCEDURE — 25000132 ZZH RX MED GY IP 250 OP 250 PS 637: Performed by: NURSE PRACTITIONER

## 2019-04-18 PROCEDURE — 12000001 ZZH R&B MED SURG/OB UMMC

## 2019-04-18 PROCEDURE — 36592 COLLECT BLOOD FROM PICC: CPT | Performed by: INTERNAL MEDICINE

## 2019-04-18 PROCEDURE — 25000131 ZZH RX MED GY IP 250 OP 636 PS 637: Performed by: PHYSICIAN ASSISTANT

## 2019-04-18 PROCEDURE — 25000132 ZZH RX MED GY IP 250 OP 250 PS 637: Performed by: PHYSICIAN ASSISTANT

## 2019-04-18 PROCEDURE — 93010 ELECTROCARDIOGRAM REPORT: CPT | Performed by: INTERNAL MEDICINE

## 2019-04-18 PROCEDURE — 25000128 H RX IP 250 OP 636: Performed by: PHYSICIAN ASSISTANT

## 2019-04-18 PROCEDURE — 25000132 ZZH RX MED GY IP 250 OP 250 PS 637: Performed by: STUDENT IN AN ORGANIZED HEALTH CARE EDUCATION/TRAINING PROGRAM

## 2019-04-18 PROCEDURE — 84550 ASSAY OF BLOOD/URIC ACID: CPT | Performed by: INTERNAL MEDICINE

## 2019-04-18 RX ORDER — PROCHLORPERAZINE MALEATE 5 MG
5-10 TABLET ORAL EVERY 6 HOURS SCHEDULED
Status: DISCONTINUED | OUTPATIENT
Start: 2019-04-18 | End: 2019-04-21 | Stop reason: HOSPADM

## 2019-04-18 RX ADMIN — ACYCLOVIR 400 MG: 200 CAPSULE ORAL at 19:24

## 2019-04-18 RX ADMIN — ACYCLOVIR 400 MG: 200 CAPSULE ORAL at 07:59

## 2019-04-18 RX ADMIN — SENNOSIDES AND DOCUSATE SODIUM 2 TABLET: 8.6; 5 TABLET ORAL at 19:24

## 2019-04-18 RX ADMIN — PREDNISONE 60 MG: 50 TABLET ORAL at 07:59

## 2019-04-18 RX ADMIN — SENNOSIDES AND DOCUSATE SODIUM 1 TABLET: 8.6; 5 TABLET ORAL at 07:59

## 2019-04-18 RX ADMIN — RANITIDINE 150 MG: 150 TABLET ORAL at 16:53

## 2019-04-18 RX ADMIN — Medication 5 ML: at 05:56

## 2019-04-18 RX ADMIN — ONDANSETRON HYDROCHLORIDE 16 MG: 8 TABLET, FILM COATED ORAL at 16:42

## 2019-04-18 RX ADMIN — ETOPOSIDE 115 MG: 20 INJECTION, SOLUTION, CONCENTRATE INTRAVENOUS at 18:11

## 2019-04-18 RX ADMIN — RANITIDINE 150 MG: 150 TABLET ORAL at 07:59

## 2019-04-18 RX ADMIN — ALLOPURINOL 300 MG: 300 TABLET ORAL at 07:59

## 2019-04-18 RX ADMIN — FLUCONAZOLE 100 MG: 100 TABLET ORAL at 07:59

## 2019-04-18 RX ADMIN — PREDNISONE 60 MG: 50 TABLET ORAL at 19:24

## 2019-04-18 RX ADMIN — PROCHLORPERAZINE MALEATE 5 MG: 5 TABLET, FILM COATED ORAL at 17:51

## 2019-04-18 RX ADMIN — VINCRISTINE SULFATE 0.78 MG: 1 INJECTION, SOLUTION INTRAVENOUS at 18:12

## 2019-04-18 RX ADMIN — MELATONIN TAB 3 MG 3 MG: 3 TAB at 22:00

## 2019-04-18 ASSESSMENT — ACTIVITIES OF DAILY LIVING (ADL)
ADLS_ACUITY_SCORE: 10

## 2019-04-18 ASSESSMENT — MIFFLIN-ST. JEOR: SCORE: 1705.49

## 2019-04-18 NOTE — PLAN OF CARE
AVSS, afebrile. Denies pain, n/v, SOB. Tolerated dose #1 rituxan well with no side effects. Tolerating CIVI etoposide and vincristine/doxorubicin well so far. Voiding well, appetite good. Melatonin given at HS. Continue to monitor.

## 2019-04-18 NOTE — PROGRESS NOTES
Nursing Focus: Chemotherapy    D: Positive blood return via PICC. Insertion site is clean/dry/intact, dressing intact with no complaints of pain.  Urine output is recorded in intake in Doc Flowsheet.      I: Premedications given per order (see electronic medical administration record). Dose #1 of etoposide and vincristine/doxirubicin started to infuse over 22 hours. Reviewed pt teaching on chemotherapy side effects.  Pt denies need for further teaching. Chemotherapy double checked per protocol by two chemotherapy competent RN's.     A: Tolerating chemo well. Denies nausea and or pain.     P: Continue to monitor urine output and symptoms of nausea. Screen for symptoms of toxicity.

## 2019-04-18 NOTE — PLAN OF CARE
"Afebrile, vital signs stable. Denies pain or nausea. Compazine scheduled as patient states in previous cycle the nausea started on day 2 of chemotherapy. Reports of heart palpitations this evening. Upon auscultation patient had a few irregular \"extra\" beats. EKG ordered. NSR. Vital signs were stable. ANTHONY notified. Up in room and hallway independently. Eating and drinking well. Refusing subcutaneous heparin.   "

## 2019-04-18 NOTE — PROGRESS NOTES
"SPIRITUAL HEALTH SERVICES  SPIRITUAL ASSESSMENT Progress Note  [facility] ([place]) 7D     REFERRAL SOURCE: Follow Up Visit with Christiano Molina and his wife Halie for [employer]    Distress: Per chart review, Christiano \"Prudencio\" Tracy has a diagnosis of Burkitt's Lymphoma and is hospitalized for his second cycle of chemotherapy. Marcel have throess children ages 4, 7 and 8. The kids struggle when their dad is not home but Halie is trying to keep them on a schedule to help with this.    Coping/Meaning Making: Marcel are [employer] and actively attend a [employer] near their home in [place]. Members of their [employer] are providing them with meals 3 times a week which is scheduled out until June. Their genie is an important source of coping for them. Prudencio wants to have [employer] Communion as often as he can while hospitalized.     PLAN: I will continue to follow for spiritual support during Prudencio's stay on 7D.    Stephanie Stoddard  Oncology   Pager [phone]    [facility] remains available 24/7 for emergent requests/referrals, either by having the switchboard page the on-call  or by entering an ASAP/STAT consult in Epic (this will also page the on-call ).        "

## 2019-04-18 NOTE — PROGRESS NOTES
[facility], [place]    Hematology / Oncology Progress Note     Assessment & Plan   Christiano Molina is a 43 year old male with Burkitt lymphoma diagnosed in March, s/p Cycle 1 of DA-R-EPOCH, now presenting for cycle 2, with dose increase, overall doing well.     #Burkitt Lymphoma IA   Follows with Dr. Ramirez. Initially presented in mid-march with r-axillary mass, found to have high-grade B-cell lymphoma. Initial pathology was not fully clear whether DLBCL or Burkitt, but more recently this was finalized as Burkitt lymphoma. Cycle 1 was 3/27. Plan is to do 6 cycles of DA-R-EPOCH. Intrathecal chemo will start after cycle 3. Bone marrow biopsy and LP on 3/25 are negative for lymphoma involvement, and PET CT indicates stage IA disease. Patient tolerated cycle 1 well, with some nausea. Today is Day 2.  - 20% dose increase with this cycle.   - PICC to be place on admission, and removed upon discharge  - Allopurinol 300mg daily  - Acyclovir BID  - Fluconazole daily  - If neutropenic, levaquin (has not been)   - PET CT after this cycle to assess response.      Treatment plan:   Rituximab Day 0 completed   Prednisone 60mg BID Day 1-5  Etoposide Day 1-4  Vincristine Day 1-4  Cyclophosphamide Day 5  Emend, Benadryl, Tylenol, Zofran, Decadron    # Chemotherapy induced nausea/vomiting. Reports experiencing nausea with last cycle of chemo.  Wants to prevent as much as possible this cycle.   - already has scheduled zofran 16mg prior to each dose of chemo   - will add scheduled compazine q6h prn   - emend x1      FEN: regular diet  PPX: sq lovenox   LINES/DRAINS: PICC being placed 4/17. Should get it removed upon discharge.   CODE: FULL  DISPO: pending chemo completion, likely discharge Sunday 4/21. Needs nuelasta at discharge.       Disposition Plan   Expected discharge: 2 - 3 days, recommended to prior living arrangement once chemotherapy completed.     Entered: Radha Krishnamurthy 04/18/2019, 12:10  PM   Information in the above section will display in the discharge planner report.    Discussed with staff attending, Dr. Booker.     Radha Krishnamurthy, DNP, APRN, CNP  Hematology/oncology  [id number]    Interval History     Afebrile. Denies headaches, chest pain, SOB, nausea/vomiting.  No loose stools. Tolerating chemo well thus far.  Reports he had nausea with last cycle, would like to prevent that as much as possible this cycle.  No abdominal discomfort. He is eating and drinking well. Ambulating in halls. Spouse is at bedside.      Physical Exam   Temp: 97.2  F (36.2  C) Temp src: Oral BP: 144/69 Pulse: 64 Heart Rate: 88 Resp: 18 SpO2: 98 % O2 Device: None (Room air)    Vitals:    04/17/19 0937 04/18/19 0816   Weight: 78.6 kg (173 lb 4.8 oz) 79.9 kg (176 lb 3.2 oz)     Vital Signs with Ranges  Temp:  [96  F (35.6  C)-98.2  F (36.8  C)] 97.2  F (36.2  C)  Pulse:  [64-69] 64  Heart Rate:  [65-88] 88  Resp:  [16-18] 18  BP: (113-144)/(60-76) 144/69  SpO2:  [95 %-98 %] 98 %  I/O last 3 completed shifts:  In: 1230 [P.O.:960; I.V.:195; IV Piggyback:75]  Out: 1900 [Urine:1900]    Constitutional: Awake, alert, cooperative, no apparent distress, and appears stated age.   Eyes: Lids and lashes normal, pupils equal, round and reactive to light, extra ocular muscles intact, sclera clear, conjunctiva normal.  ENT: Normocephalic, without obvious abnormality, atraumatic.  Respiratory: No increased work of breathing. No oxygen. LS clear, no crackles or wheezes Cardiovascular: Regular rate, regular rhythm, no murmurs.  GI: Normal bowel sounds, soft, non-distended, non-tender.  Musculoskeletal: No edema B/L LE. No obvious joint swelling or deformities.   Neurologic: A&O x4, cranial nerves II-XII appear to be grossly intact.  No focal deficits.   Neuropsychiatric: Calm, normal eye contact, alert, normal affect memory for past and recent events intact and thought process normal.      Medications     - MEDICATION INSTRUCTIONS -       -  MEDICATION INSTRUCTIONS -       sodium chloride         acyclovir  400 mg Oral BID     allopurinol  300 mg Oral Daily     Chemotherapy Infusing-Continuous Infusion   Does not apply Q8H     [START ON 4/21/2019] cyclophosphamide (CYTOXAN) chemo infusion  900 mg/m2 (Treatment Plan Recorded) Intravenous Once     [START ON 4/22/2019] dexamethasone  8 mg Oral Daily     etoposide (TOPOSAR) chemo infusion  60 mg/m2 (Treatment Plan Recorded) Intravenous Q22H     fluconazole  100 mg Oral Daily     [START ON 4/21/2019] fosaprepitant (EMEND) 150 mg intermittent infusion  150 mg Intravenous Once     heparin  5,000 Units Subcutaneous Q8H     ondansetron  16 mg Oral Q22H     predniSONE  30 mg/m2 (Treatment Plan Recorded) Oral BID     ranitidine  150 mg Oral BID     senna-docusate  2 tablet Oral BID     vinCRIStine/DOXOrubicin (ONCOVIN/ADRIAMYCIN) chemo infusion  0.4 mg/m2 (Treatment Plan Recorded) Intravenous Q22H       Data   Results for orders placed or performed during the hospital encounter of 04/17/19 (from the past 24 hour(s))   CBC with platelets differential   Result Value Ref Range    WBC 15.0 (H) 4.0 - 11.0 10e9/L    RBC Count 4.17 (L) 4.4 - 5.9 10e12/L    Hemoglobin 12.6 (L) 13.3 - 17.7 g/dL    Hematocrit 38.6 (L) 40.0 - 53.0 %    MCV 93 78 - 100 fl    MCH 30.2 26.5 - 33.0 pg    MCHC 32.6 31.5 - 36.5 g/dL    RDW 13.2 10.0 - 15.0 %    Platelet Count 191 150 - 450 10e9/L    Diff Method Automated Method     % Neutrophils 93.4 %    % Lymphocytes 4.1 %    % Monocytes 1.5 %    % Eosinophils 0.0 %    % Basophils 0.1 %    % Immature Granulocytes 0.9 %    Nucleated RBCs 0 0 /100    Absolute Neutrophil 14.0 (H) 1.6 - 8.3 10e9/L    Absolute Lymphocytes 0.6 (L) 0.8 - 5.3 10e9/L    Absolute Monocytes 0.2 0.0 - 1.3 10e9/L    Absolute Eosinophils 0.0 0.0 - 0.7 10e9/L    Absolute Basophils 0.0 0.0 - 0.2 10e9/L    Abs Immature Granulocytes 0.1 0 - 0.4 10e9/L    Absolute Nucleated RBC 0.0    Basic metabolic panel   Result Value Ref Range     Sodium 140 133 - 144 mmol/L    Potassium 4.1 3.4 - 5.3 mmol/L    Chloride 108 94 - 109 mmol/L    Carbon Dioxide 24 20 - 32 mmol/L    Anion Gap 8 3 - 14 mmol/L    Glucose 121 (H) 70 - 99 mg/dL    Urea Nitrogen 15 7 - 30 mg/dL    Creatinine 0.88 0.66 - 1.25 mg/dL    GFR Estimate >90 >60 mL/min/[1.73_m2]    GFR Estimate If Black >90 >60 mL/min/[1.73_m2]    Calcium 8.2 (L) 8.5 - 10.1 mg/dL   Uric acid   Result Value Ref Range    Uric Acid 2.4 (L) 3.5 - 7.2 mg/dL     Recent Labs   Lab 04/18/19  0559 04/17/19  1214 04/17/19  0759   WBC 15.0* 8.3 6.3   HGB 12.6* 13.2* 13.7   MCV 93 93 89    204 195    141 139   POTASSIUM 4.1 4.0 3.7   CHLORIDE 108 107 107   CO2 24 28 29   BUN 15 17 16   CR 0.88 0.98 1.12   ANIONGAP 8 7 3   TAMMY 8.2* 8.8 9.4   * 97 54*   ALBUMIN  --  3.6 3.8   PROTTOTAL  --  7.0 7.3   BILITOTAL  --  0.3 0.3   ALKPHOS  --  60 66   ALT  --  65 67   AST  --  21 19     No results found for this or any previous visit (from the past 24 hour(s)).

## 2019-04-18 NOTE — PLAN OF CARE
VSS. Afebrile. Denies pain/nausea. CIVI Etop/Vincristine infusing to L) PICC , good blood returne. Up independent. Pt would like prednisone times changed for earlier in evening. Will continue w/POC.

## 2019-04-19 LAB
ANION GAP SERPL CALCULATED.3IONS-SCNC: 6 MMOL/L (ref 3–14)
BASOPHILS # BLD AUTO: 0 10E9/L (ref 0–0.2)
BASOPHILS NFR BLD AUTO: 0.2 %
BUN SERPL-MCNC: 14 MG/DL (ref 7–30)
CALCIUM SERPL-MCNC: 8.7 MG/DL (ref 8.5–10.1)
CHLORIDE SERPL-SCNC: 108 MMOL/L (ref 94–109)
CO2 SERPL-SCNC: 25 MMOL/L (ref 20–32)
CREAT SERPL-MCNC: 0.88 MG/DL (ref 0.66–1.25)
DIFFERENTIAL METHOD BLD: ABNORMAL
EOSINOPHIL # BLD AUTO: 0 10E9/L (ref 0–0.7)
EOSINOPHIL NFR BLD AUTO: 0 %
ERYTHROCYTE [DISTWIDTH] IN BLOOD BY AUTOMATED COUNT: 13.6 % (ref 10–15)
GFR SERPL CREATININE-BSD FRML MDRD: >90 ML/MIN/{1.73_M2}
GLUCOSE SERPL-MCNC: 117 MG/DL (ref 70–99)
HCT VFR BLD AUTO: 39.2 % (ref 40–53)
HGB BLD-MCNC: 12.5 G/DL (ref 13.3–17.7)
IMM GRANULOCYTES # BLD: 0.2 10E9/L (ref 0–0.4)
IMM GRANULOCYTES NFR BLD: 1.1 %
INTERPRETATION ECG - MUSE: NORMAL
LYMPHOCYTES # BLD AUTO: 0.8 10E9/L (ref 0.8–5.3)
LYMPHOCYTES NFR BLD AUTO: 4.7 %
MCH RBC QN AUTO: 30.1 PG (ref 26.5–33)
MCHC RBC AUTO-ENTMCNC: 31.9 G/DL (ref 31.5–36.5)
MCV RBC AUTO: 95 FL (ref 78–100)
MONOCYTES # BLD AUTO: 0.6 10E9/L (ref 0–1.3)
MONOCYTES NFR BLD AUTO: 3.4 %
NEUTROPHILS # BLD AUTO: 15.1 10E9/L (ref 1.6–8.3)
NEUTROPHILS NFR BLD AUTO: 90.6 %
NRBC # BLD AUTO: 0 10*3/UL
NRBC BLD AUTO-RTO: 0 /100
PLATELET # BLD AUTO: 231 10E9/L (ref 150–450)
POTASSIUM SERPL-SCNC: 3.9 MMOL/L (ref 3.4–5.3)
RBC # BLD AUTO: 4.15 10E12/L (ref 4.4–5.9)
SODIUM SERPL-SCNC: 139 MMOL/L (ref 133–144)
URATE SERPL-MCNC: 2.3 MG/DL (ref 3.5–7.2)
WBC # BLD AUTO: 16.7 10E9/L (ref 4–11)

## 2019-04-19 PROCEDURE — 80048 BASIC METABOLIC PNL TOTAL CA: CPT | Performed by: INTERNAL MEDICINE

## 2019-04-19 PROCEDURE — 25000132 ZZH RX MED GY IP 250 OP 250 PS 637: Performed by: PHYSICIAN ASSISTANT

## 2019-04-19 PROCEDURE — 36592 COLLECT BLOOD FROM PICC: CPT | Performed by: INTERNAL MEDICINE

## 2019-04-19 PROCEDURE — 85025 COMPLETE CBC W/AUTO DIFF WBC: CPT | Performed by: INTERNAL MEDICINE

## 2019-04-19 PROCEDURE — 12000001 ZZH R&B MED SURG/OB UMMC

## 2019-04-19 PROCEDURE — 25800030 ZZH RX IP 258 OP 636: Performed by: PHYSICIAN ASSISTANT

## 2019-04-19 PROCEDURE — 25000128 H RX IP 250 OP 636: Performed by: NURSE PRACTITIONER

## 2019-04-19 PROCEDURE — 25000132 ZZH RX MED GY IP 250 OP 250 PS 637: Performed by: STUDENT IN AN ORGANIZED HEALTH CARE EDUCATION/TRAINING PROGRAM

## 2019-04-19 PROCEDURE — 25000131 ZZH RX MED GY IP 250 OP 636 PS 637: Performed by: PHYSICIAN ASSISTANT

## 2019-04-19 PROCEDURE — 25000132 ZZH RX MED GY IP 250 OP 250 PS 637: Performed by: NURSE PRACTITIONER

## 2019-04-19 PROCEDURE — 25000128 H RX IP 250 OP 636: Performed by: PHYSICIAN ASSISTANT

## 2019-04-19 PROCEDURE — 84550 ASSAY OF BLOOD/URIC ACID: CPT | Performed by: INTERNAL MEDICINE

## 2019-04-19 RX ADMIN — PREDNISONE 60 MG: 50 TABLET ORAL at 08:47

## 2019-04-19 RX ADMIN — ETOPOSIDE 115 MG: 20 INJECTION, SOLUTION, CONCENTRATE INTRAVENOUS at 17:36

## 2019-04-19 RX ADMIN — SENNOSIDES AND DOCUSATE SODIUM 2 TABLET: 8.6; 5 TABLET ORAL at 20:12

## 2019-04-19 RX ADMIN — ALLOPURINOL 300 MG: 300 TABLET ORAL at 08:47

## 2019-04-19 RX ADMIN — PROCHLORPERAZINE MALEATE 5 MG: 5 TABLET, FILM COATED ORAL at 19:05

## 2019-04-19 RX ADMIN — PROCHLORPERAZINE MALEATE 5 MG: 5 TABLET, FILM COATED ORAL at 08:03

## 2019-04-19 RX ADMIN — PROCHLORPERAZINE MALEATE 5 MG: 5 TABLET, FILM COATED ORAL at 17:42

## 2019-04-19 RX ADMIN — PROCHLORPERAZINE MALEATE 5 MG: 5 TABLET, FILM COATED ORAL at 01:20

## 2019-04-19 RX ADMIN — ACYCLOVIR 400 MG: 200 CAPSULE ORAL at 08:47

## 2019-04-19 RX ADMIN — LORAZEPAM 0.5 MG: 0.5 TABLET ORAL at 20:10

## 2019-04-19 RX ADMIN — ACYCLOVIR 400 MG: 200 CAPSULE ORAL at 20:10

## 2019-04-19 RX ADMIN — RANITIDINE 150 MG: 150 TABLET ORAL at 08:47

## 2019-04-19 RX ADMIN — ENOXAPARIN SODIUM 40 MG: 40 INJECTION SUBCUTANEOUS at 13:52

## 2019-04-19 RX ADMIN — VINCRISTINE SULFATE 0.78 MG: 1 INJECTION, SOLUTION INTRAVENOUS at 17:36

## 2019-04-19 RX ADMIN — PROCHLORPERAZINE MALEATE 5 MG: 5 TABLET, FILM COATED ORAL at 23:38

## 2019-04-19 RX ADMIN — ONDANSETRON HYDROCHLORIDE 16 MG: 8 TABLET, FILM COATED ORAL at 13:52

## 2019-04-19 RX ADMIN — RANITIDINE 150 MG: 150 TABLET ORAL at 20:10

## 2019-04-19 RX ADMIN — SENNOSIDES AND DOCUSATE SODIUM 2 TABLET: 8.6; 5 TABLET ORAL at 20:10

## 2019-04-19 RX ADMIN — PROCHLORPERAZINE MALEATE 5 MG: 5 TABLET, FILM COATED ORAL at 12:18

## 2019-04-19 RX ADMIN — SENNOSIDES AND DOCUSATE SODIUM 2 TABLET: 8.6; 5 TABLET ORAL at 08:47

## 2019-04-19 RX ADMIN — FLUCONAZOLE 100 MG: 100 TABLET ORAL at 08:47

## 2019-04-19 RX ADMIN — PREDNISONE 60 MG: 50 TABLET ORAL at 20:10

## 2019-04-19 ASSESSMENT — ACTIVITIES OF DAILY LIVING (ADL)
ADLS_ACUITY_SCORE: 10

## 2019-04-19 ASSESSMENT — MIFFLIN-ST. JEOR
SCORE: 1700.95
SCORE: 1700.04

## 2019-04-19 NOTE — PROGRESS NOTES
[facility], [place]    Hematology / Oncology Progress Note     Assessment & Plan   Christiano Molina is a 43 year old male with Burkitt lymphoma diagnosed in March, s/p Cycle 1 of DA-R-EPOCH, now presenting for cycle 2, with dose increase, overall doing well.     #Burkitt Lymphoma IA   Follows with Dr. Ramirez. Initially presented in mid-march with r-axillary mass, found to have high-grade B-cell lymphoma. Initial pathology was not fully clear whether DLBCL or Burkitt, but more recently this was finalized as Burkitt lymphoma. Cycle 1 was 3/27. Plan is to do 6 cycles of DA-R-EPOCH. Intrathecal chemo will start after cycle 3. Bone marrow biopsy and LP on 3/25 are negative for lymphoma involvement, and PET CT indicates stage IA disease. Patient tolerated cycle 1 well, with some nausea. Today is Day 3.  - 20% dose increase with this cycle.   - PICC to be place on admission, and removed upon discharge  - Allopurinol 300mg daily  - Acyclovir BID  - Fluconazole daily  - If neutropenic, levaquin (has not been)   - PET CT after this cycle to assess response.      Treatment plan:   Rituximab Day 0 completed   Prednisone 60mg BID Day 1-5  Etoposide Day 1-4  Vincristine Day 1-4  Cyclophosphamide Day 5  Emend, Benadryl, Tylenol, Zofran, Decadron    # Chemotherapy induced nausea/vomiting. Reports experiencing nausea with last cycle of chemo.  Wants to prevent as much as possible this cycle.   - already has scheduled zofran 16mg prior to each dose of chemo   - will add scheduled compazine q6h prn   - emend x1   - consider zyprexa if nausea/vomiting persists      FEN: regular diet  PPX: sq lovenox   LINES/DRAINS: PICC. Should get it removed upon discharge.   CODE: FULL  DISPO: pending chemo completion, likely discharge Sunday 4/21. Needs nuelasta at discharge.       Disposition Plan   Expected discharge: 2 - 3 days, recommended to prior living arrangement once chemotherapy completed.     Entered: Radha  Yessy 04/19/2019, 9:56 AM   Information in the above section will display in the discharge planner report.    Discussed with staff attending, Dr. Booker.     Radha Krishnamurthy, DNP, APRN, CNP  Hematology/oncology  9626    Interval History     Afebrile. Denies headaches, chest pain, SOB, nausea/vomiting.  No loose stools. Had an episode of palpitations and felt like his HR was beating really hard yesterday after reaching his arm across his body.  Denies dizziness, headaches, chest pain, SOB.  Had one episode of nausea after breakfast this AM but no vomiting and for the most part has had nausea be controlled.  EKG done without evidence of changes.  Has not happened since.  Reports last cycle had issues with bradycardia.  Today he is feeling well. Did not sleep great.  Walking, eating/drinking well.  No fevers.    Physical Exam   Temp: 96.2  F (35.7  C) Temp src: Oral BP: 129/73   Heart Rate: 98 Resp: 16 SpO2: 99 % O2 Device: None (Room air)    Vitals:    04/18/19 0816 04/19/19 0700 04/19/19 0900   Weight: 79.9 kg (176 lb 3.2 oz) 79.4 kg (175 lb) 79.5 kg (175 lb 3.2 oz)     Vital Signs with Ranges  Temp:  [96.2  F (35.7  C)-97.6  F (36.4  C)] 96.2  F (35.7  C)  Heart Rate:  [73-98] 98  Resp:  [16-20] 16  BP: (122-139)/(63-73) 129/73  SpO2:  [96 %-99 %] 99 %  I/O last 3 completed shifts:  In: 1174.4 [P.O.:560; I.V.:20; IV Piggyback:594.4]  Out: 6555 [Urine:6555]    Constitutional: Awake, alert, cooperative, no apparent distress, and appears stated age.   Eyes: Lids and lashes normal, pupils equal, round and reactive to light, extra ocular muscles intact, sclera clear, conjunctiva normal.  ENT: Normocephalic, without obvious abnormality, atraumatic.  Respiratory: No increased work of breathing. No oxygen. LS clear, no crackles or wheezes Cardiovascular: Regular rate, regular rhythm, no murmurs.  GI: Normal bowel sounds, soft, non-distended, non-tender.  Musculoskeletal: No edema B/L LE. No obvious joint swelling or  deformities.   Neurologic: A&O x4, cranial nerves II-XII appear to be grossly intact.  No focal deficits.   Neuropsychiatric: Calm, normal eye contact, alert, normal affect memory for past and recent events intact and thought process normal.      Medications     - MEDICATION INSTRUCTIONS -       - MEDICATION INSTRUCTIONS -       sodium chloride         acyclovir  400 mg Oral BID     allopurinol  300 mg Oral Daily     Chemotherapy Infusing-Continuous Infusion   Does not apply Q8H     [START ON 4/21/2019] cyclophosphamide (CYTOXAN) chemo infusion  900 mg/m2 (Treatment Plan Recorded) Intravenous Once     [START ON 4/22/2019] dexamethasone  8 mg Oral Daily     etoposide (TOPOSAR) chemo infusion  60 mg/m2 (Treatment Plan Recorded) Intravenous Q22H     fluconazole  100 mg Oral Daily     [START ON 4/21/2019] fosaprepitant (EMEND) 150 mg intermittent infusion  150 mg Intravenous Once     heparin  5,000 Units Subcutaneous Q8H     ondansetron  16 mg Oral Q22H     predniSONE  30 mg/m2 (Treatment Plan Recorded) Oral BID     prochlorperazine  5-10 mg Intravenous Q6H LESLEY    Or     prochlorperazine  5-10 mg Oral Q6H LESLEY     ranitidine  150 mg Oral BID     senna-docusate  2 tablet Oral BID     vinCRIStine/DOXOrubicin (ONCOVIN/ADRIAMYCIN) chemo infusion  0.4 mg/m2 (Treatment Plan Recorded) Intravenous Q22H       Data   Results for orders placed or performed during the hospital encounter of 04/17/19 (from the past 24 hour(s))   EKG 12-lead, complete   Result Value Ref Range    Interpretation ECG Click View Image link to view waveform and result    CBC with platelets differential   Result Value Ref Range    WBC 16.7 (H) 4.0 - 11.0 10e9/L    RBC Count 4.15 (L) 4.4 - 5.9 10e12/L    Hemoglobin 12.5 (L) 13.3 - 17.7 g/dL    Hematocrit 39.2 (L) 40.0 - 53.0 %    MCV 95 78 - 100 fl    MCH 30.1 26.5 - 33.0 pg    MCHC 31.9 31.5 - 36.5 g/dL    RDW 13.6 10.0 - 15.0 %    Platelet Count 231 150 - 450 10e9/L    Diff Method Automated Method     %  Neutrophils 90.6 %    % Lymphocytes 4.7 %    % Monocytes 3.4 %    % Eosinophils 0.0 %    % Basophils 0.2 %    % Immature Granulocytes 1.1 %    Nucleated RBCs 0 0 /100    Absolute Neutrophil 15.1 (H) 1.6 - 8.3 10e9/L    Absolute Lymphocytes 0.8 0.8 - 5.3 10e9/L    Absolute Monocytes 0.6 0.0 - 1.3 10e9/L    Absolute Eosinophils 0.0 0.0 - 0.7 10e9/L    Absolute Basophils 0.0 0.0 - 0.2 10e9/L    Abs Immature Granulocytes 0.2 0 - 0.4 10e9/L    Absolute Nucleated RBC 0.0    Basic metabolic panel   Result Value Ref Range    Sodium 139 133 - 144 mmol/L    Potassium 3.9 3.4 - 5.3 mmol/L    Chloride 108 94 - 109 mmol/L    Carbon Dioxide 25 20 - 32 mmol/L    Anion Gap 6 3 - 14 mmol/L    Glucose 117 (H) 70 - 99 mg/dL    Urea Nitrogen 14 7 - 30 mg/dL    Creatinine 0.88 0.66 - 1.25 mg/dL    GFR Estimate >90 >60 mL/min/[1.73_m2]    GFR Estimate If Black >90 >60 mL/min/[1.73_m2]    Calcium 8.7 8.5 - 10.1 mg/dL   Uric acid   Result Value Ref Range    Uric Acid 2.3 (L) 3.5 - 7.2 mg/dL     Recent Labs   Lab 04/19/19  0610 04/18/19  0559 04/17/19  1214 04/17/19  0759   WBC 16.7* 15.0* 8.3 6.3   HGB 12.5* 12.6* 13.2* 13.7   MCV 95 93 93 89    191 204 195    140 141 139   POTASSIUM 3.9 4.1 4.0 3.7   CHLORIDE 108 108 107 107   CO2 25 24 28 29   BUN 14 15 17 16   CR 0.88 0.88 0.98 1.12   ANIONGAP 6 8 7 3   TAMMY 8.7 8.2* 8.8 9.4   * 121* 97 54*   ALBUMIN  --   --  3.6 3.8   PROTTOTAL  --   --  7.0 7.3   BILITOTAL  --   --  0.3 0.3   ALKPHOS  --   --  60 66   ALT  --   --  65 67   AST  --   --  21 19     No results found for this or any previous visit (from the past 24 hour(s)).     Physician Attestation   I, Erna Booker, saw and evaluated Christiano Molina as part of a shared visit.  I have reviewed and discussed with the advanced practice provider their history, physical and plan.     I personally reviewed the vital signs, medications, labs and imaging.     My key history or physical exam findings:      43-year-old  male with low risk Burkitt lymphoma stage Ia here for cycle #2 dose adjusted R-EPOCH.       Feels fine, no complaints. Had an episode of palpitations- ekg normal and has not recurred    CV: RRR, no murmurs         Key management decisions made by me:   # BL-day 3 dose adjusted R-EPOCH tolerating well.  Supportive care with Zofran and Compazine.  Likely discharge on Sunday with Neulasta following discharge.  Plan 6 cycles.      Erna Booker MD   of Medicine  Hematology, Oncology and Transplantation   Pager: [phone]        Date of Service (when I saw the patient): 04/19/19

## 2019-04-19 NOTE — PLAN OF CARE
AVSS, afebrile. Denies pain, SOB. Intermittent nausea controlled with scheduled antiemetics. Dose #2 CIVI etoposide and vincristine/doxorubicin running over 22hrs, tolerating well. Up ind walking halls, voiding well, appetite good. Melatonin given at HS. Continue to monitor.

## 2019-04-19 NOTE — PROGRESS NOTES
Nursing Focus: Chemotherapy    D: Positive blood return via PICC. Insertion site is clean/dry/intact, dressing intact with no complaints of pain.  Urine output is recorded in intake in Doc Flowsheet.      I: Premedications given per order (see electronic medical administration record). Dose #2 of etoposide and vincristine/doxorubicin CIVI started to infuse over 22 hours. Reviewed pt teaching on chemotherapy side effects.  Pt denies need for further teaching. Chemotherapy double checked per protocol by two chemotherapy competent RN's.     A: Tolerating chemo well. Denies pain, scheduled antiemetics controlling nausea.     P: Continue to monitor urine output and symptoms of nausea. Screen for symptoms of toxicity.

## 2019-04-19 NOTE — PLAN OF CARE
VSS. Afebrile. Denies pain. Compazine for nausea. CIVI infusing to L) PICC, good blood return. Ambulating in hallway. Voiding spontaneously. Will continue w/POC.

## 2019-04-20 LAB
ANION GAP SERPL CALCULATED.3IONS-SCNC: 7 MMOL/L (ref 3–14)
BASOPHILS # BLD AUTO: 0 10E9/L (ref 0–0.2)
BASOPHILS NFR BLD AUTO: 0.2 %
BUN SERPL-MCNC: 13 MG/DL (ref 7–30)
CALCIUM SERPL-MCNC: 8.6 MG/DL (ref 8.5–10.1)
CHLORIDE SERPL-SCNC: 106 MMOL/L (ref 94–109)
CO2 SERPL-SCNC: 26 MMOL/L (ref 20–32)
CREAT SERPL-MCNC: 0.85 MG/DL (ref 0.66–1.25)
DIFFERENTIAL METHOD BLD: ABNORMAL
EOSINOPHIL # BLD AUTO: 0 10E9/L (ref 0–0.7)
EOSINOPHIL NFR BLD AUTO: 0 %
ERYTHROCYTE [DISTWIDTH] IN BLOOD BY AUTOMATED COUNT: 13.6 % (ref 10–15)
GFR SERPL CREATININE-BSD FRML MDRD: >90 ML/MIN/{1.73_M2}
GLUCOSE SERPL-MCNC: 139 MG/DL (ref 70–99)
HCT VFR BLD AUTO: 39.4 % (ref 40–53)
HGB BLD-MCNC: 12.6 G/DL (ref 13.3–17.7)
IMM GRANULOCYTES # BLD: 0.1 10E9/L (ref 0–0.4)
IMM GRANULOCYTES NFR BLD: 0.8 %
LYMPHOCYTES # BLD AUTO: 0.6 10E9/L (ref 0.8–5.3)
LYMPHOCYTES NFR BLD AUTO: 4.8 %
MAGNESIUM SERPL-MCNC: 1.9 MG/DL (ref 1.6–2.3)
MCH RBC QN AUTO: 29.8 PG (ref 26.5–33)
MCHC RBC AUTO-ENTMCNC: 32 G/DL (ref 31.5–36.5)
MCV RBC AUTO: 93 FL (ref 78–100)
MONOCYTES # BLD AUTO: 0.6 10E9/L (ref 0–1.3)
MONOCYTES NFR BLD AUTO: 5.1 %
NEUTROPHILS # BLD AUTO: 10.5 10E9/L (ref 1.6–8.3)
NEUTROPHILS NFR BLD AUTO: 89.1 %
NRBC # BLD AUTO: 0 10*3/UL
NRBC BLD AUTO-RTO: 0 /100
PHOSPHATE SERPL-MCNC: 3.5 MG/DL (ref 2.5–4.5)
PLATELET # BLD AUTO: 246 10E9/L (ref 150–450)
POTASSIUM SERPL-SCNC: 3.6 MMOL/L (ref 3.4–5.3)
RBC # BLD AUTO: 4.23 10E12/L (ref 4.4–5.9)
SODIUM SERPL-SCNC: 139 MMOL/L (ref 133–144)
URATE SERPL-MCNC: 2.7 MG/DL (ref 3.5–7.2)
WBC # BLD AUTO: 11.8 10E9/L (ref 4–11)

## 2019-04-20 PROCEDURE — 25000131 ZZH RX MED GY IP 250 OP 636 PS 637: Performed by: PHYSICIAN ASSISTANT

## 2019-04-20 PROCEDURE — 25000132 ZZH RX MED GY IP 250 OP 250 PS 637: Performed by: STUDENT IN AN ORGANIZED HEALTH CARE EDUCATION/TRAINING PROGRAM

## 2019-04-20 PROCEDURE — 84100 ASSAY OF PHOSPHORUS: CPT | Performed by: INTERNAL MEDICINE

## 2019-04-20 PROCEDURE — 25000128 H RX IP 250 OP 636: Performed by: PHYSICIAN ASSISTANT

## 2019-04-20 PROCEDURE — 25000132 ZZH RX MED GY IP 250 OP 250 PS 637: Performed by: PHYSICIAN ASSISTANT

## 2019-04-20 PROCEDURE — 36592 COLLECT BLOOD FROM PICC: CPT | Performed by: INTERNAL MEDICINE

## 2019-04-20 PROCEDURE — 84550 ASSAY OF BLOOD/URIC ACID: CPT | Performed by: INTERNAL MEDICINE

## 2019-04-20 PROCEDURE — 25000132 ZZH RX MED GY IP 250 OP 250 PS 637: Performed by: NURSE PRACTITIONER

## 2019-04-20 PROCEDURE — 12000001 ZZH R&B MED SURG/OB UMMC

## 2019-04-20 PROCEDURE — 25000128 H RX IP 250 OP 636: Performed by: NURSE PRACTITIONER

## 2019-04-20 PROCEDURE — 25800030 ZZH RX IP 258 OP 636: Performed by: PHYSICIAN ASSISTANT

## 2019-04-20 PROCEDURE — 80048 BASIC METABOLIC PNL TOTAL CA: CPT | Performed by: INTERNAL MEDICINE

## 2019-04-20 PROCEDURE — 85025 COMPLETE CBC W/AUTO DIFF WBC: CPT | Performed by: INTERNAL MEDICINE

## 2019-04-20 PROCEDURE — 83735 ASSAY OF MAGNESIUM: CPT | Performed by: INTERNAL MEDICINE

## 2019-04-20 RX ORDER — DEXAMETHASONE 4 MG/1
8 TABLET ORAL DAILY
Qty: 4 TABLET | Refills: 0 | Status: ON HOLD | OUTPATIENT
Start: 2019-04-22 | End: 2019-05-12

## 2019-04-20 RX ADMIN — VINCRISTINE SULFATE 0.78 MG: 1 INJECTION, SOLUTION INTRAVENOUS at 15:36

## 2019-04-20 RX ADMIN — LORAZEPAM 0.5 MG: 0.5 TABLET ORAL at 21:09

## 2019-04-20 RX ADMIN — ACYCLOVIR 400 MG: 200 CAPSULE ORAL at 19:27

## 2019-04-20 RX ADMIN — PROCHLORPERAZINE MALEATE 10 MG: 5 TABLET, FILM COATED ORAL at 05:55

## 2019-04-20 RX ADMIN — ALLOPURINOL 300 MG: 300 TABLET ORAL at 08:36

## 2019-04-20 RX ADMIN — ONDANSETRON HYDROCHLORIDE 16 MG: 8 TABLET, FILM COATED ORAL at 13:26

## 2019-04-20 RX ADMIN — SENNOSIDES AND DOCUSATE SODIUM 2 TABLET: 8.6; 5 TABLET ORAL at 19:27

## 2019-04-20 RX ADMIN — PREDNISONE 60 MG: 50 TABLET ORAL at 08:35

## 2019-04-20 RX ADMIN — PROCHLORPERAZINE MALEATE 10 MG: 5 TABLET, FILM COATED ORAL at 14:28

## 2019-04-20 RX ADMIN — FLUCONAZOLE 100 MG: 100 TABLET ORAL at 08:35

## 2019-04-20 RX ADMIN — ETOPOSIDE 115 MG: 20 INJECTION, SOLUTION, CONCENTRATE INTRAVENOUS at 15:12

## 2019-04-20 RX ADMIN — PREDNISONE 60 MG: 50 TABLET ORAL at 18:09

## 2019-04-20 RX ADMIN — PROCHLORPERAZINE MALEATE 10 MG: 5 TABLET, FILM COATED ORAL at 18:09

## 2019-04-20 RX ADMIN — RANITIDINE 150 MG: 150 TABLET ORAL at 19:27

## 2019-04-20 RX ADMIN — LORAZEPAM 0.5 MG: 0.5 TABLET ORAL at 07:15

## 2019-04-20 RX ADMIN — RANITIDINE 150 MG: 150 TABLET ORAL at 08:35

## 2019-04-20 RX ADMIN — SENNOSIDES AND DOCUSATE SODIUM 2 TABLET: 8.6; 5 TABLET ORAL at 08:37

## 2019-04-20 RX ADMIN — ACYCLOVIR 400 MG: 200 CAPSULE ORAL at 08:35

## 2019-04-20 ASSESSMENT — MIFFLIN-ST. JEOR: SCORE: 1702.31

## 2019-04-20 ASSESSMENT — ACTIVITIES OF DAILY LIVING (ADL)
ADLS_ACUITY_SCORE: 10

## 2019-04-20 NOTE — PLAN OF CARE
6705-2584: VSS. Chemo CIVI with positive blood return. Ambulating the halls. Reports some nausea - compazine given. Continue POC.     Nursing Focus: Chemotherapy  D: Positive blood return via PICC. Insertion site is clean/dry/intact, dressing intact with no complaints of pain.  Urine output is recorded in intake in Doc Flowsheet.    I: Premedications given per order (see electronic medical administration record). Dose #4 of Etoposide, Doxorubicin/vincristine started to infuse over 22 hours. Reviewed pt teaching on chemotherapy side effects.  Pt denies need for further teaching. Chemotherapy double checked per protocol by two chemotherapy competent RN's.   A: Tolerating procedure well. Denies nausea and or pain.   P: Continue to monitor urine output and symptoms of nausea. Screen for symptoms of toxicity.

## 2019-04-20 NOTE — PLAN OF CARE
0304-4026: VSS. C/o intermittent nausea all day, relieved by scheduled compazine and zofran. Day 3 CIVI chemo with positive blood return. Ambulating the halls frequently. Eating well. Continue POC.     Nursing Focus: Chemotherapy  D: Positive blood return via PICC. Insertion site is clean/dry/intact, dressing intact with no complaints of pain.  Urine output is recorded in intake in Doc Flowsheet.    I: Premedications given per order (see electronic medical administration record). Dose #3 of etoposide, doxorubicin/vincristine started to infuse over 22 hours. Reviewed pt teaching on chemotherapy side effects.  Pt denies need for further teaching. Chemotherapy double checked per protocol by two chemotherapy competent RN's.   A: Tolerating procedure well. Denies nausea and or pain.   P: Continue to monitor urine output and symptoms of nausea. Screen for symptoms of toxicity.

## 2019-04-20 NOTE — PLAN OF CARE
7914-1466: VSS. Afebrile. Day 3 CIVI chemo running w/ brisk blood return q4hrs. Up ind and ambulating halls. C/O nausea. Scheduled Compazine and 0.5mg PO ativan given w/ relief. Good appetite. Voiding adequately. Continue POC.

## 2019-04-20 NOTE — PLAN OF CARE
Day3 vcr/dox/etoposide civi.good blood returned via picc line.mild nausea cont.despite of schduled antiemetics.good po intake. Up ad jessica.

## 2019-04-20 NOTE — PROGRESS NOTES
[facility], [place]    Hematology / Oncology Progress Note     Assessment & Plan   Christiano Molina is a 43 year old male with Burkitt lymphoma diagnosed in March, s/p Cycle 1 of DA-R-EPOCH, now presenting for cycle 2, with dose increase, overall doing well.     #Burkitt Lymphoma IA   Follows with Dr. Ramirez. Initially presented in mid-march with r-axillary mass, found to have high-grade B-cell lymphoma. Initial pathology was not fully clear whether DLBCL or Burkitt, but more recently this was finalized as Burkitt lymphoma. Cycle 1 was 3/27. Plan is to do 6 cycles of DA-R-EPOCH. Intrathecal chemo will start after cycle 3. Bone marrow biopsy and LP on 3/25 are negative for lymphoma involvement, and PET CT indicates stage IA disease. Patient tolerated cycle 1 well, with some nausea. Today is Day 4.  - 20% dose increase with this cycle.   - PICC to be place on admission, and removed upon discharge  - Allopurinol 300mg daily  - Acyclovir BID  - Fluconazole daily  - If neutropenic, levaquin (has not been)   - PET CT after this cycle to assess response.      Treatment plan:   Rituximab Day 0 completed   Prednisone 60mg BID Day 1-5  Etoposide Day 1-4  Vincristine Day 1-4  Cyclophosphamide Day 5  Emend, Benadryl, Tylenol, Zofran, Decadron    # Chemotherapy induced nausea/vomiting. Reports experiencing nausea with last cycle of chemo.  Wants to prevent as much as possible this cycle.   - already has scheduled zofran 16mg prior to each dose of chemo   - will add scheduled compazine q6h prn , has ativan prn   - emend x1   - consider zyprexa if nausea/vomiting persists       FEN: regular diet  PPX: sq lovenox   LINES/DRAINS: PICC. Should get it removed upon discharge.   CODE: FULL  DISPO: pending chemo completion, likely discharge Sunday 4/21. Needs nuelasta at discharge. Scheduled on Tuesday 4/23       Disposition Plan   Expected discharge: 2 - 3 days, recommended to prior living arrangement once  chemotherapy completed.     Entered: Radha Krishnamurthy 04/20/2019, 1:13 PM   Information in the above section will display in the discharge planner report.    Discussed with staff attending, Dr. Booker.     Radha Krishnamurthy, DNP, APRN, CNP  Hematology/oncology  9626    Interval History   No acute events overnight. Remains afebrile. Denies headaches, chest pain, SOB.  Continues to have intermittent nausea/vomiting, though finds it is controlled with current medications.  No loose stools or abdominal discomfort.  No further heart palpitations. He is eating/drinking and walking in halls.     Physical Exam   Temp: 97.9  F (36.6  C) Temp src: Oral BP: 122/69 Pulse: 73 Heart Rate: 72 Resp: 16 SpO2: 97 % O2 Device: None (Room air)    Vitals:    04/19/19 0700 04/19/19 0900 04/20/19 0900   Weight: 79.4 kg (175 lb) 79.5 kg (175 lb 3.2 oz) 79.6 kg (175 lb 8 oz)     Vital Signs with Ranges  Temp:  [96.1  F (35.6  C)-97.9  F (36.6  C)] 97.9  F (36.6  C)  Pulse:  [73-77] 73  Heart Rate:  [70-74] 72  Resp:  [16-18] 16  BP: (119-130)/(60-71) 122/69  SpO2:  [95 %-98 %] 97 %  I/O last 3 completed shifts:  In: 3398 [P.O.:1320; IV Piggyback:2078]  Out: 4550 [Urine:4550]    Constitutional: Awake, alert, cooperative, no apparent distress, and appears stated age.   Eyes: Lids and lashes normal, pupils equal, round and reactive to light, extra ocular muscles intact, sclera clear, conjunctiva normal.  ENT: Normocephalic, without obvious abnormality, atraumatic.  Respiratory: No increased work of breathing. No oxygen. LS clear, no crackles or wheezes Cardiovascular: Regular rate, regular rhythm, no murmurs.  GI: Normal bowel sounds, soft, non-distended, non-tender.  Musculoskeletal: No edema B/L LE. No obvious joint swelling or deformities.   Neurologic: A&O x4, cranial nerves II-XII appear to be grossly intact.  No focal deficits.   Neuropsychiatric: Calm, normal eye contact, alert, normal affect memory for past and recent events intact and  thought process normal.      Medications     - MEDICATION INSTRUCTIONS -       - MEDICATION INSTRUCTIONS -       sodium chloride         acyclovir  400 mg Oral BID     allopurinol  300 mg Oral Daily     Chemotherapy Infusing-Continuous Infusion   Does not apply Q8H     [START ON 4/21/2019] cyclophosphamide (CYTOXAN) chemo infusion  900 mg/m2 (Treatment Plan Recorded) Intravenous Once     [START ON 4/22/2019] dexamethasone  8 mg Oral Daily     enoxaparin  40 mg Subcutaneous Q24H     etoposide (TOPOSAR) chemo infusion  60 mg/m2 (Treatment Plan Recorded) Intravenous Q22H     fluconazole  100 mg Oral Daily     [START ON 4/21/2019] fosaprepitant (EMEND) 150 mg intermittent infusion  150 mg Intravenous Once     ondansetron  16 mg Oral Q22H     predniSONE  30 mg/m2 (Treatment Plan Recorded) Oral BID     prochlorperazine  5-10 mg Intravenous Q6H LESLEY    Or     prochlorperazine  5-10 mg Oral Q6H LESLEY     ranitidine  150 mg Oral BID     senna-docusate  2 tablet Oral BID     vinCRIStine/DOXOrubicin (ONCOVIN/ADRIAMYCIN) chemo infusion  0.4 mg/m2 (Treatment Plan Recorded) Intravenous Q22H       Data   Results for orders placed or performed during the hospital encounter of 04/17/19 (from the past 24 hour(s))   CBC with platelets differential   Result Value Ref Range    WBC 11.8 (H) 4.0 - 11.0 10e9/L    RBC Count 4.23 (L) 4.4 - 5.9 10e12/L    Hemoglobin 12.6 (L) 13.3 - 17.7 g/dL    Hematocrit 39.4 (L) 40.0 - 53.0 %    MCV 93 78 - 100 fl    MCH 29.8 26.5 - 33.0 pg    MCHC 32.0 31.5 - 36.5 g/dL    RDW 13.6 10.0 - 15.0 %    Platelet Count 246 150 - 450 10e9/L    Diff Method Automated Method     % Neutrophils 89.1 %    % Lymphocytes 4.8 %    % Monocytes 5.1 %    % Eosinophils 0.0 %    % Basophils 0.2 %    % Immature Granulocytes 0.8 %    Nucleated RBCs 0 0 /100    Absolute Neutrophil 10.5 (H) 1.6 - 8.3 10e9/L    Absolute Lymphocytes 0.6 (L) 0.8 - 5.3 10e9/L    Absolute Monocytes 0.6 0.0 - 1.3 10e9/L    Absolute Eosinophils 0.0 0.0 - 0.7  10e9/L    Absolute Basophils 0.0 0.0 - 0.2 10e9/L    Abs Immature Granulocytes 0.1 0 - 0.4 10e9/L    Absolute Nucleated RBC 0.0    Basic metabolic panel   Result Value Ref Range    Sodium 139 133 - 144 mmol/L    Potassium 3.6 3.4 - 5.3 mmol/L    Chloride 106 94 - 109 mmol/L    Carbon Dioxide 26 20 - 32 mmol/L    Anion Gap 7 3 - 14 mmol/L    Glucose 139 (H) 70 - 99 mg/dL    Urea Nitrogen 13 7 - 30 mg/dL    Creatinine 0.85 0.66 - 1.25 mg/dL    GFR Estimate >90 >60 mL/min/[1.73_m2]    GFR Estimate If Black >90 >60 mL/min/[1.73_m2]    Calcium 8.6 8.5 - 10.1 mg/dL   Uric acid   Result Value Ref Range    Uric Acid 2.7 (L) 3.5 - 7.2 mg/dL   Magnesium   Result Value Ref Range    Magnesium 1.9 1.6 - 2.3 mg/dL   Phosphorus   Result Value Ref Range    Phosphorus 3.5 2.5 - 4.5 mg/dL     Recent Labs   Lab 04/20/19  0525 04/19/19  0610 04/18/19  0559 04/17/19  1214 04/17/19  0759   WBC 11.8* 16.7* 15.0* 8.3 6.3   HGB 12.6* 12.5* 12.6* 13.2* 13.7   MCV 93 95 93 93 89    231 191 204 195    139 140 141 139   POTASSIUM 3.6 3.9 4.1 4.0 3.7   CHLORIDE 106 108 108 107 107   CO2 26 25 24 28 29   BUN 13 14 15 17 16   CR 0.85 0.88 0.88 0.98 1.12   ANIONGAP 7 6 8 7 3   TAMMY 8.6 8.7 8.2* 8.8 9.4   * 117* 121* 97 54*   ALBUMIN  --   --   --  3.6 3.8   PROTTOTAL  --   --   --  7.0 7.3   BILITOTAL  --   --   --  0.3 0.3   ALKPHOS  --   --   --  60 66   ALT  --   --   --  65 67   AST  --   --   --  21 19     No results found for this or any previous visit (from the past 24 hour(s)).     Physician Attestation   I, Erna Pretty He, saw and evaluated Christiano MELLY Tracy as part of a shared visit.  I have reviewed and discussed with the advanced practice provider their history, physical and plan.     I personally reviewed the vital signs, medications, labs and imaging.     My key history or physical exam findings:       43-year-old male with low risk Burkitt lymphoma stage Ia here for cycle #2 dose adjusted R-EPOCH.       Feels  fine, no complaints.     Key management decisions made by me:   # BL-day 4 dose adjusted R-EPOCH tolerating well.  Supportive care with Zofran and Compazine.  Likely discharge on Sunday with Neulasta on Tuesday.  Plan 6 cycles.      Erna Bokoer MD   of Medicine  Hematology, Oncology and Transplantation   Pager: [phone]        Date of Service (when I saw the patient): 04/20/19

## 2019-04-21 VITALS
SYSTOLIC BLOOD PRESSURE: 125 MMHG | DIASTOLIC BLOOD PRESSURE: 68 MMHG | BODY MASS INDEX: 24.74 KG/M2 | RESPIRATION RATE: 20 BRPM | WEIGHT: 172.8 LBS | OXYGEN SATURATION: 96 % | HEART RATE: 73 BPM | TEMPERATURE: 98 F | HEIGHT: 70 IN

## 2019-04-21 LAB
ANION GAP SERPL CALCULATED.3IONS-SCNC: 6 MMOL/L (ref 3–14)
BASOPHILS # BLD AUTO: 0 10E9/L (ref 0–0.2)
BASOPHILS NFR BLD AUTO: 0.1 %
BUN SERPL-MCNC: 16 MG/DL (ref 7–30)
CALCIUM SERPL-MCNC: 9 MG/DL (ref 8.5–10.1)
CHLORIDE SERPL-SCNC: 106 MMOL/L (ref 94–109)
CO2 SERPL-SCNC: 26 MMOL/L (ref 20–32)
CREAT SERPL-MCNC: 0.89 MG/DL (ref 0.66–1.25)
DIFFERENTIAL METHOD BLD: ABNORMAL
EOSINOPHIL # BLD AUTO: 0 10E9/L (ref 0–0.7)
EOSINOPHIL NFR BLD AUTO: 0 %
ERYTHROCYTE [DISTWIDTH] IN BLOOD BY AUTOMATED COUNT: 13.3 % (ref 10–15)
GFR SERPL CREATININE-BSD FRML MDRD: >90 ML/MIN/{1.73_M2}
GLUCOSE SERPL-MCNC: 116 MG/DL (ref 70–99)
HCT VFR BLD AUTO: 39.9 % (ref 40–53)
HGB BLD-MCNC: 13.2 G/DL (ref 13.3–17.7)
IMM GRANULOCYTES # BLD: 0 10E9/L (ref 0–0.4)
IMM GRANULOCYTES NFR BLD: 0.5 %
LYMPHOCYTES # BLD AUTO: 0.7 10E9/L (ref 0.8–5.3)
LYMPHOCYTES NFR BLD AUTO: 8.5 %
MCH RBC QN AUTO: 30.4 PG (ref 26.5–33)
MCHC RBC AUTO-ENTMCNC: 33.1 G/DL (ref 31.5–36.5)
MCV RBC AUTO: 92 FL (ref 78–100)
MONOCYTES # BLD AUTO: 0.6 10E9/L (ref 0–1.3)
MONOCYTES NFR BLD AUTO: 6.9 %
NEUTROPHILS # BLD AUTO: 7.2 10E9/L (ref 1.6–8.3)
NEUTROPHILS NFR BLD AUTO: 84 %
NRBC # BLD AUTO: 0 10*3/UL
NRBC BLD AUTO-RTO: 0 /100
PLATELET # BLD AUTO: 230 10E9/L (ref 150–450)
POTASSIUM SERPL-SCNC: 3.6 MMOL/L (ref 3.4–5.3)
RBC # BLD AUTO: 4.34 10E12/L (ref 4.4–5.9)
SODIUM SERPL-SCNC: 138 MMOL/L (ref 133–144)
URATE SERPL-MCNC: 2.9 MG/DL (ref 3.5–7.2)
WBC # BLD AUTO: 8.6 10E9/L (ref 4–11)

## 2019-04-21 PROCEDURE — 25000132 ZZH RX MED GY IP 250 OP 250 PS 637: Performed by: PHYSICIAN ASSISTANT

## 2019-04-21 PROCEDURE — 84550 ASSAY OF BLOOD/URIC ACID: CPT | Performed by: INTERNAL MEDICINE

## 2019-04-21 PROCEDURE — 80048 BASIC METABOLIC PNL TOTAL CA: CPT | Performed by: INTERNAL MEDICINE

## 2019-04-21 PROCEDURE — 25000131 ZZH RX MED GY IP 250 OP 636 PS 637: Performed by: PHYSICIAN ASSISTANT

## 2019-04-21 PROCEDURE — 25000128 H RX IP 250 OP 636: Performed by: PHYSICIAN ASSISTANT

## 2019-04-21 PROCEDURE — 36592 COLLECT BLOOD FROM PICC: CPT | Performed by: INTERNAL MEDICINE

## 2019-04-21 PROCEDURE — 25000132 ZZH RX MED GY IP 250 OP 250 PS 637: Performed by: STUDENT IN AN ORGANIZED HEALTH CARE EDUCATION/TRAINING PROGRAM

## 2019-04-21 PROCEDURE — 85025 COMPLETE CBC W/AUTO DIFF WBC: CPT | Performed by: INTERNAL MEDICINE

## 2019-04-21 PROCEDURE — 25800030 ZZH RX IP 258 OP 636: Performed by: PHYSICIAN ASSISTANT

## 2019-04-21 PROCEDURE — 25000132 ZZH RX MED GY IP 250 OP 250 PS 637: Performed by: NURSE PRACTITIONER

## 2019-04-21 RX ORDER — ONDANSETRON 8 MG/1
8 TABLET, FILM COATED ORAL EVERY 8 HOURS PRN
Qty: 30 TABLET | Refills: 0 | Status: SHIPPED | OUTPATIENT
Start: 2019-04-21 | End: 2019-08-15

## 2019-04-21 RX ADMIN — SENNOSIDES AND DOCUSATE SODIUM 2 TABLET: 8.6; 5 TABLET ORAL at 08:10

## 2019-04-21 RX ADMIN — ALLOPURINOL 300 MG: 300 TABLET ORAL at 08:10

## 2019-04-21 RX ADMIN — SODIUM CHLORIDE 150 MG: 9 INJECTION, SOLUTION INTRAVENOUS at 12:46

## 2019-04-21 RX ADMIN — ONDANSETRON HYDROCHLORIDE 16 MG: 8 TABLET, FILM COATED ORAL at 12:46

## 2019-04-21 RX ADMIN — CYCLOPHOSPHAMIDE 1755 MG: 2 INJECTION, POWDER, FOR SOLUTION INTRAVENOUS; ORAL at 13:25

## 2019-04-21 RX ADMIN — PROCHLORPERAZINE MALEATE 5 MG: 5 TABLET, FILM COATED ORAL at 03:08

## 2019-04-21 RX ADMIN — ACYCLOVIR 400 MG: 200 CAPSULE ORAL at 08:10

## 2019-04-21 RX ADMIN — FLUCONAZOLE 100 MG: 100 TABLET ORAL at 08:10

## 2019-04-21 RX ADMIN — RANITIDINE 150 MG: 150 TABLET ORAL at 08:10

## 2019-04-21 RX ADMIN — PREDNISONE 60 MG: 50 TABLET ORAL at 08:10

## 2019-04-21 RX ADMIN — PROCHLORPERAZINE MALEATE 5 MG: 5 TABLET, FILM COATED ORAL at 08:10

## 2019-04-21 ASSESSMENT — ACTIVITIES OF DAILY LIVING (ADL)
ADLS_ACUITY_SCORE: 10

## 2019-04-21 ASSESSMENT — MIFFLIN-ST. JEOR: SCORE: 1690.07

## 2019-04-21 NOTE — PLAN OF CARE
3429-6785: VSS. Afebrile. Day 4 CIVI chemo infusing w/ brisk blood return. Nausea intermitent controlled w/ scheduled compazine and PO ativan 0.5mg x 1. No pain. Looking forward to discharge tonight after Cytoxan. Continue POC.

## 2019-04-21 NOTE — DISCHARGE SUMMARY
Nursing Focus: Chemotherapy  D: Positive blood return via PICC. Insertion site is clean/dry/intact, dressing intact with no complaints of pain.  Urine output is recorded in intake in Doc Flowsheet.    I: Premedications given per order (see electronic medical administration record). CIVI chemo completed, followed up Cytoxan for 1 dose  started to infuse over 1 hour. Reviewed pt teaching on chemotherapy side effects.  Pt denies need for further teaching. Chemotherapy double checked per protocol by two chemotherapy competent RN's. Pt discharge instructions reviewed. Pt denies any further questions. Picc line removed dressing intact.   A: Tolerating procedure well. Denies nausea and or pain.   P: Continue to monitor urine output and symptoms of nausea. Screen for symptoms of toxicity.

## 2019-04-21 NOTE — DISCHARGE SUMMARY
[facility], [place]    Discharge Summary  Hematology / Oncology    Date of Admission:  4/17/2019  Date of Discharge:  4/21/2019  Discharging Provider: DEJA Gotti CNP    Discharge Diagnoses   Active Problems:    Burkitt lymphoma (H)      History of Present Illness   Christiano Molina is a 43 year old male with Burkitt lymphoma diagnosed in March, s/p Cycle 1 of DA-R-EPOCH, now presenting for cycle 2, with dose increase, overall doing well. Tolerated chemotherapy well, despite some chemotherapy induced nausea.  Will discharge home today after completion of chemotherapy.  Discharging with D 6 dexamethasone. Will also send with prophy fluconazole and acyclovir.  Patient to start levaquin when ANC less than 1000.  Patient has f/u scheduled for Neulasta on Tuesday 4/23.  Will be seen in clinic with ANTHONY and labs on Thursday 4/25.      Hospital Course   Christiano Molina was admitted on 4/17/2019.  The following problems were addressed during his hospitalization:    #Burkitt Lymphoma IA   Follows with Dr. Ramirez. Initially presented in mid-march with r-axillary mass, found to have high-grade B-cell lymphoma. Initial pathology was not fully clear whether DLBCL or Burkitt, but more recently this was finalized as Burkitt lymphoma. Cycle 1 was 3/27. Plan is to do 6 cycles of DA-R-EPOCH. Intrathecal chemo will start after cycle 3. Bone marrow biopsy and LP on 3/25 are negative for lymphoma involvement, and PET CT indicates stage IA disease. Patient tolerated cycle 1 well, with some nausea. Today is Day 5.  - 20% dose increase with this cycle.   - PICC removed at discharge   - Allopurinol 300mg daily  - Acyclovir BID  - Fluconazole daily  - If neutropenic, levaquin (has not been)   - PET CT after this cycle to assess response.      Treatment plan:   Rituximab Day 0 completed   Prednisone 60mg BID Day 1-5  Etoposide Day 1-4  Vincristine Day 1-4  Cyclophosphamide Day 5  Emend, Benadryl,  Tylenol, Zofran, Decadron     # Chemotherapy induced nausea/vomiting. Reports experiencing nausea with last cycle of chemo.  Wants to prevent as much as possible this cycle.   - already has scheduled zofran 16mg prior to each dose of chemo   - emend x1   - will discharge with antiemetics           Discussed with Dr. Booker.     Radha Krishnamurthy, DNP, APRN, CNP  Hematology/oncology  [id number]     Significant Results and Procedures   N/a     Pending Results   These results will be followed up by primary team in [facility].   Unresulted Labs Ordered in the Past 30 Days of this Admission     Date and Time Order Name Status Description    3/25/2019 0829 PROCESS AND HOLD DNA In process     3/25/2019 0805 LEUKEMIA LYMPHOMA EVALUATION (FLOW CYTOMETRY) In process     3/25/2019 0805 CHROMOSOME BONE MARROW In process           Code Status   Full Code    Primary Care Physician   Layton Weir    Physical Exam   Temp: 97  F (36.1  C) Temp src: Oral BP: 127/67   Heart Rate: 68 Resp: 20 SpO2: 95 % O2 Device: None (Room air)    Vitals:    04/19/19 0900 04/20/19 0900 04/21/19 0757   Weight: 79.5 kg (175 lb 3.2 oz) 79.6 kg (175 lb 8 oz) 78.4 kg (172 lb 12.8 oz)     Vital Signs with Ranges  Temp:  [97  F (36.1  C)-98.1  F (36.7  C)] 97  F (36.1  C)  Heart Rate:  [63-73] 68  Resp:  [16-20] 20  BP: (116-130)/(64-76) 127/67  SpO2:  [94 %-97 %] 95 %  I/O last 3 completed shifts:  In: 2283.6 [P.O.:480; I.V.:20; IV Piggyback:1783.6]  Out: 5900 [Urine:5900]    Constitutional: Awake, alert, cooperative, no apparent distress, and appears stated age.   Eyes: Lids and lashes normal, pupils equal, round and reactive to light, extra ocular muscles intact, sclera clear, conjunctiva normal.  ENT: Normocephalic, without obvious abnormality, atraumatic.  Respiratory: No increased work of breathing. No oxygen. LS clear, no crackles or wheezes Cardiovascular: Regular rate, regular rhythm, no murmurs.  GI: Normal bowel sounds, soft, non-distended,  non-tender.  Musculoskeletal: No edema B/L LE. No obvious joint swelling or deformities.   Neurologic: A&O x4, cranial nerves II-XII appear to be grossly intact.  No focal deficits.   Neuropsychiatric: Calm, normal eye contact, alert, normal affect memory for past and recent events intact and thought process normal.      Time Spent on this Encounter   I, Radha Krishnamurthy, personally saw the patient today and spent greater than 30 minutes discharging this patient.    Discharge Disposition   Discharged to home  Condition at discharge: Stable    Consultations This Hospital Stay   VASCULAR ACCESS ADULT IP CONSULT    Discharge Orders      *CBC with platelets differential    Last Lab Result: Hemoglobin (g/dL)       Date                     Value                 04/20/2019               12.6 (L)         ----------     Basic metabolic panel     Follow Up and recommended labs and tests    Follow up as scheduled for Neulasta on Tuesday 4/22 and for appt with ANTHONY/lab check on 4/24 .     Activity    Your activity upon discharge: as tolerated     When to contact your care team    [facility] [place]age line at [phone] for temp >100.4, uncontrolled nausea/vomiting/diarrhea/constipation, unrelieved pain, bleeding not relieved with pressure, dizziness, chest pain, shortness of breath, loss of consciousness, and any new or concerning symptoms.     Reason for your hospital stay    You were admitted for chemotherapy (C2 DA REPOCH). You tolerated it well.  You are being discharged with one more day of steroids (dexamethasone) to take on 4/22.  You also should continue taking your acyclovir and fluconazole at discharge.  You will restart levaquin (antibiotic) if your WBC ct drops below 1000.     Diet    Follow this diet upon discharge: regular     Discharge Medications   Current Discharge Medication List      START taking these medications    Details   dexamethasone (DECADRON) 4 MG tablet Take 8 mg by mouth daily On  4/22 and 4/23..  Qty: 4 tablet, Refills: 0    Comments: Please include the quantity of tablets and (strength) per dose in sig.  Associated Diagnoses: High grade B-cell lymphoma (H)      ondansetron (ZOFRAN) 8 MG tablet Take 1 tablet (8 mg) by mouth every 8 hours as needed for nausea  Qty: 30 tablet, Refills: 0    Associated Diagnoses: High grade B-cell lymphoma (H)         CONTINUE these medications which have NOT CHANGED    Details   Acetaminophen (TYLENOL PO)       acyclovir (ZOVIRAX) 200 MG capsule Take 2 capsules (400 mg) by mouth 2 times daily  Qty: 60 capsule, Refills: 1    Associated Diagnoses: High grade malignant lymphoma (H)      allopurinol (ZYLOPRIM) 300 MG tablet Take 1 tablet (300 mg) by mouth daily  Qty: 30 tablet, Refills: 0    Associated Diagnoses: High grade malignant lymphoma (H)      fluconazole (DIFLUCAN) 100 MG tablet Take 1 tablet (100 mg) by mouth daily  Qty: 30 tablet, Refills: 0    Associated Diagnoses: High grade malignant lymphoma (H)      MULTIVITAMINS OR TABS 1 TABLET DAILY PO  Refills: 0      ondansetron (ZOFRAN-ODT) 8 MG ODT tab Take 1 tablet (8 mg) by mouth every 8 hours At first continue scheduled (but can back off as nausea improves)  Qty: 30 tablet, Refills: 0    Associated Diagnoses: High grade malignant lymphoma (H)      prochlorperazine (COMPAZINE) 10 MG tablet Take 1 tablet (10 mg) by mouth every 6 hours as needed for nausea or vomiting (Try second.)  Qty: 30 tablet, Refills: 0    Associated Diagnoses: High grade B-cell lymphoma (H)      ranitidine (ZANTAC) 150 MG tablet Take 1 tablet (150 mg) by mouth 2 times daily  Qty: 60 tablet, Refills: 1    Associated Diagnoses: Gastroesophageal reflux disease without esophagitis      senna-docusate (SENOKOT-S/PERICOLACE) 8.6-50 MG tablet Take 2 tablets by mouth 2 times daily as needed for constipation    Associated Diagnoses: High grade B-cell lymphoma (H)      triamcinolone (KENALOG) 0.1 % external cream Apply a thin layer over rash  twice daily  Qty: 30 g, Refills: 1    Associated Diagnoses: Contact dermatitis due to other agent, unspecified contact dermatitis type      TUMS 500 MG OR CHEW 1 TABLET  PO  Qty: 90, Refills: 0           Allergies   Allergies   Allergen Reactions     Chloraprep One Step Rash     Data   Most Recent 3 CBC's:  Recent Labs   Lab Test 04/21/19  0559 04/20/19  0525 04/19/19  0610   WBC 8.6 11.8* 16.7*   HGB 13.2* 12.6* 12.5*   MCV 92 93 95    246 231      Most Recent 3 BMP's:  Recent Labs   Lab Test 04/21/19  0559 04/20/19  0525 04/19/19  0610    139 139   POTASSIUM 3.6 3.6 3.9   CHLORIDE 106 106 108   CO2 26 26 25   BUN 16 13 14   CR 0.89 0.85 0.88   ANIONGAP 6 7 6   TAMMY 9.0 8.6 8.7   * 139* 117*     Most Recent 2 LFT's:  Recent Labs   Lab Test 04/17/19  1214 04/17/19  0759   AST 21 19   ALT 65 67   ALKPHOS 60 66   BILITOTAL 0.3 0.3     Most Recent INR's and Anticoagulation Dosing History:  Anticoagulation Dose History     There is no flowsheet data to display.        Most Recent 3 Troponin's:No lab results found.  Most Recent Cholesterol Panel:No lab results found.  Most Recent 6 Bacteria Isolates From Any Culture (See EPIC Reports for Culture Details):  Recent Labs   Lab Test 03/25/19  1120   CULT No growth     Most Recent TSH, T4 and A1c Labs:No lab results found.  Results for orders placed or performed during the hospital encounter of 04/17/19   XR Chest 1 View    Narrative    Exam: XR CHEST 1 VW, 4/17/2019 11:35 AM    Indication: PICC placement    Comparison: 3/27/2019    Findings: Single PA chest radiograph. Right upper extremity PICC has  been removed. Left upper extremity PICC tip at the level of the low  SVC. The cardiomediastinal silhouette is within normal limits. Lungs  are clear. No pleural effusion or pneumothorax.      Impression    Impression:   1. Left upper extremity PICC tip at the level of the superior  cavoatrial junction. Right upper extremity PICC has been removed.  2. Clear  lungs.    I have personally reviewed the examination and initial interpretation  and I agree with the findings.    ZAYRA PINA MD     Physician Attestation   I, Erna Booker, saw and evaluated Christiano Molina as part of a shared visit.  I have reviewed and discussed with the advanced practice provider their history, physical and plan.     I personally reviewed the vital signs, medications, labs and imaging.     My key history or physical exam findings:      43-year-old male with low risk Burkitt lymphoma stage Ia here for cycle #2 dose adjusted R-EPOCH.       Feels fine, no complaints. Ready to go home.      Key management decisions made by me:   # BL-day 5 dose adjusted R-EPOCH tolerating well.  Supportive care with Zofran and Compazine.  Discharge with Neulasta on Tuesday.  Plan 6 cycles.      >30 minutes spent on hospital discharge coordination.       Erna Booker MD   of Medicine  Hematology, Oncology and Transplantation   Pager: [phone]        Date of Service (when I saw the patient): 04/21/19

## 2019-04-22 LAB — COPATH REPORT: NORMAL

## 2019-04-23 ENCOUNTER — ALLIED HEALTH/NURSE VISIT (OUTPATIENT)
Dept: ONCOLOGY | Facility: CLINIC | Age: 44
End: 2019-04-23
Attending: INTERNAL MEDICINE
Payer: COMMERCIAL

## 2019-04-23 VITALS
WEIGHT: 176.5 LBS | OXYGEN SATURATION: 99 % | HEART RATE: 79 BPM | BODY MASS INDEX: 25.33 KG/M2 | RESPIRATION RATE: 18 BRPM | TEMPERATURE: 98.4 F | SYSTOLIC BLOOD PRESSURE: 128 MMHG | DIASTOLIC BLOOD PRESSURE: 83 MMHG

## 2019-04-23 DIAGNOSIS — C85.10 HIGH GRADE B-CELL LYMPHOMA (H): Primary | ICD-10-CM

## 2019-04-23 PROCEDURE — 25000128 H RX IP 250 OP 636: Mod: ZF | Performed by: PHYSICIAN ASSISTANT

## 2019-04-23 PROCEDURE — 96372 THER/PROPH/DIAG INJ SC/IM: CPT

## 2019-04-23 RX ADMIN — PEGFILGRASTIM 6 MG: 6 INJECTION SUBCUTANEOUS at 09:42

## 2019-04-23 ASSESSMENT — PAIN SCALES - GENERAL: PAINLEVEL: NO PAIN (0)

## 2019-04-23 NOTE — PROGRESS NOTES
Chief Complaint   Patient presents with     Imm/Inj     patient with High grade B-cell lymphoma - here for vitals and a Neulasta injection     Patient arrived to clinic for Neulasta injection today.  Patient declined to speak with an RN today.   No results needed for treatment parameters, ok to proceed with treatment.  Neualsta injection given to right arm without incident.  Copy of AVS reviewed with patient and/or family.  Patient will return Thursday for next appointment.    Patient mentioned a rash that he had after he received his Neulasta the last time - the rash was in the shape of where the chloraprep was used for his picc line that was placed on a Wednesday and pulled on a Sunday.  He returned to clinic for the Neulasta on a Tuesday and the rash started a week later (around Wednesday or Thursday). So today we administered the Neulasta in the right arm to see if he would still get a rash - the picc line was placed in the left arm this time.

## 2019-04-25 ENCOUNTER — ONCOLOGY VISIT (OUTPATIENT)
Dept: ONCOLOGY | Facility: CLINIC | Age: 44
End: 2019-04-25
Attending: PHYSICIAN ASSISTANT
Payer: COMMERCIAL

## 2019-04-25 ENCOUNTER — APPOINTMENT (OUTPATIENT)
Dept: LAB | Facility: CLINIC | Age: 44
End: 2019-04-25
Attending: INTERNAL MEDICINE
Payer: COMMERCIAL

## 2019-04-25 VITALS
OXYGEN SATURATION: 96 % | DIASTOLIC BLOOD PRESSURE: 74 MMHG | TEMPERATURE: 98.6 F | WEIGHT: 177 LBS | RESPIRATION RATE: 16 BRPM | BODY MASS INDEX: 25.4 KG/M2 | SYSTOLIC BLOOD PRESSURE: 142 MMHG | HEART RATE: 86 BPM

## 2019-04-25 DIAGNOSIS — C85.10 HIGH GRADE B-CELL LYMPHOMA (H): ICD-10-CM

## 2019-04-25 LAB
ALBUMIN SERPL-MCNC: 3.5 G/DL (ref 3.4–5)
ALP SERPL-CCNC: 96 U/L (ref 40–150)
ALT SERPL W P-5'-P-CCNC: 53 U/L (ref 0–70)
ANION GAP SERPL CALCULATED.3IONS-SCNC: 4 MMOL/L (ref 3–14)
AST SERPL W P-5'-P-CCNC: 10 U/L (ref 0–45)
BASOPHILS # BLD AUTO: 0 10E9/L (ref 0–0.2)
BASOPHILS NFR BLD AUTO: 0 %
BILIRUB SERPL-MCNC: 0.2 MG/DL (ref 0.2–1.3)
BUN SERPL-MCNC: 23 MG/DL (ref 7–30)
CALCIUM SERPL-MCNC: 9 MG/DL (ref 8.5–10.1)
CHLORIDE SERPL-SCNC: 102 MMOL/L (ref 94–109)
CO2 SERPL-SCNC: 31 MMOL/L (ref 20–32)
CREAT SERPL-MCNC: 1.24 MG/DL (ref 0.66–1.25)
DIFFERENTIAL METHOD BLD: ABNORMAL
EOSINOPHIL # BLD AUTO: 0 10E9/L (ref 0–0.7)
EOSINOPHIL NFR BLD AUTO: 0 %
ERYTHROCYTE [DISTWIDTH] IN BLOOD BY AUTOMATED COUNT: 13.4 % (ref 10–15)
GFR SERPL CREATININE-BSD FRML MDRD: 71 ML/MIN/{1.73_M2}
GLUCOSE SERPL-MCNC: 100 MG/DL (ref 70–99)
HCT VFR BLD AUTO: 38 % (ref 40–53)
HGB BLD-MCNC: 12.9 G/DL (ref 13.3–17.7)
LYMPHOCYTES # BLD AUTO: 3 10E9/L (ref 0.8–5.3)
LYMPHOCYTES NFR BLD AUTO: 9.7 %
MCH RBC QN AUTO: 31 PG (ref 26.5–33)
MCHC RBC AUTO-ENTMCNC: 33.9 G/DL (ref 31.5–36.5)
MCV RBC AUTO: 91 FL (ref 78–100)
MONOCYTES # BLD AUTO: 0 10E9/L (ref 0–1.3)
MONOCYTES NFR BLD AUTO: 0 %
NEUTROPHILS # BLD AUTO: 27.8 10E9/L (ref 1.6–8.3)
NEUTROPHILS NFR BLD AUTO: 89.4 %
PLATELET # BLD AUTO: 279 10E9/L (ref 150–450)
PLATELET # BLD EST: ABNORMAL 10*3/UL
POTASSIUM SERPL-SCNC: 4.1 MMOL/L (ref 3.4–5.3)
PROMYELOCYTES # BLD MANUAL: 0.3 10E9/L
PROMYELOCYTES NFR BLD MANUAL: 0.9 %
PROT SERPL-MCNC: 6.8 G/DL (ref 6.8–8.8)
RBC # BLD AUTO: 4.16 10E12/L (ref 4.4–5.9)
RBC MORPH BLD: NORMAL
SODIUM SERPL-SCNC: 137 MMOL/L (ref 133–144)
WBC # BLD AUTO: 31.1 10E9/L (ref 4–11)

## 2019-04-25 PROCEDURE — 36415 COLL VENOUS BLD VENIPUNCTURE: CPT

## 2019-04-25 PROCEDURE — 80053 COMPREHEN METABOLIC PANEL: CPT | Performed by: PHYSICIAN ASSISTANT

## 2019-04-25 PROCEDURE — 99214 OFFICE O/P EST MOD 30 MIN: CPT | Mod: ZP | Performed by: PHYSICIAN ASSISTANT

## 2019-04-25 PROCEDURE — G0463 HOSPITAL OUTPT CLINIC VISIT: HCPCS | Mod: ZF

## 2019-04-25 PROCEDURE — 85025 COMPLETE CBC W/AUTO DIFF WBC: CPT | Performed by: PHYSICIAN ASSISTANT

## 2019-04-25 ASSESSMENT — PAIN SCALES - GENERAL: PAINLEVEL: NO PAIN (0)

## 2019-04-25 NOTE — NURSING NOTE
Chief Complaint   Patient presents with     Blood Draw     vitals and venipuncture done by JAMES Quiroz CMA on 4/25/2019 at 3:14 PM

## 2019-04-25 NOTE — LETTER
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 He presented with R axillary mass. PET/CT showed stage IA disease. Bone marrow biopsy and LP done 3/25 were negative for disease. Please see Dr. Ramirez's note for further discussion of diagnostic work-up.     Plan for 2 cycles of DA-R-EPOCH followed by PET/CT. He presented for cycle 1 on 3/27/19 and was discharged on 3/31/19. He tolerated chemotherapy well apart from mild hives and facial itching from Rituxan (was able to complete dose), chemotherapy-induced nausea, and mild post-LP occipital headache.     Following discharge, his pathology was finalized here with Ki-67 positive in 95-99% of lymphoma cells. TDT negative, EBV was strongly positive. The positive findings for CD20, CD10, BCL6, very high Ki-67 with negative BCL2, as well as MYC translocation supports diagnoses of Burkitt's lymphoma.     Cycle 2 of R-EPOCH was given 4/17-4/21. He presents in hospital follow-up.     Interval History: Prudencio is here with his wife today and feels well. He had no acute issues in the hospital. He had mild nausea the first few days upon arriving home which resolved with prn antiemetics. He had epigastric abdominal discomfort the first evening which resolved with antiemetics. Taking 3 stool softeners per day + Miralax prn for constipation. No change in n/t in first three fingers. No diarrhea. Eating and drinking well. No issues with urination. No fevers/chills or infectious concerns. No SOB, CP, or edema. No bone pain from Neulasta. ROS otherwise negative.     Current Outpatient Medications   Medication      Acetaminophen (TYLENOL PO)     acyclovir (ZOVIRAX) 200 MG capsule     allopurinol (ZYLOPRIM) 300 MG tablet     dexamethasone (DECADRON) 4 MG tablet     fluconazole (DIFLUCAN) 100 MG tablet     MULTIVITAMINS OR TABS     ondansetron (ZOFRAN) 8 MG tablet     ondansetron (ZOFRAN-ODT) 8 MG ODT tab     prochlorperazine (COMPAZINE) 10 MG tablet     ranitidine (ZANTAC) 150 MG tablet     senna-docusate (SENOKOT-S/PERICOLACE) 8.6-50 MG tablet     triamcinolone (KENALOG) 0.1 % external cream     TUMS 500 MG OR CHEW     No current facility-administered medications for this visit.        PHYSICAL EXAM:  /74 (BP Location: Right arm, Patient Position: Sitting, Cuff Size: Adult Regular)   Pulse 86   Temp 98.6  F (37  C) (Oral)   Resp 16   Wt 80.3 kg (177 lb)   SpO2 96%   BMI 25.40 kg/m     General: Alert, oriented, pleasant, NAD  Skin: No rashes, bruising, or petechiae on exposed skin   HEENT: Normocephalic, atraumatic, PERRLA, EOMI. Moist mucus membranes, no lesions or thrush  Neck: No cervical or supraclavicular LAD.   Axillary: No longer any palpable nodes on R. None on L   Lungs: CTA bilaterally, normal work of breathing  Cardiac: RRR, S1, S2, no murmurs  Abdomen: Soft, nontender, nondistended. Normoactive bowel sounds. No hepatosplenomegaly, masses  Neuro: CNII-XII grossly intact  Extremities: No pedal edema    Labs:   Results for orders placed or performed in visit on 04/25/19 (from the past 24 hour(s))   Comprehensive metabolic panel   Result Value Ref Range    Sodium 137 133 - 144 mmol/L    Potassium 4.1 3.4 - 5.3 mmol/L    Chloride 102 94 - 109 mmol/L    Carbon Dioxide 31 20 - 32 mmol/L    Anion Gap 4 3 - 14 mmol/L    Glucose 100 (H) 70 - 99 mg/dL    Urea Nitrogen 23 7 - 30 mg/dL    Creatinine 1.24 0.66 - 1.25 mg/dL    GFR Estimate 71 >60 mL/min/[1.73_m2]    GFR Estimate If Black 82 >60 mL/min/[1.73_m2]    Calcium 9.0 8.5 - 10.1 mg/dL    Bilirubin Total 0.2 0.2 - 1.3 mg/dL    Albumin 3.5 3.4 - 5.0 g/dL     Protein Total 6.8 6.8 - 8.8 g/dL    Alkaline Phosphatase 96 40 - 150 U/L    ALT 53 0 - 70 U/L    AST 10 0 - 45 U/L   CBC with platelets differential   Result Value Ref Range    WBC 31.1 (H) 4.0 - 11.0 10e9/L    RBC Count 4.16 (L) 4.4 - 5.9 10e12/L    Hemoglobin 12.9 (L) 13.3 - 17.7 g/dL    Hematocrit 38.0 (L) 40.0 - 53.0 %    MCV 91 78 - 100 fl    MCH 31.0 26.5 - 33.0 pg    MCHC 33.9 31.5 - 36.5 g/dL    RDW 13.4 10.0 - 15.0 %    Platelet Count 279 150 - 450 10e9/L    Diff Method Manual Differential     % Neutrophils 89.4 %    % Lymphocytes 9.7 %    % Monocytes 0.0 %    % Eosinophils 0.0 %    % Basophils 0.0 %    % Promyelocytes 0.9 %    Absolute Neutrophil 27.8 (H) 1.6 - 8.3 10e9/L    Absolute Lymphocytes 3.0 0.8 - 5.3 10e9/L    Absolute Monocytes 0.0 0.0 - 1.3 10e9/L    Absolute Eosinophils 0.0 0.0 - 0.7 10e9/L    Absolute Basophils 0.0 0.0 - 0.2 10e9/L    Absolute Promyeloctyes 0.3 (H) 0 10e9/L    RBC Morphology Normal     Platelet Estimate Confirming automated cell count        Assessment & Plan:     1. Stage Ia Burkitt's lymphoma: Negative marrow or CNS involvement. S/p 2 cycles of R-EPOCH which he tolerated well beyond mild rituxan reaction and nausea. Neulasta was given on 4/23. He has great clinical response with resolution of his LAD. Overall plan is R-EPOCH x 6 cycles with IT methotrexate days 1&5 cycles 3-6. He will have interim PET/CT on 5/7.     2. HEME: Hgb 12.9, platelets 279, WBC 31.1/ANC 27.8.  Plan to transfuse for Hgb <8 and platelets <10K.     3. ID: No active concerns. Continue ppx ACV and fluconazole. Start Levaquin when ANC <1000.     4. Vascular access: PICC placements with admission.     5. NEURO: Post-LP headaches slowly improving. Continue to push fluids and use caffeine. 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     6. GI: For GERD, continue ranitidine 150 mg BID. Avoid offensive foods. Continue prn antiemetics for nausea. Continue stool softeners + Miralax  prn for constipaion.      Holly Wheat PA-C    [facility] [address] [address] [phone]

## 2019-04-25 NOTE — NURSING NOTE
"Oncology Rooming Note    April 25, 2019 3:26 PM   Christiano Molina is a 43 year old male who presents for:    Chief Complaint   Patient presents with     Blood Draw     vitals and venipuncture done by [facility]     Oncology Clinic Visit     Lymphoma , labs      Initial Vitals: /74 (BP Location: Right arm, Patient Position: Sitting, Cuff Size: Adult Regular)   Pulse 86   Temp 98.6  F (37  C) (Oral)   Resp 16   Wt 80.3 kg (177 lb)   SpO2 96%   BMI 25.40 kg/m   Estimated body mass index is 25.4 kg/m  as calculated from the following:    Height as of 4/17/19: 1.778 m (5' 10\").    Weight as of this encounter: 80.3 kg (177 lb). Body surface area is 1.99 meters squared.  No Pain (0) Comment: Data Unavailable   No LMP for male patient.  Allergies reviewed: Yes  Medications reviewed: Yes    Medications: Medication refills not needed today.  Pharmacy name entered into [facility]:    [id number] - [facility] [address] [place] [address]  [id number] - [place] - [address] [id number] - [place], [place] - [address] AT [facility] [address] [place] [address]    Clinical concerns: no concerns  Mary Alice  was notified.      Giana Vasquez MA              "

## 2019-04-29 ENCOUNTER — HOSPITAL ENCOUNTER (OUTPATIENT)
Facility: CLINIC | Age: 44
Setting detail: SPECIMEN
Discharge: HOME | End: 2019-04-29
Attending: PHYSICIAN ASSISTANT | Admitting: PHYSICIAN ASSISTANT
Payer: COMMERCIAL

## 2019-04-29 ENCOUNTER — INFUSION THERAPY VISIT (OUTPATIENT)
Dept: INFUSION THERAPY | Facility: CLINIC | Age: 44
End: 2019-04-29
Attending: PHYSICIAN ASSISTANT
Payer: COMMERCIAL

## 2019-04-29 DIAGNOSIS — C85.10 HIGH GRADE B-CELL LYMPHOMA (H): Primary | ICD-10-CM

## 2019-04-29 DIAGNOSIS — C85.10 HIGH GRADE B-CELL LYMPHOMA (H): ICD-10-CM

## 2019-04-29 LAB
ALBUMIN SERPL-MCNC: 3.6 G/DL (ref 3.4–5)
ALP SERPL-CCNC: 99 U/L (ref 40–150)
ALT SERPL W P-5'-P-CCNC: 77 U/L (ref 0–70)
ANION GAP SERPL CALCULATED.3IONS-SCNC: 5 MMOL/L (ref 3–14)
ANISOCYTOSIS BLD QL SMEAR: SLIGHT
AST SERPL W P-5'-P-CCNC: 22 U/L (ref 0–45)
BASOPHILS # BLD AUTO: 0 10E9/L (ref 0–0.2)
BASOPHILS NFR BLD AUTO: 0 %
BILIRUB SERPL-MCNC: 0.2 MG/DL (ref 0.2–1.3)
BUN SERPL-MCNC: 14 MG/DL (ref 7–30)
CALCIUM SERPL-MCNC: 9 MG/DL (ref 8.5–10.1)
CHLORIDE SERPL-SCNC: 105 MMOL/L (ref 94–109)
CO2 SERPL-SCNC: 31 MMOL/L (ref 20–32)
CREAT SERPL-MCNC: 1.17 MG/DL (ref 0.66–1.25)
DIFFERENTIAL METHOD BLD: ABNORMAL
EOSINOPHIL # BLD AUTO: 0.1 10E9/L (ref 0–0.7)
EOSINOPHIL NFR BLD AUTO: 1 %
ERYTHROCYTE [DISTWIDTH] IN BLOOD BY AUTOMATED COUNT: 13.5 % (ref 10–15)
GFR SERPL CREATININE-BSD FRML MDRD: 76 ML/MIN/{1.73_M2}
GLUCOSE SERPL-MCNC: 81 MG/DL (ref 70–99)
HCT VFR BLD AUTO: 37.8 % (ref 40–53)
HGB BLD-MCNC: 12.7 G/DL (ref 13.3–17.7)
LYMPHOCYTES # BLD AUTO: 2.1 10E9/L (ref 0.8–5.3)
LYMPHOCYTES NFR BLD AUTO: 14 %
MCH RBC QN AUTO: 30.5 PG (ref 26.5–33)
MCHC RBC AUTO-ENTMCNC: 33.6 G/DL (ref 31.5–36.5)
MCV RBC AUTO: 91 FL (ref 78–100)
MONOCYTES # BLD AUTO: 1.5 10E9/L (ref 0–1.3)
MONOCYTES NFR BLD AUTO: 10 %
NEUTROPHILS # BLD AUTO: 10.9 10E9/L (ref 1.6–8.3)
NEUTROPHILS NFR BLD AUTO: 74 %
PLATELET # BLD AUTO: 221 10E9/L (ref 150–450)
PLATELET # BLD EST: ABNORMAL 10*3/UL
POLYCHROMASIA BLD QL SMEAR: SLIGHT
POTASSIUM SERPL-SCNC: 4.2 MMOL/L (ref 3.4–5.3)
PROMYELOCYTES # BLD MANUAL: 0.1 10E9/L
PROMYELOCYTES NFR BLD MANUAL: 1 %
PROT SERPL-MCNC: 7.1 G/DL (ref 6.8–8.8)
RBC # BLD AUTO: 4.17 10E12/L (ref 4.4–5.9)
SODIUM SERPL-SCNC: 141 MMOL/L (ref 133–144)
WBC # BLD AUTO: 14.7 10E9/L (ref 4–11)

## 2019-04-29 PROCEDURE — 80053 COMPREHEN METABOLIC PANEL: CPT | Performed by: PHYSICIAN ASSISTANT

## 2019-04-29 PROCEDURE — 36415 COLL VENOUS BLD VENIPUNCTURE: CPT

## 2019-04-29 PROCEDURE — 85025 COMPLETE CBC W/AUTO DIFF WBC: CPT | Performed by: PHYSICIAN ASSISTANT

## 2019-04-29 NOTE — PROGRESS NOTES
Medical Assistant Note:  Christiano Molina presents today for blood draw.    Patient seen by provider today: No.   present during visit today: Not Applicable.    Concerns: No Concerns.    Procedure:  Lab draw site: LAC, Needle type: BF, Gauge: 23g with gauze and coban.    Post Assessment:  Labs drawn without difficulty: Yes.    Discharge Plan:  Departure Mode: Ambulatory.    Face to Face Time: 5.    Christy Roy

## 2019-04-29 NOTE — PROGRESS NOTES
Infusion Nursing Note:  Christiano Molina presents today for possible blood transfusion.    Patient seen by provider today: No   present during visit today: Not Applicable.    Note: N/A.    Intravenous Access:  Labs drawn without difficulty.    Treatment Conditions:  Lab Results   Component Value Date    HGB 12.7 04/29/2019     Lab Results   Component Value Date    WBC 14.7 04/29/2019      Lab Results   Component Value Date    ANEU 10.9 04/29/2019     Lab Results   Component Value Date     04/29/2019      Results reviewed, labs did NOT meet treatment parameters: pt didn't need blood transfusion. Gave lab results report to pt.       Post Infusion Assessment:  n/a      Discharge Plan:   Discharge instructions reviewed with: Patient.  Patient and/or family verbalized understanding of discharge instructions and all questions answered.  Copy of AVS reviewed with patient and/or family.  Patient will return 5/2 for next appointment.  Patient discharged in stable condition accompanied by: self.  Departure Mode: Ambulatory.    Belle Grant RN

## 2019-05-02 ENCOUNTER — INFUSION THERAPY VISIT (OUTPATIENT)
Dept: INFUSION THERAPY | Facility: CLINIC | Age: 44
End: 2019-05-02
Attending: PHYSICIAN ASSISTANT
Payer: COMMERCIAL

## 2019-05-02 ENCOUNTER — HOSPITAL ENCOUNTER (OUTPATIENT)
Facility: CLINIC | Age: 44
Setting detail: SPECIMEN
Discharge: HOME | End: 2019-05-02
Attending: PHYSICIAN ASSISTANT | Admitting: PHYSICIAN ASSISTANT
Payer: COMMERCIAL

## 2019-05-02 DIAGNOSIS — C85.10 HIGH GRADE B-CELL LYMPHOMA (H): ICD-10-CM

## 2019-05-02 DIAGNOSIS — C85.90: ICD-10-CM

## 2019-05-02 LAB
ALBUMIN SERPL-MCNC: 3.8 G/DL (ref 3.4–5)
ALP SERPL-CCNC: 93 U/L (ref 40–150)
ALT SERPL W P-5'-P-CCNC: 68 U/L (ref 0–70)
ANION GAP SERPL CALCULATED.3IONS-SCNC: 4 MMOL/L (ref 3–14)
ANISOCYTOSIS BLD QL SMEAR: SLIGHT
AST SERPL W P-5'-P-CCNC: 17 U/L (ref 0–45)
BASOPHILS # BLD AUTO: 0 10E9/L (ref 0–0.2)
BASOPHILS NFR BLD AUTO: 0 %
BILIRUB SERPL-MCNC: 0.3 MG/DL (ref 0.2–1.3)
BUN SERPL-MCNC: 14 MG/DL (ref 7–30)
CALCIUM SERPL-MCNC: 9.3 MG/DL (ref 8.5–10.1)
CHLORIDE SERPL-SCNC: 108 MMOL/L (ref 94–109)
CO2 SERPL-SCNC: 29 MMOL/L (ref 20–32)
CREAT SERPL-MCNC: 1.14 MG/DL (ref 0.66–1.25)
DIFFERENTIAL METHOD BLD: ABNORMAL
EOSINOPHIL # BLD AUTO: 0.3 10E9/L (ref 0–0.7)
EOSINOPHIL NFR BLD AUTO: 3 %
ERYTHROCYTE [DISTWIDTH] IN BLOOD BY AUTOMATED COUNT: 14.5 % (ref 10–15)
GFR SERPL CREATININE-BSD FRML MDRD: 78 ML/MIN/{1.73_M2}
GLUCOSE SERPL-MCNC: 88 MG/DL (ref 70–99)
HCT VFR BLD AUTO: 39.1 % (ref 40–53)
HGB BLD-MCNC: 13.1 G/DL (ref 13.3–17.7)
LYMPHOCYTES # BLD AUTO: 1.1 10E9/L (ref 0.8–5.3)
LYMPHOCYTES NFR BLD AUTO: 10 %
MCH RBC QN AUTO: 30.6 PG (ref 26.5–33)
MCHC RBC AUTO-ENTMCNC: 33.5 G/DL (ref 31.5–36.5)
MCV RBC AUTO: 91 FL (ref 78–100)
METAMYELOCYTES # BLD: 0.1 10E9/L
METAMYELOCYTES NFR BLD MANUAL: 1 %
MONOCYTES # BLD AUTO: 0.7 10E9/L (ref 0–1.3)
MONOCYTES NFR BLD AUTO: 6 %
MYELOCYTES # BLD: 0.1 10E9/L
MYELOCYTES NFR BLD MANUAL: 1 %
NEUTROPHILS # BLD AUTO: 8.6 10E9/L (ref 1.6–8.3)
NEUTROPHILS NFR BLD AUTO: 79 %
PLATELET # BLD AUTO: 162 10E9/L (ref 150–450)
PLATELET # BLD EST: ABNORMAL 10*3/UL
POLYCHROMASIA BLD QL SMEAR: SLIGHT
POTASSIUM SERPL-SCNC: 4.2 MMOL/L (ref 3.4–5.3)
PROT SERPL-MCNC: 7.2 G/DL (ref 6.8–8.8)
RBC # BLD AUTO: 4.28 10E12/L (ref 4.4–5.9)
SODIUM SERPL-SCNC: 141 MMOL/L (ref 133–144)
WBC # BLD AUTO: 10.9 10E9/L (ref 4–11)

## 2019-05-02 PROCEDURE — 85025 COMPLETE CBC W/AUTO DIFF WBC: CPT | Performed by: PHYSICIAN ASSISTANT

## 2019-05-02 PROCEDURE — 80053 COMPREHEN METABOLIC PANEL: CPT | Performed by: PHYSICIAN ASSISTANT

## 2019-05-02 PROCEDURE — 36415 COLL VENOUS BLD VENIPUNCTURE: CPT

## 2019-05-02 RX ORDER — FLUCONAZOLE 100 MG/1
100 TABLET ORAL DAILY
Qty: 30 TABLET | Refills: 0 | Status: SHIPPED | OUTPATIENT
Start: 2019-05-02 | End: 2019-06-08

## 2019-05-02 RX ORDER — ALLOPURINOL 300 MG/1
300 TABLET ORAL DAILY
Qty: 30 TABLET | Refills: 0 | Status: SHIPPED | OUTPATIENT
Start: 2019-05-02 | End: 2019-06-08

## 2019-05-02 NOTE — PROGRESS NOTES
Medical Assistant Note:  Christiano Molina presents today for lab draw.    Patient seen by provider today: No.   present during visit today: Not Applicable.    Concerns: No Concerns.    Procedure:  Lab draw site: RAC, Needle type: BF, Gauge: 23g with gauze and coban.    Post Assessment:  Labs drawn without difficulty: Yes.    Discharge Plan:  Departure Mode: Ambulatory.    Face to Face Time: 4mins.    Shannan Avilez CMA

## 2019-05-06 ENCOUNTER — INFUSION THERAPY VISIT (OUTPATIENT)
Dept: INFUSION THERAPY | Facility: CLINIC | Age: 44
End: 2019-05-06
Attending: PHYSICIAN ASSISTANT
Payer: COMMERCIAL

## 2019-05-06 ENCOUNTER — HOSPITAL ENCOUNTER (OUTPATIENT)
Facility: CLINIC | Age: 44
Setting detail: SPECIMEN
Discharge: HOME | End: 2019-05-06
Attending: PHYSICIAN ASSISTANT | Admitting: PHYSICIAN ASSISTANT
Payer: COMMERCIAL

## 2019-05-06 DIAGNOSIS — C85.10 HIGH GRADE B-CELL LYMPHOMA (H): Primary | ICD-10-CM

## 2019-05-06 DIAGNOSIS — C85.10 HIGH GRADE B-CELL LYMPHOMA (H): ICD-10-CM

## 2019-05-06 LAB
ALBUMIN SERPL-MCNC: 3.6 G/DL (ref 3.4–5)
ALP SERPL-CCNC: 76 U/L (ref 40–150)
ALT SERPL W P-5'-P-CCNC: 54 U/L (ref 0–70)
ANION GAP SERPL CALCULATED.3IONS-SCNC: 3 MMOL/L (ref 3–14)
AST SERPL W P-5'-P-CCNC: 17 U/L (ref 0–45)
BASOPHILS # BLD AUTO: 0 10E9/L (ref 0–0.2)
BASOPHILS NFR BLD AUTO: 0.6 %
BILIRUB SERPL-MCNC: 0.2 MG/DL (ref 0.2–1.3)
BUN SERPL-MCNC: 14 MG/DL (ref 7–30)
CALCIUM SERPL-MCNC: 9 MG/DL (ref 8.5–10.1)
CHLORIDE SERPL-SCNC: 108 MMOL/L (ref 94–109)
CO2 SERPL-SCNC: 30 MMOL/L (ref 20–32)
CREAT SERPL-MCNC: 1.18 MG/DL (ref 0.66–1.25)
DIFFERENTIAL METHOD BLD: ABNORMAL
EOSINOPHIL # BLD AUTO: 0 10E9/L (ref 0–0.7)
EOSINOPHIL NFR BLD AUTO: 0.5 %
ERYTHROCYTE [DISTWIDTH] IN BLOOD BY AUTOMATED COUNT: 14.5 % (ref 10–15)
GFR SERPL CREATININE-BSD FRML MDRD: 75 ML/MIN/{1.73_M2}
GLUCOSE SERPL-MCNC: 73 MG/DL (ref 70–99)
HCT VFR BLD AUTO: 39.4 % (ref 40–53)
HGB BLD-MCNC: 13.3 G/DL (ref 13.3–17.7)
IMM GRANULOCYTES # BLD: 0.1 10E9/L (ref 0–0.4)
IMM GRANULOCYTES NFR BLD: 1.4 %
LYMPHOCYTES # BLD AUTO: 1.2 10E9/L (ref 0.8–5.3)
LYMPHOCYTES NFR BLD AUTO: 18.6 %
MCH RBC QN AUTO: 30.8 PG (ref 26.5–33)
MCHC RBC AUTO-ENTMCNC: 33.8 G/DL (ref 31.5–36.5)
MCV RBC AUTO: 91 FL (ref 78–100)
MONOCYTES # BLD AUTO: 0.4 10E9/L (ref 0–1.3)
MONOCYTES NFR BLD AUTO: 6.9 %
NEUTROPHILS # BLD AUTO: 4.5 10E9/L (ref 1.6–8.3)
NEUTROPHILS NFR BLD AUTO: 72 %
NRBC # BLD AUTO: 0 10*3/UL
NRBC BLD AUTO-RTO: 0 /100
PLATELET # BLD AUTO: 143 10E9/L (ref 150–450)
POTASSIUM SERPL-SCNC: 4.2 MMOL/L (ref 3.4–5.3)
PROT SERPL-MCNC: 7 G/DL (ref 6.8–8.8)
RBC # BLD AUTO: 4.32 10E12/L (ref 4.4–5.9)
SODIUM SERPL-SCNC: 141 MMOL/L (ref 133–144)
WBC # BLD AUTO: 6.2 10E9/L (ref 4–11)

## 2019-05-06 PROCEDURE — 80053 COMPREHEN METABOLIC PANEL: CPT | Performed by: PHYSICIAN ASSISTANT

## 2019-05-06 PROCEDURE — 36415 COLL VENOUS BLD VENIPUNCTURE: CPT

## 2019-05-06 PROCEDURE — 85025 COMPLETE CBC W/AUTO DIFF WBC: CPT | Performed by: PHYSICIAN ASSISTANT

## 2019-05-06 NOTE — PROGRESS NOTES
Infusion Nursing Note:  Christiano Molina presents today for possible blood transfusion- not needed today.    Patient seen by provider today: No    Note: Patient continues to report feeling well.  Reports no concerns today.  Patient's blood and platelet parameters do not indicate a need for transfusion today.  Patient verbalizes understanding.    Intravenous Access:  No Intravenous access/labs at this visit.      Treatment Conditions:  Lab Results   Component Value Date    HGB 13.3 05/06/2019     Lab Results   Component Value Date    WBC 6.2 05/06/2019      Lab Results   Component Value Date    ANEU 4.5 05/06/2019     Lab Results   Component Value Date     05/06/2019      Lab Results   Component Value Date     05/06/2019                   Lab Results   Component Value Date    POTASSIUM 4.2 05/06/2019           Lab Results   Component Value Date    MAG 1.9 04/20/2019            Lab Results   Component Value Date    CR 1.18 05/06/2019                   Lab Results   Component Value Date    TAMMY 9.0 05/06/2019                Lab Results   Component Value Date    BILITOTAL 0.2 05/06/2019           Lab Results   Component Value Date    ALBUMIN 3.6 05/06/2019                    Lab Results   Component Value Date    ALT 54 05/06/2019           Lab Results   Component Value Date    AST 17 05/06/2019       Results reviewed, labs MET treatment parameters, ok to proceed with treatment.    Discharge Plan:   Patient declined prescription refills.  Discharge instructions reviewed with: Patient.  Patient and/or family verbalized understanding of discharge instructions and all questions answered.  AVS to patient via [employer].  Patient will return 5/8/19 for las and to see Holly OH for next appointment.   Patient discharged in stable condition accompanied by: self.  Departure Mode: Ambulatory.    Monica Lopez RN

## 2019-05-06 NOTE — PROGRESS NOTES
Medical Assistant Note:  Christiano Molina presents today for Blood draw.    Patient seen by provider today: No.   present during visit today: Not Applicable.    Concerns: No Concerns.    Procedure:  Lab draw site: LAC, Needle type: BF, Gauge: 23 Wrapped with coban.    Post Assessment:  Labs drawn without difficulty: Yes.    Discharge Plan:  Departure Mode: Ambulatory.    Face to Face Time: 5 min.    Omaira Reyes

## 2019-05-07 ENCOUNTER — HOSPITAL ENCOUNTER (OUTPATIENT)
Dept: PET IMAGING | Facility: CLINIC | Age: 44
Discharge: HOME | End: 2019-05-07
Attending: INTERNAL MEDICINE | Admitting: INTERNAL MEDICINE
Payer: COMMERCIAL

## 2019-05-07 DIAGNOSIS — C85.14 B-CELL LYMPHOMA OF LYMPH NODES OF AXILLA, UNSPECIFIED B-CELL LYMPHOMA TYPE (H): ICD-10-CM

## 2019-05-07 LAB — GLUCOSE BLDC GLUCOMTR-MCNC: 89 MG/DL (ref 70–99)

## 2019-05-07 PROCEDURE — 34300033 ZZH RX 343: Performed by: INTERNAL MEDICINE

## 2019-05-07 PROCEDURE — 82962 GLUCOSE BLOOD TEST: CPT

## 2019-05-07 PROCEDURE — 78816 PET IMAGE W/CT FULL BODY: CPT | Mod: PS

## 2019-05-07 PROCEDURE — A9552 F18 FDG: HCPCS | Performed by: INTERNAL MEDICINE

## 2019-05-07 RX ADMIN — FLUDEOXYGLUCOSE F-18 10.56 MCI.: 500 INJECTION, SOLUTION INTRAVENOUS at 08:15

## 2019-05-08 ENCOUNTER — APPOINTMENT (OUTPATIENT)
Dept: GENERAL RADIOLOGY | Facility: CLINIC | Age: 44
DRG: 847 | End: 2019-05-08
Attending: PHYSICIAN ASSISTANT
Payer: COMMERCIAL

## 2019-05-08 ENCOUNTER — HOSPITAL ENCOUNTER (INPATIENT)
Facility: CLINIC | Age: 44
LOS: 4 days | Discharge: HOME | DRG: 847 | End: 2019-05-12
Attending: INTERNAL MEDICINE | Admitting: INTERNAL MEDICINE
Payer: COMMERCIAL

## 2019-05-08 ENCOUNTER — ONCOLOGY VISIT (OUTPATIENT)
Dept: ONCOLOGY | Facility: CLINIC | Age: 44
End: 2019-05-08
Attending: PHYSICIAN ASSISTANT
Payer: COMMERCIAL

## 2019-05-08 VITALS
TEMPERATURE: 98.4 F | DIASTOLIC BLOOD PRESSURE: 80 MMHG | BODY MASS INDEX: 25.41 KG/M2 | OXYGEN SATURATION: 99 % | RESPIRATION RATE: 16 BRPM | HEART RATE: 81 BPM | WEIGHT: 177.1 LBS | SYSTOLIC BLOOD PRESSURE: 125 MMHG

## 2019-05-08 DIAGNOSIS — C83.74 BURKITT LYMPHOMA OF LYMPH NODES OF AXILLA (H): Primary | ICD-10-CM

## 2019-05-08 DIAGNOSIS — C83.74 BURKITT LYMPHOMA OF LYMPH NODES OF AXILLA (H): ICD-10-CM

## 2019-05-08 DIAGNOSIS — D70.1 CHEMOTHERAPY-INDUCED NEUTROPENIA (H): ICD-10-CM

## 2019-05-08 DIAGNOSIS — T45.1X5A CHEMOTHERAPY-INDUCED NEUTROPENIA (H): ICD-10-CM

## 2019-05-08 DIAGNOSIS — C85.10 HIGH GRADE B-CELL LYMPHOMA (H): Primary | ICD-10-CM

## 2019-05-08 DIAGNOSIS — C83.70 BURKITT LYMPHOMA, UNSPECIFIED BODY REGION (H): ICD-10-CM

## 2019-05-08 DIAGNOSIS — C85.10 HIGH GRADE B-CELL LYMPHOMA (H): ICD-10-CM

## 2019-05-08 PROBLEM — C85.90 LYMPHOMA (H): Status: ACTIVE | Noted: 2019-05-08

## 2019-05-08 LAB
ALBUMIN SERPL-MCNC: 3.6 G/DL (ref 3.4–5)
ALP SERPL-CCNC: 67 U/L (ref 40–150)
ALT SERPL W P-5'-P-CCNC: 46 U/L (ref 0–70)
ANION GAP SERPL CALCULATED.3IONS-SCNC: 5 MMOL/L (ref 3–14)
AST SERPL W P-5'-P-CCNC: 20 U/L (ref 0–45)
BASOPHILS # BLD AUTO: 0.1 10E9/L (ref 0–0.2)
BASOPHILS NFR BLD AUTO: 1.1 %
BILIRUB DIRECT SERPL-MCNC: <0.1 MG/DL (ref 0–0.2)
BILIRUB SERPL-MCNC: 0.2 MG/DL (ref 0.2–1.3)
BUN SERPL-MCNC: 13 MG/DL (ref 7–30)
CALCIUM SERPL-MCNC: 9.1 MG/DL (ref 8.5–10.1)
CHLORIDE SERPL-SCNC: 109 MMOL/L (ref 94–109)
CO2 SERPL-SCNC: 27 MMOL/L (ref 20–32)
CREAT SERPL-MCNC: 1.13 MG/DL (ref 0.66–1.25)
DIFFERENTIAL METHOD BLD: ABNORMAL
EOSINOPHIL # BLD AUTO: 0 10E9/L (ref 0–0.7)
EOSINOPHIL NFR BLD AUTO: 0.4 %
ERYTHROCYTE [DISTWIDTH] IN BLOOD BY AUTOMATED COUNT: 14.6 % (ref 10–15)
GFR SERPL CREATININE-BSD FRML MDRD: 79 ML/MIN/{1.73_M2}
GLUCOSE SERPL-MCNC: 64 MG/DL (ref 70–99)
HCT VFR BLD AUTO: 39.3 % (ref 40–53)
HGB BLD-MCNC: 13.1 G/DL (ref 13.3–17.7)
IMM GRANULOCYTES # BLD: 0.1 10E9/L (ref 0–0.4)
IMM GRANULOCYTES NFR BLD: 1.1 %
LYMPHOCYTES # BLD AUTO: 1.1 10E9/L (ref 0.8–5.3)
LYMPHOCYTES NFR BLD AUTO: 21.3 %
MCH RBC QN AUTO: 30.3 PG (ref 26.5–33)
MCHC RBC AUTO-ENTMCNC: 33.3 G/DL (ref 31.5–36.5)
MCV RBC AUTO: 91 FL (ref 78–100)
MONOCYTES # BLD AUTO: 0.5 10E9/L (ref 0–1.3)
MONOCYTES NFR BLD AUTO: 9.5 %
NEUTROPHILS # BLD AUTO: 3.5 10E9/L (ref 1.6–8.3)
NEUTROPHILS NFR BLD AUTO: 66.6 %
NRBC # BLD AUTO: 0 10*3/UL
NRBC BLD AUTO-RTO: 0 /100
PLATELET # BLD AUTO: 157 10E9/L (ref 150–450)
POTASSIUM SERPL-SCNC: 4.1 MMOL/L (ref 3.4–5.3)
PROT SERPL-MCNC: 6.8 G/DL (ref 6.8–8.8)
RBC # BLD AUTO: 4.32 10E12/L (ref 4.4–5.9)
SODIUM SERPL-SCNC: 140 MMOL/L (ref 133–144)
WBC # BLD AUTO: 5.3 10E9/L (ref 4–11)

## 2019-05-08 PROCEDURE — 25000128 H RX IP 250 OP 636: Performed by: PHYSICIAN ASSISTANT

## 2019-05-08 PROCEDURE — 3E04305 INTRODUCTION OF OTHER ANTINEOPLASTIC INTO CENTRAL VEIN, PERCUTANEOUS APPROACH: ICD-10-PCS | Performed by: INTERNAL MEDICINE

## 2019-05-08 PROCEDURE — 27211389 ZZ H KIT, 5 FR DL BIOFLO OPEN ENDED PICC

## 2019-05-08 PROCEDURE — 27211414 ZZ H ADHESIVE SKIN CLOSURE, DERMABOND

## 2019-05-08 PROCEDURE — 25000131 ZZH RX MED GY IP 250 OP 636 PS 637: Performed by: INTERNAL MEDICINE

## 2019-05-08 PROCEDURE — 80076 HEPATIC FUNCTION PANEL: CPT | Performed by: NURSE PRACTITIONER

## 2019-05-08 PROCEDURE — 80048 BASIC METABOLIC PNL TOTAL CA: CPT | Performed by: NURSE PRACTITIONER

## 2019-05-08 PROCEDURE — 25800030 ZZH RX IP 258 OP 636: Performed by: PHYSICIAN ASSISTANT

## 2019-05-08 PROCEDURE — G0463 HOSPITAL OUTPT CLINIC VISIT: HCPCS | Mod: ZF

## 2019-05-08 PROCEDURE — 99214 OFFICE O/P EST MOD 30 MIN: CPT | Mod: ZP | Performed by: PHYSICIAN ASSISTANT

## 2019-05-08 PROCEDURE — 85025 COMPLETE CBC W/AUTO DIFF WBC: CPT | Performed by: NURSE PRACTITIONER

## 2019-05-08 PROCEDURE — 25000132 ZZH RX MED GY IP 250 OP 250 PS 637: Performed by: PHYSICIAN ASSISTANT

## 2019-05-08 PROCEDURE — 99222 1ST HOSP IP/OBS MODERATE 55: CPT | Mod: AI | Performed by: INTERNAL MEDICINE

## 2019-05-08 PROCEDURE — 12000001 ZZH R&B MED SURG/OB UMMC

## 2019-05-08 PROCEDURE — 25000125 ZZHC RX 250: Performed by: PHYSICIAN ASSISTANT

## 2019-05-08 PROCEDURE — 36569 INSJ PICC 5 YR+ W/O IMAGING: CPT

## 2019-05-08 PROCEDURE — 25000131 ZZH RX MED GY IP 250 OP 636 PS 637: Performed by: PHYSICIAN ASSISTANT

## 2019-05-08 PROCEDURE — 36415 COLL VENOUS BLD VENIPUNCTURE: CPT

## 2019-05-08 PROCEDURE — 40000986 XR CHEST 1 VW

## 2019-05-08 RX ORDER — PROCHLORPERAZINE MALEATE 10 MG
10 TABLET ORAL EVERY 8 HOURS
Status: DISCONTINUED | OUTPATIENT
Start: 2019-05-08 | End: 2019-05-12 | Stop reason: HOSPADM

## 2019-05-08 RX ORDER — EPINEPHRINE 1 MG/ML
0.3 INJECTION, SOLUTION INTRAMUSCULAR; SUBCUTANEOUS EVERY 5 MIN PRN
Status: CANCELLED | OUTPATIENT
Start: 2019-05-08

## 2019-05-08 RX ORDER — LORAZEPAM 0.5 MG/1
.5-1 TABLET ORAL EVERY 6 HOURS PRN
Status: CANCELLED
Start: 2019-05-08

## 2019-05-08 RX ORDER — DIPHENHYDRAMINE HCL 25 MG
50 CAPSULE ORAL ONCE
Status: CANCELLED
Start: 2019-05-08

## 2019-05-08 RX ORDER — MEPERIDINE HYDROCHLORIDE 25 MG/ML
25 INJECTION INTRAMUSCULAR; INTRAVENOUS; SUBCUTANEOUS EVERY 30 MIN PRN
Status: DISCONTINUED | OUTPATIENT
Start: 2019-05-08 | End: 2019-05-12 | Stop reason: HOSPADM

## 2019-05-08 RX ORDER — SODIUM CHLORIDE 9 MG/ML
1000 INJECTION, SOLUTION INTRAVENOUS CONTINUOUS PRN
Status: CANCELLED
Start: 2019-05-08

## 2019-05-08 RX ORDER — HEPARIN SODIUM,PORCINE 10 UNIT/ML
2-5 VIAL (ML) INTRAVENOUS
Status: DISCONTINUED | OUTPATIENT
Start: 2019-05-08 | End: 2019-05-12 | Stop reason: HOSPADM

## 2019-05-08 RX ORDER — MEPERIDINE HYDROCHLORIDE 25 MG/ML
25 INJECTION INTRAMUSCULAR; INTRAVENOUS; SUBCUTANEOUS EVERY 30 MIN PRN
Status: CANCELLED | OUTPATIENT
Start: 2019-05-08

## 2019-05-08 RX ORDER — CALCIUM CARBONATE 500 MG/1
500-1000 TABLET, CHEWABLE ORAL 3 TIMES DAILY PRN
Status: DISCONTINUED | OUTPATIENT
Start: 2019-05-08 | End: 2019-05-12 | Stop reason: HOSPADM

## 2019-05-08 RX ORDER — ONDANSETRON 8 MG/1
16 TABLET, FILM COATED ORAL EVERY 24 HOURS
Status: CANCELLED
Start: 2019-05-09

## 2019-05-08 RX ORDER — NALOXONE HYDROCHLORIDE 0.4 MG/ML
.1-.4 INJECTION, SOLUTION INTRAMUSCULAR; INTRAVENOUS; SUBCUTANEOUS
Status: DISCONTINUED | OUTPATIENT
Start: 2019-05-08 | End: 2019-05-12 | Stop reason: HOSPADM

## 2019-05-08 RX ORDER — ACETAMINOPHEN 325 MG/1
650 TABLET ORAL EVERY 4 HOURS PRN
Status: DISCONTINUED | OUTPATIENT
Start: 2019-05-08 | End: 2019-05-12 | Stop reason: HOSPADM

## 2019-05-08 RX ORDER — PROCHLORPERAZINE MALEATE 10 MG
10 TABLET ORAL EVERY 8 HOURS
Status: CANCELLED
Start: 2019-05-08

## 2019-05-08 RX ORDER — AMOXICILLIN 250 MG
2 CAPSULE ORAL 2 TIMES DAILY
Status: DISCONTINUED | OUTPATIENT
Start: 2019-05-08 | End: 2019-05-08

## 2019-05-08 RX ORDER — METHYLPREDNISOLONE SODIUM SUCCINATE 125 MG/2ML
125 INJECTION, POWDER, LYOPHILIZED, FOR SOLUTION INTRAMUSCULAR; INTRAVENOUS
Status: CANCELLED
Start: 2019-05-08

## 2019-05-08 RX ORDER — ONDANSETRON 8 MG/1
16 TABLET, FILM COATED ORAL EVERY 24 HOURS
Status: COMPLETED | OUTPATIENT
Start: 2019-05-08 | End: 2019-05-12

## 2019-05-08 RX ORDER — POTASSIUM CL/LIDO/0.9 % NACL 10MEQ/0.1L
10 INTRAVENOUS SOLUTION, PIGGYBACK (ML) INTRAVENOUS
Status: DISCONTINUED | OUTPATIENT
Start: 2019-05-08 | End: 2019-05-12 | Stop reason: HOSPADM

## 2019-05-08 RX ORDER — ALBUTEROL SULFATE 90 UG/1
1-2 AEROSOL, METERED RESPIRATORY (INHALATION)
Status: DISCONTINUED | OUTPATIENT
Start: 2019-05-08 | End: 2019-05-12 | Stop reason: HOSPADM

## 2019-05-08 RX ORDER — DIPHENHYDRAMINE HYDROCHLORIDE 50 MG/ML
50 INJECTION INTRAMUSCULAR; INTRAVENOUS
Status: CANCELLED
Start: 2019-05-08

## 2019-05-08 RX ORDER — POTASSIUM CHLORIDE 29.8 MG/ML
20 INJECTION INTRAVENOUS
Status: DISCONTINUED | OUTPATIENT
Start: 2019-05-08 | End: 2019-05-12 | Stop reason: HOSPADM

## 2019-05-08 RX ORDER — EPINEPHRINE 1 MG/ML
0.3 INJECTION, SOLUTION, CONCENTRATE INTRAVENOUS EVERY 5 MIN PRN
Status: DISCONTINUED | OUTPATIENT
Start: 2019-05-08 | End: 2019-05-12 | Stop reason: HOSPADM

## 2019-05-08 RX ORDER — LORAZEPAM 0.5 MG/1
.5-1 TABLET ORAL EVERY 6 HOURS PRN
Status: DISCONTINUED | OUTPATIENT
Start: 2019-05-08 | End: 2019-05-12 | Stop reason: HOSPADM

## 2019-05-08 RX ORDER — ALLOPURINOL 300 MG/1
300 TABLET ORAL DAILY
Status: DISCONTINUED | OUTPATIENT
Start: 2019-05-08 | End: 2019-05-08

## 2019-05-08 RX ORDER — DEXAMETHASONE 4 MG/1
8 TABLET ORAL DAILY
Status: DISCONTINUED | OUTPATIENT
Start: 2019-05-14 | End: 2019-05-12 | Stop reason: HOSPADM

## 2019-05-08 RX ORDER — ACETAMINOPHEN 325 MG/1
650 TABLET ORAL ONCE
Status: COMPLETED | OUTPATIENT
Start: 2019-05-08 | End: 2019-05-08

## 2019-05-08 RX ORDER — DIPHENHYDRAMINE HYDROCHLORIDE 50 MG/ML
50 INJECTION INTRAMUSCULAR; INTRAVENOUS
Status: DISCONTINUED | OUTPATIENT
Start: 2019-05-08 | End: 2019-05-12 | Stop reason: HOSPADM

## 2019-05-08 RX ORDER — POTASSIUM CHLORIDE 7.45 MG/ML
10 INJECTION INTRAVENOUS
Status: DISCONTINUED | OUTPATIENT
Start: 2019-05-08 | End: 2019-05-12 | Stop reason: HOSPADM

## 2019-05-08 RX ORDER — AMOXICILLIN 250 MG
1-2 CAPSULE ORAL 2 TIMES DAILY
Status: DISCONTINUED | OUTPATIENT
Start: 2019-05-08 | End: 2019-05-12 | Stop reason: HOSPADM

## 2019-05-08 RX ORDER — ALBUTEROL SULFATE 0.83 MG/ML
2.5 SOLUTION RESPIRATORY (INHALATION)
Status: CANCELLED | OUTPATIENT
Start: 2019-05-08

## 2019-05-08 RX ORDER — PROCHLORPERAZINE MALEATE 10 MG
10 TABLET ORAL EVERY 6 HOURS PRN
Status: CANCELLED
Start: 2019-05-08

## 2019-05-08 RX ORDER — LIDOCAINE 40 MG/G
CREAM TOPICAL
Status: DISCONTINUED | OUTPATIENT
Start: 2019-05-08 | End: 2019-05-12 | Stop reason: HOSPADM

## 2019-05-08 RX ORDER — ALBUTEROL SULFATE 90 UG/1
1-2 AEROSOL, METERED RESPIRATORY (INHALATION)
Status: CANCELLED
Start: 2019-05-08

## 2019-05-08 RX ORDER — ALLOPURINOL 300 MG/1
300 TABLET ORAL DAILY
Status: CANCELLED | OUTPATIENT
Start: 2019-05-08

## 2019-05-08 RX ORDER — SODIUM CHLORIDE 9 MG/ML
1000 INJECTION, SOLUTION INTRAVENOUS CONTINUOUS PRN
Status: DISCONTINUED | OUTPATIENT
Start: 2019-05-08 | End: 2019-05-12 | Stop reason: HOSPADM

## 2019-05-08 RX ORDER — AMOXICILLIN 250 MG
2 CAPSULE ORAL 2 TIMES DAILY
Status: CANCELLED | OUTPATIENT
Start: 2019-05-08

## 2019-05-08 RX ORDER — DEXAMETHASONE 4 MG/1
8 TABLET ORAL DAILY
Status: CANCELLED
Start: 2019-05-14

## 2019-05-08 RX ORDER — MAGNESIUM SULFATE HEPTAHYDRATE 40 MG/ML
4 INJECTION, SOLUTION INTRAVENOUS EVERY 4 HOURS PRN
Status: DISCONTINUED | OUTPATIENT
Start: 2019-05-08 | End: 2019-05-12 | Stop reason: HOSPADM

## 2019-05-08 RX ORDER — LORAZEPAM 2 MG/ML
.5-1 INJECTION INTRAMUSCULAR EVERY 6 HOURS PRN
Status: DISCONTINUED | OUTPATIENT
Start: 2019-05-08 | End: 2019-05-12 | Stop reason: HOSPADM

## 2019-05-08 RX ORDER — ACYCLOVIR 200 MG/1
400 CAPSULE ORAL 2 TIMES DAILY
Status: DISCONTINUED | OUTPATIENT
Start: 2019-05-08 | End: 2019-05-12 | Stop reason: HOSPADM

## 2019-05-08 RX ORDER — POLYETHYLENE GLYCOL 3350 17 G/17G
17 POWDER, FOR SOLUTION ORAL DAILY PRN
Status: DISCONTINUED | OUTPATIENT
Start: 2019-05-08 | End: 2019-05-12 | Stop reason: HOSPADM

## 2019-05-08 RX ORDER — LORAZEPAM 2 MG/ML
.5-1 INJECTION INTRAMUSCULAR EVERY 6 HOURS PRN
Status: CANCELLED | OUTPATIENT
Start: 2019-05-08

## 2019-05-08 RX ORDER — FLUCONAZOLE 100 MG/1
100 TABLET ORAL DAILY
Status: DISCONTINUED | OUTPATIENT
Start: 2019-05-09 | End: 2019-05-12 | Stop reason: HOSPADM

## 2019-05-08 RX ORDER — DIPHENHYDRAMINE HCL 50 MG
50 CAPSULE ORAL ONCE
Status: COMPLETED | OUTPATIENT
Start: 2019-05-08 | End: 2019-05-08

## 2019-05-08 RX ORDER — ALBUTEROL SULFATE 0.83 MG/ML
2.5 SOLUTION RESPIRATORY (INHALATION)
Status: DISCONTINUED | OUTPATIENT
Start: 2019-05-08 | End: 2019-05-12 | Stop reason: HOSPADM

## 2019-05-08 RX ORDER — ONDANSETRON 4 MG/1
4 TABLET, FILM COATED ORAL EVERY 6 HOURS PRN
Status: DISCONTINUED | OUTPATIENT
Start: 2019-05-08 | End: 2019-05-12 | Stop reason: HOSPADM

## 2019-05-08 RX ORDER — AMOXICILLIN 250 MG
1-2 CAPSULE ORAL 2 TIMES DAILY PRN
Status: DISCONTINUED | OUTPATIENT
Start: 2019-05-08 | End: 2019-05-08

## 2019-05-08 RX ORDER — PROCHLORPERAZINE MALEATE 10 MG
10 TABLET ORAL EVERY 6 HOURS PRN
Status: DISCONTINUED | OUTPATIENT
Start: 2019-05-08 | End: 2019-05-08

## 2019-05-08 RX ORDER — METHYLPREDNISOLONE SODIUM SUCCINATE 125 MG/2ML
125 INJECTION, POWDER, LYOPHILIZED, FOR SOLUTION INTRAMUSCULAR; INTRAVENOUS
Status: DISCONTINUED | OUTPATIENT
Start: 2019-05-08 | End: 2019-05-12 | Stop reason: HOSPADM

## 2019-05-08 RX ORDER — ACETAMINOPHEN 325 MG/1
650 TABLET ORAL ONCE
Status: CANCELLED
Start: 2019-05-08

## 2019-05-08 RX ORDER — POTASSIUM CHLORIDE 750 MG/1
20-40 TABLET, EXTENDED RELEASE ORAL
Status: DISCONTINUED | OUTPATIENT
Start: 2019-05-08 | End: 2019-05-12 | Stop reason: HOSPADM

## 2019-05-08 RX ORDER — ALLOPURINOL 300 MG/1
300 TABLET ORAL DAILY
Status: DISCONTINUED | OUTPATIENT
Start: 2019-05-09 | End: 2019-05-12 | Stop reason: HOSPADM

## 2019-05-08 RX ORDER — POTASSIUM CHLORIDE 1.5 G/1.58G
20-40 POWDER, FOR SOLUTION ORAL
Status: DISCONTINUED | OUTPATIENT
Start: 2019-05-08 | End: 2019-05-12 | Stop reason: HOSPADM

## 2019-05-08 RX ORDER — PROCHLORPERAZINE MALEATE 5 MG
5 TABLET ORAL EVERY 6 HOURS PRN
Status: DISCONTINUED | OUTPATIENT
Start: 2019-05-08 | End: 2019-05-12 | Stop reason: HOSPADM

## 2019-05-08 RX ADMIN — ETOPOSIDE 140 MG: 20 INJECTION, SOLUTION, CONCENTRATE INTRAVENOUS at 18:09

## 2019-05-08 RX ADMIN — ACETAMINOPHEN 650 MG: 325 TABLET, FILM COATED ORAL at 13:14

## 2019-05-08 RX ADMIN — PROCHLORPERAZINE MALEATE 10 MG: 10 TABLET, FILM COATED ORAL at 13:14

## 2019-05-08 RX ADMIN — VINCRISTINE SULFATE 0.78 MG: 1 INJECTION, SOLUTION INTRAVENOUS at 18:10

## 2019-05-08 RX ADMIN — RITUXIMAB 700 MG: 10 INJECTION, SOLUTION INTRAVENOUS at 14:14

## 2019-05-08 RX ADMIN — ACYCLOVIR 400 MG: 200 CAPSULE ORAL at 19:55

## 2019-05-08 RX ADMIN — DIPHENHYDRAMINE HYDROCHLORIDE 50 MG: 50 CAPSULE ORAL at 13:14

## 2019-05-08 RX ADMIN — ONDANSETRON HYDROCHLORIDE 16 MG: 8 TABLET, FILM COATED ORAL at 17:29

## 2019-05-08 RX ADMIN — PREDNISONE 120 MG: 20 TABLET ORAL at 16:17

## 2019-05-08 RX ADMIN — SENNOSIDES AND DOCUSATE SODIUM 1 TABLET: 8.6; 5 TABLET ORAL at 19:55

## 2019-05-08 RX ADMIN — RANITIDINE 150 MG: 150 TABLET ORAL at 19:54

## 2019-05-08 RX ADMIN — SODIUM CHLORIDE 150 MG: 900 INJECTION, SOLUTION INTRAVENOUS at 17:29

## 2019-05-08 RX ADMIN — PROCHLORPERAZINE MALEATE 10 MG: 10 TABLET, FILM COATED ORAL at 19:55

## 2019-05-08 RX ADMIN — LIDOCAINE HYDROCHLORIDE 5 ML: 10 INJECTION, SOLUTION EPIDURAL; INFILTRATION; INTRACAUDAL; PERINEURAL at 10:10

## 2019-05-08 ASSESSMENT — MIFFLIN-ST. JEOR: SCORE: 1690.97

## 2019-05-08 ASSESSMENT — ACTIVITIES OF DAILY LIVING (ADL)
ADLS_ACUITY_SCORE: 10

## 2019-05-08 ASSESSMENT — PAIN SCALES - GENERAL: PAINLEVEL: NO PAIN (0)

## 2019-05-08 NOTE — NURSING NOTE
"Oncology Rooming Note    May 8, 2019 8:17 AM   Christiano Molina is a 43 year old male who presents for:    Chief Complaint   Patient presents with     Blood Draw     labs drawn in lab via , vitals taken, pt checked in for appt     Oncology Clinic Visit     B Cell Lymphoma , labs, admit to hosp     Initial Vitals: /80   Pulse 81   Temp 98.4  F (36.9  C) (Oral)   Resp 16   Wt 80.3 kg (177 lb 1.6 oz)   SpO2 99%   BMI 25.41 kg/m   Estimated body mass index is 25.41 kg/m  as calculated from the following:    Height as of 4/17/19: 1.778 m (5' 10\").    Weight as of this encounter: 80.3 kg (177 lb 1.6 oz). Body surface area is 1.99 meters squared.  No Pain (0) Comment: Data Unavailable   No LMP for male patient.  Allergies reviewed: Yes  Medications reviewed: Yes    Medications: Medication refills not needed today.  Pharmacy name entered into [facility]:    [facility] # [id number] - [place], [place] - [address] [place] [address] PHARMACY # [id number] - [place] - [address] [facility] - [place], [place] - [address] AT [facility] [address] [place] [address]    Clinical concerns: no concerns  Samuel  was notified.      Giana Vasquez MA              "

## 2019-05-08 NOTE — H&P
[facility], [place]    History and Physical  Hematology / Oncology     Date of Admission:  5/8/2019  Date of Service (when I saw the patient): 05/08/19    Assessment & Plan   Christiano Molina is a 43 year old male with Burkitt lymphoma, now s/p 2 cycles of DA-EPOCH-R. He is being admitted for Cycle 3.      #Burkitt Lymphoma, EBV+, Stage IA   Follows with Dr. Ramirez. Initially presented in mid-March with a right axillary mass, found to have high-grade B-cell lymphoma. Initial pathology was not fully clear whether DLBCL or Burkitt, but more recently this was finalized by [facility] Heme Path review as Burkitt lymphoma (EBV+). Plan is to do 6 cycles of DA-R-EPOCH with IT chemo prophylaxis during C3-6. Of note, pt had PET/CT (5/7) after 2 cycles; mild-mod FDG uptake in R axillary node suggestive of residual disease but overall size/activity appear decreased based on initial outside imaging report. Outpatient team working on getting initial images.   - PICC placed on admission.   - Allopurinol      Treatment plan: C3 DA-EPOCH-R (Day 0/1=5/8/19)  - Premeds: Tylenol/Benadryl  - Antiemetics: Emend D1&5, Zofran 16mg D1-5, Compazine 10mg q8hr  - Prednisone 120mg (60mg/m2) BID on D1-5  - Etoposide 140mg CIVI on Day 1-4  - Vincristine 0.78mg / Doxorubicin 28mg CIVI on Day 1-4  - Cyclophosphamide 2105 mg (1080mg/m2) Day 5  - Dex on D6-7  - PRN antiemetics  - will need Neulasta after discharge  - plan for addition of IT triple therapy on Day 1 and Day 5. Will plan to do D1 on 5/9. Then will arrange for D5 outpatient M/Tu after discharge.     #ID PPx  - continue ppx ACV and Fluc  - add Levaquin when ANC <1000 (plan to order on discharge)    #Chemotherapy induced nausea  Improved with scheduled antiemetics during chemo.   - scheduled antiemetics again with this cycle  - consider zyprexa if increased difficulty with nausea    #Constipation, chemotherapy induced  Currently improved but occurs during chemo per  pt.   - Senna-S BID  - Miralax PRN     #GERD  - continue home Zantac  - TUMS PRN    #Peripheral neuropathy.   Mild numbness/tingling affecting first 3 digits of b/l hands. Not interfering with function. Denies difficulty with numbness/tingling in feet.   - monitor    #H/o post-LP HA.   - monitor closely for this after LP done this cycle. Will encourage laying flat >1hr and caffeine/IVFs.     FEN  - no current IVFs in addition to CIVI chemo  - lyte repletion per unit protocol  - regular diet    PPx  - VTE: hold VTE ppx given plan for LP on 5/9  - GI: Zantac    Dispo: Anticipate discharge on Sunday (5/12).   - currently has Neulasta (5/13) and labs/transfusions set up for M/Th week after discharge  - will need LP arranged in clinic    Discussed with Dr. Jt Jones PA-C  Heme/Onc  [phone]    Code Status   Full Code, presumed    Primary Care Physician   Layton Weir    Chief Complaint   Scheduled admission for scheduled chemotherapy.     History is obtained from the patient and chart review.    History of Present Illness   Christiano Molina is a 43 year old male with Burkitt lymphoma, now s/p 2 cycles of DA-EPOCH-R. He is being admitted for Cycle 3.      Has been doing well overall since last cycle. Reports decreased energy the week following chemo, but by the second week he has improved energy and has been active (taking walks, doing stuff around the house). States that nausea was better controlled with scheduled antiemetics last cycle. He did take PRN antiemetic for a couple days after discharge but since then has not had difficulty with nausea. He also has struggled with constipation during and directly after chemo but by the second week after chemo his bowels return to normal. Last BM was this AM. He struggled with a post-LP HA for 2 weeks after his initial LP. HAs have been better. We discussed options to optimize status after LP that will be done inpatient. Denies mouth pain/sores.  Denies SOB or cough. Denies dysuria. Has had bilateral tinnitus, which was most noticeable with his post-LP HAs and now improving; mainly notices at night when it is quiet. Ongoing mild numbness in first 3 digits of b/l hands. Denies any current numbness/tingling in feet/toes. Denies any issues with extremity edema. Ready to begin this cycle.       Past Medical History    Past Medical History:   Diagnosis Date     Fourth CraniaNerve Palsy 2008       Past Surgical History   Past Surgical History:   Procedure Laterality Date     IR PICC VASCULAR  3/28/2019     PICC INSERTION Right 2019    5Fr - 42cm, Basilic vein, mid SVC         Prior to Admission Medications   Prior to Admission Medications   Prescriptions Last Dose Informant Patient Reported? Taking?   Acetaminophen (TYLENOL PO) Unknown at Unknown time  Yes No   MULTIVITAMINS OR TABS Unknown at Unknown time  Yes No   Si TABLET DAILY PO   TUMS 500 MG OR CHEW More than a month at Unknown time  Yes No   Si TABLET  PO   acyclovir (ZOVIRAX) 200 MG capsule   No No   Sig: Take 2 capsules (400 mg) by mouth 2 times daily   allopurinol (ZYLOPRIM) 300 MG tablet 2019 at Unknown time  No Yes   Sig: Take 1 tablet (300 mg) by mouth daily   dexamethasone (DECADRON) 4 MG tablet Past Month at Unknown time  No Yes   Sig: Take 8 mg by mouth daily On  and ..   fluconazole (DIFLUCAN) 100 MG tablet 2019 at Unknown time  No Yes   Sig: Take 1 tablet (100 mg) by mouth daily   ondansetron (ZOFRAN) 8 MG tablet Past Month at Unknown time  No Yes   Sig: Take 1 tablet (8 mg) by mouth every 8 hours as needed for nausea   ondansetron (ZOFRAN-ODT) 8 MG ODT tab Past Month at Unknown time  No Yes   Sig: Take 1 tablet (8 mg) by mouth every 8 hours At first continue scheduled (but can back off as nausea improves)   prochlorperazine (COMPAZINE) 10 MG tablet Past Month at Unknown time  No Yes   Sig: Take 1 tablet (10 mg) by mouth every 6 hours as needed for nausea or  vomiting (Try second.)   ranitidine (ZANTAC) 150 MG tablet 5/7/2019 at Unknown time  No Yes   Sig: Take 1 tablet (150 mg) by mouth 2 times daily   senna-docusate (SENOKOT-S/PERICOLACE) 8.6-50 MG tablet Past Week at Unknown time  No Yes   Sig: Take 2 tablets by mouth 2 times daily as needed for constipation   triamcinolone (KENALOG) 0.1 % external cream Unknown at Unknown time  No No   Sig: Apply a thin layer over rash twice daily      Facility-Administered Medications: None     Allergies   Allergies   Allergen Reactions     Chloraprep One Step Rash       Social History    Lives with his family (wife and 3 young children) in [place], [place]. 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 Using FMLA at this time.     Social History     Socioeconomic History     Marital status:      Spouse name: Not on file     Number of children: Not on file     Years of education: Not on file     Highest education level: Not on file   Occupational History     Not on file   Social Needs     Financial resource strain: Not on file     Food insecurity:     Worry: Not on file     Inability: Not on file     Transportation needs:     Medical: Not on file     Non-medical: Not on file   Tobacco Use     Smoking status: Never Smoker     Smokeless tobacco: Never Used   Substance and Sexual Activity     Alcohol use: Not on file     Drug use: Not on file     Sexual activity: Not on file   Lifestyle     Physical activity:     Days per week: Not on file     Minutes per session: Not on file     Stress: Not on file   Relationships     Social connections:     Talks on phone: Not on file     Gets together: Not on file     Attends Yarsani service: Not on file     Active member of club or organization: Not on file     Attends meetings of clubs or organizations: Not on file     Relationship status: Not on file     Intimate partner violence:     Fear of current or ex partner: Not on file     Emotionally abused: Not on file     Physically abused: Not on  file     Forced sexual activity: Not on file   Other Topics Concern     Parent/sibling w/ CABG, MI or angioplasty before 65F 55M? Not Asked   Social History Narrative     Not on file       Family History   Non-contributory.    Review of Systems   The 10 point Review of Systems is negative other than noted in the HPI or here.     Physical Exam   Temp: 97.9  F (36.6  C) Temp src: Oral BP: 124/71 Pulse: 75   Resp: 12 SpO2: 96 % O2 Device: None (Room air)    Vital Signs with Ranges  Temp:  [97.9  F (36.6  C)-98.6  F (37  C)] 97.9  F (36.6  C)  Pulse:  [75-82] 75  Resp:  [12-18] 12  BP: (124-134)/(71-80) 124/71  SpO2:  [96 %-100 %] 96 %  173 lbs 0 oz  General: Well-appearing male seen dressed in street clothes, sitting up in bed. Alert, interactive. NAD. Wife at bedside for support.   HEENT: NC/AT. EOMI, PERRL. Sclerae are anicteric. Oral mucosa is pink and moist with no lesions or thrush.   Heart: Regular rate and rhythm. No murmur appreciated.  Lungs: Breathing even and non-labored on RA. Lungs clear to auscultation bilaterally. No wheezing or crackles.   Abdomen: Bowel sounds present, abd soft, nontender, no appreciated hepatosplenomegaly.  Extremities: Grossly normal in appearance. No edema.   Neuro: Alert, speech normal, grossly nonfocal. Mentation appears normal, affect congruent.       Data    ROUTINE IP LABS (Last four results)  BMP  Recent Labs   Lab 05/08/19  0806 05/06/19  0934 05/02/19  0920    141 141   POTASSIUM 4.1 4.2 4.2   CHLORIDE 109 108 108   TAMMY 9.1 9.0 9.3   CO2 27 30 29   BUN 13 14 14   CR 1.13 1.18 1.14   GLC 64* 73 88     CBC  Recent Labs   Lab 05/08/19  0806 05/06/19  0934 05/02/19  0920   WBC 5.3 6.2 10.9   RBC 4.32* 4.32* 4.28*   HGB 13.1* 13.3 13.1*   HCT 39.3* 39.4* 39.1*   MCV 91 91 91   MCH 30.3 30.8 30.6   MCHC 33.3 33.8 33.5   RDW 14.6 14.5 14.5    143* 162         Results for orders placed or performed during the hospital encounter of 05/08/19 (from the past 24 hour(s))    XR Chest 1 View    Narrative    XR CHEST 1 VW  5/8/2019 10:57 AM      HISTORY: PICC line    COMPARISON: 4/17/2019    FINDINGS: PA view the chest. Right upper approach PICC with tip  projecting over the low SVC. Clear lungs. No pneumothorax or pleural  effusion. Heart size is normal.      Impression    IMPRESSION: Right upper approach PICC tip projecting over the low SVC.  Interval removal of the left-sided PICC.

## 2019-05-08 NOTE — LETTER
5/8/2019      RE: Christiano Molina  [address]  [place] [address]       Hematology-Oncology Visit  May 8, 2019    Reason for Visit: follow-up stage IA Burkitt's lymphoma     HPI: Christiano Molina is a 43 year old gentleman with past medical history of strabismus with diffuse large B cell lymphoma with MYC rearrangement making this high grade NOS versus Burkitt however clinically presenting more like high-grade DLBCL. He presented with R axillary mass. PET/CT showed stage IA disease. Bone marrow biopsy and LP done 3/25 were negative for disease. Please see Dr. Ramirez's note for further discussion of diagnostic work-up.     Plan for 2 cycles of DA-R-EPOCH followed by PET/CT. He presented for cycle 1 on 3/27/19 and was discharged on 3/31/19. He tolerated chemotherapy well apart from mild hives and facial itching from Rituxan (was able to complete dose), chemotherapy-induced nausea, and mild post-LP occipital headache.     Following discharge, his pathology was finalized here with Ki-67 positive in 95-99% of lymphoma cells. TDT negative, EBV was strongly positive. The positive findings for CD20, CD10, BCL6, very high Ki-67 with negative BCL2, as well as MYC translocation supports diagnoses of Burkitt's lymphoma.     Cycle 2 of R-EPOCH was given 4/17-4/21/19. He presents for routine follow up and to review his recent PET/CT prior to cycle 3.     Interval History:   Patient reports that overall he has been doing well.  He has occasional headaches that improved with rest.  He had a post LP headache that lasted for several weeks with his diagnostic LP.  He has stable bilateral tinnitus that he most notices when the room is quiet.  He has constant numbness in his fingertips that is not painful nor affecting function.  He has intermittent numbness in his toes with walking.  He denies any bleeding issues or night sweats.  He wonders if he may take milk thistle.  He denies other concerns.    No current outpatient  medications on file.     No current facility-administered medications for this visit.      PHYSICAL EXAM:  General: The patient is a pleasant male in no acute distress.  /80   Pulse 81   Temp 98.4  F (36.9  C) (Oral)   Resp 16   Wt 80.3 kg (177 lb 1.6 oz)   SpO2 99%   BMI 25.41 kg/m     Wt Readings from Last 10 Encounters:   05/08/19 80.3 kg (177 lb 1.6 oz)   04/25/19 80.3 kg (177 lb)   04/23/19 80.1 kg (176 lb 8 oz)   04/21/19 78.4 kg (172 lb 12.8 oz)   04/17/19 79.5 kg (175 lb 3.2 oz)   04/09/19 80.1 kg (176 lb 8 oz)   04/05/19 79.3 kg (174 lb 14.4 oz)   04/02/19 78.8 kg (173 lb 12.8 oz)   03/31/19 78.1 kg (172 lb 1.6 oz)   03/25/19 78.5 kg (173 lb)   HEENT: EOMI, PERRL. Sclerae are anicteric. Oral mucosa is pink and moist with no lesions or thrush.   Lymph: Neck is supple with no lymphadenopathy in the cervical or supraclavicular areas. No axillary adenopathy palpable.   Heart: Regular rate and rhythm.   Lungs: Clear to auscultation bilaterally.   Abdomen: Bowel sounds present, soft, nontender with no palpable hepatosplenomegaly or masses.   Extremities: No lower extremity edema noted bilaterally.   Neuro: Cranial nerves II through XII are grossly intact.  Skin: No rashes, petechiae, or bruising noted on exposed skin.      Labs:    5/8/2019 08:06   Sodium 140   Potassium 4.1   Chloride 109   Carbon Dioxide 27   Urea Nitrogen 13   Creatinine 1.13   GFR Estimate 79   GFR Estimate If Black >90   Calcium 9.1   Anion Gap 5   Glucose 64 (L)   WBC 5.3   Hemoglobin 13.1 (L)   Hematocrit 39.3 (L)   Platelet Count 157   RBC Count 4.32 (L)   MCV 91   MCH 30.3   MCHC 33.3   RDW 14.6   Diff Method Automated Method   % Neutrophils 66.6   % Lymphocytes 21.3   % Monocytes 9.5   % Eosinophils 0.4   % Basophils 1.1   % Immature Granulocytes 1.1   Nucleated RBCs 0   Absolute Neutrophil 3.5   Absolute Lymphocytes 1.1   Absolute Monocytes 0.5   Absolute Eosinophils 0.0   Absolute Basophils 0.1   Abs Immature Granulocytes  0.1   Absolute Nucleated RBC 0.0     Imaging:  PET/CT on 5/7/19 shows the following:  In this patient with diffuse large B-cell lymphoma, status  post 2 cycles of DA-R-EPOCH:   1. Mild-to-moderate FDG uptake in the right axillary lymph node  (Deauville scale 3), favoring to represent residual disease. Per  outside report dated 3/22/2019, overall size and metabolic activity  has decreased, although images/source data was not available at the  time of dictation. Recommend obtaining outside images for future  comparison.  2. Large appendicolith without evidence of acute appendicitis.  3. 5 mm solid pulmonary nodule. Attention on follow-up.    Assessment & Plan:     1. Stage Ia Burkitt's lymphoma: Negative marrow or CNS involvement. S/p 2 cycles of R-EPOCH with Neulasta support which he tolerated well beyond mild rituxan reaction and nausea. He had a great clinical response with resolution of his LAD. His PET/CT on 5/7/19 shows a very good response to treatment. I will request his outside PET/CT images be uploaded into our system. He will be admitted for cycle 3 R-EPOCH, which will be dose escalated again, per protocol. Overall plan is R-EPOCH x 6 cycles with IT methotrexate days 1&5 cycles 3-6. He will follow up with Dr. Ramirez in 3 weeks prior to cycle 4.     2. HEME: No transfusion needs today Plan to transfuse for Hgb <8 and platelets <10K. He will have labs on Mondays and Thursdays at [facility]. He has signed a blood consent.     3. ID: No active concerns. Continue ppx ACV and fluconazole. Start Levaquin when ANC <1000. Plan to send home at discharge with Levaquin.    4. Vascular access: PICC placements with admission. He may consider keeping the PICC line in this cycle at discharge.     5. NEURO: Post-LP headaches resolved. Discussed pushing fluids and using caffeine if recurs. If post-LP headache lasts more than 48 hours, would consider a blood patch. Has minimal grade 1 neuropathy in fingertips and toes from  vincristine, monitor.     6. GI:   -GERD. Under good control. Continue ranitidine 150 mg BID. Avoid offensive foods.   -Nausea. Chemotherapy induced. Will plan to schedule Compazine 10 mg tid and Zofran 16 mg daily during his admission, along with Emend on days 1 and 5. This regimen worked well for him with cycle 2. Continue prn antiemetics for nausea once home.   -Constipation. Chemotherapy induced. Continue stool softeners + Miralax scheduled during hospital admission, then prn once home.    7. Herbal supplements:  -Patient wondered about taking milk thistle. I reviewed with pharmacy. Milk thistle could increase serum concentrations of etoposide, vincristine, and Emend, so I recommend avoiding taking it. He is agreeable to avoiding it.     Mariah Baker PA-C  [facility]  [address], [place] [id number] [phone]

## 2019-05-08 NOTE — PLAN OF CARE
Prudencio was admitted today for Cycle 3 R-EPOCH.  Has been doing well at home.  PICC placed without difficulty.  Rituxan started at 1400 at 100 ml and increased by 100 ml every 30 minutes.

## 2019-05-08 NOTE — PROGRESS NOTES
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 He presented with R axillary mass. PET/CT showed stage IA disease. Bone marrow biopsy and LP done 3/25 were negative for disease. Please see Dr. Ramirez's note for further discussion of diagnostic work-up.     Plan for 2 cycles of DA-R-EPOCH followed by PET/CT. He presented for cycle 1 on 3/27/19 and was discharged on 3/31/19. He tolerated chemotherapy well apart from mild hives and facial itching from Rituxan (was able to complete dose), chemotherapy-induced nausea, and mild post-LP occipital headache.     Following discharge, his pathology was finalized here with Ki-67 positive in 95-99% of lymphoma cells. TDT negative, EBV was strongly positive. The positive findings for CD20, CD10, BCL6, very high Ki-67 with negative BCL2, as well as MYC translocation supports diagnoses of Burkitt's lymphoma.     Cycle 2 of R-EPOCH was given 4/17-4/21/19. He presents for routine follow up and to review his recent PET/CT prior to cycle 3.     Interval History:   Patient reports that overall he has been doing well.  He has occasional headaches that improved with rest.  He had a post LP headache that lasted for several weeks with his diagnostic LP.  He has stable bilateral tinnitus that he most notices when the room is quiet.  He has constant numbness in his fingertips that is not painful nor affecting function.  He has intermittent numbness in his toes with walking.  He denies any bleeding issues or night sweats.  He wonders if he may take milk thistle.  He denies other concerns.    No current outpatient medications on file.     No current facility-administered medications for this visit.       PHYSICAL EXAM:  General: The patient is a pleasant male in no acute distress.  /80   Pulse 81   Temp 98.4  F (36.9  C) (Oral)   Resp 16   Wt 80.3 kg (177 lb 1.6 oz)   SpO2 99%   BMI 25.41 kg/m    Wt Readings from Last 10 Encounters:   05/08/19 80.3 kg (177 lb 1.6 oz)   04/25/19 80.3 kg (177 lb)   04/23/19 80.1 kg (176 lb 8 oz)   04/21/19 78.4 kg (172 lb 12.8 oz)   04/17/19 79.5 kg (175 lb 3.2 oz)   04/09/19 80.1 kg (176 lb 8 oz)   04/05/19 79.3 kg (174 lb 14.4 oz)   04/02/19 78.8 kg (173 lb 12.8 oz)   03/31/19 78.1 kg (172 lb 1.6 oz)   03/25/19 78.5 kg (173 lb)   HEENT: EOMI, PERRL. Sclerae are anicteric. Oral mucosa is pink and moist with no lesions or thrush.   Lymph: Neck is supple with no lymphadenopathy in the cervical or supraclavicular areas. No axillary adenopathy palpable.   Heart: Regular rate and rhythm.   Lungs: Clear to auscultation bilaterally.   Abdomen: Bowel sounds present, soft, nontender with no palpable hepatosplenomegaly or masses.   Extremities: No lower extremity edema noted bilaterally.   Neuro: Cranial nerves II through XII are grossly intact.  Skin: No rashes, petechiae, or bruising noted on exposed skin.      Labs:    5/8/2019 08:06   Sodium 140   Potassium 4.1   Chloride 109   Carbon Dioxide 27   Urea Nitrogen 13   Creatinine 1.13   GFR Estimate 79   GFR Estimate If Black >90   Calcium 9.1   Anion Gap 5   Glucose 64 (L)   WBC 5.3   Hemoglobin 13.1 (L)   Hematocrit 39.3 (L)   Platelet Count 157   RBC Count 4.32 (L)   MCV 91   MCH 30.3   MCHC 33.3   RDW 14.6   Diff Method Automated Method   % Neutrophils 66.6   % Lymphocytes 21.3   % Monocytes 9.5   % Eosinophils 0.4   % Basophils 1.1   % Immature Granulocytes 1.1   Nucleated RBCs 0   Absolute Neutrophil 3.5   Absolute Lymphocytes 1.1   Absolute Monocytes 0.5   Absolute Eosinophils 0.0   Absolute Basophils 0.1   Abs Immature Granulocytes 0.1   Absolute Nucleated RBC 0.0     Imaging:  PET/CT on 5/7/19 shows the  following:  In this patient with diffuse large B-cell lymphoma, status  post 2 cycles of DA-R-EPOCH:   1. Mild-to-moderate FDG uptake in the right axillary lymph node  (Deauville scale 3), favoring to represent residual disease. Per  outside report dated 3/22/2019, overall size and metabolic activity  has decreased, although images/source data was not available at the  time of dictation. Recommend obtaining outside images for future  comparison.  2. Large appendicolith without evidence of acute appendicitis.  3. 5 mm solid pulmonary nodule. Attention on follow-up.    Assessment & Plan:     1. Stage Ia Burkitt's lymphoma: Negative marrow or CNS involvement. S/p 2 cycles of R-EPOCH with Neulasta support which he tolerated well beyond mild rituxan reaction and nausea. He had a great clinical response with resolution of his LAD. His PET/CT on 5/7/19 shows a very good response to treatment. I will request his outside PET/CT images be uploaded into our system. He will be admitted for cycle 3 R-EPOCH, which will be dose escalated again, per protocol. Overall plan is R-EPOCH x 6 cycles with IT methotrexate days 1&5 cycles 3-6. He will follow up with Dr. Ramirez in 3 weeks prior to cycle 4.     2. HEME: No transfusion needs today Plan to transfuse for Hgb <8 and platelets <10K. He will have labs on Mondays and Thursdays at [facility]. He has signed a blood consent.     3. ID: No active concerns. Continue ppx ACV and fluconazole. Start Levaquin when ANC <1000. Plan to send home at discharge with Levaquin.    4. Vascular access: PICC placements with admission. He may consider keeping the PICC line in this cycle at discharge.     5. NEURO: Post-LP headaches resolved. Discussed pushing fluids and using caffeine if recurs. If post-LP headache lasts more than 48 hours, would consider a blood patch. Has minimal grade 1 neuropathy in fingertips and toes from vincristine, monitor.     6. GI:   -GERD. Under good control. Continue  ranitidine 150 mg BID. Avoid offensive foods.   -Nausea. Chemotherapy induced. Will plan to schedule Compazine 10 mg tid and Zofran 16 mg daily during his admission, along with Emend on days 1 and 5. This regimen worked well for him with cycle 2. Continue prn antiemetics for nausea once home.   -Constipation. Chemotherapy induced. Continue stool softeners + Miralax scheduled during hospital admission, then prn once home.    7. Herbal supplements:  -Patient wondered about taking milk thistle. I reviewed with pharmacy. Milk thistle could increase serum concentrations of etoposide, vincristine, and Emend, so I recommend avoiding taking it. He is agreeable to avoiding it.     Mariah Baker PA-C  [facility]  [address], [place] [id number] [phone]

## 2019-05-08 NOTE — NURSING NOTE
Chief Complaint   Patient presents with     Blood Draw     labs drawn in lab via , vitals taken, pt checked in for appt     Tierra Cardenas, CMA

## 2019-05-08 NOTE — PROGRESS NOTES
Nursing Focus: Chemotherapy    D: Positive blood return via PICC. Insertion site is clean/dry/intact, dressing intact with no complaints of pain.  Urine output is recorded in intake in Doc Flowsheet.      I: Emend and Zofran premedication given. Dose #1 of Etoposide started to infuse over 22 hours and Dose #1 of Vincristine/Doxorubicin started to infuse over 22 hours. Reviewed pt teaching on chemotherapy side effects.  Pt denies need for further teaching. Chemotherapy double checked per protocol by two chemotherapy competent RN's.     A: Tolerating chemo well. Denies nausea and or pain.     P: Continue to monitor urine output and symptoms of nausea. Screen for symptoms of toxicity.

## 2019-05-08 NOTE — PROVIDER NOTIFICATION
DATE/TIME  (DOT-TD, DOT-NOW) CHEMO CHECK ACTIVITY (REGIMEN & DOSE CHECK, DAY, DOSE #, NAME OF CHEMO #1)  CHEMO DRUG #2  CHEMO DRUG #3 NAME OF RN #1 (USE DOT-ME HERE) NAME OF RN#2 (2ND RN TO LOG IN SEPARATELY)   5/8/2019   10:16 AM  Cycle 3 DA-R-EPOCH protocol double check increased by 20%    Toña Browning    5/8/2019  1:30 PM   Rituxan   Jane Ding   5/8/2019  4:55 PM Dose #1 Vincristine/Doxorubicin Dose#1 etoposide  Dayan R. Ayon   (Jahaira)   5/9/2019  1:51 PM   IT MTX   Jane Marion    5/9/2019  5:07 PM   Vincristine/doxorubicin   Dayan R. Ayon   Gustavo Felton     5/10/2019  2:36 PM Day #3 vincristine/doxorubicin double-check Day #3 etoposide double-check  Santa Nath Score    Sarah Lawson RN    5/11/2019  2:25 PM   Day #4 vincristine/doxorubicin dose verification Day #4 etoposide  Mary Nath Score    5/12/2019  12:15 PM   Day 5 Cytoxan   Mary Watson

## 2019-05-09 LAB
ANION GAP SERPL CALCULATED.3IONS-SCNC: 5 MMOL/L (ref 3–14)
BASOPHILS # BLD AUTO: 0 10E9/L (ref 0–0.2)
BASOPHILS NFR BLD AUTO: 0.1 %
BUN SERPL-MCNC: 14 MG/DL (ref 7–30)
CALCIUM SERPL-MCNC: 8.8 MG/DL (ref 8.5–10.1)
CHLORIDE SERPL-SCNC: 108 MMOL/L (ref 94–109)
CO2 SERPL-SCNC: 26 MMOL/L (ref 20–32)
CREAT SERPL-MCNC: 0.87 MG/DL (ref 0.66–1.25)
DIFFERENTIAL METHOD BLD: ABNORMAL
EOSINOPHIL # BLD AUTO: 0 10E9/L (ref 0–0.7)
EOSINOPHIL NFR BLD AUTO: 0 %
ERYTHROCYTE [DISTWIDTH] IN BLOOD BY AUTOMATED COUNT: 14.5 % (ref 10–15)
GFR SERPL CREATININE-BSD FRML MDRD: >90 ML/MIN/{1.73_M2}
GLUCOSE CSF-MCNC: 129 MG/DL (ref 40–70)
GLUCOSE SERPL-MCNC: 106 MG/DL (ref 70–99)
GRAM STN SPEC: NORMAL
HCT VFR BLD AUTO: 37.7 % (ref 40–53)
HGB BLD-MCNC: 12.1 G/DL (ref 13.3–17.7)
IMM GRANULOCYTES # BLD: 0.2 10E9/L (ref 0–0.4)
IMM GRANULOCYTES NFR BLD: 1 %
INR PPP: 1.06 (ref 0.86–1.14)
LYMPHOCYTES # BLD AUTO: 0.7 10E9/L (ref 0.8–5.3)
LYMPHOCYTES NFR BLD AUTO: 4.9 %
Lab: NORMAL
MAGNESIUM SERPL-MCNC: 2.2 MG/DL (ref 1.6–2.3)
MCH RBC QN AUTO: 30 PG (ref 26.5–33)
MCHC RBC AUTO-ENTMCNC: 32.1 G/DL (ref 31.5–36.5)
MCV RBC AUTO: 94 FL (ref 78–100)
MONOCYTES # BLD AUTO: 0.5 10E9/L (ref 0–1.3)
MONOCYTES NFR BLD AUTO: 3.5 %
NEUTROPHILS # BLD AUTO: 13.6 10E9/L (ref 1.6–8.3)
NEUTROPHILS NFR BLD AUTO: 90.5 %
NRBC # BLD AUTO: 0 10*3/UL
NRBC BLD AUTO-RTO: 0 /100
PHOSPHATE SERPL-MCNC: 2.8 MG/DL (ref 2.5–4.5)
PLATELET # BLD AUTO: 163 10E9/L (ref 150–450)
POTASSIUM SERPL-SCNC: 4.1 MMOL/L (ref 3.4–5.3)
PROT CSF-MCNC: 221 MG/DL (ref 15–60)
RBC # BLD AUTO: 4.03 10E12/L (ref 4.4–5.9)
SODIUM SERPL-SCNC: 138 MMOL/L (ref 133–144)
SPECIMEN SOURCE: NORMAL
WBC # BLD AUTO: 15.1 10E9/L (ref 4–11)

## 2019-05-09 PROCEDURE — 84157 ASSAY OF PROTEIN OTHER: CPT | Performed by: PHYSICIAN ASSISTANT

## 2019-05-09 PROCEDURE — 25000131 ZZH RX MED GY IP 250 OP 636 PS 637: Performed by: PHYSICIAN ASSISTANT

## 2019-05-09 PROCEDURE — 87015 SPECIMEN INFECT AGNT CONCNTJ: CPT | Performed by: PHYSICIAN ASSISTANT

## 2019-05-09 PROCEDURE — 25000132 ZZH RX MED GY IP 250 OP 250 PS 637: Performed by: PHYSICIAN ASSISTANT

## 2019-05-09 PROCEDURE — 87181 SC STD AGAR DILUTION PER AGT: CPT | Performed by: PHYSICIAN ASSISTANT

## 2019-05-09 PROCEDURE — 36592 COLLECT BLOOD FROM PICC: CPT | Performed by: PHYSICIAN ASSISTANT

## 2019-05-09 PROCEDURE — 25000128 H RX IP 250 OP 636: Performed by: PHYSICIAN ASSISTANT

## 2019-05-09 PROCEDURE — 89051 BODY FLUID CELL COUNT: CPT | Performed by: PHYSICIAN ASSISTANT

## 2019-05-09 PROCEDURE — 3E0R305 INTRODUCTION OF OTHER ANTINEOPLASTIC INTO SPINAL CANAL, PERCUTANEOUS APPROACH: ICD-10-PCS | Performed by: INTERNAL MEDICINE

## 2019-05-09 PROCEDURE — 96450 CHEMOTHERAPY INTO CNS: CPT | Performed by: INTERNAL MEDICINE

## 2019-05-09 PROCEDURE — 87076 CULTURE ANAEROBE IDENT EACH: CPT | Performed by: PHYSICIAN ASSISTANT

## 2019-05-09 PROCEDURE — 83735 ASSAY OF MAGNESIUM: CPT | Performed by: PHYSICIAN ASSISTANT

## 2019-05-09 PROCEDURE — 80048 BASIC METABOLIC PNL TOTAL CA: CPT | Performed by: PHYSICIAN ASSISTANT

## 2019-05-09 PROCEDURE — 12000001 ZZH R&B MED SURG/OB UMMC

## 2019-05-09 PROCEDURE — 88184 FLOWCYTOMETRY/ TC 1 MARKER: CPT | Performed by: PHYSICIAN ASSISTANT

## 2019-05-09 PROCEDURE — 88185 FLOWCYTOMETRY/TC ADD-ON: CPT | Performed by: PHYSICIAN ASSISTANT

## 2019-05-09 PROCEDURE — 87205 SMEAR GRAM STAIN: CPT | Performed by: PHYSICIAN ASSISTANT

## 2019-05-09 PROCEDURE — 40001004 ZZHCL STATISTIC FLOW INT 9-15 ABY TC 88188: Performed by: PHYSICIAN ASSISTANT

## 2019-05-09 PROCEDURE — 85025 COMPLETE CBC W/AUTO DIFF WBC: CPT | Performed by: PHYSICIAN ASSISTANT

## 2019-05-09 PROCEDURE — 82945 GLUCOSE OTHER FLUID: CPT | Performed by: PHYSICIAN ASSISTANT

## 2019-05-09 PROCEDURE — 85610 PROTHROMBIN TIME: CPT | Performed by: PHYSICIAN ASSISTANT

## 2019-05-09 PROCEDURE — 25000128 H RX IP 250 OP 636: Performed by: INTERNAL MEDICINE

## 2019-05-09 PROCEDURE — 84100 ASSAY OF PHOSPHORUS: CPT | Performed by: PHYSICIAN ASSISTANT

## 2019-05-09 PROCEDURE — 25800030 ZZH RX IP 258 OP 636: Performed by: PHYSICIAN ASSISTANT

## 2019-05-09 PROCEDURE — 25000131 ZZH RX MED GY IP 250 OP 636 PS 637: Performed by: INTERNAL MEDICINE

## 2019-05-09 PROCEDURE — 87070 CULTURE OTHR SPECIMN AEROBIC: CPT | Performed by: PHYSICIAN ASSISTANT

## 2019-05-09 RX ORDER — LIDOCAINE HYDROCHLORIDE 10 MG/ML
10 INJECTION, SOLUTION EPIDURAL; INFILTRATION; INTRACAUDAL; PERINEURAL ONCE
Status: DISCONTINUED | OUTPATIENT
Start: 2019-05-09 | End: 2019-05-12 | Stop reason: HOSPADM

## 2019-05-09 RX ADMIN — ETOPOSIDE 140 MG: 20 INJECTION, SOLUTION, CONCENTRATE INTRAVENOUS at 17:11

## 2019-05-09 RX ADMIN — ONDANSETRON HYDROCHLORIDE 16 MG: 8 TABLET, FILM COATED ORAL at 17:14

## 2019-05-09 RX ADMIN — PREDNISONE 120 MG: 20 TABLET ORAL at 16:04

## 2019-05-09 RX ADMIN — VINCRISTINE SULFATE 0.78 MG: 1 INJECTION, SOLUTION INTRAVENOUS at 17:12

## 2019-05-09 RX ADMIN — ACYCLOVIR 400 MG: 200 CAPSULE ORAL at 19:39

## 2019-05-09 RX ADMIN — SODIUM CHLORIDE 1000 ML: 9 INJECTION, SOLUTION INTRAVENOUS at 12:44

## 2019-05-09 RX ADMIN — RANITIDINE 150 MG: 150 TABLET ORAL at 08:10

## 2019-05-09 RX ADMIN — ALLOPURINOL 300 MG: 300 TABLET ORAL at 08:10

## 2019-05-09 RX ADMIN — PROCHLORPERAZINE MALEATE 10 MG: 10 TABLET, FILM COATED ORAL at 12:47

## 2019-05-09 RX ADMIN — PROCHLORPERAZINE MALEATE 10 MG: 10 TABLET, FILM COATED ORAL at 05:50

## 2019-05-09 RX ADMIN — METHOTREXATE: 25 INJECTION, SOLUTION INTRA-ARTERIAL; INTRAMUSCULAR; INTRATHECAL; INTRAVENOUS at 15:08

## 2019-05-09 RX ADMIN — SENNOSIDES AND DOCUSATE SODIUM 2 TABLET: 8.6; 5 TABLET ORAL at 19:39

## 2019-05-09 RX ADMIN — FLUCONAZOLE 100 MG: 100 TABLET ORAL at 08:10

## 2019-05-09 RX ADMIN — ACYCLOVIR 400 MG: 200 CAPSULE ORAL at 08:10

## 2019-05-09 RX ADMIN — PREDNISONE 120 MG: 20 TABLET ORAL at 08:10

## 2019-05-09 RX ADMIN — SENNOSIDES AND DOCUSATE SODIUM 2 TABLET: 8.6; 5 TABLET ORAL at 08:12

## 2019-05-09 RX ADMIN — PROCHLORPERAZINE MALEATE 10 MG: 10 TABLET, FILM COATED ORAL at 19:39

## 2019-05-09 RX ADMIN — RANITIDINE 150 MG: 150 TABLET ORAL at 19:39

## 2019-05-09 ASSESSMENT — ACTIVITIES OF DAILY LIVING (ADL)
ADLS_ACUITY_SCORE: 10

## 2019-05-09 ASSESSMENT — MIFFLIN-ST. JEOR: SCORE: 1717.74

## 2019-05-09 NOTE — PLAN OF CARE
VSS. Afebrile. Denies pain/nausea. CIVI Chemo infusing to R) PICC, good blood return. Up independent. Will continue w/POC.

## 2019-05-09 NOTE — PLAN OF CARE
Afebrile, VSS. Denies pain/nausea. Rituxan infusion completed this shift. Dose #1 of CIVI etoposide and vincristine/doxorubicin hung and pt tolerating well thus far. Good UOP. Good appetite this evening. Up ambulating in halls independently throughout the evening. Continue with plan of care.

## 2019-05-09 NOTE — PROCEDURES
Lumbar Puncture Procedure Note   Patient: Christiano Molina  : 1975  Date of Procedure: 2019                DIAGNOSIS: Burkitt Lymphoma  PROCEDURE: Lumbar puncture with intrathecal administration   SITE: Inpatient Room   INDICATION: Prophylactic Intrathecal administration of methotrexate    Procedure, benefits, risks and alternatives were explained to the patient, who voiced understanding of the information and agreed to proceed with the lumbar puncture. Risks include bleeding, infection, and post-procedure headache. Verbal and written consent were obtained.   Description: Consent obtained. Patient positioned fetal position. Pause for cause completed to verify patient identification using verbal verification. Skin overlying the L3-4 interspace and surrounding area was prepped and draped in a sterile fashion. Local anesthetic with (1%) Lidocaine was injected to anesthetize the skin and interspace being careful to assess for lack of CSF return prior to injection. A 22g spinal needle was placed into the L3-4 interspace with collection of approximately 6 mL of blood tinged spinal fluid. Specimen was sent for cell count and differential, protein and glucose, gram stain, culture, flow cytometry, and cytology.   Berto Trivedi then administered 6ml of methotrexate (12mg) without apparent complication. Chemotherapy previously double checked by two RNs and also verified by this provider and NP. Needle stylet was replaced and then needle removed and site cleaned and dressed with a bandage.  Complications: None. Patient tolerated procedure very well with minimal local pain. Atraumatic tap with minimal discomfort during procedure. No significant bleeding or other immediate complication observed.   Disposition: Patient and RN were instructed that patient should rest flat on back x 1 hour. He/She should notify RN if HA or pain develop.     Procedure performed by: Dr Berto Trivedi and Dr Gavin Waters.         Hematology/Oncology  Pager: [phone]

## 2019-05-09 NOTE — PLAN OF CARE
Prudencio is on Cycle 3 Day 2 R-EPOCH.  Etoposide, Vincristine, and Doxorubicin infusion CIVI with + blood return.  Denies nausea with Zofran and Compazine ATC.  Up walking halls often.  Plan for IT chemo this afternoon.  Wife here visiting.

## 2019-05-09 NOTE — PROGRESS NOTES
"[facility] [place]  Hematology / Oncology Progress Note    Date of Admission: 5/8/2019  Date of Service (when I saw the patient): 05/09/2019     Assessment & Plan   Christiano Molina is a 43 year old male with Burkitt lymphoma, now s/p 2 cycles of DA-EPOCH-R. He is admitted for Cycle 3.      #Burkitt Lymphoma, EBV+, Stage IA   Follows with Dr. Ramirez. Initially presented in mid-March with a right axillary mass, found to have high-grade B-cell lymphoma. Initial pathology was not fully clear whether DLBCL or Burkitt, but more recently this was finalized by [employer] Heme Path review as Burkitt lymphoma (EBV+). Plan is to do 6 cycles of DA-R-EPOCH with IT chemo prophylaxis during C3-6. Of note, pt had PET/CT (5/7) after 2 cycles; mild-mod FDG uptake in R axillary node suggestive of residual disease but overall size/activity appear decreased based on initial outside imaging report. Outpatient team working on obtaining initial images.   - PICC placed on admission.   - Allopurinol      Treatment plan: C3 DA-EPOCH-R (Day 0/1=5/8/19). Today will be Day 2.   - Premeds: Tylenol/Benadryl  - Antiemetics: Emend D1&5, Zofran 16mg D1-5, Compazine 10mg q8hr  - Prednisone 120mg (60mg/m2) BID on D1-5  - Etoposide 140mg CIVI on Day 1-4  - Vincristine 0.78mg / Doxorubicin 28mg CIVI on Day 1-4  - Cyclophosphamide 2105 mg (1080mg/m2) Day 5  - Dex on D6-7  - PRN antiemetics  - will need Neulasta after discharge  - plan for addition of IT triple therapy on Day 1 and Day 5. Will plan to do D1 on 5/9. Then will arrange for \"D5\" outpatient on M/Tu after discharge.     #Leukocytosis, 2/2 steroid per treatment plan. No infectious concerns at this time.  #Mild anemia, 2/2 chemotherapy.   - transfuse to maintain Hgb >7 (inpt, unless symptomatic)     #ID PPx  - continue PTA ppx ACV and Fluc  - add Levaquin when ANC <1000 (plan to order on discharge)     #Chemotherapy induced nausea  Improved with scheduled antiemetics " "during chemo. Controlled at this time.   - scheduled antiemetics as above  - consider zyprexa if increased difficulty with nausea     #Constipation, chemotherapy induced  Currently improved but occurs during chemo per pt. Had small BM since admission.   - Senna-S BID  - Miralax PRN     #GERD  - continue home Zantac  - TUMS PRN     #Peripheral neuropathy.   Mild numbness/tingling affecting first 3 digits of b/l hands. Not interfering with function. Denies difficulty with numbness/tingling in feet.   - monitor     #H/o post-LP HA.   - monitor closely for this after LP done this cycle. Will encourage laying flat >1hr and caffeine/IVFs.   - plan to give 1L NS bolus shama-procedurally     #Supplements.   Patient asked inpatient team about milk thistle. Had discussed this with outpatient team on 5/8 and was advised to avoid taking it (per RPH, milk thistle could increase serum concentrations of etoposide, vincristine, and Emend). Reviewed with inpatient chemo RPH, who stated same interactions and possibility of liver dysfunction. Will let pt know this.       FEN  - no current IVFs in addition to CIVI chemo, bolus PRN  - lyte repletion per unit protocol  - regular diet     PPx  - VTE: holding VTE ppx given plan for LP on 5/9. Will plan to add tonight for remainder of admission. He is otherwise actively ambulatory.  - GI: Zantac  - monitor BG on high dose steroid     Dispo: Anticipate discharge on Sunday (5/12).   - currently has Neulasta (5/13) and labs/transfusions set up for M/Th at  during the week after discharge. However, with plan to do \"D5\" LP/IT chemo in clinic, will request adjustment of Mon (5/13) appts to [facility]. Discussed with pt today.      Plan of care discussed with Dr. Waters.     Heidy Jones PA-C  Heme/Onc  [id number]     Interval History   No acute events overnight, afebrile. Started chemotherapy yesterday without issue. First night always a little interrupted, but more for " "cares rather than sleep disturbance with steroid. Rates that nausea as about a 1/10, controlled with antiemetics he has been receiving. Has been eating and drinking well. Feels better with fluids \"flushing\" his system.  Has been up and walking the halls and feels good doing so. Denies SOB or edema. Reports small BM since admission. Plans to lay flat for 2 hrs post LP today as well as try some cola (caffeine), would like fluid bolus with procedure as well.       Physical Exam   Temp: 96.7  F (35.9  C) Temp src: Oral BP: 121/70 Pulse: 67   Resp: 16 SpO2: 99 % O2 Device: None (Room air)    Vitals:    05/08/19 0908 05/09/19 0819   Weight: 78.5 kg (173 lb) 81.1 kg (178 lb 14.4 oz)     Vital Signs with Ranges  Temp:  [95.9  F (35.5  C)-98.6  F (37  C)] 96.7  F (35.9  C)  Pulse:  [62-82] 67  Resp:  [12-18] 16  BP: (104-137)/(55-74) 121/70  SpO2:  [96 %-100 %] 99 %  I/O last 3 completed shifts:  In: 1560 [P.O.:840; I.V.:20; IV Piggyback:700]  Out: 2875 [Urine:2875]    General: Well-appearing male seen walking the halls, later sitting up in bed. Alert, interactive. NAD.   HEENT: NC/AT. Sclerae are anicteric. MMM.  Heart: Regular rate and rhythm. No murmur appreciated.  Lungs: Breathing even and non-labored on RA. Lungs clear to auscultation bilaterally. No wheezing or crackles.   Abdomen: Bowel sounds present, abd soft, nontender.   Extremities: Grossly normal in appearance. No edema.   Neuro: Alert, speech normal, grossly nonfocal. Mentation appears normal, affect congruent.         Medications     - MEDICATION INSTRUCTIONS -       - MEDICATION INSTRUCTIONS -       sodium chloride         acyclovir  400 mg Oral BID     allopurinol  300 mg Oral Daily     Chemotherapy Infusing-Continuous Infusion   Does not apply Q8H     [START ON 5/12/2019] cyclophosphamide (CYTOXAN) chemo infusion  1,080 mg/m2 (Treatment Plan Recorded) Intravenous Once     [START ON 5/14/2019] dexamethasone  8 mg Oral Daily     etoposide (TOPOSAR) chemo " infusion  72 mg/m2 (Treatment Plan Recorded) Intravenous Q22H     fluconazole  100 mg Oral Daily     [START ON 5/12/2019] fosaprepitant (EMEND) 150 mg intermittent infusion  150 mg Intravenous Once     [START ON 5/12/2019] INTRATHECAL chemo - Cytarabine and/or methotrexate and/or Hydrocortisone   Intrathecal Once     INTRATHECAL chemo - Cytarabine and/or methotrexate and/or Hydrocortisone   Intrathecal Once     ondansetron  16 mg Oral Q24H     predniSONE  60 mg/m2 (Treatment Plan Recorded) Oral BID     prochlorperazine  10 mg Oral Q8H     ranitidine  150 mg Oral BID     senna-docusate  1-2 tablet Oral BID     sodium chloride (PF)  10 mL Intracatheter Q7 Days     vinCRIStine/DOXOrubicin (ONCOVIN/ADRIAMYCIN) chemo infusion  0.4 mg/m2 (Treatment Plan Recorded) Intravenous Q22H       Data   Results for orders placed or performed during the hospital encounter of 05/08/19 (from the past 24 hour(s))   XR Chest 1 View    Narrative    XR CHEST 1 VW  5/8/2019 10:57 AM      HISTORY: PICC line    COMPARISON: 4/17/2019    FINDINGS: PA view the chest. Right upper approach PICC with tip  projecting over the low SVC. Clear lungs. No pneumothorax or pleural  effusion. Heart size is normal.      Impression    IMPRESSION: Right upper approach PICC tip projecting over the low SVC.  Interval removal of the left-sided PICC.    I have personally reviewed the examination and initial interpretation  and I agree with the findings.    CHITRA CAMP MD   CBC with platelets differential   Result Value Ref Range    WBC 15.1 (H) 4.0 - 11.0 10e9/L    RBC Count 4.03 (L) 4.4 - 5.9 10e12/L    Hemoglobin 12.1 (L) 13.3 - 17.7 g/dL    Hematocrit 37.7 (L) 40.0 - 53.0 %    MCV 94 78 - 100 fl    MCH 30.0 26.5 - 33.0 pg    MCHC 32.1 31.5 - 36.5 g/dL    RDW 14.5 10.0 - 15.0 %    Platelet Count 163 150 - 450 10e9/L    Diff Method Automated Method     % Neutrophils 90.5 %    % Lymphocytes 4.9 %    % Monocytes 3.5 %    % Eosinophils 0.0 %    % Basophils 0.1 %     % Immature Granulocytes 1.0 %    Nucleated RBCs 0 0 /100    Absolute Neutrophil 13.6 (H) 1.6 - 8.3 10e9/L    Absolute Lymphocytes 0.7 (L) 0.8 - 5.3 10e9/L    Absolute Monocytes 0.5 0.0 - 1.3 10e9/L    Absolute Eosinophils 0.0 0.0 - 0.7 10e9/L    Absolute Basophils 0.0 0.0 - 0.2 10e9/L    Abs Immature Granulocytes 0.2 0 - 0.4 10e9/L    Absolute Nucleated RBC 0.0    Basic metabolic panel   Result Value Ref Range    Sodium 138 133 - 144 mmol/L    Potassium 4.1 3.4 - 5.3 mmol/L    Chloride 108 94 - 109 mmol/L    Carbon Dioxide 26 20 - 32 mmol/L    Anion Gap 5 3 - 14 mmol/L    Glucose 106 (H) 70 - 99 mg/dL    Urea Nitrogen 14 7 - 30 mg/dL    Creatinine 0.87 0.66 - 1.25 mg/dL    GFR Estimate >90 >60 mL/min/[1.73_m2]    GFR Estimate If Black >90 >60 mL/min/[1.73_m2]    Calcium 8.8 8.5 - 10.1 mg/dL   Magnesium   Result Value Ref Range    Magnesium 2.2 1.6 - 2.3 mg/dL   Phosphorus   Result Value Ref Range    Phosphorus 2.8 2.5 - 4.5 mg/dL   INR   Result Value Ref Range    INR 1.06 0.86 - 1.14        patient was critically ill... Patient was critically ill with a high probability of imminent or life threatening deterioration.

## 2019-05-10 LAB
ANION GAP SERPL CALCULATED.3IONS-SCNC: 7 MMOL/L (ref 3–14)
BASOPHILS # BLD AUTO: 0 10E9/L (ref 0–0.2)
BASOPHILS NFR BLD AUTO: 0.1 %
BUN SERPL-MCNC: 11 MG/DL (ref 7–30)
CALCIUM SERPL-MCNC: 9.1 MG/DL (ref 8.5–10.1)
CHLORIDE SERPL-SCNC: 109 MMOL/L (ref 94–109)
CO2 SERPL-SCNC: 26 MMOL/L (ref 20–32)
COPATH REPORT: NORMAL
CREAT SERPL-MCNC: 0.9 MG/DL (ref 0.66–1.25)
DIFFERENTIAL METHOD BLD: ABNORMAL
EOSINOPHIL # BLD AUTO: 0 10E9/L (ref 0–0.7)
EOSINOPHIL NFR BLD AUTO: 0 %
ERYTHROCYTE [DISTWIDTH] IN BLOOD BY AUTOMATED COUNT: 14.7 % (ref 10–15)
GFR SERPL CREATININE-BSD FRML MDRD: >90 ML/MIN/{1.73_M2}
GLUCOSE SERPL-MCNC: 120 MG/DL (ref 70–99)
HCT VFR BLD AUTO: 38.1 % (ref 40–53)
HGB BLD-MCNC: 12.4 G/DL (ref 13.3–17.7)
IMM GRANULOCYTES # BLD: 0.1 10E9/L (ref 0–0.4)
IMM GRANULOCYTES NFR BLD: 0.9 %
LYMPHOCYTES # BLD AUTO: 0.8 10E9/L (ref 0.8–5.3)
LYMPHOCYTES NFR BLD AUTO: 4.6 %
MCH RBC QN AUTO: 30.2 PG (ref 26.5–33)
MCHC RBC AUTO-ENTMCNC: 32.5 G/DL (ref 31.5–36.5)
MCV RBC AUTO: 93 FL (ref 78–100)
MONOCYTES # BLD AUTO: 1 10E9/L (ref 0–1.3)
MONOCYTES NFR BLD AUTO: 6 %
NEUTROPHILS # BLD AUTO: 14.5 10E9/L (ref 1.6–8.3)
NEUTROPHILS NFR BLD AUTO: 88.4 %
NRBC # BLD AUTO: 0 10*3/UL
NRBC BLD AUTO-RTO: 0 /100
PLATELET # BLD AUTO: 200 10E9/L (ref 150–450)
POTASSIUM SERPL-SCNC: 3.9 MMOL/L (ref 3.4–5.3)
RBC # BLD AUTO: 4.1 10E12/L (ref 4.4–5.9)
SODIUM SERPL-SCNC: 141 MMOL/L (ref 133–144)
WBC # BLD AUTO: 16.4 10E9/L (ref 4–11)

## 2019-05-10 PROCEDURE — 25000132 ZZH RX MED GY IP 250 OP 250 PS 637: Performed by: PHYSICIAN ASSISTANT

## 2019-05-10 PROCEDURE — 80048 BASIC METABOLIC PNL TOTAL CA: CPT | Performed by: PHYSICIAN ASSISTANT

## 2019-05-10 PROCEDURE — 36592 COLLECT BLOOD FROM PICC: CPT | Performed by: PHYSICIAN ASSISTANT

## 2019-05-10 PROCEDURE — 25000131 ZZH RX MED GY IP 250 OP 636 PS 637: Performed by: PHYSICIAN ASSISTANT

## 2019-05-10 PROCEDURE — 25800030 ZZH RX IP 258 OP 636: Performed by: PHYSICIAN ASSISTANT

## 2019-05-10 PROCEDURE — 25000128 H RX IP 250 OP 636: Performed by: PHYSICIAN ASSISTANT

## 2019-05-10 PROCEDURE — 25000131 ZZH RX MED GY IP 250 OP 636 PS 637: Performed by: INTERNAL MEDICINE

## 2019-05-10 PROCEDURE — 85025 COMPLETE CBC W/AUTO DIFF WBC: CPT | Performed by: PHYSICIAN ASSISTANT

## 2019-05-10 PROCEDURE — 99232 SBSQ HOSP IP/OBS MODERATE 35: CPT | Performed by: INTERNAL MEDICINE

## 2019-05-10 PROCEDURE — 12000001 ZZH R&B MED SURG/OB UMMC

## 2019-05-10 RX ORDER — LEVOFLOXACIN 250 MG/1
250 TABLET, FILM COATED ORAL DAILY
Qty: 30 TABLET | Refills: 0 | Status: ON HOLD | OUTPATIENT
Start: 2019-05-10 | End: 2019-06-03

## 2019-05-10 RX ADMIN — RANITIDINE 150 MG: 150 TABLET ORAL at 08:57

## 2019-05-10 RX ADMIN — ACYCLOVIR 400 MG: 200 CAPSULE ORAL at 08:57

## 2019-05-10 RX ADMIN — PROCHLORPERAZINE MALEATE 10 MG: 10 TABLET, FILM COATED ORAL at 06:00

## 2019-05-10 RX ADMIN — ONDANSETRON HYDROCHLORIDE 16 MG: 8 TABLET, FILM COATED ORAL at 19:39

## 2019-05-10 RX ADMIN — SENNOSIDES AND DOCUSATE SODIUM 1 TABLET: 8.6; 5 TABLET ORAL at 19:39

## 2019-05-10 RX ADMIN — VINCRISTINE SULFATE 0.78 MG: 1 INJECTION, SOLUTION INTRAVENOUS at 17:12

## 2019-05-10 RX ADMIN — ETOPOSIDE 140 MG: 20 INJECTION, SOLUTION, CONCENTRATE INTRAVENOUS at 17:12

## 2019-05-10 RX ADMIN — FLUCONAZOLE 100 MG: 100 TABLET ORAL at 08:57

## 2019-05-10 RX ADMIN — PROCHLORPERAZINE MALEATE 10 MG: 10 TABLET, FILM COATED ORAL at 19:39

## 2019-05-10 RX ADMIN — PROCHLORPERAZINE MALEATE 10 MG: 10 TABLET, FILM COATED ORAL at 12:27

## 2019-05-10 RX ADMIN — PREDNISONE 120 MG: 20 TABLET ORAL at 08:57

## 2019-05-10 RX ADMIN — RANITIDINE 150 MG: 150 TABLET ORAL at 19:39

## 2019-05-10 RX ADMIN — PREDNISONE 120 MG: 20 TABLET ORAL at 17:36

## 2019-05-10 RX ADMIN — SENNOSIDES AND DOCUSATE SODIUM 1 TABLET: 8.6; 5 TABLET ORAL at 09:02

## 2019-05-10 RX ADMIN — ALLOPURINOL 300 MG: 300 TABLET ORAL at 08:57

## 2019-05-10 RX ADMIN — ACYCLOVIR 400 MG: 200 CAPSULE ORAL at 19:39

## 2019-05-10 ASSESSMENT — ACTIVITIES OF DAILY LIVING (ADL)
ADLS_ACUITY_SCORE: 10

## 2019-05-10 ASSESSMENT — MIFFLIN-ST. JEOR: SCORE: 1712.29

## 2019-05-10 NOTE — PLAN OF CARE
VSS. Afebrile. Denies pain/nausea. CIVI chemo infusing to R) PICC x 2, good blood return. Up ambulating this morning. Will continue w/POC.

## 2019-05-10 NOTE — PROGRESS NOTES
Chemotherapy  D: Blood return is brisk per DL PICC catheter. Urine output is adequate as recorded in intake and output flowsheet, chemotherapy agents double checked by two chemotherapy certified RN's, documentation in doc flowsheet recorded .     I: Premedications given (see electronic medical administration record). Dose #2 of etoposide and vincristine/dox started to infuse over 22hours.     A: Tolerating well, A&O x4, denies pain/nausea, Pt ambulating in halls frequently.    P: Continue to monitor urine output and symptoms of nausea. Screen for symptoms of toxicity.

## 2019-05-10 NOTE — PROGRESS NOTES
SPIRITUAL HEALTH SERVICES  SPIRITUAL ASSESSMENT   [facility] ([place] 7D     PRIMARY FOCUS:     Goals of care    Symptom/pain management    Emotional/spiritual/Spiritism distress    Support for coping     Visit with Prudencio Molina and his roommate. Prudencio and his roommate has asked to receive Communion together.     Distress: Prudencio and his roommate talked about their struggles to stay on top of thier anxiety. They both worry about their cancer not responding to treatment as well as worrying about what will happen if the cancer does respond to treatment  but then comes back.      Coping/Meaning Making: Prudencio identifies himself as EL[employer]-Ori[employer] orthodox and says he needs to learn to trust God more and to find relief in even the little answers. We explored what genie looks like and  how trust often happens in the doubts, the questions and the hard times, even more than at other times.      Intervention: Reviewed documentation. Offered reflective conversation and emotional/spiritual support through validation of feelings and experience which integrated elements of illness and family narratives and Mosque Communion.     PLAN: I will continue to follow for on-going spiritual assessment and support while Prudencio is on unit 7D.     Stephanie Stoddard  Oncology   Pager [phone]

## 2019-05-10 NOTE — PLAN OF CARE
[id number]: AVSS on RA. Denies pain. Nausea adequately managed with scheduled antiemetics. Day 2 CIVI vincristine/doxorubicin and etoposide infusing via PICC with brisk blood return noted q4h. Day 3 to be hung around [id number]. Tolerating well. LP site C/D/I. Voiding spontaneously. Up independently. Continue with POC.

## 2019-05-10 NOTE — PLAN OF CARE
Afebrile, VSS. Denies pain, c/o mild nausea, controlled with scheduled compazine. Pt laid flat for 2 hours following LP this afternoon, denies headache. Dose #2 Etoposide, Vincristine, and Doxorubicin CIVI chemo infusing. Good appetite this evening. Good UOP. Ambulating in halls frequently. Continue to monitor.

## 2019-05-10 NOTE — PROGRESS NOTES
"[facility], [place]  Hematology / Oncology Progress Note    Date of Admission: 5/8/2019  Date of Service (when I saw the patient): 05/10/2019     Assessment & Plan   Christiano Molina is a 43 year old male with Burkitt lymphoma, now s/p 2 cycles of DA-EPOCH-R. He is admitted for Cycle 3.      #Burkitt Lymphoma, EBV+, Stage IA   Follows with Dr. Ramirez. Initially presented in mid-March with a right axillary mass, found to have high-grade B-cell lymphoma. Initial pathology was not fully clear whether DLBCL or Burkitt, but more recently this was finalized by [facility] Heme Path review as Burkitt lymphoma (EBV+). Plan is to do 6 cycles of DA-R-EPOCH with IT chemo prophylaxis during C3-6. Of note, pt had PET/CT (5/7) after 2 cycles; mild-mod FDG uptake in R axillary node suggestive of residual disease but overall size/activity appear decreased based on initial outside imaging report. Outpatient team note indicates they are working on obtaining initial images.   - PICC placed on admission.   - Allopurinol      Treatment plan: C3 DA-EPOCH-R (Day 0/1=5/8/19). Today will be Day 3.   - Premeds: Tylenol/Benadryl  - Antiemetics: Emend D1&5, Zofran 16mg D1-5, Compazine 10mg q8hr  - Prednisone 120mg (60mg/m2) BID on D1-5. Started 5/8 PM so last dose will be 5/13 AM. Will need scripts for last dose(s) pending timing of discharge.  - Etoposide 140mg CIVI on Day 1-4  - Vincristine 0.78mg / Doxorubicin 28mg CIVI on Day 1-4  - Cyclophosphamide 2105 mg (1080mg/m2) Day 5  - Dex on D6-7. Will need to provide script on discharge and instruct pt to take on D7 and D8 (5/14 and 5/15) given that he will be completing high dose steroid on 5/13 AM.   - PRN antiemetics  - will need Neulasta after discharge  - plan for addition of IT triple therapy on Day 1 and Day 5. Had D1 on 5/9. CSF with high cell counts (traumatic tap). Diff pending, flow negative.  Arranged for \"D5\" LP with IT chemo to be done outpatient on M " (5/13).    #Leukocytosis, 2/2 steroid per treatment plan. No infectious concerns at this time.  #Mild anemia, 2/2 chemotherapy.   - transfuse to maintain Hgb >7 (inpt, unless symptomatic)     #ID PPx  - continue PTA ppx ACV and Fluc (pt reports having refills of these at home)  - start Levaquin when ANC <1000 (pt does not have this, have ordered for discharge)     #Chemotherapy induced nausea  Improved with scheduled antiemetics during chemo. Controlled at this time. He feels that the Emend on D1 has been helpful.  - scheduled antiemetics as above  - consider zyprexa if increased difficulty with nausea     #Constipation, chemotherapy induced  Currently improved but occurs during chemo per pt. Having adequate BMs since admission per pt.  - Senna-S BID  - Miralax PRN     #GERD  - continue home Zantac  - TUMS PRN     #Peripheral neuropathy.   Mild numbness/tingling affecting first 3 digits of b/l hands. Not interfering with function. Denies difficulty with numbness/tingling in feet.   - monitor     #H/o post-LP HA.   - monitor closely for this after LP done this cycle. Pt did receive 1L NS bolus shama-procedurally, had cola with caffeine, and laid flat for 2 hrs post procedure. No HAs this AM, but monitor as these did not start for >24 hrs after last LP.      #Supplements.   Patient asked inpatient team about milk thistle. Had discussed this with outpatient team on 5/8 and was advised to avoid taking it (per RPH, milk thistle could increase serum concentrations of etoposide, vincristine, and Emend). Reviewed with inpatient chemo RPH, who stated same interactions and possibility of liver dysfunction. I discussed this with the pt as well.     FEN  - no current IVFs in addition to CIVI chemo, bolus PRN  - lyte repletion per unit protocol  - regular diet     PPx  - VTE: initially held given plan for LP on 5/9. Declined on 5/9 PM. Ok to discontinue as pt is actively ambulatory, add back if activity level decreases.   - GI:  Zantac  - monitor BG on high dose steroid. Has been <200 on AM labs thus far.      Dispo: Anticipate discharge on Sunday (5/12).   [place] [facility] [place] with [place] chemo, and Neulasta on 5/13 afternoon.   - currently has labs/transfusions set up for M/Th at  starting 5/16 during the 1-2 weeks after discharge.      Plan of care discussed with Dr. Waters.     Heidy Jones PA-C  Heme/Onc  [phone]     Interval History   No acute events overnight, afebrile. States he is feeling very well today. No HA since LP but does state that these started >24hrs after last. Reports nausea is controlled and thinks the Emend on D1 was very helpful this round. No mouth sores, SOB or difficulty breathing. Had harder BM this AM but felt it was a good one. Using bowel regimen to help prevent constipation. Denies feeling of fluid overload/edema. He reports weight was 177 in clinic on 5/8 PTA, then was recorded at 173 on scale inpatient. Weight yesterday and today is 178, so he has not really gained much weight here. Continues to ambulate.       Physical Exam   Temp: 95.6  F (35.3  C) Temp src: Oral BP: 118/64 Pulse: 71 Heart Rate: 68 Resp: 16 SpO2: 96 % O2 Device: None (Room air)    Vitals:    05/08/19 0908 05/09/19 0819 05/10/19 0727   Weight: 78.5 kg (173 lb) 81.1 kg (178 lb 14.4 oz) 80.6 kg (177 lb 11.2 oz)     Vital Signs with Ranges  Temp:  [95.6  F (35.3  C)-98  F (36.7  C)] 95.6  F (35.3  C)  Pulse:  [71-77] 71  Heart Rate:  [68-76] 68  Resp:  [14-18] 16  BP: (117-126)/(57-70) 118/64  SpO2:  [95 %-97 %] 96 %  I/O last 3 completed shifts:  In: 1980 [P.O.:960; I.V.:20; IV Piggyback:1000]  Out: 5150 [Urine:5150]    General: Well-appearing male seen sitting up in bed. Alert, interactive. NAD.   HEENT: NC/AT. Sclerae are anicteric. OP pink and moist, no lesions or thrush.   Heart: Regular rate and rhythm. No murmur appreciated.  Lungs: Breathing even and non-labored on RA. Lungs clear to auscultation bilaterally.  No wheezing or crackles.   Abdomen: Bowel sounds present, abd soft, non-distended and nontender.   Extremities: Grossly normal in appearance. No edema.   Neuro: Alert, speech normal, grossly nonfocal. Mentation appears normal, affect congruent.   VAD: PICC c/d/i      Medications     - MEDICATION INSTRUCTIONS -       - MEDICATION INSTRUCTIONS -       sodium chloride         acyclovir  400 mg Oral BID     allopurinol  300 mg Oral Daily     Chemotherapy Infusing-Continuous Infusion   Does not apply Q8H     [START ON 5/12/2019] cyclophosphamide (CYTOXAN) chemo infusion  1,080 mg/m2 (Treatment Plan Recorded) Intravenous Once     [START ON 5/14/2019] dexamethasone  8 mg Oral Daily     enoxaparin  40 mg Subcutaneous Q24H     etoposide (TOPOSAR) chemo infusion  72 mg/m2 (Treatment Plan Recorded) Intravenous Q22H     fluconazole  100 mg Oral Daily     [START ON 5/12/2019] fosaprepitant (EMEND) 150 mg intermittent infusion  150 mg Intravenous Once     [START ON 5/12/2019] INTRATHECAL chemo - Cytarabine and/or methotrexate and/or Hydrocortisone   Intrathecal Once     lidocaine (PF)  10 mL Subcutaneous Once     ondansetron  16 mg Oral Q24H     predniSONE  60 mg/m2 (Treatment Plan Recorded) Oral BID     prochlorperazine  10 mg Oral Q8H     ranitidine  150 mg Oral BID     senna-docusate  1-2 tablet Oral BID     sodium chloride (PF)  10 mL Intracatheter Q7 Days     vinCRIStine/DOXOrubicin (ONCOVIN/ADRIAMYCIN) chemo infusion  0.4 mg/m2 (Treatment Plan Recorded) Intravenous Q22H       Data   Results for orders placed or performed during the hospital encounter of 05/08/19 (from the past 24 hour(s))   Glucose CSF:   Result Value Ref Range    Glucose  (H) 40 - 70 mg/dL   Protein total CSF:   Result Value Ref Range    Protein Total  (H) 15 - 60 mg/dL   Gram stain   Result Value Ref Range    Specimen Description Cerebrospinal fluid     Special Requests TUBE 2     Gram Stain No organisms seen     Gram Stain Few  WBC'S  seen  predominantly PMN's       Gram Stain Many  Red blood cells seen       Gram Stain       Quantification of host cells and microbiological organisms was done on a cytocentrifuged   preparation.     CSF Culture Aerobic Bacterial   Result Value Ref Range    Specimen Description Cerebrospinal fluid     Special Requests TUBE 2     Culture Micro PENDING    Cell count with differential CSF:   Result Value Ref Range    WBC CSF 12 (H) 0 - 5 /uL    RBC CSF 10,000 (H) 0 - 2 /uL    Tube Number 4 #    Color CSF Pink (A) CLRL^Colorless    Appearance CSF Slightly Cloudy (A) CLER^Clear   CBC with platelets differential   Result Value Ref Range    WBC 16.4 (H) 4.0 - 11.0 10e9/L    RBC Count 4.10 (L) 4.4 - 5.9 10e12/L    Hemoglobin 12.4 (L) 13.3 - 17.7 g/dL    Hematocrit 38.1 (L) 40.0 - 53.0 %    MCV 93 78 - 100 fl    MCH 30.2 26.5 - 33.0 pg    MCHC 32.5 31.5 - 36.5 g/dL    RDW 14.7 10.0 - 15.0 %    Platelet Count 200 150 - 450 10e9/L    Diff Method Automated Method     % Neutrophils 88.4 %    % Lymphocytes 4.6 %    % Monocytes 6.0 %    % Eosinophils 0.0 %    % Basophils 0.1 %    % Immature Granulocytes 0.9 %    Nucleated RBCs 0 0 /100    Absolute Neutrophil 14.5 (H) 1.6 - 8.3 10e9/L    Absolute Lymphocytes 0.8 0.8 - 5.3 10e9/L    Absolute Monocytes 1.0 0.0 - 1.3 10e9/L    Absolute Eosinophils 0.0 0.0 - 0.7 10e9/L    Absolute Basophils 0.0 0.0 - 0.2 10e9/L    Abs Immature Granulocytes 0.1 0 - 0.4 10e9/L    Absolute Nucleated RBC 0.0    Basic metabolic panel   Result Value Ref Range    Sodium 141 133 - 144 mmol/L    Potassium 3.9 3.4 - 5.3 mmol/L    Chloride 109 94 - 109 mmol/L    Carbon Dioxide 26 20 - 32 mmol/L    Anion Gap 7 3 - 14 mmol/L    Glucose 120 (H) 70 - 99 mg/dL    Urea Nitrogen 11 7 - 30 mg/dL    Creatinine 0.90 0.66 - 1.25 mg/dL    GFR Estimate >90 >60 mL/min/[1.73_m2]    GFR Estimate If Black >90 >60 mL/min/[1.73_m2]    Calcium 9.1 8.5 - 10.1 mg/dL

## 2019-05-11 LAB
ANION GAP SERPL CALCULATED.3IONS-SCNC: 6 MMOL/L (ref 3–14)
BASOPHILS # BLD AUTO: 0 10E9/L (ref 0–0.2)
BASOPHILS NFR BLD AUTO: 0.1 %
BUN SERPL-MCNC: 12 MG/DL (ref 7–30)
CALCIUM SERPL-MCNC: 8.9 MG/DL (ref 8.5–10.1)
CHLORIDE SERPL-SCNC: 108 MMOL/L (ref 94–109)
CO2 SERPL-SCNC: 26 MMOL/L (ref 20–32)
CREAT SERPL-MCNC: 0.86 MG/DL (ref 0.66–1.25)
DIFFERENTIAL METHOD BLD: ABNORMAL
EOSINOPHIL # BLD AUTO: 0 10E9/L (ref 0–0.7)
EOSINOPHIL NFR BLD AUTO: 0 %
ERYTHROCYTE [DISTWIDTH] IN BLOOD BY AUTOMATED COUNT: 15 % (ref 10–15)
GFR SERPL CREATININE-BSD FRML MDRD: >90 ML/MIN/{1.73_M2}
GLUCOSE SERPL-MCNC: 163 MG/DL (ref 70–99)
HCT VFR BLD AUTO: 36.4 % (ref 40–53)
HGB BLD-MCNC: 11.6 G/DL (ref 13.3–17.7)
IMM GRANULOCYTES # BLD: 0.1 10E9/L (ref 0–0.4)
IMM GRANULOCYTES NFR BLD: 0.7 %
LYMPHOCYTES # BLD AUTO: 0.6 10E9/L (ref 0.8–5.3)
LYMPHOCYTES NFR BLD AUTO: 7.1 %
MCH RBC QN AUTO: 30.1 PG (ref 26.5–33)
MCHC RBC AUTO-ENTMCNC: 31.9 G/DL (ref 31.5–36.5)
MCV RBC AUTO: 95 FL (ref 78–100)
MONOCYTES # BLD AUTO: 0.4 10E9/L (ref 0–1.3)
MONOCYTES NFR BLD AUTO: 4.7 %
NEUTROPHILS # BLD AUTO: 7.9 10E9/L (ref 1.6–8.3)
NEUTROPHILS NFR BLD AUTO: 87.4 %
NRBC # BLD AUTO: 0 10*3/UL
NRBC BLD AUTO-RTO: 0 /100
PLATELET # BLD AUTO: 175 10E9/L (ref 150–450)
POTASSIUM SERPL-SCNC: 4.2 MMOL/L (ref 3.4–5.3)
RBC # BLD AUTO: 3.85 10E12/L (ref 4.4–5.9)
SODIUM SERPL-SCNC: 141 MMOL/L (ref 133–144)
WBC # BLD AUTO: 9 10E9/L (ref 4–11)

## 2019-05-11 PROCEDURE — 25800030 ZZH RX IP 258 OP 636: Performed by: PHYSICIAN ASSISTANT

## 2019-05-11 PROCEDURE — 25000132 ZZH RX MED GY IP 250 OP 250 PS 637: Performed by: NURSE PRACTITIONER

## 2019-05-11 PROCEDURE — 80048 BASIC METABOLIC PNL TOTAL CA: CPT | Performed by: PHYSICIAN ASSISTANT

## 2019-05-11 PROCEDURE — 25000132 ZZH RX MED GY IP 250 OP 250 PS 637: Performed by: PHYSICIAN ASSISTANT

## 2019-05-11 PROCEDURE — 25000131 ZZH RX MED GY IP 250 OP 636 PS 637: Performed by: PHYSICIAN ASSISTANT

## 2019-05-11 PROCEDURE — 12000001 ZZH R&B MED SURG/OB UMMC

## 2019-05-11 PROCEDURE — 99232 SBSQ HOSP IP/OBS MODERATE 35: CPT | Performed by: INTERNAL MEDICINE

## 2019-05-11 PROCEDURE — 36592 COLLECT BLOOD FROM PICC: CPT | Performed by: PHYSICIAN ASSISTANT

## 2019-05-11 PROCEDURE — 40000802 ZZH SITE CHECK

## 2019-05-11 PROCEDURE — 25000128 H RX IP 250 OP 636: Performed by: PHYSICIAN ASSISTANT

## 2019-05-11 PROCEDURE — 25000131 ZZH RX MED GY IP 250 OP 636 PS 637: Performed by: INTERNAL MEDICINE

## 2019-05-11 PROCEDURE — 85025 COMPLETE CBC W/AUTO DIFF WBC: CPT | Performed by: PHYSICIAN ASSISTANT

## 2019-05-11 RX ORDER — OLANZAPINE 2.5 MG/1
2.5 TABLET, FILM COATED ORAL AT BEDTIME
Status: DISCONTINUED | OUTPATIENT
Start: 2019-05-11 | End: 2019-05-12 | Stop reason: HOSPADM

## 2019-05-11 RX ORDER — OLANZAPINE 2.5 MG/1
2.5 TABLET, FILM COATED ORAL ONCE
Status: COMPLETED | OUTPATIENT
Start: 2019-05-11 | End: 2019-05-11

## 2019-05-11 RX ADMIN — PREDNISONE 120 MG: 20 TABLET ORAL at 08:25

## 2019-05-11 RX ADMIN — ONDANSETRON HYDROCHLORIDE 4 MG: 4 TABLET, FILM COATED ORAL at 07:17

## 2019-05-11 RX ADMIN — PROCHLORPERAZINE MALEATE 10 MG: 10 TABLET, FILM COATED ORAL at 12:08

## 2019-05-11 RX ADMIN — SENNOSIDES AND DOCUSATE SODIUM 1 TABLET: 8.6; 5 TABLET ORAL at 08:25

## 2019-05-11 RX ADMIN — PREDNISONE 120 MG: 20 TABLET ORAL at 17:00

## 2019-05-11 RX ADMIN — ACYCLOVIR 400 MG: 200 CAPSULE ORAL at 08:25

## 2019-05-11 RX ADMIN — ALLOPURINOL 300 MG: 300 TABLET ORAL at 08:25

## 2019-05-11 RX ADMIN — PROCHLORPERAZINE MALEATE 5 MG: 5 TABLET, FILM COATED ORAL at 09:20

## 2019-05-11 RX ADMIN — RANITIDINE 150 MG: 150 TABLET ORAL at 20:58

## 2019-05-11 RX ADMIN — OLANZAPINE 2.5 MG: 2.5 TABLET, FILM COATED ORAL at 10:41

## 2019-05-11 RX ADMIN — VINCRISTINE SULFATE 0.78 MG: 1 INJECTION, SOLUTION INTRAVENOUS at 15:51

## 2019-05-11 RX ADMIN — FLUCONAZOLE 100 MG: 100 TABLET ORAL at 08:25

## 2019-05-11 RX ADMIN — SENNOSIDES AND DOCUSATE SODIUM 2 TABLET: 8.6; 5 TABLET ORAL at 20:59

## 2019-05-11 RX ADMIN — ACYCLOVIR 400 MG: 200 CAPSULE ORAL at 20:58

## 2019-05-11 RX ADMIN — ETOPOSIDE 140 MG: 20 INJECTION, SOLUTION, CONCENTRATE INTRAVENOUS at 14:33

## 2019-05-11 RX ADMIN — ONDANSETRON HYDROCHLORIDE 16 MG: 8 TABLET, FILM COATED ORAL at 16:02

## 2019-05-11 RX ADMIN — PROCHLORPERAZINE MALEATE 10 MG: 10 TABLET, FILM COATED ORAL at 05:55

## 2019-05-11 RX ADMIN — PROCHLORPERAZINE MALEATE 10 MG: 10 TABLET, FILM COATED ORAL at 20:58

## 2019-05-11 RX ADMIN — LORAZEPAM 0.5 MG: 0.5 TABLET ORAL at 09:48

## 2019-05-11 RX ADMIN — RANITIDINE 150 MG: 150 TABLET ORAL at 08:25

## 2019-05-11 RX ADMIN — OLANZAPINE 2.5 MG: 2.5 TABLET, FILM COATED ORAL at 20:58

## 2019-05-11 ASSESSMENT — MIFFLIN-ST. JEOR: SCORE: 1710.02

## 2019-05-11 ASSESSMENT — ACTIVITIES OF DAILY LIVING (ADL)
ADLS_ACUITY_SCORE: 10

## 2019-05-11 NOTE — PLAN OF CARE
Nursing Focus: Chemotherapy  D: Positive blood return via PICC. Insertion site is clean/dry/intact, dressing intact with no complaints of pain.  Urine output is recorded in intake in Doc Flowsheet.  Persistent nausea this morning despite prn zofran, compazine and ativan (in addition to scheduled zofran and compazine).  Zyprexa added with relief.  I: Premedications given per order (see electronic medical administration record). Dose #4 of etoposide started to infuse over 22hours. Reviewed pt teaching on chemotherapy side effects.  Pt denies need for further teaching. Chemotherapy double checked per protocol by two chemotherapy competent RN's.   A: Tolerating procedure well. Denies nausea and or pain.   P: Continue to monitor urine output and symptoms of nausea. Screen for symptoms of toxicity.

## 2019-05-11 NOTE — PLAN OF CARE
AF, VSS.  Up ad jessica.  Reports intermittent nausea, on scheduled zofran and compazine, NO emesis.  Able to eat at each meal time. Day 3 chemo, see chemo note.

## 2019-05-11 NOTE — PROGRESS NOTES
[facility], [place]  Hematology / Oncology Progress Note    Date of Admission: 5/8/2019  Date of Service (when I saw the patient): 05/11/2019     Assessment & Plan   Christiano Molina is a 43 year old male with Burkitt lymphoma, now s/p 2 cycles of DA-EPOCH-R. He is admitted for Cycle 3.      #Burkitt Lymphoma, EBV+, Stage IA   Follows with Dr. Ramirez. Initially presented in mid-March with a right axillary mass, found to have high-grade B-cell lymphoma. Initial pathology was not fully clear whether DLBCL or Burkitt, but more recently this was finalized by [facility] Heme Path review as Burkitt lymphoma (EBV+). Plan is to do 6 cycles of DA-R-EPOCH with IT chemo prophylaxis during C3-6. Of note, pt had PET/CT (5/7) after 2 cycles; mild-mod FDG uptake in R axillary node suggestive of residual disease but overall size/activity appear decreased based on initial outside imaging report. Outpatient team note indicates they are working on obtaining initial images.   - PICC placed on admission.   - Allopurinol      Treatment plan: C3 DA-EPOCH-R (Day 0/1=5/8/19). Today will be Day 4.   - Premeds: Tylenol/Benadryl  - Antiemetics: Emend D1&5, Zofran 16mg D1-5, Compazine 10mg q8hr  - Prednisone 120mg (60mg/m2) BID on D1-5. Started 5/8 PM so last dose will be 5/13 AM. Will need scripts for last dose(s) pending timing of discharge.  - Etoposide 140mg CIVI on Day 1-4  - Vincristine 0.78mg / Doxorubicin 28mg CIVI on Day 1-4  - Cyclophosphamide 2105 mg (1080mg/m2) Day 5  - Dex on D6-7. Will need to provide script on discharge and instruct pt to take on D7 and D8 (5/14 and 5/15) given that he will be completing high dose steroid on 5/13 AM.   - PRN antiemetics, add zyprexa 2.5mg now, and again at bedtime given persistent nausea. Send home with script to use PRN.   - will need Neulasta after discharge  - plan for addition of IT triple therapy on Day 1 and Day 5. Had D1 on 5/9. CSF with high cell counts (traumatic  "tap). Diff pending, flow negative.  Arranged for \"D5\" LP with IT chemo to be done outpatient on M (5/13).    #Leukocytosis, 2/2 steroid per treatment plan. No infectious concerns at this time.  #Mild anemia, 2/2 chemotherapy.   - transfuse to maintain Hgb >7 (inpt, unless symptomatic)     #ID PPx  - continue PTA ppx ACV and Fluc (pt reports having refills of these at home)  - start Levaquin when ANC <1000 (pt does not have this, have ordered for discharge)     #Chemotherapy induced nausea  Improved with scheduled antiemetics during chemo. Controlled at this time. He feels that the Emend on D1 has been helpful.  - scheduled antiemetics as above  - consider zyprexa if increased difficulty with nausea     #Constipation, chemotherapy induced  Currently improved but occurs during chemo per pt. Having adequate BMs since admission per pt.  - Senna-S BID  - Miralax PRN     #GERD  - continue home Zantac  - TUMS PRN     #Peripheral neuropathy.   Mild numbness/tingling affecting first 3 digits of b/l hands. Not interfering with function. Denies difficulty with numbness/tingling in feet.   - monitor     #H/o post-LP HA.   - monitor closely for this after LP done this cycle. Pt did receive 1L NS bolus shama-procedurally, had cola with caffeine, and laid flat for 2 hrs post procedure. No HAs this AM, but monitor as these did not start for >24 hrs after last LP.      #Supplements.   Patient asked inpatient team about milk thistle. Had discussed this with outpatient team on 5/8 and was advised to avoid taking it (per RPH, milk thistle could increase serum concentrations of etoposide, vincristine, and Emend). Reviewed with inpatient chemo RPH, who stated same interactions and possibility of liver dysfunction. I discussed this with the pt as well.     FEN  - no current IVFs in addition to CIVI chemo, bolus PRN  - lyte repletion per unit protocol  - regular diet     PPx  - VTE: initially held given plan for LP on 5/9. Declined on 5/9 " PM. Ok to discontinue as pt is actively ambulatory, add back if activity level decreases.   - GI: Zantac  - monitor BG on high dose steroid. Has been <200 on AM labs thus far.      Dispo: Anticipate discharge on Sunday (5/12).   - has appt at [facility] [place] for labs, LP with IT chemo, and Neulasta on 5/13 afternoon.   - currently has labs/transfusions set up for M/Th at  starting 5/16 during the 1-2 weeks after discharge.      Plan of care discussed with Dr. Waters.    Felicia Zuniga, [facility]-BC  Hematology/Oncology  #[phone]     Interval History   No acute events overnight, afebrile. Having nausea despite meds. Took zyprexa and it improved. Went to go get lunch in the cafeteria. Mood is good, chats w roommate often. No mouth sores, SOB or difficulty breathing. Continues to ambulate.       Physical Exam   Temp: 96.6  F (35.9  C) Temp src: Oral BP: 128/72   Heart Rate: 64 Resp: 18 SpO2: 97 % O2 Device: None (Room air)    Vitals:    05/08/19 0908 05/09/19 0819 05/10/19 0727   Weight: 78.5 kg (173 lb) 81.1 kg (178 lb 14.4 oz) 80.6 kg (177 lb 11.2 oz)     Vital Signs with Ranges  Temp:  [96.2  F (35.7  C)-96.9  F (36.1  C)] 96.6  F (35.9  C)  Heart Rate:  [63-73] 64  Resp:  [16-20] 18  BP: (112-138)/(62-74) 128/72  SpO2:  [96 %-98 %] 97 %  I/O last 3 completed shifts:  In: 2825 [P.O.:1040; IV Piggyback:1785]  Out: 3820 [Urine:3820]    Per Dr. Waters:   General: Well-appearing male seen sitting up in bed. Alert, interactive. NAD.   HEENT: NC/AT. Sclerae are anicteric. OP pink and moist, no lesions or thrush.   Heart: Regular rate and rhythm. No murmur appreciated.  Lungs: Breathing even and non-labored on RA. Lungs clear to auscultation bilaterally. No wheezing or crackles.   Abdomen: Bowel sounds present, abd soft, non-distended and nontender.   Extremities: Grossly normal in appearance. No edema.   Neuro: Alert, speech normal, grossly nonfocal. Mentation appears normal, affect congruent.   VAD: PICC c/d/i      Medications     -  MEDICATION INSTRUCTIONS -       - MEDICATION INSTRUCTIONS -       sodium chloride         acyclovir  400 mg Oral BID     allopurinol  300 mg Oral Daily     Chemotherapy Infusing-Continuous Infusion   Does not apply Q8H     [START ON 5/12/2019] cyclophosphamide (CYTOXAN) chemo infusion  1,080 mg/m2 (Treatment Plan Recorded) Intravenous Once     [START ON 5/14/2019] dexamethasone  8 mg Oral Daily     etoposide (TOPOSAR) chemo infusion  72 mg/m2 (Treatment Plan Recorded) Intravenous Q22H     fluconazole  100 mg Oral Daily     [START ON 5/12/2019] fosaprepitant (EMEND) 150 mg intermittent infusion  150 mg Intravenous Once     [START ON 5/12/2019] INTRATHECAL chemo - Cytarabine and/or methotrexate and/or Hydrocortisone   Intrathecal Once     lidocaine (PF)  10 mL Subcutaneous Once     ondansetron  16 mg Oral Q24H     predniSONE  60 mg/m2 (Treatment Plan Recorded) Oral BID     prochlorperazine  10 mg Oral Q8H     ranitidine  150 mg Oral BID     senna-docusate  1-2 tablet Oral BID     sodium chloride (PF)  10 mL Intracatheter Q7 Days     vinCRIStine/DOXOrubicin (ONCOVIN/ADRIAMYCIN) chemo infusion  0.4 mg/m2 (Treatment Plan Recorded) Intravenous Q22H       Data   Results for orders placed or performed during the hospital encounter of 05/08/19 (from the past 24 hour(s))   CBC with platelets differential   Result Value Ref Range    WBC 9.0 4.0 - 11.0 10e9/L    RBC Count 3.85 (L) 4.4 - 5.9 10e12/L    Hemoglobin 11.6 (L) 13.3 - 17.7 g/dL    Hematocrit 36.4 (L) 40.0 - 53.0 %    MCV 95 78 - 100 fl    MCH 30.1 26.5 - 33.0 pg    MCHC 31.9 31.5 - 36.5 g/dL    RDW 15.0 10.0 - 15.0 %    Platelet Count 175 150 - 450 10e9/L    Diff Method Automated Method     % Neutrophils 87.4 %    % Lymphocytes 7.1 %    % Monocytes 4.7 %    % Eosinophils 0.0 %    % Basophils 0.1 %    % Immature Granulocytes 0.7 %    Nucleated RBCs 0 0 /100    Absolute Neutrophil 7.9 1.6 - 8.3 10e9/L    Absolute Lymphocytes 0.6 (L) 0.8 - 5.3 10e9/L    Absolute  Monocytes 0.4 0.0 - 1.3 10e9/L    Absolute Eosinophils 0.0 0.0 - 0.7 10e9/L    Absolute Basophils 0.0 0.0 - 0.2 10e9/L    Abs Immature Granulocytes 0.1 0 - 0.4 10e9/L    Absolute Nucleated RBC 0.0    Basic metabolic panel   Result Value Ref Range    Sodium 141 133 - 144 mmol/L    Potassium 4.2 3.4 - 5.3 mmol/L    Chloride 108 94 - 109 mmol/L    Carbon Dioxide 26 20 - 32 mmol/L    Anion Gap 6 3 - 14 mmol/L    Glucose 163 (H) 70 - 99 mg/dL    Urea Nitrogen 12 7 - 30 mg/dL    Creatinine 0.86 0.66 - 1.25 mg/dL    GFR Estimate >90 >60 mL/min/[1.73_m2]    GFR Estimate If Black >90 >60 mL/min/[1.73_m2]    Calcium 8.9 8.5 - 10.1 mg/dL

## 2019-05-11 NOTE — PLAN OF CARE
D: Day 3/4 Etoposide, Doxorubicin and Vincristine via picc.  Picc with brisk blood return at each check.  Reports intermittent nausea on scheduled antiemetics, no emesis. Eating at each meal time. Reports fair appetite.      I:  Etoposide, Doxorubicin and Vincristine IV set to infuse over 22 hour as ordered. (Doxorubicin rate adjusted with his med team ok, as has been running late) Encouraged po fluid intake, and activity.   A: Tolerating chemo well at present.  P: Continue treatment as ordered.  Pt will notify nursing if with breakthrough nausea.

## 2019-05-11 NOTE — PLAN OF CARE
VSS. Denies pain or nausea. CIVI chemo infusing with good blood return. Slept well overnight. Continue with plan of care.

## 2019-05-12 ENCOUNTER — MYC REFILL (OUTPATIENT)
Dept: ONCOLOGY | Facility: CLINIC | Age: 44
End: 2019-05-12

## 2019-05-12 VITALS
HEART RATE: 60 BPM | TEMPERATURE: 97.8 F | OXYGEN SATURATION: 96 % | BODY MASS INDEX: 25.31 KG/M2 | SYSTOLIC BLOOD PRESSURE: 118 MMHG | RESPIRATION RATE: 16 BRPM | WEIGHT: 176.8 LBS | DIASTOLIC BLOOD PRESSURE: 70 MMHG | HEIGHT: 70 IN

## 2019-05-12 DIAGNOSIS — K21.9 GASTROESOPHAGEAL REFLUX DISEASE WITHOUT ESOPHAGITIS: ICD-10-CM

## 2019-05-12 LAB
ANION GAP SERPL CALCULATED.3IONS-SCNC: 4 MMOL/L (ref 3–14)
BASOPHILS # BLD AUTO: 0 10E9/L (ref 0–0.2)
BASOPHILS NFR BLD AUTO: 0 %
BUN SERPL-MCNC: 11 MG/DL (ref 7–30)
CALCIUM SERPL-MCNC: 8.9 MG/DL (ref 8.5–10.1)
CHLORIDE SERPL-SCNC: 107 MMOL/L (ref 94–109)
CO2 SERPL-SCNC: 28 MMOL/L (ref 20–32)
CREAT SERPL-MCNC: 0.87 MG/DL (ref 0.66–1.25)
DIFFERENTIAL METHOD BLD: ABNORMAL
EOSINOPHIL # BLD AUTO: 0 10E9/L (ref 0–0.7)
EOSINOPHIL NFR BLD AUTO: 0 %
ERYTHROCYTE [DISTWIDTH] IN BLOOD BY AUTOMATED COUNT: 14.6 % (ref 10–15)
GFR SERPL CREATININE-BSD FRML MDRD: >90 ML/MIN/{1.73_M2}
GLUCOSE SERPL-MCNC: 122 MG/DL (ref 70–99)
HCT VFR BLD AUTO: 37.6 % (ref 40–53)
HGB BLD-MCNC: 12.2 G/DL (ref 13.3–17.7)
IMM GRANULOCYTES # BLD: 0.1 10E9/L (ref 0–0.4)
IMM GRANULOCYTES NFR BLD: 0.6 %
LYMPHOCYTES # BLD AUTO: 0.6 10E9/L (ref 0.8–5.3)
LYMPHOCYTES NFR BLD AUTO: 7.1 %
MCH RBC QN AUTO: 30.3 PG (ref 26.5–33)
MCHC RBC AUTO-ENTMCNC: 32.4 G/DL (ref 31.5–36.5)
MCV RBC AUTO: 93 FL (ref 78–100)
MONOCYTES # BLD AUTO: 0.4 10E9/L (ref 0–1.3)
MONOCYTES NFR BLD AUTO: 5.1 %
NEUTROPHILS # BLD AUTO: 6.8 10E9/L (ref 1.6–8.3)
NEUTROPHILS NFR BLD AUTO: 87.2 %
NRBC # BLD AUTO: 0 10*3/UL
NRBC BLD AUTO-RTO: 0 /100
PLATELET # BLD AUTO: 189 10E9/L (ref 150–450)
POTASSIUM SERPL-SCNC: 4.8 MMOL/L (ref 3.4–5.3)
RBC # BLD AUTO: 4.03 10E12/L (ref 4.4–5.9)
SODIUM SERPL-SCNC: 139 MMOL/L (ref 133–144)
WBC # BLD AUTO: 7.8 10E9/L (ref 4–11)

## 2019-05-12 PROCEDURE — 99239 HOSP IP/OBS DSCHRG MGMT >30: CPT | Mod: GC | Performed by: INTERNAL MEDICINE

## 2019-05-12 PROCEDURE — 25800030 ZZH RX IP 258 OP 636: Performed by: PHYSICIAN ASSISTANT

## 2019-05-12 PROCEDURE — 25000131 ZZH RX MED GY IP 250 OP 636 PS 637: Performed by: PHYSICIAN ASSISTANT

## 2019-05-12 PROCEDURE — 85025 COMPLETE CBC W/AUTO DIFF WBC: CPT | Performed by: PHYSICIAN ASSISTANT

## 2019-05-12 PROCEDURE — 25000131 ZZH RX MED GY IP 250 OP 636 PS 637: Performed by: INTERNAL MEDICINE

## 2019-05-12 PROCEDURE — 25000132 ZZH RX MED GY IP 250 OP 250 PS 637: Performed by: PHYSICIAN ASSISTANT

## 2019-05-12 PROCEDURE — 25000128 H RX IP 250 OP 636: Performed by: PHYSICIAN ASSISTANT

## 2019-05-12 PROCEDURE — 80048 BASIC METABOLIC PNL TOTAL CA: CPT | Performed by: PHYSICIAN ASSISTANT

## 2019-05-12 PROCEDURE — 36592 COLLECT BLOOD FROM PICC: CPT | Performed by: PHYSICIAN ASSISTANT

## 2019-05-12 RX ORDER — ACETAMINOPHEN 325 MG/1
650 TABLET ORAL EVERY 4 HOURS PRN
Qty: 30 TABLET | Refills: 0 | Status: SHIPPED | OUTPATIENT
Start: 2019-05-12

## 2019-05-12 RX ORDER — DEXAMETHASONE 4 MG/1
8 TABLET ORAL DAILY
Qty: 4 TABLET | Refills: 0 | Status: SHIPPED | OUTPATIENT
Start: 2019-05-14 | End: 2019-05-12

## 2019-05-12 RX ORDER — PREDNISONE 20 MG/1
60 TABLET ORAL 2 TIMES DAILY
Qty: 2 TABLET | Refills: 0 | Status: ON HOLD | OUTPATIENT
Start: 2019-05-12 | End: 2019-06-03

## 2019-05-12 RX ORDER — DEXAMETHASONE 4 MG/1
8 TABLET ORAL DAILY
Qty: 4 TABLET | Refills: 0 | Status: ON HOLD | OUTPATIENT
Start: 2019-05-14 | End: 2019-06-03

## 2019-05-12 RX ADMIN — SODIUM CHLORIDE 150 MG: 9 INJECTION, SOLUTION INTRAVENOUS at 11:57

## 2019-05-12 RX ADMIN — CYCLOPHOSPHAMIDE 2105 MG: 2 INJECTION, POWDER, FOR SOLUTION INTRAVENOUS; ORAL at 13:48

## 2019-05-12 RX ADMIN — ALLOPURINOL 300 MG: 300 TABLET ORAL at 07:53

## 2019-05-12 RX ADMIN — PROCHLORPERAZINE MALEATE 10 MG: 10 TABLET, FILM COATED ORAL at 11:59

## 2019-05-12 RX ADMIN — CALCIUM CARBONATE (ANTACID) CHEW TAB 500 MG 1000 MG: 500 CHEW TAB at 14:10

## 2019-05-12 RX ADMIN — SENNOSIDES AND DOCUSATE SODIUM 2 TABLET: 8.6; 5 TABLET ORAL at 07:53

## 2019-05-12 RX ADMIN — FLUCONAZOLE 100 MG: 100 TABLET ORAL at 07:53

## 2019-05-12 RX ADMIN — ACYCLOVIR 400 MG: 200 CAPSULE ORAL at 07:53

## 2019-05-12 RX ADMIN — ONDANSETRON HYDROCHLORIDE 16 MG: 8 TABLET, FILM COATED ORAL at 14:25

## 2019-05-12 RX ADMIN — RANITIDINE 150 MG: 150 TABLET ORAL at 07:53

## 2019-05-12 RX ADMIN — PREDNISONE 120 MG: 20 TABLET ORAL at 07:52

## 2019-05-12 RX ADMIN — PROCHLORPERAZINE MALEATE 10 MG: 10 TABLET, FILM COATED ORAL at 04:11

## 2019-05-12 ASSESSMENT — ACTIVITIES OF DAILY LIVING (ADL)
ADLS_ACUITY_SCORE: 10

## 2019-05-12 ASSESSMENT — MIFFLIN-ST. JEOR: SCORE: 1708.21

## 2019-05-12 NOTE — PLAN OF CARE
Nursing Focus: Chemotherapy  D: Positive blood return via PICC. Insertion site is clean/dry/intact, dressing intact with no complaints of pain.  Urine output is recorded in intake in Doc Flowsheet.    I: Premedications given per order (see electronic medical administration record). Day 5 Cytoxan started to infuse over 1 hours. Reviewed pt teaching on chemotherapy side effects.  Pt denies need for further teaching. Chemotherapy double checked per protocol by two chemotherapy competent RN's. Reviewed discharge orders with Prudencio and his wife, Halie.  Will discontinue PICC prior to discharging.  A: Tolerating procedure well. Denies nausea and or pain.   P: Continue to monitor urine output and symptoms of nausea. Screen for symptoms of toxicity.

## 2019-05-12 NOTE — PLAN OF CARE
D: Day 4 Vincristine/Doxorubicin via picc, picc with brisk blood return.  Zofran premed given. Mild  nausea on scheduled antiemetics. Zyprexa added today. Reports appetite fair, eating at each meal time.      I: Vincristine/Doxorubicin IV set to infuse over 22 hrs as ordered.  Encouraged activity and oral fluids.  A: Tolerating chemo well at present.  P: Continue treatment as ordered.  Pt will notify nursing if with breakthrough nausea.

## 2019-05-12 NOTE — PLAN OF CARE
AF, VSS. Up and active in room.  Reported difficult morning with nausea, better since Zyprexa given..  Zyprexa also at HS tonight, pt hoping for better morning tomorrow. Day 4 chemo, see chemo note. Plans disharge tomorrow after competion of Cytoxan.  To have picc pulled no teaching or supply needs.  Per pt he will have his scheduled IT chemo in clinic on Monday.

## 2019-05-12 NOTE — DISCHARGE SUMMARY
[facility] Discharge Summary    Christiano Molina MRN# [id number]   Age: 43 year old YOB: 1975     Date of Admission:  5/8/2019  Date of Discharge::  5/12/2019  Admitting Physician:  Gavin Waters MD  Discharge Physician:  Vito Llanos MD     Home clinic: [facility]          Admission Diagnoses:   Burkitt's lymphoma           Discharge Diagnosis:     Burkitt's lymphoma.           Procedures:   IT chemotherapy on 5/9.           Medications Prior to Admission:     Medications Prior to Admission   Medication Sig Dispense Refill Last Dose     allopurinol (ZYLOPRIM) 300 MG tablet Take 1 tablet (300 mg) by mouth daily 30 tablet 0 5/7/2019 at Unknown time     fluconazole (DIFLUCAN) 100 MG tablet Take 1 tablet (100 mg) by mouth daily 30 tablet 0 5/7/2019 at Unknown time     ondansetron (ZOFRAN) 8 MG tablet Take 1 tablet (8 mg) by mouth every 8 hours as needed for nausea 30 tablet 0 Past Month at Unknown time     ondansetron (ZOFRAN-ODT) 8 MG ODT tab Take 1 tablet (8 mg) by mouth every 8 hours At first continue scheduled (but can back off as nausea improves) 30 tablet 0 Past Month at Unknown time     prochlorperazine (COMPAZINE) 10 MG tablet Take 1 tablet (10 mg) by mouth every 6 hours as needed for nausea or vomiting (Try second.) 30 tablet 0 Past Month at Unknown time     ranitidine (ZANTAC) 150 MG tablet Take 1 tablet (150 mg) by mouth 2 times daily 60 tablet 1 5/7/2019 at Unknown time     senna-docusate (SENOKOT-S/PERICOLACE) 8.6-50 MG tablet Take 2 tablets by mouth 2 times daily as needed for constipation   Past Week at Unknown time     acyclovir (ZOVIRAX) 200 MG capsule Take 2 capsules (400 mg) by mouth 2 times daily 60 capsule 1 Taking     MULTIVITAMINS OR TABS 1 TABLET DAILY PO  0 Unknown at Unknown time     triamcinolone (KENALOG) 0.1 % external cream Apply a thin layer over rash twice daily 30 g 1 Unknown at Unknown time     TUMS 500 MG OR CHEW 1 TABLET   PO 90 0 More than a month at Unknown time     [DISCONTINUED] Acetaminophen (TYLENOL PO)    Unknown at Unknown time     [DISCONTINUED] dexamethasone (DECADRON) 4 MG tablet Take 8 mg by mouth daily On 4/22 and 4/23.. 4 tablet 0 Past Month at Unknown time             Discharge Medications:     Current Discharge Medication List      START taking these medications    Details   levofloxacin (LEVAQUIN) 250 MG tablet Take 1 tablet (250 mg) by mouth daily . Begin if absolute neutrophil count (ANC) <1.0 and continue until advised by clinic team to stop.  Qty: 30 tablet, Refills: 0    Associated Diagnoses: Chemotherapy-induced neutropenia (H)      predniSONE (DELTASONE) 20 MG tablet Take 6 tablets (120 mg) by mouth 2 times daily  Qty: 2 tablet, Refills: 0    Comments: Take 120 mg orally this evening 5/12 and 120 mg tomorrow am 5/13.  Associated Diagnoses: High grade B-cell lymphoma (H)         CONTINUE these medications which have CHANGED    Details   acetaminophen (TYLENOL) 325 MG tablet Take 2 tablets (650 mg) by mouth every 4 hours as needed for mild pain  Qty: 30 tablet, Refills: 0    Associated Diagnoses: Burkitt lymphoma, unspecified body region (H)      dexamethasone (DECADRON) 4 MG tablet Take 8 mg by mouth daily Take 8 mg by mouth daily on 5/14 and 5/15..  Qty: 4 tablet, Refills: 0    Comments: Please include the quantity of tablets and (strength) per dose in sig.  Associated Diagnoses: High grade B-cell lymphoma (H)         CONTINUE these medications which have NOT CHANGED    Details   allopurinol (ZYLOPRIM) 300 MG tablet Take 1 tablet (300 mg) by mouth daily  Qty: 30 tablet, Refills: 0    Associated Diagnoses: High grade malignant lymphoma (H)      fluconazole (DIFLUCAN) 100 MG tablet Take 1 tablet (100 mg) by mouth daily  Qty: 30 tablet, Refills: 0    Associated Diagnoses: High grade malignant lymphoma (H)      ondansetron (ZOFRAN) 8 MG tablet Take 1 tablet (8 mg) by mouth every 8 hours as needed for nausea  Qty:  30 tablet, Refills: 0    Associated Diagnoses: High grade B-cell lymphoma (H)      ondansetron (ZOFRAN-ODT) 8 MG ODT tab Take 1 tablet (8 mg) by mouth every 8 hours At first continue scheduled (but can back off as nausea improves)  Qty: 30 tablet, Refills: 0    Associated Diagnoses: High grade malignant lymphoma (H)      prochlorperazine (COMPAZINE) 10 MG tablet Take 1 tablet (10 mg) by mouth every 6 hours as needed for nausea or vomiting (Try second.)  Qty: 30 tablet, Refills: 0    Associated Diagnoses: High grade B-cell lymphoma (H)      ranitidine (ZANTAC) 150 MG tablet Take 1 tablet (150 mg) by mouth 2 times daily  Qty: 60 tablet, Refills: 1    Associated Diagnoses: Gastroesophageal reflux disease without esophagitis      senna-docusate (SENOKOT-S/PERICOLACE) 8.6-50 MG tablet Take 2 tablets by mouth 2 times daily as needed for constipation    Associated Diagnoses: High grade B-cell lymphoma (H)      acyclovir (ZOVIRAX) 200 MG capsule Take 2 capsules (400 mg) by mouth 2 times daily  Qty: 60 capsule, Refills: 1    Associated Diagnoses: High grade malignant lymphoma (H)      MULTIVITAMINS OR TABS 1 TABLET DAILY PO  Refills: 0      triamcinolone (KENALOG) 0.1 % external cream Apply a thin layer over rash twice daily  Qty: 30 g, Refills: 1    Associated Diagnoses: Contact dermatitis due to other agent, unspecified contact dermatitis type      TUMS 500 MG OR CHEW 1 TABLET  PO  Qty: 90, Refills: 0                   Consultations:   No consultations were requested during this admission          Brief History of Illness:   Christiano Molina is a 43 year old male with Burkitt lymphoma, now s/p 2 cycles of DA-EPOCH-R. He was admitted for C3 DA R EPOCH on 5/8/19.           Hospital Course:   Christiano Molina is a 43 year old male with Burkitt lymphoma, now s/p 2 cycles of DA-EPOCH-R. He is admitted for Cycle 3.      #Burkitt Lymphoma, EBV+, Stage IA   Follows with Dr. Ramirez. Initially presented in mid-March with a  "right axillary mass, found to have high-grade B-cell lymphoma. Initial pathology was not fully clear whether DLBCL or Burkitt, but more recently this was finalized by [facility] Heme Path review as Burkitt lymphoma (EBV+). Plan is to do 6 cycles of DA-R-EPOCH with IT chemo prophylaxis during C3-6. Of note, pt had PET/CT (5/7) after 2 cycles; mild-mod FDG uptake in R axillary node suggestive of residual disease but overall size/activity appear decreased based on initial outside imaging report. Outpatient team note indicates they are working on obtaining initial images.   - PICC placed on admission, to be removed at discharge.   - Allopurinol      Treatment plan: C3 DA-EPOCH-R (Day 0/1=5/8/19). Today will be Day 5.   - Premeds: Tylenol/Benadryl  - Antiemetics: Emend D1&5, Zofran 16mg D1-5, Compazine 10mg q8hr  - Prednisone 120mg (60mg/m2) BID on D1-5. Started 5/8 PM so last dose will be 5/13 AM. Scripts sent for last 2 doses, to be taken after discharge at home.   - Etoposide 140mg CIVI on Day 1-4  - Vincristine 0.78mg / Doxorubicin 28mg CIVI on Day 1-4  - Cyclophosphamide 2105 mg (1080mg/m2) Day 5  - Dex on D6-7. Sent script on discharge and instruct pt to take on D7 and D8 (5/14 and 5/15) given that he will be completing high dose steroid on 5/13 AM.   - PRN antiemetics, received zyprexa 2.5mg in hospital given persistent nausea.   - will need Neulasta after discharge, scheduled for tomorrow.   - plan for addition of IT triple therapy on Day 1 and Day 5. Had D1 on 5/9. CSF with high cell counts (traumatic tap). Diff pending, flow negative.  Arranged for \"D5\" LP with IT chemo to be done outpatient on  (5/13).  - CSF culture positive for Gram positive bacilli (diphtheroids). Clinically no concern for meningitis. This is likely a contaminant. May elect to repeat culture on LP tomorrow.      #Leukocytosis, 2/2 steroid per treatment plan. No infectious concerns at this time.  #Mild anemia, 2/2 chemotherapy.   - transfuse to " maintain Hgb >7 (inpt, unless symptomatic)     #ID PPx  - continue PTA ppx ACV and Fluc (pt reports having refills of these at home)  - start Levaquin when ANC <1000 (pt does not have this, have ordered for discharge)     #Chemotherapy induced nausea  Improved with scheduled antiemetics during chemo. Controlled at this time. He feels that the Emend on D1 has been helpful.  - scheduled antiemetics as above  - received zyprexa prn in hospital.      #Constipation, chemotherapy induced  Currently improved but occurs during chemo per pt. Having adequate BMs since admission per pt.  - Senna-S BID  - Miralax PRN     #GERD  - continue home Zantac  - TUMS PRN     #Peripheral neuropathy.   Mild numbness/tingling affecting first 3 digits of b/l hands. Not interfering with function. Denies difficulty with numbness/tingling in feet.   - monitor     #H/o post-LP HA.   - monitor closely for this after LP done this cycle. Pt did receive 1L NS bolus shama-procedurally, had cola with caffeine, and laid flat for 2 hrs post procedure. No HAs this AM, but monitor as these did not start for >24 hrs after last LP.      #Supplements.   Patient asked inpatient team about milk thistle. Had discussed this with outpatient team on 5/8 and was advised to avoid taking it (per RPH, milk thistle could increase serum concentrations of etoposide, vincristine, and Emend). Reviewed with inpatient chemo RPH, who stated same interactions and possibility of liver dysfunction. I discussed this with the pt as well.      FEN  - no current IVFs in addition to CIVI chemo, bolus PRN  - lyte repletion per unit protocol  - regular diet     PPx  - VTE: initially held given plan for LP on 5/9. Declined on 5/9 PM. Ok to discontinue as pt is actively ambulatory, add back if activity level decreases.   - GI: Zantac  - monitor BG on high dose steroid. Has been <200 on AM labs thus far.      Dispo: Discharge on Sunday (5/12).   - has appt at [facility] for labs, LP with IT  "chemo, and Neulasta on 5/13 afternoon.   - currently has labs/transfusions set up for M/Th at  starting 5/16 during the 1-2 weeks after discharge.      Plan of care discussed with Dr. Waters.    Physical exam:  Vital signs:  Temp: 96.3  F (35.7  C) Temp src: Oral BP: 120/70 Pulse: 60 Heart Rate: 62 Resp: 16 SpO2: 97 % O2 Device: None (Room air)   Height: 177.8 cm (5' 10\") Weight: 80.2 kg (176 lb 12.8 oz)  Estimated body mass index is 25.37 kg/m  as calculated from the following:    Height as of this encounter: 1.778 m (5' 10\").    Weight as of this encounter: 80.2 kg (176 lb 12.8 oz).     Subjective: No acute events overnight. Nausea, well controlled, no emesis. Good oral intake. No fever, chills, sob, chest pain, abdominal pain, diarrhea or any other symptoms.     Objective:  Awake, alert and no distress, ambulating in floors.  HEENT: normocephalic, atraumatic, moist mucosae.  Skin: no rashes  Chest- good AE bilaterally, no crackles or wheeze.  CVS- regular heart sounds, no mumurs.  Abd- soft, NT, ND, good bowel sounds  Ext- No edema  Neuro- oriented*3, no gross focal deficits.                 Discharge Instructions and Follow-Up:   Discharge diet: Regular   Discharge activity: Activity as tolerated   Discharge follow-up: has appt at [facility] for labs, LP with IT chemo, and Neulasta on 5/13 afternoon.   - currently has labs/transfusions set up for M/Th at  starting 5/16 during the 1-2 weeks after discharge.              Discharge Disposition:   Discharged to home      Attestation:  -    Vito Llanos MD       "

## 2019-05-12 NOTE — PLAN OF CARE
Discharge  D: Orders for discharge and outpatient medications written.  Lab reported Gram positive bacilli in broth from LP of 5/9.  Dr. Llanos was notified; no change in plan.  I: Home medications and return to clinic schedule reviewed with patient. Discharge instructions and parameters for calling Health Care Provider reviewed. Patient left at 1545 accompanied by his wife.   A: Patient/family verbalized understanding and was ready for discharge.   P: Patient picked up medications earlier in Pharmacy. Follow up as scheduled tomorrow for  and Neupogen.

## 2019-05-12 NOTE — PLAN OF CARE
VSS. Denies pain. Nausea managed with scheduled compazine. Day 4 chemo infusing with good blood return. Will discharge this afternoon after last chemo. Continue with plan of care.

## 2019-05-13 ENCOUNTER — OFFICE VISIT (OUTPATIENT)
Dept: ONCOLOGY | Facility: CLINIC | Age: 44
DRG: 847 | End: 2019-05-13
Attending: PHYSICIAN ASSISTANT
Payer: COMMERCIAL

## 2019-05-13 ENCOUNTER — APPOINTMENT (OUTPATIENT)
Dept: LAB | Facility: CLINIC | Age: 44
DRG: 847 | End: 2019-05-13
Attending: INTERNAL MEDICINE
Payer: COMMERCIAL

## 2019-05-13 ENCOUNTER — APPOINTMENT (OUTPATIENT)
Dept: LAB | Facility: CLINIC | Age: 44
End: 2019-05-13
Attending: INTERNAL MEDICINE
Payer: COMMERCIAL

## 2019-05-13 VITALS
WEIGHT: 178.8 LBS | SYSTOLIC BLOOD PRESSURE: 114 MMHG | HEART RATE: 79 BPM | OXYGEN SATURATION: 98 % | BODY MASS INDEX: 25.66 KG/M2 | TEMPERATURE: 97.8 F | DIASTOLIC BLOOD PRESSURE: 73 MMHG | RESPIRATION RATE: 16 BRPM

## 2019-05-13 DIAGNOSIS — C85.10 HIGH GRADE B-CELL LYMPHOMA (H): ICD-10-CM

## 2019-05-13 DIAGNOSIS — C83.74 BURKITT LYMPHOMA OF LYMPH NODES OF AXILLA (H): Primary | ICD-10-CM

## 2019-05-13 LAB
ALBUMIN SERPL-MCNC: 3.4 G/DL (ref 3.4–5)
ALP SERPL-CCNC: 56 U/L (ref 40–150)
ALT SERPL W P-5'-P-CCNC: 28 U/L (ref 0–70)
ANION GAP SERPL CALCULATED.3IONS-SCNC: 7 MMOL/L (ref 3–14)
APPEARANCE CSF: ABNORMAL
AST SERPL W P-5'-P-CCNC: 13 U/L (ref 0–45)
BASOPHILS # BLD AUTO: 0 10E9/L (ref 0–0.2)
BASOPHILS NFR BLD AUTO: 0.1 %
BILIRUB SERPL-MCNC: 0.7 MG/DL (ref 0.2–1.3)
BUN SERPL-MCNC: 25 MG/DL (ref 7–30)
CALCIUM SERPL-MCNC: 9.3 MG/DL (ref 8.5–10.1)
CHLORIDE SERPL-SCNC: 104 MMOL/L (ref 94–109)
CO2 SERPL-SCNC: 27 MMOL/L (ref 20–32)
COLOR CSF: ABNORMAL
CREAT SERPL-MCNC: 1.11 MG/DL (ref 0.66–1.25)
DIFFERENTIAL METHOD BLD: ABNORMAL
EOSINOPHIL # BLD AUTO: 0 10E9/L (ref 0–0.7)
EOSINOPHIL NFR BLD AUTO: 0 %
ERYTHROCYTE [DISTWIDTH] IN BLOOD BY AUTOMATED COUNT: 14 % (ref 10–15)
GFR SERPL CREATININE-BSD FRML MDRD: 81 ML/MIN/{1.73_M2}
GLUCOSE CSF-MCNC: 68 MG/DL (ref 40–70)
GLUCOSE SERPL-MCNC: 115 MG/DL (ref 70–99)
GRAM STN SPEC: NORMAL
HCT VFR BLD AUTO: 35.2 % (ref 40–53)
HGB BLD-MCNC: 12 G/DL (ref 13.3–17.7)
IMM GRANULOCYTES # BLD: 0.1 10E9/L (ref 0–0.4)
IMM GRANULOCYTES NFR BLD: 0.6 %
INR PPP: 1.1 (ref 0.86–1.14)
LYMPHOCYTES # BLD AUTO: 0.2 10E9/L (ref 0.8–5.3)
LYMPHOCYTES NFR BLD AUTO: 1.4 %
Lab: NORMAL
MCH RBC QN AUTO: 30.3 PG (ref 26.5–33)
MCHC RBC AUTO-ENTMCNC: 34.1 G/DL (ref 31.5–36.5)
MCV RBC AUTO: 89 FL (ref 78–100)
MONOCYTES # BLD AUTO: 0.1 10E9/L (ref 0–1.3)
MONOCYTES NFR BLD AUTO: 0.6 %
NEUTROPHILS # BLD AUTO: 10.4 10E9/L (ref 1.6–8.3)
NEUTROPHILS NFR BLD AUTO: 97.3 %
NRBC # BLD AUTO: 0 10*3/UL
NRBC BLD AUTO-RTO: 0 /100
PLATELET # BLD AUTO: 243 10E9/L (ref 150–450)
POTASSIUM SERPL-SCNC: 4 MMOL/L (ref 3.4–5.3)
PROT CSF-MCNC: 44 MG/DL (ref 15–60)
PROT SERPL-MCNC: 6.6 G/DL (ref 6.8–8.8)
RBC # BLD AUTO: 3.96 10E12/L (ref 4.4–5.9)
RBC # CSF MANUAL: ABNORMAL /UL (ref 0–2)
SODIUM SERPL-SCNC: 138 MMOL/L (ref 133–144)
SPECIMEN SOURCE: NORMAL
TUBE # CSF: 4 #
WBC # BLD AUTO: 10.7 10E9/L (ref 4–11)
WBC # CSF MANUAL: 12 /UL (ref 0–5)

## 2019-05-13 PROCEDURE — 84157 ASSAY OF PROTEIN OTHER: CPT | Performed by: PHYSICIAN ASSISTANT

## 2019-05-13 PROCEDURE — 87015 SPECIMEN INFECT AGNT CONCNTJ: CPT | Performed by: PHYSICIAN ASSISTANT

## 2019-05-13 PROCEDURE — 87205 SMEAR GRAM STAIN: CPT | Performed by: PHYSICIAN ASSISTANT

## 2019-05-13 PROCEDURE — 89050 BODY FLUID CELL COUNT: CPT | Performed by: PHYSICIAN ASSISTANT

## 2019-05-13 PROCEDURE — 88185 FLOWCYTOMETRY/TC ADD-ON: CPT | Performed by: PHYSICIAN ASSISTANT

## 2019-05-13 PROCEDURE — 82945 GLUCOSE OTHER FLUID: CPT | Performed by: PHYSICIAN ASSISTANT

## 2019-05-13 PROCEDURE — 25000128 H RX IP 250 OP 636: Mod: ZF | Performed by: PHYSICIAN ASSISTANT

## 2019-05-13 PROCEDURE — 40001004 ZZHCL STATISTIC FLOW INT 9-15 ABY TC 88188: Performed by: PHYSICIAN ASSISTANT

## 2019-05-13 PROCEDURE — 88184 FLOWCYTOMETRY/ TC 1 MARKER: CPT | Performed by: PHYSICIAN ASSISTANT

## 2019-05-13 PROCEDURE — 85025 COMPLETE CBC W/AUTO DIFF WBC: CPT | Performed by: PHYSICIAN ASSISTANT

## 2019-05-13 PROCEDURE — 87070 CULTURE OTHR SPECIMN AEROBIC: CPT | Performed by: PHYSICIAN ASSISTANT

## 2019-05-13 PROCEDURE — 89051 BODY FLUID CELL COUNT: CPT | Performed by: PHYSICIAN ASSISTANT

## 2019-05-13 PROCEDURE — 85610 PROTHROMBIN TIME: CPT | Performed by: PHYSICIAN ASSISTANT

## 2019-05-13 PROCEDURE — 87076 CULTURE ANAEROBE IDENT EACH: CPT | Performed by: PHYSICIAN ASSISTANT

## 2019-05-13 PROCEDURE — 25000125 ZZHC RX 250: Mod: ZF | Performed by: PHYSICIAN ASSISTANT

## 2019-05-13 PROCEDURE — 87077 CULTURE AEROBIC IDENTIFY: CPT | Performed by: PHYSICIAN ASSISTANT

## 2019-05-13 PROCEDURE — 96372 THER/PROPH/DIAG INJ SC/IM: CPT | Mod: 59

## 2019-05-13 PROCEDURE — 80053 COMPREHEN METABOLIC PANEL: CPT | Performed by: PHYSICIAN ASSISTANT

## 2019-05-13 PROCEDURE — 87181 SC STD AGAR DILUTION PER AGT: CPT | Performed by: PHYSICIAN ASSISTANT

## 2019-05-13 PROCEDURE — 96450 CHEMOTHERAPY INTO CNS: CPT | Mod: ZF | Performed by: PHYSICIAN ASSISTANT

## 2019-05-13 RX ORDER — LIDOCAINE HYDROCHLORIDE 10 MG/ML
5 INJECTION, SOLUTION EPIDURAL; INFILTRATION; INTRACAUDAL; PERINEURAL ONCE
Status: COMPLETED | OUTPATIENT
Start: 2019-05-13 | End: 2019-05-13

## 2019-05-13 RX ADMIN — METHOTREXATE: 25 INJECTION INTRA-ARTERIAL; INTRAMUSCULAR; INTRATHECAL; INTRAVENOUS at 15:17

## 2019-05-13 RX ADMIN — PEGFILGRASTIM 6 MG: 6 INJECTION SUBCUTANEOUS at 16:16

## 2019-05-13 RX ADMIN — LIDOCAINE HYDROCHLORIDE 5 ML: 10 INJECTION, SOLUTION EPIDURAL; INFILTRATION; INTRACAUDAL; PERINEURAL at 15:19

## 2019-05-13 ASSESSMENT — PAIN SCALES - GENERAL: PAINLEVEL: NO PAIN (0)

## 2019-05-13 NOTE — NURSING NOTE
"Oncology Rooming Note    May 13, 2019 2:57 PM   Christiano Molina is a 43 year old male who presents for:    Chief Complaint   Patient presents with     Blood Draw     Labs drawn via VPT by RN in lab. VS taken. Pt checked in for next appt     Initial Vitals: Blood Pressure 114/73 (BP Location: Right arm, Patient Position: Sitting, Cuff Size: Adult Regular)   Pulse 79   Temperature 97.8  F (36.6  C) (Oral)   Respiration 16   Weight 81.1 kg (178 lb 12.8 oz)   Oxygen Saturation 98%   Body Mass Index 25.66 kg/m   Estimated body mass index is 25.66 kg/m  as calculated from the following:    Height as of 5/8/19: 1.778 m (5' 10\").    Weight as of this encounter: 81.1 kg (178 lb 12.8 oz). Body surface area is 2 meters squared.  No Pain (0) Comment: Data Unavailable   No LMP for male patient.  Allergies reviewed: Yes  Medications reviewed: Yes    Medications: Medication refills not needed today.  Pharmacy name entered into [facility]:    [id number] - [place], [place] - [address] [place] [address]  [id number] - [place] - [address] [facility] [facility] - [place], [place] - [address] AT [facility] [address] [place] [address]    Clinical concerns: none       Leandra Alan MA              "

## 2019-05-13 NOTE — NURSING NOTE
Pt was given his Nuelasta injection in his right arm.    Pt. Tolerated this well.  Please see RICHARD Alan MA

## 2019-05-13 NOTE — LETTER
5/13/2019      RE: Christiano Molina  [address]  [place] [address]       BMT ONC Adult Lumbar Puncture and Intrathecal Chemotherapy Administration Procedure Note    May 13, 2019    Procedure: Lumbar Puncture to obtain CSF and give intrathecal chemotherapy    Diagnosis: Burkitt Lymphoma    Learning needs assessment complete within 12 months: YES     Vitals reviewed:  YES    Informed Consent: Procedure, benefits, risks, and alternatives explained to the patient who voiced understanding of the information and agreed to proceed with lumbar puncture. Risks include bleeding, headache, and or infection.   Patient safety checklist completed.    Labs: Reviewed:     Lab Results   Component Value Date    INR 1.10 05/13/2019     05/13/2019     Results for SOUMYA MOLINA (MRN [id number]) as of 5/13/2019 16:02   Ref. Range 5/13/2019 14:10   WBC Latest Ref Range: 4.0 - 11.0 10e9/L 10.7   Hemoglobin Latest Ref Range: 13.3 - 17.7 g/dL 12.0 (L)   Hematocrit Latest Ref Range: 40.0 - 53.0 % 35.2 (L)   Platelet Count Latest Ref Range: 150 - 450 10e9/L 243   RBC Count Latest Ref Range: 4.4 - 5.9 10e12/L 3.96 (L)   MCV Latest Ref Range: 78 - 100 fl 89   MCH Latest Ref Range: 26.5 - 33.0 pg 30.3   MCHC Latest Ref Range: 31.5 - 36.5 g/dL 34.1   RDW Latest Ref Range: 10.0 - 15.0 % 14.0   Diff Method Unknown Automated Method   % Neutrophils Latest Units: % 97.3   % Lymphocytes Latest Units: % 1.4   % Monocytes Latest Units: % 0.6   % Eosinophils Latest Units: % 0.0   % Basophils Latest Units: % 0.1   % Immature Granulocytes Latest Units: % 0.6   Nucleated RBCs Latest Ref Range: 0 /100 0   Absolute Neutrophil Latest Ref Range: 1.6 - 8.3 10e9/L 10.4 (H)   Absolute Lymphocytes Latest Ref Range: 0.8 - 5.3 10e9/L 0.2 (L)   Absolute Monocytes Latest Ref Range: 0.0 - 1.3 10e9/L 0.1   Absolute Eosinophils Latest Ref Range: 0.0 - 0.7 10e9/L 0.0   Absolute Basophils Latest Ref Range: 0.0 - 0.2 10e9/L 0.0   Abs Immature Granulocytes  Latest Ref Range: 0 - 0.4 10e9/L 0.1   Absolute Nucleated RBC Unknown 0.0       Description:. The patient was seated at the bedside with knees dangling. The L3-4 disc space was prepped and draped in a sterile fashion. Local anesthesia with 4 mL 1% preservative-free lidocaine was used. A 22 gauge, 4 inch needle was placed on the third attempt.  6 mL spinal fluid collected. Specimen appears clear. Specimen sent for cell count and differential, protein, glucose, flow cytometry, gram stain and culture.     Methotrexate in 6 mL of 0.9% preservative free sodium chloride was administered intrathecally slowly in a barbotage fashion.  The needle was removed and a bandage was applied to the site.  Chemotherapy double-check was performed per institutional policy.  Patient tolerated well.  Post-procedure pain assessment: 0 out of 10 on the numeric pain rating scale  Interventions: No    Complications: No     Disposition: Patient will remain on back for 1 hour post procedure. Avoid soaking in a tub tonight.  Call if develops headaches, fevers and or chills.     Neulasta given in clinic today.    Performed by:   ADRIANO Barba

## 2019-05-13 NOTE — PROGRESS NOTES
BMT ONC Adult Lumbar Puncture and Intrathecal Chemotherapy Administration Procedure Note    May 13, 2019    Procedure: Lumbar Puncture to obtain CSF and give intrathecal chemotherapy    Diagnosis: Burkitt Lymphoma    Learning needs assessment complete within 12 months: YES     Vitals reviewed:  YES    Informed Consent: Procedure, benefits, risks, and alternatives explained to the patient who voiced understanding of the information and agreed to proceed with lumbar puncture. Risks include bleeding, headache, and or infection.   Patient safety checklist completed.    Labs: Reviewed:     Lab Results   Component Value Date    INR 1.10 05/13/2019     05/13/2019     Results for SOUMYA CRUZ (MRN [id number]) as of 5/13/2019 16:02   Ref. Range 5/13/2019 14:10   WBC Latest Ref Range: 4.0 - 11.0 10e9/L 10.7   Hemoglobin Latest Ref Range: 13.3 - 17.7 g/dL 12.0 (L)   Hematocrit Latest Ref Range: 40.0 - 53.0 % 35.2 (L)   Platelet Count Latest Ref Range: 150 - 450 10e9/L 243   RBC Count Latest Ref Range: 4.4 - 5.9 10e12/L 3.96 (L)   MCV Latest Ref Range: 78 - 100 fl 89   MCH Latest Ref Range: 26.5 - 33.0 pg 30.3   MCHC Latest Ref Range: 31.5 - 36.5 g/dL 34.1   RDW Latest Ref Range: 10.0 - 15.0 % 14.0   Diff Method Unknown Automated Method   % Neutrophils Latest Units: % 97.3   % Lymphocytes Latest Units: % 1.4   % Monocytes Latest Units: % 0.6   % Eosinophils Latest Units: % 0.0   % Basophils Latest Units: % 0.1   % Immature Granulocytes Latest Units: % 0.6   Nucleated RBCs Latest Ref Range: 0 /100 0   Absolute Neutrophil Latest Ref Range: 1.6 - 8.3 10e9/L 10.4 (H)   Absolute Lymphocytes Latest Ref Range: 0.8 - 5.3 10e9/L 0.2 (L)   Absolute Monocytes Latest Ref Range: 0.0 - 1.3 10e9/L 0.1   Absolute Eosinophils Latest Ref Range: 0.0 - 0.7 10e9/L 0.0   Absolute Basophils Latest Ref Range: 0.0 - 0.2 10e9/L 0.0   Abs Immature Granulocytes Latest Ref Range: 0 - 0.4 10e9/L 0.1   Absolute Nucleated RBC Unknown 0.0        Description:. The patient was seated at the bedside with knees dangling. The L3-4 disc space was prepped and draped in a sterile fashion. Local anesthesia with 4 mL 1% preservative-free lidocaine was used. A 22 gauge, 4 inch needle was placed on the third attempt.  6 mL spinal fluid collected. Specimen appears clear. Specimen sent for cell count and differential, protein, glucose, flow cytometry, gram stain and culture.     Methotrexate in 6 mL of 0.9% preservative free sodium chloride was administered intrathecally slowly in a barbotage fashion.  The needle was removed and a bandage was applied to the site.  Chemotherapy double-check was performed per institutional policy.  Patient tolerated well.  Post-procedure pain assessment: 0 out of 10 on the numeric pain rating scale  Interventions: No    Complications: No     Disposition: Patient will remain on back for 1 hour post procedure. Avoid soaking in a tub tonight.  Call if develops headaches, fevers and or chills.     Neulasta given in clinic today.    Performed by:   Hiwot Couch

## 2019-05-14 LAB — COPATH REPORT: NORMAL

## 2019-05-15 LAB
APPEARANCE CSF: CLEAR
COLOR CSF: COLORLESS
RBC # CSF MANUAL: 61 /UL (ref 0–2)
TUBE # CSF: 3 #
WBC # CSF MANUAL: 4 /UL (ref 0–5)

## 2019-05-16 ENCOUNTER — HOSPITAL ENCOUNTER (OUTPATIENT)
Facility: CLINIC | Age: 44
Setting detail: SPECIMEN
Discharge: HOME | End: 2019-05-16
Attending: PHYSICIAN ASSISTANT | Admitting: PHYSICIAN ASSISTANT
Payer: COMMERCIAL

## 2019-05-16 ENCOUNTER — TELEPHONE (OUTPATIENT)
Dept: ONCOLOGY | Facility: CLINIC | Age: 44
End: 2019-05-16

## 2019-05-16 ENCOUNTER — INFUSION THERAPY VISIT (OUTPATIENT)
Dept: INFUSION THERAPY | Facility: CLINIC | Age: 44
End: 2019-05-16
Attending: INTERNAL MEDICINE
Payer: COMMERCIAL

## 2019-05-16 VITALS
SYSTOLIC BLOOD PRESSURE: 121 MMHG | RESPIRATION RATE: 16 BRPM | DIASTOLIC BLOOD PRESSURE: 75 MMHG | HEART RATE: 74 BPM | TEMPERATURE: 98.8 F

## 2019-05-16 DIAGNOSIS — C85.10 HIGH GRADE B-CELL LYMPHOMA (H): ICD-10-CM

## 2019-05-16 LAB
ALBUMIN SERPL-MCNC: 3.3 G/DL (ref 3.4–5)
ALP SERPL-CCNC: 87 U/L (ref 40–150)
ALT SERPL W P-5'-P-CCNC: 24 U/L (ref 0–70)
ANION GAP SERPL CALCULATED.3IONS-SCNC: 5 MMOL/L (ref 3–14)
AST SERPL W P-5'-P-CCNC: 11 U/L (ref 0–45)
BACTERIA SPEC CULT: ABNORMAL
BACTERIA SPEC CULT: ABNORMAL
BASOPHILS # BLD AUTO: 0 10E9/L (ref 0–0.2)
BASOPHILS NFR BLD AUTO: 0 %
BILIRUB SERPL-MCNC: 0.3 MG/DL (ref 0.2–1.3)
BUN SERPL-MCNC: 21 MG/DL (ref 7–30)
CALCIUM SERPL-MCNC: 9 MG/DL (ref 8.5–10.1)
CHLORIDE SERPL-SCNC: 104 MMOL/L (ref 94–109)
CO2 SERPL-SCNC: 32 MMOL/L (ref 20–32)
CREAT SERPL-MCNC: 1.06 MG/DL (ref 0.66–1.25)
DIFFERENTIAL METHOD BLD: ABNORMAL
EOSINOPHIL # BLD AUTO: 0 10E9/L (ref 0–0.7)
EOSINOPHIL NFR BLD AUTO: 0 %
ERYTHROCYTE [DISTWIDTH] IN BLOOD BY AUTOMATED COUNT: 14.8 % (ref 10–15)
GFR SERPL CREATININE-BSD FRML MDRD: 86 ML/MIN/{1.73_M2}
GLUCOSE SERPL-MCNC: 91 MG/DL (ref 70–99)
HCT VFR BLD AUTO: 35 % (ref 40–53)
HGB BLD-MCNC: 11.9 G/DL (ref 13.3–17.7)
LYMPHOCYTES # BLD AUTO: 1.9 10E9/L (ref 0.8–5.3)
LYMPHOCYTES NFR BLD AUTO: 10 %
Lab: ABNORMAL
MCH RBC QN AUTO: 30.8 PG (ref 26.5–33)
MCHC RBC AUTO-ENTMCNC: 34 G/DL (ref 31.5–36.5)
MCV RBC AUTO: 91 FL (ref 78–100)
MONOCYTES # BLD AUTO: 0 10E9/L (ref 0–1.3)
MONOCYTES NFR BLD AUTO: 0 %
NEUTROPHILS # BLD AUTO: 17.3 10E9/L (ref 1.6–8.3)
NEUTROPHILS NFR BLD AUTO: 90 %
PLATELET # BLD AUTO: 170 10E9/L (ref 150–450)
PLATELET # BLD EST: ABNORMAL 10*3/UL
POTASSIUM SERPL-SCNC: 4 MMOL/L (ref 3.4–5.3)
PROT SERPL-MCNC: 6.4 G/DL (ref 6.8–8.8)
RBC # BLD AUTO: 3.86 10E12/L (ref 4.4–5.9)
RBC MORPH BLD: ABNORMAL
SODIUM SERPL-SCNC: 141 MMOL/L (ref 133–144)
SPECIMEN SOURCE: ABNORMAL
WBC # BLD AUTO: 19.2 10E9/L (ref 4–11)

## 2019-05-16 PROCEDURE — 80053 COMPREHEN METABOLIC PANEL: CPT | Performed by: PHYSICIAN ASSISTANT

## 2019-05-16 PROCEDURE — 36415 COLL VENOUS BLD VENIPUNCTURE: CPT

## 2019-05-16 PROCEDURE — 85025 COMPLETE CBC W/AUTO DIFF WBC: CPT | Performed by: PHYSICIAN ASSISTANT

## 2019-05-16 NOTE — TELEPHONE ENCOUNTER
Received call from Prudencio  He is requesting is next admission to be scheduled.    Prudencio is also wondering if his provider visit could be moved earlier in the day.

## 2019-05-16 NOTE — PROGRESS NOTES
Infusion Nursing Note:  Christiano Molina presents today for labs  Patient seen by provider today: No   present during visit today: Not Applicable.    Note: N/A.    Intravenous Access:  Lab draw site right arm, Needle type BF, Gauge 23.  Labs drawn without difficulty.    Treatment Conditions:  Lab Results   Component Value Date    HGB 11.9 05/16/2019     Lab Results   Component Value Date    WBC 19.2 05/16/2019      Lab Results   Component Value Date    ANEU 17.3 05/16/2019     Lab Results   Component Value Date     05/16/2019      Lab Results   Component Value Date     05/16/2019                   Lab Results   Component Value Date    POTASSIUM 4.0 05/16/2019           Lab Results   Component Value Date    MAG 2.2 05/09/2019            Lab Results   Component Value Date    CR 1.06 05/16/2019                   Lab Results   Component Value Date    TAMMY 9.0 05/16/2019                Lab Results   Component Value Date    BILITOTAL 0.3 05/16/2019           Lab Results   Component Value Date    ALBUMIN 3.3 05/16/2019                    Lab Results   Component Value Date    ALT 24 05/16/2019           Lab Results   Component Value Date    AST 11 05/16/2019           Post Infusion Assessment:  Site patent and intact, free from redness, edema or discomfort.  No evidence of extravasations.       Discharge Plan:   AVS to patient via MYCHART.  Patient will return as [facility] for [facility] appointment.   Patient discharged in stable condition accompanied by: self.  Departure Mode: Ambulatory.    Greg Amador RN

## 2019-05-17 DIAGNOSIS — C85.10 HIGH GRADE B-CELL LYMPHOMA (H): Primary | ICD-10-CM

## 2019-05-20 ENCOUNTER — HOSPITAL ENCOUNTER (OUTPATIENT)
Facility: CLINIC | Age: 44
Setting detail: SPECIMEN
Discharge: HOME | End: 2019-05-20
Attending: INTERNAL MEDICINE | Admitting: INTERNAL MEDICINE
Payer: COMMERCIAL

## 2019-05-20 ENCOUNTER — INFUSION THERAPY VISIT (OUTPATIENT)
Dept: INFUSION THERAPY | Facility: CLINIC | Age: 44
End: 2019-05-20
Attending: INTERNAL MEDICINE
Payer: COMMERCIAL

## 2019-05-20 DIAGNOSIS — Z51.11 ENCOUNTER FOR ANTINEOPLASTIC CHEMOTHERAPY: Primary | ICD-10-CM

## 2019-05-20 DIAGNOSIS — C85.10 HIGH GRADE B-CELL LYMPHOMA (H): ICD-10-CM

## 2019-05-20 LAB
ALBUMIN SERPL-MCNC: 3.7 G/DL (ref 3.4–5)
ALP SERPL-CCNC: 75 U/L (ref 40–150)
ALT SERPL W P-5'-P-CCNC: 55 U/L (ref 0–70)
ANION GAP SERPL CALCULATED.3IONS-SCNC: 2 MMOL/L (ref 3–14)
AST SERPL W P-5'-P-CCNC: 20 U/L (ref 0–45)
BASOPHILS # BLD AUTO: 0.1 10E9/L (ref 0–0.2)
BASOPHILS NFR BLD AUTO: 1 %
BILIRUB SERPL-MCNC: 0.4 MG/DL (ref 0.2–1.3)
BUN SERPL-MCNC: 13 MG/DL (ref 7–30)
CALCIUM SERPL-MCNC: 9.2 MG/DL (ref 8.5–10.1)
CHLORIDE SERPL-SCNC: 109 MMOL/L (ref 94–109)
CO2 SERPL-SCNC: 30 MMOL/L (ref 20–32)
CREAT SERPL-MCNC: 1.1 MG/DL (ref 0.66–1.25)
DIFFERENTIAL METHOD BLD: ABNORMAL
EOSINOPHIL # BLD AUTO: 0.2 10E9/L (ref 0–0.7)
EOSINOPHIL NFR BLD AUTO: 3 %
ERYTHROCYTE [DISTWIDTH] IN BLOOD BY AUTOMATED COUNT: 14.3 % (ref 10–15)
GFR SERPL CREATININE-BSD FRML MDRD: 82 ML/MIN/{1.73_M2}
GLUCOSE SERPL-MCNC: 83 MG/DL (ref 70–99)
HCT VFR BLD AUTO: 36.3 % (ref 40–53)
HGB BLD-MCNC: 12.3 G/DL (ref 13.3–17.7)
LYMPHOCYTES # BLD AUTO: 1.7 10E9/L (ref 0.8–5.3)
LYMPHOCYTES NFR BLD AUTO: 31 %
MCH RBC QN AUTO: 30.6 PG (ref 26.5–33)
MCHC RBC AUTO-ENTMCNC: 33.9 G/DL (ref 31.5–36.5)
MCV RBC AUTO: 90 FL (ref 78–100)
MONOCYTES # BLD AUTO: 0.3 10E9/L (ref 0–1.3)
MONOCYTES NFR BLD AUTO: 6 %
MYELOCYTES # BLD: 0.2 10E9/L
MYELOCYTES NFR BLD MANUAL: 3 %
NEUTROPHILS # BLD AUTO: 3 10E9/L (ref 1.6–8.3)
NEUTROPHILS NFR BLD AUTO: 56 %
PLATELET # BLD AUTO: 152 10E9/L (ref 150–450)
PLATELET # BLD EST: ABNORMAL 10*3/UL
POLYCHROMASIA BLD QL SMEAR: SLIGHT
POTASSIUM SERPL-SCNC: 4 MMOL/L (ref 3.4–5.3)
PROT SERPL-MCNC: 7.1 G/DL (ref 6.8–8.8)
RBC # BLD AUTO: 4.02 10E12/L (ref 4.4–5.9)
SODIUM SERPL-SCNC: 141 MMOL/L (ref 133–144)
WBC # BLD AUTO: 5.4 10E9/L (ref 4–11)

## 2019-05-20 PROCEDURE — 80053 COMPREHEN METABOLIC PANEL: CPT | Performed by: PHYSICIAN ASSISTANT

## 2019-05-20 PROCEDURE — 85025 COMPLETE CBC W/AUTO DIFF WBC: CPT | Performed by: PHYSICIAN ASSISTANT

## 2019-05-20 PROCEDURE — 36415 COLL VENOUS BLD VENIPUNCTURE: CPT

## 2019-05-20 NOTE — PROGRESS NOTES
Infusion Nursing Note:  Christiano Molina presents today for poss tx.    Patient seen by provider today: No   present during visit today: Not Applicable.    Note: N/A.    Intravenous Access:  NA    Treatment Conditions:  Lab Results   Component Value Date    HGB 12.3 05/20/2019     Lab Results   Component Value Date    WBC 5.4 05/20/2019      Lab Results   Component Value Date    ANEU 17.3 05/16/2019     Lab Results   Component Value Date     05/20/2019      Lab Results   Component Value Date     05/20/2019                   Lab Results   Component Value Date    POTASSIUM 4.0 05/20/2019           Lab Results   Component Value Date    MAG 2.2 05/09/2019            Lab Results   Component Value Date    CR 1.10 05/20/2019                   Lab Results   Component Value Date    TAMMY 9.2 05/20/2019                Lab Results   Component Value Date    BILITOTAL 0.4 05/20/2019           Lab Results   Component Value Date    ALBUMIN 3.7 05/20/2019                    Lab Results   Component Value Date    ALT 55 05/20/2019           Lab Results   Component Value Date    AST 20 05/20/2019       Results reviewed, labs did NOT meet treatment parameters: no transfusion needed today.      Post Infusion Assessment:  NA.       Discharge Plan:   Discharge instructions reviewed with: Patient.  Patient and/or family verbalized understanding of discharge instructions and all questions answered.  Patient discharged in stable condition accompanied by: self.  Departure Mode: Ambulatory.    Shelby Mcdaniel RN

## 2019-05-20 NOTE — PROGRESS NOTES
Medical Assistant Note:  Christiano Molina presents today for BLOOD DRAW.    Patient seen by provider today: No.   present during visit today: Not Applicable.    Concerns: No Concerns.    Procedure:  Lab draw site: Left hand, Needle type: bf, Gauge: 23.    Post Assessment:  Labs drawn without difficulty: Yes.    Discharge Plan:  Departure Mode: Ambulatory.    Face to Face Time: 5 min.    Omaira Reyes

## 2019-05-22 ENCOUNTER — MYC REFILL (OUTPATIENT)
Dept: ONCOLOGY | Facility: CLINIC | Age: 44
End: 2019-05-22

## 2019-05-22 DIAGNOSIS — C85.90: ICD-10-CM

## 2019-05-22 RX ORDER — ACYCLOVIR 200 MG/1
400 CAPSULE ORAL 2 TIMES DAILY
Qty: 60 CAPSULE | Refills: 1 | Status: CANCELLED | OUTPATIENT
Start: 2019-05-22

## 2019-05-23 ENCOUNTER — HOSPITAL ENCOUNTER (OUTPATIENT)
Facility: CLINIC | Age: 44
Setting detail: SPECIMEN
End: 2019-05-23
Attending: INTERNAL MEDICINE
Payer: COMMERCIAL

## 2019-05-23 ENCOUNTER — INFUSION THERAPY VISIT (OUTPATIENT)
Dept: INFUSION THERAPY | Facility: CLINIC | Age: 44
End: 2019-05-23
Attending: INTERNAL MEDICINE
Payer: COMMERCIAL

## 2019-05-23 ENCOUNTER — HOSPITAL ENCOUNTER (OUTPATIENT)
Facility: CLINIC | Age: 44
Setting detail: SPECIMEN
Discharge: HOME | End: 2019-05-23
Attending: INTERNAL MEDICINE | Admitting: PHYSICIAN ASSISTANT
Payer: COMMERCIAL

## 2019-05-23 DIAGNOSIS — C85.10 HIGH GRADE B-CELL LYMPHOMA (H): ICD-10-CM

## 2019-05-23 LAB
ALBUMIN SERPL-MCNC: 3.8 G/DL (ref 3.4–5)
ALP SERPL-CCNC: 80 U/L (ref 40–150)
ALT SERPL W P-5'-P-CCNC: 53 U/L (ref 0–70)
ANION GAP SERPL CALCULATED.3IONS-SCNC: 5 MMOL/L (ref 3–14)
ANISOCYTOSIS BLD QL SMEAR: SLIGHT
AST SERPL W P-5'-P-CCNC: 19 U/L (ref 0–45)
BASOPHILS # BLD AUTO: 0.1 10E9/L (ref 0–0.2)
BASOPHILS NFR BLD AUTO: 1 %
BILIRUB SERPL-MCNC: 0.2 MG/DL (ref 0.2–1.3)
BUN SERPL-MCNC: 16 MG/DL (ref 7–30)
CALCIUM SERPL-MCNC: 9 MG/DL (ref 8.5–10.1)
CHLORIDE SERPL-SCNC: 106 MMOL/L (ref 94–109)
CO2 SERPL-SCNC: 30 MMOL/L (ref 20–32)
CREAT SERPL-MCNC: 1.15 MG/DL (ref 0.66–1.25)
DIFFERENTIAL METHOD BLD: ABNORMAL
EOSINOPHIL # BLD AUTO: 0 10E9/L (ref 0–0.7)
EOSINOPHIL NFR BLD AUTO: 0 %
ERYTHROCYTE [DISTWIDTH] IN BLOOD BY AUTOMATED COUNT: 15.2 % (ref 10–15)
GFR SERPL CREATININE-BSD FRML MDRD: 78 ML/MIN/{1.73_M2}
GLUCOSE SERPL-MCNC: 95 MG/DL (ref 70–99)
HCT VFR BLD AUTO: 37.7 % (ref 40–53)
HGB BLD-MCNC: 12.5 G/DL (ref 13.3–17.7)
LYMPHOCYTES # BLD AUTO: 2.2 10E9/L (ref 0.8–5.3)
LYMPHOCYTES NFR BLD AUTO: 17 %
MCH RBC QN AUTO: 30.6 PG (ref 26.5–33)
MCHC RBC AUTO-ENTMCNC: 33.2 G/DL (ref 31.5–36.5)
MCV RBC AUTO: 92 FL (ref 78–100)
METAMYELOCYTES # BLD: 0.3 10E9/L
METAMYELOCYTES NFR BLD MANUAL: 2 %
MONOCYTES # BLD AUTO: 0 10E9/L (ref 0–1.3)
MONOCYTES NFR BLD AUTO: 0 %
MYELOCYTES # BLD: 0.5 10E9/L
MYELOCYTES NFR BLD MANUAL: 4 %
NEUTROPHILS # BLD AUTO: 9.6 10E9/L (ref 1.6–8.3)
NEUTROPHILS NFR BLD AUTO: 73 %
PLATELET # BLD AUTO: 175 10E9/L (ref 150–450)
PLATELET # BLD EST: ABNORMAL 10*3/UL
POLYCHROMASIA BLD QL SMEAR: SLIGHT
POTASSIUM SERPL-SCNC: 4.1 MMOL/L (ref 3.4–5.3)
PROMYELOCYTES # BLD MANUAL: 0.4 10E9/L
PROMYELOCYTES NFR BLD MANUAL: 3 %
PROT SERPL-MCNC: 7.3 G/DL (ref 6.8–8.8)
RBC # BLD AUTO: 4.08 10E12/L (ref 4.4–5.9)
SODIUM SERPL-SCNC: 141 MMOL/L (ref 133–144)
WBC # BLD AUTO: 13.1 10E9/L (ref 4–11)

## 2019-05-23 PROCEDURE — 85025 COMPLETE CBC W/AUTO DIFF WBC: CPT | Performed by: PHYSICIAN ASSISTANT

## 2019-05-23 PROCEDURE — 80053 COMPREHEN METABOLIC PANEL: CPT | Performed by: PHYSICIAN ASSISTANT

## 2019-05-23 PROCEDURE — 36415 COLL VENOUS BLD VENIPUNCTURE: CPT

## 2019-05-24 DIAGNOSIS — C85.90: ICD-10-CM

## 2019-05-24 LAB
BACTERIA SPEC CULT: ABNORMAL
BACTERIA SPEC CULT: ABNORMAL
Lab: ABNORMAL
SPECIMEN SOURCE: ABNORMAL

## 2019-05-24 RX ORDER — ACYCLOVIR 200 MG/1
400 CAPSULE ORAL 2 TIMES DAILY
Qty: 120 CAPSULE | Refills: 0 | Status: SHIPPED | OUTPATIENT
Start: 2019-05-24 | End: 2019-06-25

## 2019-05-24 RX ORDER — ACYCLOVIR 200 MG/1
CAPSULE ORAL
Qty: 60 CAPSULE | Refills: 0 | OUTPATIENT
Start: 2019-05-24

## 2019-05-28 ENCOUNTER — TELEPHONE (OUTPATIENT)
Dept: ONCOLOGY | Facility: CLINIC | Age: 44
End: 2019-05-28

## 2019-05-28 NOTE — TELEPHONE ENCOUNTER
Patient calling to report a horse voice, which began just a few days after last discharge (about two weeks). It is consistent throughout the day and he denies pain, congestion, cough, swelling, or fever. I let him know I would let his care team know, and he is scheduled for 5/30 for evaluation. Dr. Aslhey crawford.    Edna Horn RN

## 2019-05-29 NOTE — PROGRESS NOTES
[facility]      Referring Provider: self     Dx:   1. BL, stage 1A, LDH slightly high, max size 6.2 cm, Dx 3/15/19    Tx:  1. Da-R-EPOCH since 3/27/19, MI after C2    PMH: strasibusmus   Allergies: None     Interval Hx: New hoarseness (in life) for 3 weeks with no URI symptoms. Tingling and numbness in finger tips, intermittent. ROS otherwise neg on a 10-point review.     Lab Results   Component Value Date    WBC 6.8 05/30/2019    HGB 12.7 (L) 05/30/2019    HCT 38.5 (L) 05/30/2019     05/30/2019     05/30/2019    POTASSIUM 4.6 05/30/2019    CHLORIDE 105 05/30/2019    CO2 28 05/30/2019    BUN 18 05/30/2019    CR 1.08 05/30/2019    GLC 76 05/30/2019    AST 19 05/30/2019    ALT 40 05/30/2019    ALKPHOS 64 05/30/2019    BILITOTAL 0.2 05/30/2019    INR 1.10 05/13/2019       Current Outpatient Medications   Medication     acetaminophen (TYLENOL) 325 MG tablet     acyclovir (ZOVIRAX) 200 MG capsule     allopurinol (ZYLOPRIM) 300 MG tablet     fluconazole (DIFLUCAN) 100 MG tablet     levofloxacin (LEVAQUIN) 250 MG tablet     predniSONE (DELTASONE) 20 MG tablet     ranitidine (ZANTAC) 150 MG tablet     senna-docusate (SENOKOT-S/PERICOLACE) 8.6-50 MG tablet     TUMS 500 MG OR CHEW     dexamethasone (DECADRON) 4 MG tablet     MULTIVITAMINS OR TABS     ondansetron (ZOFRAN) 8 MG tablet     ondansetron (ZOFRAN-ODT) 8 MG ODT tab     prochlorperazine (COMPAZINE) 10 MG tablet     triamcinolone (KENALOG) 0.1 % external cream     No current facility-administered medications for this visit.        Ph/E:   Vitals: /81 (BP Location: Left arm, Patient Position: Sitting, Cuff Size: Adult Regular)   Pulse 81   Temp 98.2  F (36.8  C) (Oral)   Wt 80.9 kg (178 lb 6.4 oz)   SpO2 97%   BMI 25.60 kg/m    BMI= Body mass index is 25.6 kg/m .  ECOG PS: 0  General: NAD; H&N: no jaundice or mucosal lesions; Lungs clear; Heart RRR; Abdomen; Soft, No organomegaly; Extremities: No edema; Skin: No  rash; Neuro: Nonfocal; Mood/Affect: appropriate; Lymph: subcm right axillary node, deep and central    Assessment and Plan:  1. Stage 1 BL: s/p da-r-EPOCH x3, IA after C2. Admit for C4 and PET before C5. He will get 2 IT sessions of methotrexate with each cycle (3-6). Dose level up. Will have inpatient ENT consult before deciding on VCR dose. Updated the team.   2. ID: acyclovir, fluc. Levaquin 250 to start at discharge (dose level is high; so won't wait until neutropenia) and continued until outpatient ANC > 1K.

## 2019-05-30 ENCOUNTER — HOSPITAL ENCOUNTER (OUTPATIENT)
Dept: GENERAL RADIOLOGY | Facility: CLINIC | Age: 44
DRG: 847 | End: 2019-05-30
Attending: PHYSICIAN ASSISTANT | Admitting: INTERNAL MEDICINE
Payer: COMMERCIAL

## 2019-05-30 ENCOUNTER — HOSPITAL ENCOUNTER (OUTPATIENT)
Dept: VASCULAR ULTRASOUND | Facility: CLINIC | Age: 44
DRG: 847 | End: 2019-05-30
Attending: PHYSICIAN ASSISTANT
Payer: COMMERCIAL

## 2019-05-30 ENCOUNTER — OFFICE VISIT (OUTPATIENT)
Dept: TRANSPLANT | Facility: CLINIC | Age: 44
DRG: 847 | End: 2019-05-30
Attending: INTERNAL MEDICINE
Payer: COMMERCIAL

## 2019-05-30 ENCOUNTER — HOSPITAL ENCOUNTER (INPATIENT)
Facility: CLINIC | Age: 44
LOS: 4 days | Discharge: HOME | DRG: 847 | End: 2019-06-03
Attending: INTERNAL MEDICINE | Admitting: INTERNAL MEDICINE
Payer: COMMERCIAL

## 2019-05-30 VITALS
BODY MASS INDEX: 25.54 KG/M2 | HEIGHT: 70 IN | SYSTOLIC BLOOD PRESSURE: 120 MMHG | TEMPERATURE: 98.2 F | HEART RATE: 81 BPM | OXYGEN SATURATION: 97 % | WEIGHT: 178.4 LBS | DIASTOLIC BLOOD PRESSURE: 81 MMHG

## 2019-05-30 DIAGNOSIS — C83.70 BURKITT LYMPHOMA, UNSPECIFIED BODY REGION (H): ICD-10-CM

## 2019-05-30 DIAGNOSIS — C85.90: Primary | ICD-10-CM

## 2019-05-30 DIAGNOSIS — C85.10 HIGH GRADE B-CELL LYMPHOMA (H): ICD-10-CM

## 2019-05-30 DIAGNOSIS — D70.1 CHEMOTHERAPY-INDUCED NEUTROPENIA (H): ICD-10-CM

## 2019-05-30 DIAGNOSIS — T45.1X5A CHEMOTHERAPY-INDUCED NEUTROPENIA (H): ICD-10-CM

## 2019-05-30 DIAGNOSIS — C85.10 HIGH GRADE B-CELL LYMPHOMA (H): Primary | ICD-10-CM

## 2019-05-30 LAB
ALBUMIN SERPL-MCNC: 3.6 G/DL (ref 3.4–5)
ALBUMIN SERPL-MCNC: 3.8 G/DL (ref 3.4–5)
ALP SERPL-CCNC: 57 U/L (ref 40–150)
ALP SERPL-CCNC: 64 U/L (ref 40–150)
ALT SERPL W P-5'-P-CCNC: 40 U/L (ref 0–70)
ALT SERPL W P-5'-P-CCNC: 41 U/L (ref 0–70)
ANION GAP SERPL CALCULATED.3IONS-SCNC: 5 MMOL/L (ref 3–14)
ANION GAP SERPL CALCULATED.3IONS-SCNC: 5 MMOL/L (ref 3–14)
AST SERPL W P-5'-P-CCNC: 19 U/L (ref 0–45)
AST SERPL W P-5'-P-CCNC: 21 U/L (ref 0–45)
BASOPHILS # BLD AUTO: 0.1 10E9/L (ref 0–0.2)
BASOPHILS # BLD AUTO: 0.1 10E9/L (ref 0–0.2)
BASOPHILS NFR BLD AUTO: 0.5 %
BASOPHILS NFR BLD AUTO: 0.9 %
BILIRUB SERPL-MCNC: 0.2 MG/DL (ref 0.2–1.3)
BILIRUB SERPL-MCNC: 0.2 MG/DL (ref 0.2–1.3)
BUN SERPL-MCNC: 17 MG/DL (ref 7–30)
BUN SERPL-MCNC: 18 MG/DL (ref 7–30)
CALCIUM SERPL-MCNC: 8.8 MG/DL (ref 8.5–10.1)
CALCIUM SERPL-MCNC: 9.4 MG/DL (ref 8.5–10.1)
CHLORIDE SERPL-SCNC: 105 MMOL/L (ref 94–109)
CHLORIDE SERPL-SCNC: 106 MMOL/L (ref 94–109)
CO2 SERPL-SCNC: 28 MMOL/L (ref 20–32)
CO2 SERPL-SCNC: 29 MMOL/L (ref 20–32)
CREAT SERPL-MCNC: 1.08 MG/DL (ref 0.66–1.25)
CREAT SERPL-MCNC: 1.16 MG/DL (ref 0.66–1.25)
DIFFERENTIAL METHOD BLD: ABNORMAL
DIFFERENTIAL METHOD BLD: ABNORMAL
EOSINOPHIL # BLD AUTO: 0 10E9/L (ref 0–0.7)
EOSINOPHIL # BLD AUTO: 0 10E9/L (ref 0–0.7)
EOSINOPHIL NFR BLD AUTO: 0.1 %
EOSINOPHIL NFR BLD AUTO: 0.3 %
ERYTHROCYTE [DISTWIDTH] IN BLOOD BY AUTOMATED COUNT: 14.9 % (ref 10–15)
ERYTHROCYTE [DISTWIDTH] IN BLOOD BY AUTOMATED COUNT: 15.2 % (ref 10–15)
GFR SERPL CREATININE-BSD FRML MDRD: 77 ML/MIN/{1.73_M2}
GFR SERPL CREATININE-BSD FRML MDRD: 84 ML/MIN/{1.73_M2}
GLUCOSE SERPL-MCNC: 122 MG/DL (ref 70–99)
GLUCOSE SERPL-MCNC: 76 MG/DL (ref 70–99)
HCT VFR BLD AUTO: 35.7 % (ref 40–53)
HCT VFR BLD AUTO: 38.5 % (ref 40–53)
HGB BLD-MCNC: 11.8 G/DL (ref 13.3–17.7)
HGB BLD-MCNC: 12.7 G/DL (ref 13.3–17.7)
IMM GRANULOCYTES # BLD: 0.1 10E9/L (ref 0–0.4)
IMM GRANULOCYTES # BLD: 0.1 10E9/L (ref 0–0.4)
IMM GRANULOCYTES NFR BLD: 1.1 %
IMM GRANULOCYTES NFR BLD: 1.6 %
LDH SERPL L TO P-CCNC: 231 U/L (ref 85–227)
LYMPHOCYTES # BLD AUTO: 1 10E9/L (ref 0.8–5.3)
LYMPHOCYTES # BLD AUTO: 1.1 10E9/L (ref 0.8–5.3)
LYMPHOCYTES NFR BLD AUTO: 11.9 %
LYMPHOCYTES NFR BLD AUTO: 14.6 %
MAGNESIUM SERPL-MCNC: 2.3 MG/DL (ref 1.6–2.3)
MCH RBC QN AUTO: 30.5 PG (ref 26.5–33)
MCH RBC QN AUTO: 30.5 PG (ref 26.5–33)
MCHC RBC AUTO-ENTMCNC: 33 G/DL (ref 31.5–36.5)
MCHC RBC AUTO-ENTMCNC: 33.1 G/DL (ref 31.5–36.5)
MCV RBC AUTO: 92 FL (ref 78–100)
MCV RBC AUTO: 93 FL (ref 78–100)
MONOCYTES # BLD AUTO: 0.6 10E9/L (ref 0–1.3)
MONOCYTES # BLD AUTO: 0.7 10E9/L (ref 0–1.3)
MONOCYTES NFR BLD AUTO: 10.2 %
MONOCYTES NFR BLD AUTO: 6.5 %
NEUTROPHILS # BLD AUTO: 4.9 10E9/L (ref 1.6–8.3)
NEUTROPHILS # BLD AUTO: 7.4 10E9/L (ref 1.6–8.3)
NEUTROPHILS NFR BLD AUTO: 72.4 %
NEUTROPHILS NFR BLD AUTO: 79.9 %
NRBC # BLD AUTO: 0 10*3/UL
NRBC # BLD AUTO: 0 10*3/UL
NRBC BLD AUTO-RTO: 0 /100
NRBC BLD AUTO-RTO: 0 /100
PHOSPHATE SERPL-MCNC: 2.6 MG/DL (ref 2.5–4.5)
PLATELET # BLD AUTO: 230 10E9/L (ref 150–450)
PLATELET # BLD AUTO: 232 10E9/L (ref 150–450)
PLATELET # BLD EST: ABNORMAL 10*3/UL
POTASSIUM SERPL-SCNC: 3.8 MMOL/L (ref 3.4–5.3)
POTASSIUM SERPL-SCNC: 4.6 MMOL/L (ref 3.4–5.3)
PROT SERPL-MCNC: 6.7 G/DL (ref 6.8–8.8)
PROT SERPL-MCNC: 7 G/DL (ref 6.8–8.8)
RBC # BLD AUTO: 3.87 10E12/L (ref 4.4–5.9)
RBC # BLD AUTO: 4.16 10E12/L (ref 4.4–5.9)
SODIUM SERPL-SCNC: 138 MMOL/L (ref 133–144)
SODIUM SERPL-SCNC: 140 MMOL/L (ref 133–144)
URATE SERPL-MCNC: 3.5 MG/DL (ref 3.5–7.2)
WBC # BLD AUTO: 6.8 10E9/L (ref 4–11)
WBC # BLD AUTO: 9.3 10E9/L (ref 4–11)

## 2019-05-30 PROCEDURE — 80053 COMPREHEN METABOLIC PANEL: CPT | Performed by: NURSE PRACTITIONER

## 2019-05-30 PROCEDURE — 85025 COMPLETE CBC W/AUTO DIFF WBC: CPT | Performed by: INTERNAL MEDICINE

## 2019-05-30 PROCEDURE — 12000001 ZZH R&B MED SURG/OB UMMC

## 2019-05-30 PROCEDURE — 83735 ASSAY OF MAGNESIUM: CPT | Performed by: NURSE PRACTITIONER

## 2019-05-30 PROCEDURE — 36592 COLLECT BLOOD FROM PICC: CPT | Performed by: NURSE PRACTITIONER

## 2019-05-30 PROCEDURE — 25800030 ZZH RX IP 258 OP 636: Performed by: INTERNAL MEDICINE

## 2019-05-30 PROCEDURE — 25000131 ZZH RX MED GY IP 250 OP 636 PS 637: Performed by: INTERNAL MEDICINE

## 2019-05-30 PROCEDURE — 85025 COMPLETE CBC W/AUTO DIFF WBC: CPT | Performed by: NURSE PRACTITIONER

## 2019-05-30 PROCEDURE — 25000125 ZZHC RX 250: Performed by: PHYSICIAN ASSISTANT

## 2019-05-30 PROCEDURE — 27211389 ZZ H KIT, 5 FR DL BIOFLO OPEN ENDED PICC

## 2019-05-30 PROCEDURE — 83615 LACTATE (LD) (LDH) ENZYME: CPT | Performed by: INTERNAL MEDICINE

## 2019-05-30 PROCEDURE — 40000986 XR CHEST 1 VW

## 2019-05-30 PROCEDURE — 99223 1ST HOSP IP/OBS HIGH 75: CPT | Mod: AI | Performed by: INTERNAL MEDICINE

## 2019-05-30 PROCEDURE — G0463 HOSPITAL OUTPT CLINIC VISIT: HCPCS | Mod: ZF

## 2019-05-30 PROCEDURE — 84550 ASSAY OF BLOOD/URIC ACID: CPT | Performed by: INTERNAL MEDICINE

## 2019-05-30 PROCEDURE — 84100 ASSAY OF PHOSPHORUS: CPT | Performed by: NURSE PRACTITIONER

## 2019-05-30 PROCEDURE — 25000132 ZZH RX MED GY IP 250 OP 250 PS 637: Performed by: NURSE PRACTITIONER

## 2019-05-30 PROCEDURE — 36569 INSJ PICC 5 YR+ W/O IMAGING: CPT

## 2019-05-30 PROCEDURE — 25000132 ZZH RX MED GY IP 250 OP 250 PS 637: Performed by: INTERNAL MEDICINE

## 2019-05-30 PROCEDURE — 25000128 H RX IP 250 OP 636: Performed by: INTERNAL MEDICINE

## 2019-05-30 PROCEDURE — 80053 COMPREHEN METABOLIC PANEL: CPT | Performed by: INTERNAL MEDICINE

## 2019-05-30 RX ORDER — DIPHENHYDRAMINE HCL 50 MG
50 CAPSULE ORAL ONCE
Status: COMPLETED | OUTPATIENT
Start: 2019-05-30 | End: 2019-05-30

## 2019-05-30 RX ORDER — ONDANSETRON 8 MG/1
8 TABLET, ORALLY DISINTEGRATING ORAL EVERY 8 HOURS PRN
Status: DISCONTINUED | OUTPATIENT
Start: 2019-05-30 | End: 2019-06-03 | Stop reason: HOSPADM

## 2019-05-30 RX ORDER — HEPARIN SODIUM,PORCINE 10 UNIT/ML
5-10 VIAL (ML) INTRAVENOUS EVERY 24 HOURS
Status: DISCONTINUED | OUTPATIENT
Start: 2019-05-30 | End: 2019-06-03 | Stop reason: HOSPADM

## 2019-05-30 RX ORDER — MEPERIDINE HYDROCHLORIDE 25 MG/ML
25 INJECTION INTRAMUSCULAR; INTRAVENOUS; SUBCUTANEOUS EVERY 30 MIN PRN
Status: DISCONTINUED | OUTPATIENT
Start: 2019-05-30 | End: 2019-06-03 | Stop reason: HOSPADM

## 2019-05-30 RX ORDER — SODIUM CHLORIDE 9 MG/ML
1000 INJECTION, SOLUTION INTRAVENOUS CONTINUOUS PRN
Status: DISCONTINUED | OUTPATIENT
Start: 2019-05-30 | End: 2019-06-03 | Stop reason: HOSPADM

## 2019-05-30 RX ORDER — ALLOPURINOL 300 MG/1
300 TABLET ORAL DAILY
Status: DISCONTINUED | OUTPATIENT
Start: 2019-05-30 | End: 2019-06-03 | Stop reason: HOSPADM

## 2019-05-30 RX ORDER — PROCHLORPERAZINE MALEATE 5 MG
5-10 TABLET ORAL EVERY 6 HOURS PRN
Status: DISCONTINUED | OUTPATIENT
Start: 2019-05-30 | End: 2019-06-03 | Stop reason: HOSPADM

## 2019-05-30 RX ORDER — HEPARIN SODIUM,PORCINE 10 UNIT/ML
5-10 VIAL (ML) INTRAVENOUS EVERY 24 HOURS
Status: DISCONTINUED | OUTPATIENT
Start: 2019-05-30 | End: 2019-05-30 | Stop reason: CLARIF

## 2019-05-30 RX ORDER — AMOXICILLIN 250 MG
2 CAPSULE ORAL 2 TIMES DAILY
Status: DISCONTINUED | OUTPATIENT
Start: 2019-05-30 | End: 2019-06-03 | Stop reason: HOSPADM

## 2019-05-30 RX ORDER — PROCHLORPERAZINE MALEATE 10 MG
10 TABLET ORAL EVERY 6 HOURS PRN
Status: DISCONTINUED | OUTPATIENT
Start: 2019-05-30 | End: 2019-05-30

## 2019-05-30 RX ORDER — ALLOPURINOL 300 MG/1
300 TABLET ORAL DAILY
Status: DISCONTINUED | OUTPATIENT
Start: 2019-05-30 | End: 2019-05-30

## 2019-05-30 RX ORDER — ALBUTEROL SULFATE 90 UG/1
1-2 AEROSOL, METERED RESPIRATORY (INHALATION)
Status: DISCONTINUED | OUTPATIENT
Start: 2019-05-30 | End: 2019-06-03 | Stop reason: HOSPADM

## 2019-05-30 RX ORDER — ONDANSETRON 8 MG/1
8 TABLET, FILM COATED ORAL EVERY 8 HOURS PRN
Status: DISCONTINUED | OUTPATIENT
Start: 2019-05-30 | End: 2019-06-03 | Stop reason: HOSPADM

## 2019-05-30 RX ORDER — FLUCONAZOLE 100 MG/1
100 TABLET ORAL DAILY
Status: DISCONTINUED | OUTPATIENT
Start: 2019-05-30 | End: 2019-06-03 | Stop reason: HOSPADM

## 2019-05-30 RX ORDER — ONDANSETRON 8 MG/1
16 TABLET, FILM COATED ORAL
Status: COMPLETED | OUTPATIENT
Start: 2019-05-30 | End: 2019-06-03

## 2019-05-30 RX ORDER — LORAZEPAM 2 MG/ML
.5-1 INJECTION INTRAMUSCULAR EVERY 6 HOURS PRN
Status: DISCONTINUED | OUTPATIENT
Start: 2019-05-30 | End: 2019-06-03 | Stop reason: HOSPADM

## 2019-05-30 RX ORDER — METHYLPREDNISOLONE SODIUM SUCCINATE 125 MG/2ML
125 INJECTION, POWDER, LYOPHILIZED, FOR SOLUTION INTRAMUSCULAR; INTRAVENOUS
Status: DISCONTINUED | OUTPATIENT
Start: 2019-05-30 | End: 2019-06-03 | Stop reason: HOSPADM

## 2019-05-30 RX ORDER — POTASSIUM CHLORIDE 7.45 MG/ML
10 INJECTION INTRAVENOUS
Status: DISCONTINUED | OUTPATIENT
Start: 2019-05-30 | End: 2019-06-03 | Stop reason: HOSPADM

## 2019-05-30 RX ORDER — POTASSIUM CHLORIDE 750 MG/1
20-40 TABLET, EXTENDED RELEASE ORAL
Status: DISCONTINUED | OUTPATIENT
Start: 2019-05-30 | End: 2019-06-03 | Stop reason: HOSPADM

## 2019-05-30 RX ORDER — PROCHLORPERAZINE MALEATE 10 MG
10 TABLET ORAL EVERY 8 HOURS
Status: DISCONTINUED | OUTPATIENT
Start: 2019-05-30 | End: 2019-06-03 | Stop reason: HOSPADM

## 2019-05-30 RX ORDER — EPINEPHRINE 1 MG/ML
0.3 INJECTION, SOLUTION, CONCENTRATE INTRAVENOUS EVERY 5 MIN PRN
Status: DISCONTINUED | OUTPATIENT
Start: 2019-05-30 | End: 2019-06-03 | Stop reason: HOSPADM

## 2019-05-30 RX ORDER — AMOXICILLIN 250 MG
2 CAPSULE ORAL 2 TIMES DAILY PRN
Status: DISCONTINUED | OUTPATIENT
Start: 2019-05-30 | End: 2019-06-03 | Stop reason: HOSPADM

## 2019-05-30 RX ORDER — POTASSIUM CHLORIDE 29.8 MG/ML
20 INJECTION INTRAVENOUS
Status: DISCONTINUED | OUTPATIENT
Start: 2019-05-30 | End: 2019-06-03 | Stop reason: HOSPADM

## 2019-05-30 RX ORDER — LORAZEPAM 0.5 MG/1
.5-1 TABLET ORAL EVERY 6 HOURS PRN
Status: DISCONTINUED | OUTPATIENT
Start: 2019-05-30 | End: 2019-05-30

## 2019-05-30 RX ORDER — POTASSIUM CHLORIDE 1.5 G/1.58G
20-40 POWDER, FOR SOLUTION ORAL
Status: DISCONTINUED | OUTPATIENT
Start: 2019-05-30 | End: 2019-06-03 | Stop reason: HOSPADM

## 2019-05-30 RX ORDER — ACETAMINOPHEN 325 MG/1
650 TABLET ORAL ONCE
Status: COMPLETED | OUTPATIENT
Start: 2019-05-30 | End: 2019-05-30

## 2019-05-30 RX ORDER — ACYCLOVIR 400 MG/1
400 TABLET ORAL 2 TIMES DAILY
Status: DISCONTINUED | OUTPATIENT
Start: 2019-05-30 | End: 2019-06-03 | Stop reason: HOSPADM

## 2019-05-30 RX ORDER — LORAZEPAM 0.5 MG/1
.5-1 TABLET ORAL EVERY 6 HOURS PRN
Status: DISCONTINUED | OUTPATIENT
Start: 2019-05-30 | End: 2019-06-03 | Stop reason: HOSPADM

## 2019-05-30 RX ORDER — DEXAMETHASONE 4 MG/1
8 TABLET ORAL DAILY
Status: DISCONTINUED | OUTPATIENT
Start: 2019-06-05 | End: 2019-06-03 | Stop reason: HOSPADM

## 2019-05-30 RX ORDER — ONDANSETRON 2 MG/ML
8 INJECTION INTRAMUSCULAR; INTRAVENOUS EVERY 8 HOURS PRN
Status: DISCONTINUED | OUTPATIENT
Start: 2019-05-30 | End: 2019-06-03 | Stop reason: HOSPADM

## 2019-05-30 RX ORDER — POTASSIUM CL/LIDO/0.9 % NACL 10MEQ/0.1L
10 INTRAVENOUS SOLUTION, PIGGYBACK (ML) INTRAVENOUS
Status: DISCONTINUED | OUTPATIENT
Start: 2019-05-30 | End: 2019-06-03 | Stop reason: HOSPADM

## 2019-05-30 RX ORDER — HEPARIN SODIUM,PORCINE 10 UNIT/ML
5-10 VIAL (ML) INTRAVENOUS
Status: DISCONTINUED | OUTPATIENT
Start: 2019-05-30 | End: 2019-06-03 | Stop reason: HOSPADM

## 2019-05-30 RX ORDER — HEPARIN SODIUM,PORCINE 10 UNIT/ML
5-10 VIAL (ML) INTRAVENOUS
Status: DISCONTINUED | OUTPATIENT
Start: 2019-05-30 | End: 2019-05-30 | Stop reason: CLARIF

## 2019-05-30 RX ORDER — DIPHENHYDRAMINE HYDROCHLORIDE 50 MG/ML
50 INJECTION INTRAMUSCULAR; INTRAVENOUS
Status: DISCONTINUED | OUTPATIENT
Start: 2019-05-30 | End: 2019-06-03 | Stop reason: HOSPADM

## 2019-05-30 RX ORDER — MAGNESIUM SULFATE HEPTAHYDRATE 40 MG/ML
4 INJECTION, SOLUTION INTRAVENOUS EVERY 4 HOURS PRN
Status: DISCONTINUED | OUTPATIENT
Start: 2019-05-30 | End: 2019-06-03 | Stop reason: HOSPADM

## 2019-05-30 RX ORDER — ALBUTEROL SULFATE 0.83 MG/ML
2.5 SOLUTION RESPIRATORY (INHALATION)
Status: DISCONTINUED | OUTPATIENT
Start: 2019-05-30 | End: 2019-06-03 | Stop reason: HOSPADM

## 2019-05-30 RX ORDER — LORAZEPAM 2 MG/ML
.5-1 INJECTION INTRAMUSCULAR EVERY 6 HOURS PRN
Status: DISCONTINUED | OUTPATIENT
Start: 2019-05-30 | End: 2019-05-30

## 2019-05-30 RX ORDER — ACETAMINOPHEN 325 MG/1
650 TABLET ORAL EVERY 4 HOURS PRN
Status: DISCONTINUED | OUTPATIENT
Start: 2019-05-30 | End: 2019-06-03 | Stop reason: HOSPADM

## 2019-05-30 RX ADMIN — DIPHENHYDRAMINE HYDROCHLORIDE 50 MG: 50 CAPSULE ORAL at 19:47

## 2019-05-30 RX ADMIN — SENNOSIDES AND DOCUSATE SODIUM 2 TABLET: 8.6; 5 TABLET ORAL at 19:48

## 2019-05-30 RX ADMIN — ACYCLOVIR 400 MG: 400 TABLET ORAL at 19:47

## 2019-05-30 RX ADMIN — PROCHLORPERAZINE MALEATE 10 MG: 10 TABLET, FILM COATED ORAL at 19:47

## 2019-05-30 RX ADMIN — RANITIDINE 150 MG: 150 TABLET ORAL at 19:47

## 2019-05-30 RX ADMIN — SODIUM CHLORIDE 150 MG: 9 INJECTION, SOLUTION INTRAVENOUS at 21:25

## 2019-05-30 RX ADMIN — LIDOCAINE HYDROCHLORIDE 1 ML: 10 INJECTION, SOLUTION EPIDURAL; INFILTRATION; INTRACAUDAL; PERINEURAL at 13:47

## 2019-05-30 RX ADMIN — ETOPOSIDE 170 MG: 20 INJECTION, SOLUTION, CONCENTRATE INTRAVENOUS at 22:10

## 2019-05-30 RX ADMIN — VINCRISTINE SULFATE 0.78 MG: 1 INJECTION, SOLUTION INTRAVENOUS at 22:07

## 2019-05-30 RX ADMIN — RITUXIMAB 700 MG: 10 INJECTION, SOLUTION INTRAVENOUS at 20:21

## 2019-05-30 RX ADMIN — ONDANSETRON HYDROCHLORIDE 16 MG: 8 TABLET, FILM COATED ORAL at 21:29

## 2019-05-30 RX ADMIN — ACETAMINOPHEN 650 MG: 325 TABLET, FILM COATED ORAL at 19:47

## 2019-05-30 RX ADMIN — Medication 5 ML: at 22:40

## 2019-05-30 RX ADMIN — PREDNISONE 120 MG: 50 TABLET ORAL at 19:47

## 2019-05-30 ASSESSMENT — MIFFLIN-ST. JEOR: SCORE: 1715.47

## 2019-05-30 ASSESSMENT — PAIN SCALES - GENERAL: PAINLEVEL: NO PAIN (0)

## 2019-05-30 ASSESSMENT — ACTIVITIES OF DAILY LIVING (ADL)
ADLS_ACUITY_SCORE: 10
ADLS_ACUITY_SCORE: 10

## 2019-05-30 NOTE — PROVIDER NOTIFICATION
05/30/19 0000   PICC Double Lumen 05/30/19 Left Basilic   Placement Date/Time: 05/30/19 1311   Catheter Brand: BlockScore DL open ended  Size (Fr): 5 Fr  Lot #: [id number]  Full barrier precautions done: Yes, hand hygiene, sterile gown, sterile gloves, mask, cap, full body drape, chlorhexidine scrub  Consent Signed...   External Cath Length (cm) 1 cm   Extremity Circumference (cm) 29 cm   Dressing Intervention Chlorhexidine patch;Transparent;New dressing;Securing device   Dressing Change Due 06/06/19   Lumen A - Color GRAY   Lumen A - Status blood return noted;saline locked   Lumen A - Cap Change Due 06/02/19   Lumen B - Color PURPLE   Lumen B - Status blood return noted;saline locked   Lumen B - Cap Change Due 06/02/19

## 2019-05-30 NOTE — PROGRESS NOTES
Nursing Focus: Admission  D: Arrived at 1400 from clinic via car. Patient accompanied by Wife, Halie. Admitted for Cycle 4 DA-EPOCH-R. PMHx: Burkitt lymphoma     I: Admission process began.  Patient oriented to room, enviroment, call light.  MD notified of patient's arrival on unit.     A: VSS. Afebrile. Denied pain, nausea and vomiting. PICC double lumen in left arm placed and verified to use. ENT saw patient due to voice hoarseness for the past couple of weeks which could be from near complete but not total glottic closure. Baseline neuropathy present. Rituxan, Etoposide and Vincristine/Doxorubicin to be given tonight. UOP adequate. Last BM today. Appetite good. NKDA. Up ad jessica independently. Full code.     P: Implement plan of care when available. Continue to monitor patient. Nursing interventions as appropriate. Notify MD with changes in pt status.

## 2019-05-30 NOTE — NURSING NOTE
Vitals done, labs drawn by venipuncture.  See doc flow sheets for details.  Elif Kim, JAMES May 30, 2019

## 2019-05-30 NOTE — CONSULTS
"Otolaryngology Consult Note  May 30, 2019      CC: Hoarseness     HPI: Christiano Molina is a 43 year old male with a past medical history of Burkitt lymphoma s/p 3 cycles of DA-EPOCH-R who we were asked to evaluate for new onset hoarseness following his third cycle of vincristine. The patient notes that shortly after completing his 3rd round of chemotherapy he developed progressively worsening hoarseness. He thinks that his voice sounds \"gravelly\" and his cough has been weak. He feels that the worst part of his voice is his inability to increase the volume. He denies difficulty swallowing and does not cough after eating or feel like he is choking on food. He denies a recent illness but does have two sick children at home. He denies headaches, vision or hearing changes, vertigo or instability, congestion, anterior or posterior drainage, odynaphagia, dysphagia, shortness of breath or chest pain. He denies recent intubations. 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        No Known Allergies    Social History     Socioeconomic History     Marital status:      Spouse name: Not on file     Number of children: Not on file     Years of education: Not on file     Highest education level: Not on file   Occupational History     Not on file   Social Needs     Financial resource strain: Not on file     Food insecurity:     Worry: Not on file     Inability: Not on file     Transportation needs:     Medical: Not on file     Non-medical: Not on file   Tobacco Use     Smoking status: Never Smoker     Smokeless tobacco: Never Used   Substance and Sexual Activity     Alcohol use: Not on file     Drug use: Not on file     Sexual " activity: Not on file   Lifestyle     Physical activity:     Days per week: Not on file     Minutes per session: Not on file     Stress: Not on file   Relationships     Social connections:     Talks on phone: Not on file     Gets together: Not on file     Attends Taoist service: Not on file     Active member of club or organization: Not on file     Attends meetings of clubs or organizations: Not on file     Relationship status: Not on file     Intimate partner violence:     Fear of current or ex partner: Not on file     Emotionally abused: Not on file     Physically abused: Not on file     Forced sexual activity: Not on file   Other Topics Concern     Parent/sibling w/ CABG, MI or angioplasty before 65F 55M? Not Asked   Social History Narrative     Not on file       No family history on file.    ROS: 12 point review of systems is negative unless noted in HPI.    PHYSICAL EXAM:  General: laying in bed, no acute distress  /69 (BP Location: Right arm)   Pulse 75   Temp 98.1  F (36.7  C) (Oral)   Resp 18   Wt 80.4 kg (177 lb 3.2 oz)   SpO2 97%   BMI 25.43 kg/m    HEAD: normocephalic, atraumatic  Face: symmetrical, CN VII intact bilaterally (HB 1), no swelling, edema, or erythema. Sensation V1-V3 intact and equal bilaterally.   Eyes: EOMI without spontaneous or gaze evoked nystagmus, PERRL, clear sclera  Ears: no tragal tenderness, external ear canal open and clear bilaterally  Nose: no anterior drainage, intact and midline septum without perforation or hematoma   Mouth: moist, no ulcers, no jaw or tooth tenderness, tongue midline and symmetric  Oropharynx: tonsils within normal limits, uvula midline, no oropharyngeal erythema, small white imer over left tonsil that was not scraped away with tongue blade most consistent with a tonsillolith   Neck: no LAD, trachea midline  Neuro: cranial nerves 2-12 grossly intact  Respiratory: breathing non-labored on RA, no stridor  Skin: no rashes or skin lesions of the  face/neck  Psych: pleasant affect  Cardio: extremities warm and well perfused     FIBEROPTIC ENDOSCOPY:  Due to hoarseness and a concern for vocal cord dysfunction an exam with a fiberoptic laryngoscopy was indicated. After obtaining verbal consent the fiberoptic laryngoscope was passed under endoscopic vision through the right nasal passage. The turbinates were normal. The inferior and middle meati were clear bilaterally without purulence, masses, or polyps. The nasopharynx was clear. The eustachian tubes were clear. The soft palate appeared normal with good mobility. The epiglottis was sharp, and the visualized portion of the vallecula was clear. The arytenoids were clear, and there was no pooling in the hypopharynx. The left TVC has a small area of granulation tissue on the posterior aspect. Bilateral cords mobile but with a small amount of midline asymmetric contact resulting in near complete but not total glottic closure. Patent airway. The patient tolerated the procedure well.      ROUTINE IP LABS (Last four results)  BMP  Recent Labs   Lab 05/30/19  1115      POTASSIUM 4.6   CHLORIDE 105   TAMMY 9.4   CO2 28   BUN 18   CR 1.08   GLC 76     CBC  Recent Labs   Lab 05/30/19  1115   WBC 6.8   RBC 4.16*   HGB 12.7*   HCT 38.5*   MCV 93   MCH 30.5   MCHC 33.0   RDW 14.9        INRNo lab results found in last 7 days.    Imaging:  Results for orders placed or performed during the hospital encounter of 05/30/19   XR Chest 1 View    Narrative    Exam: XR CHEST 1 VW, 5/30/2019 1:41 PM    Indication: PICC verification    Comparison: Chest x-ray 5/8/2019    Findings:   Right PICC has been removed. Left PICC tip projects over the low SVC.  No pneumothorax. Heart size is normal. No focal consolidation. Bones  are unremarkable.      Impression    Impression: Left PICC tip projects over the low SVC. No pneumothorax.    RAFAEL CANADA MD         Assessment and Plan  Christiano Molina is a 43 year old male with a  past medical history of Burkitt lymphoma s/p 3 cycles of DA-EPOCH-R with new onset hoarseness over the past 2.5 weeks. On direct visualization the patient has mobility in bilateral vocal cords. There is some asymmetry in adduction of the cords thus there is near complete but not total glottic closure. This is most commonly seen in patients with trauma to the cords. More specifically in patients who were recently intubated. The patient also has a small area of granulation tissue over the left posterior cord. Overall the patient has good mobility of bilateral cords. There is no further intervention from us at this time. We would recommend Speech therapy consult for hoarseness if patient is to remain in the hospital. If the patient will be discharging than they could see him as an outpatient. We will notify our schedules for follow up in ENT clinic in 2-3 weeks to assess improvement and reevaluate.      - No ENT intervention at this time   - SLP consult for assistance with hoarseness   - Follow up ENT clinic in 2-3 weeks   - ENT will sign off at this time. Please contact with questions or concerns.    The patient and plan were discussed with Dr. Nascimento who was in agreement.      [place]   Otolaryngology-Head & Neck Surgery  Please page ENT with questions by dialing [phone] and entering job code [phone] when prompted.

## 2019-05-30 NOTE — PROVIDER NOTIFICATION
DATE/TIME  (DOT-TD, DOT-NOW) CHEMO CHECK ACTIVITY (REGIMEN & DOSE CHECK, DAY, DOSE #, NAME OF CHEMO #1)  CHEMO DRUG #2  CHEMO DRUG #3 NAME OF RN #1 (USE DOT-ME HERE) NAME OF RN#2 (2ND RN TO LOG IN SEPARATELY)   May 30, 2019  5:38 PM   Cycle 4 DA-EPOCH-R Protocol Double Check   Suzette Ayon     5/30/2019  8:08 PM   Rituxan   Dayan Shepherd     5/30/2019  9:36 PM   Etoposide day #1 Vincristine/doxorubicin day #1  Dayan R. Nany Felton     5/31/2019  7:48 PM Etoposide Dose #2 Double Check Vincristine/Doxorubicin Dose #2 Double Check  Zeny Willett     6/1/2019  4:51 PM   Etoposide Dose #3 double check Vincristine/Doxorubincin Dose #3 double check  Radha Montelongo    (Raghu)   6/2/2019  4:08 PM Etoposide Dose #4 double check Vincristine/Doxorubicin Dose #4 Double Check  Zeny Felton     6/3/2019 11:11 AM IT chemo   Sanam Youngblood    6/3/2019  2:48 PM   Cytoxan    Lorena Roach

## 2019-05-30 NOTE — NURSING NOTE
"Oncology Rooming Note    May 30, 2019 11:23 AM   Christiano Molina is a 43 year old male who presents for:    Chief Complaint   Patient presents with     Blood Draw      blood draw     Oncology Clinic Visit     Return: High grade B-cell lymphoma      Initial Vitals: /81 (BP Location: Left arm, Patient Position: Sitting, Cuff Size: Adult Regular)   Pulse 81   Temp 98.2  F (36.8  C) (Oral)   Ht 1.778 m (5' 10\")   Wt 80.9 kg (178 lb 6.4 oz)   SpO2 97%   BMI 25.60 kg/m   Estimated body mass index is 25.6 kg/m  as calculated from the following:    Height as of this encounter: 1.778 m (5' 10\").    Weight as of this encounter: 80.9 kg (178 lb 6.4 oz). Body surface area is 2 meters squared.  No Pain (0) Comment: Data Unavailable   No LMP for male patient.  Allergies reviewed: Yes  Medications reviewed: Yes    Medications: Medication refills not needed today.  Pharmacy name entered into [facility]:    [id number] - [place], [place] - [address] [place] [address] PHARMACY # [id number] - [place] - [address] [facility] - [place], [place] - [address] AT [facility] OF [place] [address]    Clinical concerns: N/A       Iqra Collins CMA              "

## 2019-05-30 NOTE — H&P
[facility] [place]    History and Physical  Hematology / Oncology     Date of Admission:  5/30/2019    Assessment & Plan   Christiano Molina is a 43 year old male with Burkitt lymphoma, now s/p 3 cycles of DA-EPOCH-R. He is being admitted for Cycle 4.     #Burkitt Lymphoma, EBV+, Stage IA   Follows with Dr. Ramirez. Initially presented in mid-March with a right axillary mass, found to have high-grade B-cell lymphoma. Initial pathology was not fully clear whether DLBCL or Burkitt, but more recently this was finalized by [facility] Heme Path review as Burkitt lymphoma (EBV+). Plan is to do 6 cycles of DA-R-EPOCH with IT chemo prophylaxis during C3-6. PET/CT after 2 cycles with [place].   - PICC placed prior to admission   - Allopurinol   - Patient was seen prior to admission by Dr. Ramirez with new complaints of voice hoarseness x 3 weeks.  Will have ENT evaluate prior to initiating chemotherapy in case Vincristine dose needs to be changed   - PICC placed prior to admission.     Treatment Plan: DA REPOCH C4  Rituxan 375mg/m2 D0   Prednisone 60mg/m2 BID D1-5   Etoposide 86.4mg/m2 D1-4 -- 20% dose increase per protocol   Vincristine 0.4mg/m2 D1-4   Cytoxan 1296mg/m2 D5  -- 20% dose increase per protocol   - dexamethasone 8mg D6,7   IT triple therapy chemotherapy (will need D1 , D 5) plan to give D1 It chemo tomorrow 5/31 with procedure team, would be due again 6/4 or 6/5   -- will give emend on D1 and again on D 5   -- zofran, compazine prn     #ID PPx  - continue ppx ACV and Fluc  - add Levaquin at discharge, per Dr. Ramirez -- dose level is high, so will not wait until neutropenia.      #Chemotherapy induced nausea  Improved with scheduled antiemetics during chemo.   - scheduled antiemetics again with this cycle  - consider zyprexa if increased difficulty with nausea     #Constipation, chemotherapy induced  Currently improved but occurs during chemo per pt.   - Senna-S BID  - Miralax PRN     #GERD  -  continue home Zantac     #Peripheral neuropathy.   Mild numbness/tingling affecting first 3 digits of b/l hands. Not interfering with function. Has mild numbness in b/l toes as well.   - monitor    # Hoarseness.  Patient endorses 3 weeks of vocal hoarseness.  No other URI symptoms.  No recent sick contact.  No dysphagia.   - will have ENT evaluate patient on admission prior to initiating chemotherapy, as patient has h/o peripheral neuropathy and as dose adjustments are increases, concern for possible vocal cord neuropathy   - will discuss with Dr. Ramirez after ENT eval      #H/o post-LP HA.   - monitor closely for this after LP done this cycle. Will encourage laying flat >1hr and caffeine/IVFs.      FEN  - no current IVFs in addition to CIVI chemo  - lyte repletion per unit protocol  - regular diet     PPx  - VTE: hold VTE ppx given plan for LP on 5/31   - GI: Zantac     Dispo: anticipate discharge on D5 after Cytoxan  - will need Neulasta scheduled prior to discharge and labs/transfusions twice weekly   - possible could get D5 IT chemo in clinic at discharge       Code Status   Full Code  Disposition Plan   Expected discharge: 4 - 7 days, recommended to prior living arrangement once chemotherapy completed.     Entered: Radha Krishnamurthy 05/30/2019, 12:55 PM   Information in the above section will display in the discharge planner report.    Discussed with staff attending, Dr. Carcamo.     Radha Krishnamurthy, DNP, APRN, CNP  Hematology/oncology  [id number]     Primary Care Physician   Layton Weir    Chief Complaint   C4 DA R EPOCH     History is obtained from the patient, EHR     History of Present Illness   Christiano Molina is a 43 year old male who presents for C4 DA REPOCH.  He reports last cycle of chemo went well.  Received emend prior to chemo and again on D3 of chemo.  Reports nausea subsided after discharge.  He does endorse vocal hoarseness that started 3 days after discharging from hospital after C3.  He  "denies runny nose, sore throat, congestion, cough, chest pain, SOB.  No recent fevers or chills.  Has neuropathy in thumbs and first 3 fingers.  Also has mild numbness in b/l feet.  Has had good appetite and feels back to \"90%\" energy level.  Bowels are regular.  No dysuria. No edema. Spouse is at bedside.     Past Medical History    I have reviewed this patient's medical history and updated it with pertinent information if needed.   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  Past Surgical History:   Procedure Laterality Date     IR PICC VASCULAR  3/28/2019     PICC INSERTION Right 03/27/2019    5Fr - 42cm, Basilic vein, mid SVC       Prior to Admission Medications   Cannot display prior to admission medications because the patient has not been admitted in this contact.     Allergies   Allergies   Allergen Reactions     Chloraprep One Step Rash       Social History   I have reviewed this patient's social history and updated it with pertinent information if needed. Christiano Molina  reports that he has never smoked. He has never used smokeless tobacco.    Family History   I have reviewed this patient's family history and updated it with pertinent information if needed.   No family history on file.    Review of Systems   The 10 point Review of Systems is negative other than noted in the HPI or here.     Physical Exam                      Vital Signs with Ranges  Temp:  [98.2  F (36.8  C)] 98.2  F (36.8  C)  Pulse:  [81] 81  BP: (120)/(81) 120/81  SpO2:  [97 %] 97 %  0 lbs 0 oz    Constitutional: Awake, alert, cooperative, no apparent distress, and appears stated age.   Eyes: Lids and lashes normal, pupils equal, round and reactive to light, extra ocular muscles intact, sclera clear, conjunctiva normal.  ENT: Normocephalic, without obvious abnormality, " atraumatic. Voice is audibly hoarse, small deep palpable axillary LN in L axilla     Respiratory: No increased work of breathing. No oxygen. LS clear, no crackles or wheezes   Cardiovascular: Regular rate, regular rhythm, no murmurs.  GI: Normal bowel sounds, soft, non-distended, non-tender.  Musculoskeletal: No edema B/L LE. No obvious joint swelling or deformities.   Neurologic: A&O x4, cranial nerves II-XII appear to be grossly intact.  No focal deficits.   Neuropsychiatric: Calm, normal eye contact, alert, normal affect memory for past and recent events intact and thought process normal.      Data   No results found for this or any previous visit (from the past 24 hour(s)).  Most Recent 3 CBC's:  Recent Labs   Lab Test 05/30/19  1115 05/23/19  1151 05/20/19  1116   WBC 6.8 13.1* 5.4   HGB 12.7* 12.5* 12.3*   MCV 93 92 90    175 152     Most Recent 3 BMP's:  Recent Labs   Lab Test 05/30/19  1115 05/23/19  1151 05/20/19  1116    141 141   POTASSIUM 4.6 4.1 4.0   CHLORIDE 105 106 109   CO2 28 30 30   BUN 18 16 13   CR 1.08 1.15 1.10   ANIONGAP 5 5 2*   TAMMY 9.4 9.0 9.2   GLC 76 95 83     Most Recent 2 LFT's:  Recent Labs   Lab Test 05/30/19  1115 05/23/19  1151   AST 19 19   ALT 40 53   ALKPHOS 64 80   BILITOTAL 0.2 0.2     Most Recent 3 INR's:  Recent Labs   Lab Test 05/13/19  1410 05/09/19  0617   INR 1.10 1.06

## 2019-05-30 NOTE — LETTER
Transition Communication Hand-off for Care Transitions to Next Level of Care Provider    Name: Christiano Molina  : 1975  MRN #: [id number]  Primary Care Provider: Layton Weir     Primary Clinic: [facility] [address] [place] [address] [address] [place] [address]     Reason for Hospitalization:  Burkitt Lymphoma  Lymphoma (H)  Admit Date/Time: 2019  1:42 PM  Discharge Date: 6/3/19  Payor Source: Payor: MEDICA / Plan: MEDICA CHOICE / Product Type: Indemnity /     Christiano Molina is a 43 year old male with Burkitt lymphoma who was admitted for cycle 4 DA-EPOCH-R.      Discharge Plan: Home with clinic follow-up as recommended.        Follow-up plan:    Future Appointments   Date Time Provider Department Center   2019 10:00 AM Nurse,  Oncology Injection O[facility]   2019 10:30 AM [facility] [place] [facility]   6/10/2019 10:15 AM [facility] [place] [facility]   2019  9:45 AM [facility]   2019  9:00 AM [facility]   2019  9:00 AM  [facility] LAB DRAW [facility]   2019  9:30 AM Mariah Baker PA-C [facility]       Any outstanding tests or procedures:        Radiology & Cardiology Orders     Future Labs/Procedures Complete By Expires    IR PICC Placement > 5 Yrs of Age  2019 (Approximate) 6/3/2020              Yissel Grier, RN, BSN, PHN  Care Coordinator       AVS/Discharge Summary is the source of truth; this is a helpful guide for improved communication of patient story

## 2019-05-31 ENCOUNTER — TELEPHONE (OUTPATIENT)
Dept: OTOLARYNGOLOGY | Facility: CLINIC | Age: 44
End: 2019-05-31

## 2019-05-31 ENCOUNTER — APPOINTMENT (OUTPATIENT)
Dept: SPEECH THERAPY | Facility: CLINIC | Age: 44
DRG: 847 | End: 2019-05-31
Attending: INTERNAL MEDICINE
Payer: COMMERCIAL

## 2019-05-31 LAB
ANION GAP SERPL CALCULATED.3IONS-SCNC: 4 MMOL/L (ref 3–14)
BASOPHILS # BLD AUTO: 0 10E9/L (ref 0–0.2)
BASOPHILS NFR BLD AUTO: 0.2 %
BUN SERPL-MCNC: 18 MG/DL (ref 7–30)
CALCIUM SERPL-MCNC: 8.9 MG/DL (ref 8.5–10.1)
CHLORIDE SERPL-SCNC: 111 MMOL/L (ref 94–109)
CO2 SERPL-SCNC: 26 MMOL/L (ref 20–32)
CREAT SERPL-MCNC: 0.94 MG/DL (ref 0.66–1.25)
DIFFERENTIAL METHOD BLD: ABNORMAL
EOSINOPHIL # BLD AUTO: 0 10E9/L (ref 0–0.7)
EOSINOPHIL NFR BLD AUTO: 0 %
ERYTHROCYTE [DISTWIDTH] IN BLOOD BY AUTOMATED COUNT: 15.1 % (ref 10–15)
GFR SERPL CREATININE-BSD FRML MDRD: >90 ML/MIN/{1.73_M2}
GLUCOSE CSF-MCNC: 92 MG/DL (ref 40–70)
GLUCOSE SERPL-MCNC: 184 MG/DL (ref 70–99)
GRAM STN SPEC: NORMAL
HCT VFR BLD AUTO: 34.7 % (ref 40–53)
HGB BLD-MCNC: 11.1 G/DL (ref 13.3–17.7)
IMM GRANULOCYTES # BLD: 0.1 10E9/L (ref 0–0.4)
IMM GRANULOCYTES NFR BLD: 1.1 %
INR PPP: 1.08 (ref 0.86–1.14)
LYMPHOCYTES # BLD AUTO: 0.3 10E9/L (ref 0.8–5.3)
LYMPHOCYTES NFR BLD AUTO: 2.7 %
Lab: NORMAL
MCH RBC QN AUTO: 29.8 PG (ref 26.5–33)
MCHC RBC AUTO-ENTMCNC: 32 G/DL (ref 31.5–36.5)
MCV RBC AUTO: 93 FL (ref 78–100)
MONOCYTES # BLD AUTO: 0.1 10E9/L (ref 0–1.3)
MONOCYTES NFR BLD AUTO: 0.7 %
NEUTROPHILS # BLD AUTO: 11.7 10E9/L (ref 1.6–8.3)
NEUTROPHILS NFR BLD AUTO: 95.3 %
NRBC # BLD AUTO: 0 10*3/UL
NRBC BLD AUTO-RTO: 0 /100
PLATELET # BLD AUTO: 229 10E9/L (ref 150–450)
POTASSIUM SERPL-SCNC: 4.2 MMOL/L (ref 3.4–5.3)
PROT CSF-MCNC: 75 MG/DL (ref 15–60)
RBC # BLD AUTO: 3.72 10E12/L (ref 4.4–5.9)
SODIUM SERPL-SCNC: 140 MMOL/L (ref 133–144)
SPECIMEN SOURCE: NORMAL
WBC # BLD AUTO: 12.3 10E9/L (ref 4–11)

## 2019-05-31 PROCEDURE — 80048 BASIC METABOLIC PNL TOTAL CA: CPT | Performed by: NURSE PRACTITIONER

## 2019-05-31 PROCEDURE — 25000131 ZZH RX MED GY IP 250 OP 636 PS 637: Performed by: INTERNAL MEDICINE

## 2019-05-31 PROCEDURE — 87205 SMEAR GRAM STAIN: CPT | Performed by: NURSE PRACTITIONER

## 2019-05-31 PROCEDURE — 25000132 ZZH RX MED GY IP 250 OP 250 PS 637: Performed by: NURSE PRACTITIONER

## 2019-05-31 PROCEDURE — 82945 GLUCOSE OTHER FLUID: CPT | Performed by: NURSE PRACTITIONER

## 2019-05-31 PROCEDURE — 85610 PROTHROMBIN TIME: CPT | Performed by: NURSE PRACTITIONER

## 2019-05-31 PROCEDURE — 36592 COLLECT BLOOD FROM PICC: CPT | Performed by: NURSE PRACTITIONER

## 2019-05-31 PROCEDURE — 40000802 ZZH SITE CHECK

## 2019-05-31 PROCEDURE — 88184 FLOWCYTOMETRY/ TC 1 MARKER: CPT | Performed by: NURSE PRACTITIONER

## 2019-05-31 PROCEDURE — 25000128 H RX IP 250 OP 636: Performed by: INTERNAL MEDICINE

## 2019-05-31 PROCEDURE — 85025 COMPLETE CBC W/AUTO DIFF WBC: CPT | Performed by: NURSE PRACTITIONER

## 2019-05-31 PROCEDURE — 25800030 ZZH RX IP 258 OP 636: Performed by: INTERNAL MEDICINE

## 2019-05-31 PROCEDURE — 40001004 ZZHCL STATISTIC FLOW INT 9-15 ABY TC 88188: Performed by: NURSE PRACTITIONER

## 2019-05-31 PROCEDURE — 87070 CULTURE OTHR SPECIMN AEROBIC: CPT | Performed by: NURSE PRACTITIONER

## 2019-05-31 PROCEDURE — 3E0R305 INTRODUCTION OF OTHER ANTINEOPLASTIC INTO SPINAL CANAL, PERCUTANEOUS APPROACH: ICD-10-PCS | Performed by: STUDENT IN AN ORGANIZED HEALTH CARE EDUCATION/TRAINING PROGRAM

## 2019-05-31 PROCEDURE — 87015 SPECIMEN INFECT AGNT CONCNTJ: CPT | Performed by: NURSE PRACTITIONER

## 2019-05-31 PROCEDURE — 84157 ASSAY OF PROTEIN OTHER: CPT | Performed by: NURSE PRACTITIONER

## 2019-05-31 PROCEDURE — 25000132 ZZH RX MED GY IP 250 OP 250 PS 637: Performed by: INTERNAL MEDICINE

## 2019-05-31 PROCEDURE — 89050 BODY FLUID CELL COUNT: CPT | Performed by: NURSE PRACTITIONER

## 2019-05-31 PROCEDURE — 12000001 ZZH R&B MED SURG/OB UMMC

## 2019-05-31 PROCEDURE — 25000128 H RX IP 250 OP 636: Performed by: NURSE PRACTITIONER

## 2019-05-31 PROCEDURE — 92524 BEHAVRAL QUALIT ANALYS VOICE: CPT | Mod: GN

## 2019-05-31 PROCEDURE — 62270 DX LMBR SPI PNXR: CPT | Performed by: STUDENT IN AN ORGANIZED HEALTH CARE EDUCATION/TRAINING PROGRAM

## 2019-05-31 PROCEDURE — 99233 SBSQ HOSP IP/OBS HIGH 50: CPT | Performed by: INTERNAL MEDICINE

## 2019-05-31 PROCEDURE — 88185 FLOWCYTOMETRY/TC ADD-ON: CPT | Performed by: NURSE PRACTITIONER

## 2019-05-31 RX ORDER — CALCIUM CARBONATE 500 MG/1
500 TABLET, CHEWABLE ORAL 2 TIMES DAILY PRN
Status: DISCONTINUED | OUTPATIENT
Start: 2019-05-31 | End: 2019-06-03 | Stop reason: HOSPADM

## 2019-05-31 RX ADMIN — ONDANSETRON HYDROCHLORIDE 16 MG: 8 TABLET, FILM COATED ORAL at 19:22

## 2019-05-31 RX ADMIN — ACYCLOVIR 400 MG: 400 TABLET ORAL at 19:22

## 2019-05-31 RX ADMIN — SENNOSIDES AND DOCUSATE SODIUM 2 TABLET: 8.6; 5 TABLET ORAL at 07:59

## 2019-05-31 RX ADMIN — PROCHLORPERAZINE MALEATE 10 MG: 10 TABLET, FILM COATED ORAL at 04:45

## 2019-05-31 RX ADMIN — FLUCONAZOLE 100 MG: 100 TABLET ORAL at 07:59

## 2019-05-31 RX ADMIN — LORAZEPAM 0.5 MG: 2 INJECTION INTRAMUSCULAR; INTRAVENOUS at 10:08

## 2019-05-31 RX ADMIN — PROCHLORPERAZINE MALEATE 10 MG: 10 TABLET, FILM COATED ORAL at 19:22

## 2019-05-31 RX ADMIN — PROCHLORPERAZINE MALEATE 10 MG: 10 TABLET, FILM COATED ORAL at 12:06

## 2019-05-31 RX ADMIN — PREDNISONE 120 MG: 50 TABLET ORAL at 07:58

## 2019-05-31 RX ADMIN — Medication 5 MG: at 22:40

## 2019-05-31 RX ADMIN — ACYCLOVIR 400 MG: 400 TABLET ORAL at 07:59

## 2019-05-31 RX ADMIN — SENNOSIDES AND DOCUSATE SODIUM 2 TABLET: 8.6; 5 TABLET ORAL at 19:22

## 2019-05-31 RX ADMIN — Medication 5 ML: at 22:40

## 2019-05-31 RX ADMIN — Medication 5 ML: at 13:17

## 2019-05-31 RX ADMIN — SODIUM CHLORIDE 1000 ML: 9 INJECTION, SOLUTION INTRAVENOUS at 11:01

## 2019-05-31 RX ADMIN — CALCIUM CARBONATE (ANTACID) CHEW TAB 500 MG 500 MG: 500 CHEW TAB at 20:35

## 2019-05-31 RX ADMIN — LORAZEPAM 0.5 MG: 0.5 TABLET ORAL at 08:57

## 2019-05-31 RX ADMIN — RANITIDINE 150 MG: 150 TABLET ORAL at 19:22

## 2019-05-31 RX ADMIN — METHOTREXATE: 25 INJECTION, SOLUTION INTRA-ARTERIAL; INTRAMUSCULAR; INTRATHECAL; INTRAVENOUS at 10:13

## 2019-05-31 RX ADMIN — ETOPOSIDE 170 MG: 20 INJECTION, SOLUTION, CONCENTRATE INTRAVENOUS at 20:22

## 2019-05-31 RX ADMIN — ALLOPURINOL 300 MG: 300 TABLET ORAL at 07:59

## 2019-05-31 RX ADMIN — RANITIDINE 150 MG: 150 TABLET ORAL at 07:59

## 2019-05-31 RX ADMIN — PREDNISONE 120 MG: 50 TABLET ORAL at 16:04

## 2019-05-31 RX ADMIN — VINCRISTINE SULFATE 0.78 MG: 1 INJECTION, SOLUTION INTRAVENOUS at 20:21

## 2019-05-31 ASSESSMENT — ACTIVITIES OF DAILY LIVING (ADL)
ADLS_ACUITY_SCORE: 10

## 2019-05-31 NOTE — PROCEDURES
DIAGNOSIS: Burkitt Lymphoma   PROCEDURE: Intrathecal chemo administration   LOCATION: 7D floor  INDICATION: Burkitt Lymphoma    Lumbar puncture performed and CSF samples collected by the Internal medicine CAPS team.    Chemotherapy was double-checked by this writer and Heidy Jones PA-C. This writer then administered solu-CORTEF 50 mg, methotrexate PF 12 mg in sodium chloride (PF) 0.9% 6 mL without apparent complication.    Complications: None immediately.     Chemo instillation performed by: Laura Hernandez PA-C, supervised by Heidy Jones PA-C.    Laura Hernandez PA-C  Hematology/Oncology  Pager #[phone]    I was present for the instillation of IT chemotherapy which was performed by Laura Hernandez PA-C. No immediate complications.     Heidy Jones PA-C  [phone]

## 2019-05-31 NOTE — PLAN OF CARE
Discharge Planner SLP   Patient plan for discharge: Home Monday   Current status: Pt seen for voice evaluation. He is alert and agreeable. Oral motor evaluation unremarkable. Pt reports his voice started changing after his last hospitalization approx. 2 weeks ago. He stated he went home and there appeared to be a gradual decline in his voice the following days.  He does not recall any significant episodes of vocal changes. ENT consul completed and reported - On direct visualization the patient has mobility in bilateral vocal cords. There is some asymmetry in adduction of the cords thus there is near complete but not total glottic closure. This is most commonly seen in patients with trauma to the cords. More specifically in patients who were recently intubated. The patient also has a small area of granulation tissue over the left posterior cord. Overall the patient has good mobility of bilateral cords. Pt reports that he does have reflux which he feels is worsened by the chemo medications he is receiving (which is increase by 20% each hospitalization). He feels this may have triggered his vocal changes the last hospitalization. Pt also reports taking the medication (vincristine), of which he stated his doctor cautioned a side effect could be vocal cord dysfunction. Pt states he also zantac regularly and uses tums frequently to manage reflux. Pt's vocal quality is hoarse, he has a weak cough, and reports having difficulty increasing volume but can change pitch appropriately. Pt reports that his voice is best when he wakes up in the morning and after taking a nap later in the day. He acknowledges that vocal rest greatly improves the quality. He reports that he focuses on staying hydrated and drinks water frequently t/o the day.     -Reviewed vocal hygiene recommendations including vocal rest when able, use of a confidential voice vs whisper or normal speaking voice, managing reflux through diet and medications and the  importance of vocal hygiene.     -Pt able to verbalize vocal hygiene plan and demonstrates a strong understanding of the recommendations.     -No further SLP service required at this time. Recommend pt follow up with ENT in 2-3 as they recommended and ENT to further determine need for SLP services. Pt would also benefit from OP SLP Voice or ENT clinic for full voice evaluation.    Barriers to return to prior living situation: None per SLP  Recommendations for discharge: OP SLP Voice or ENT Clinic   Rationale for recommendations: OP SLP Voice/ENT specialty clinic as appropriate. Recommend pt utilize the recommended vocal hygiene program initiated today, follow up ENT in 2-3 weeks and then proceed with OP SLP as appropriate          Entered by: Laurel Jarvis 05/31/2019 4:28 PM

## 2019-05-31 NOTE — PROGRESS NOTES
Nursing Focus: Chemotherapy  D: Positive blood return via PICC. Insertion site is clean/dry/intact, dressing intact with no complaints of pain.  Urine output is recorded in intake in Doc Flowsheet.    I: Premedications given per order (see electronic medical administration record). Dose #1 of CIVI etoposide and vincristine/doxorubicin started to infuse over 22 hours/minutes. Rate changed on vincristine/dox to 48.5 d/t chemo being ordered to infuse over 22hrs.  Reviewed pt teaching on chemotherapy side effects.  Pt denies need for further teaching. Chemotherapy double checked per protocol by two chemotherapy competent RN's.   A: Tolerating procedure well. Denies nausea and or pain.   P: Continue to monitor urine output and symptoms of nausea. Screen for symptoms of toxicity.

## 2019-05-31 NOTE — PROCEDURES
Consult and Procedure Service - Procedure Note    Attending:  Yrn Bach MD  Resident:   None  Procedure: Lumbar Puncture  Indication:  IT chemo  Risk Assessment: standard  :    The risks and benefits of the procedure were explained to   who expressed understanding and opted to proceed.  Consent was obtained and placed in the chart.  A time out was performed.      The patient was placed in the left lateral decubitus position and the L4-5 innerspace palpated and marked.  4 ml of 1% lidocaine was instilled.  A 3.5 inch 22 gauge needle was inserted without fluid obtained.  The patient was placed in the seated position and the L3-4 innerspace palpated.  4 ml of 1% lidocaine was instilled.  The 3.5 inch spinal needle was inserted and slightly blood tinged fluid obtained on the first attempt.  The needle was connected to tubing and methotrexate administered by the Hematology/onc team. The stylet was replaced and the needle removed.   A total of 6 ml was removed.     Patient tolerated the procedure well. Please do not hesitate to contact our service if any complications or concerns arise.   Yrn Bach MD  DOS:  5/31/19

## 2019-05-31 NOTE — PLAN OF CARE
Day 1  Vincristine with Doxorubicin, day 1 Etoposide infusing into PICC line which demonstrates brisk blood return every 4 hours.  LP done at bedside with installation of IT chemotherapy.  1 liter fluid bolus given over 2 hours and Prudencio lay flat 1 hour post procedure, also drank a can of coke.  Prudencio felt moisture at his back, LP site; scant amount of bleeding noted at site and pressure dressing applied.  No further bleeding noted.  Feels well.  Eating good amounts.  Ambulating in hallways.  Denies nausea, vomiting, diarrhea.

## 2019-05-31 NOTE — PROGRESS NOTES
05/31/19 1600   General Information, SLP   Type of Evaluation Voice   Type of Visit Initial   Start of Care Date 05/31/19   Onset of Illness/Injury or Date of Surgery - Date 05/30/19   Referring Physician Shyam Medina MD   Patient/Family Goals Statement Determine reason for vocal chantges    Pertinent History of Current Problem Christiano Molina is a 43 year old male with a past medical history of Burkitt lymphoma s/p 3 cycles of DA-EPOCH-R with new onset hoarseness over the past 2.5 weeks. On direct visualization the patient has mobility in bilateral vocal cords. There is some asymmetry in adduction of the cords thus there is near complete but not total glottic closure. This is most commonly seen in patients with trauma to the cords. More specifically in patients who were recently intubated. The patient also has a small area of granulation tissue over the left posterior cord. Overall the patient has good mobility of bilateral cords. There is no further intervention from us at this time. We would recommend Speech therapy consult for hoarseness if patient is to remain in the hospital. If the patient will be discharging than they could see him as an outpatient. We will notify our schedules for follow up in ENT clinic in 2-3 weeks to assess improvement and reevaluate.     General Info Comments Christiano Molina is a 43 year old male with Burkitt lymphoma, now s/p 3 cycles of DA-EPOCH-R. He is being admitted for Cycle 4.    Speech   Deficits in Speech Respiration None   Deficits in Phonation Hoarse quality;Loudness (loud)   Deficits in Articulation None   Underlying oral motor deficit None   Deficits in Resonance None   Speech Comments 100% intelligibility, no apraxic or dysarthric errors. Hoarse quality, difficulty achieving loudness   Clinical Impression, SLP Eval   Criteria for Skilled Therapeutic Interventions Met Does not meet criteria for skilled intervention   SLP Diagnosis Hoarse voice - education  provided. SLP OP Voice Clinic/ENT follow up   Rehab Potential Good, to achieve stated therapy goals   Anticipated Discharge Disposition Home with Outpatient Therapy   Risks and Benefits of Treatment have been explained. Yes   Patient, Family & other staff in agreement with plan of care Yes   Clinical Impression Comments Pt seen for voice evaluation. He is alert and agreeable. Oral motor evaluation unremarkable. Pt reports his voice started changing after his last hospitalization approx. 2 weeks ago. He stated he went home and there appeared to be a gradual decline in his voice the following days.  He does not recall any significant episodes of vocal changes. ENT consul completed and reported - On direct visualization the patient has mobility in bilateral vocal cords. There is some asymmetry in adduction of the cords thus there is near complete but not total glottic closure. This is most commonly seen in patients with trauma to the cords. More specifically in patients who were recently intubated. The patient also has a small area of granulation tissue over the left posterior cord. Overall the patient has good mobility of bilateral cords. Pt reports that he does have reflux which he feels is worsened by the chemo medications he is receiving (which is increase by 20% each hospitalization). He feels this may have triggered his vocal changes the last hospitalization. Pt also reports taking a medication (vincristine) of which he stated his doctor cautioned a side effect could be vocal cord dysfunction. Pt states he also zantac regularly and uses tums frequently to manage reflux. Pt's vocal quality is hoarse, he has a weak cough, and reports having difficulty increasing volume but can change pitch appropriately. Pt reports that his voice is best when he wakes up in the morning and after taking a nap later in the day. He acknowledges that vocal rest greatly improves the quality. He reports that he focuses on staying  hydrated and drinks water frequently t/o the day. Reviewed vocal hygiene recommendations including vocal rest when able, use of a confidential voice vs whisper or normal speaking voice, managing reflux through diet and medications and the importance of vocal hygiene. Pt able to verbalize vocal hygiene plan and demonstrates a strong understanding of the recommendations. No further SLP service required at this time. Recommend pt follow up with ENT in 2-3 as they recommended and ENT to further determine need for SLP services. PT would also benefit from OP SLP Voice or ENT clinic for full voice evaluation.   Total Evaluation Time      Total Evaluation Time (Minutes) 30

## 2019-05-31 NOTE — PROGRESS NOTES
Nursing Focus: Chemotherapy  D: Positive blood return via PICC. Insertion site is clean/dry/intact, dressing intact with no complaints of pain.  Urine output is recorded in intake in Doc Flowsheet.    I: Premedications given per order (see electronic medical administration record). Dose #1 of rituxan started to infuse over 90 minutes. Ran at 100ml for 30min and then 400ml for the last 60min. Reviewed pt teaching on chemotherapy side effects.  Pt denies need for further teaching. Chemotherapy double checked per protocol by two chemotherapy competent RN's.   A: Tolerated without complications. Denies nausea and or pain.   P: Continue to monitor urine output and symptoms of nausea. Screen for symptoms of toxicity.

## 2019-05-31 NOTE — PLAN OF CARE
Afebrile, VSS on RA, denies N/V, dizziness. CIVI chemo, good blood returns q4h. Voiding well. Continue monitoring per care plan.

## 2019-05-31 NOTE — PROGRESS NOTES
[facility], [place]    Hematology / Oncology Progress Note     Assessment & Plan   Christiano Molina is a 43 year old male with Burkitt lymphoma, now s/p 3 cycles of DA-EPOCH-R. He is being admitted for Cycle 4.      #Burkitt Lymphoma, EBV+, Stage IA   Follows with Dr. Ramirez. Initially presented in mid-March with a right axillary mass, found to have high-grade B-cell lymphoma. Initial pathology was not fully clear whether DLBCL or Burkitt, but more recently this was finalized by [facility] Heme Path review as Burkitt lymphoma (EBV+). Plan is to do 6 cycles of DA-R-EPOCH with IT chemo prophylaxis during C3-6. PET/CT after 2 cycles with ND.   - PICC placed prior to admission   - Allopurinol   - PICC placed prior to admission.      Treatment Plan: DA REPOCH C4 today is day 2   Rituxan 375mg/m2 D0   Prednisone 60mg/m2 BID D1-5   Etoposide 86.4mg/m2 D1-4 -- 20% dose increase per protocol   Vincristine 0.4mg/m2 D1-4   Cytoxan 1296mg/m2 D5  -- 20% dose increase per protocol   - dexamethasone 8mg D6,7   IT triple therapy chemotherapy (will need D1 , D 5) completed D1 IT chemo at bedside via CAPS team.  CSF flow/cultures sent.  Will repeat D5 LP on Monday 6/3.   -- will give emend on D1 and again on D 4 (ordered).    -- zofran, compazine scheduled   - ativan prn      #ID PPx  - continue ppx ACV and Fluc  - add Levaquin at discharge, per Dr. Ramirez -- dose level is high, so will not wait until neutropenia.      #Chemotherapy induced nausea  Improved with scheduled antiemetics during chemo.   - scheduled antiemetics again with this cycle  - consider zyprexa if increased difficulty with nausea     #Constipation, chemotherapy induced  Currently improved but occurs during chemo per pt.   - Senna-S BID  - Miralax PRN     #GERD  - continue home Zantac     #Peripheral neuropathy.   Mild numbness/tingling affecting first 3 digits of b/l hands. Not interfering with function. Has mild numbness in b/l toes as  well.   - monitor     # Hoarseness.  Patient endorses 3 weeks of vocal hoarseness.  No other URI symptoms.  No recent sick contact.  No dysphagia.   -  ENT evaluated patient on admission prior to initiating chemotherapy, as patient has h/o peripheral neuropathy and as dose adjustments are increases, concern for possible vocal cord neuropathy. ENT scoped patient, saw small area of granulation tissue or polyp on posterior aspect of TVC. No concern for neuropathy of vocal cords, most likely viral in nature and cleared to begin chemotherapy   - recommending SLP eval - ordered.      #H/o post-LP HA.   - monitor closely for this after LP done this cycle. Will encourage laying flat >1hr and caffeine/IVFs.   - ordered 1 L NS bolus after LP today x 5/31 , patient planning to have some caffeine as this worked well during last LP      FEN  - no current IVFs in addition to CIVI chemo - 1 L bolus NS today   - lyte repletion per unit protocol  - regular diet     PPx  - VTE: will hold VTE ppx given LP on 5/31.  Will need another LP on 6/3. Could consider starting on 6/1, though patient is mobile   - GI: Zantac     Dispo: anticipate discharge on D5 after Cytoxan -- may be 6/4 AM based on chemo timing.   - will need Neulasta scheduled prior to discharge and labs/transfusions twice weekly   - possible could get D5 IT chemo in clinic at discharge           Disposition Plan   Expected discharge: 3-5 days, recommended to prior living arrangement once chemotherapy completed.     Entered: Radha Krishnamurthy 05/31/2019, 12:36 PM   Information in the above section will display in the discharge planner report.    Discussed with Dr. Bauer, staff attending.     Radha Krishnamurthy, DNP, APRN, CNP  Hematology/oncology  9619     Interval History   Prudencio is seen and examined in his room this morning.  Denies headaches, chest pain, SOB, nausea/vomiting.  No abdominal pain or loose stools/constipation. LP done in room this morning, tolerated well.   Discussed need for LPs.      Physical Exam   Temp: 96.9  F (36.1  C) Temp src: Oral BP: 117/58 Pulse: 80 Heart Rate: 80 Resp: 18 SpO2: 98 % O2 Device: None (Room air)    Vitals:    05/30/19 1347 05/31/19 0800   Weight: 80.4 kg (177 lb 3.2 oz) 81.5 kg (179 lb 9.6 oz)     Vital Signs with Ranges  Temp:  [96.6  F (35.9  C)-98.6  F (37  C)] 96.9  F (36.1  C)  Pulse:  [68-80] 80  Heart Rate:  [80] 80  Resp:  [18-20] 18  BP: (110-135)/(55-72) 117/58  SpO2:  [96 %-98 %] 98 %  I/O last 3 completed shifts:  In: 851.2 [P.O.:240; I.V.:20; IV Piggyback:591.2]  Out: 1725 [Urine:1725]    Constitutional: Awake, alert, cooperative, no apparent distress, and appears stated age.   Eyes: Lids and lashes normal, pupils equal, round and reactive to light, extra ocular muscles intact, sclera clear, conjunctiva normal.  ENT: Normocephalic, without obvious abnormality, atraumatic.  Respiratory: No increased work of breathing. No oxygen. LS clear, no crackles or wheezes   Cardiovascular: Regular rate, regular rhythm, no murmurs.  GI: Normal bowel sounds, soft, non-distended, non-tender.  Musculoskeletal: No edema B/L LE. No obvious joint swelling or deformities.   Neurologic: A&O x4, cranial nerves II-XII appear to be grossly intact.  No focal deficits.   Neuropsychiatric: Calm, normal eye contact, alert, normal affect memory for past and recent events intact and thought process normal.      Medications     - MEDICATION INSTRUCTIONS -       - MEDICATION INSTRUCTIONS -       sodium chloride         sodium chloride 0.9%  1,000 mL Intravenous Once     acyclovir  400 mg Oral BID     allopurinol  300 mg Oral Daily     Chemotherapy Infusing-Continuous Infusion   Does not apply Q8H     [START ON 6/3/2019] cyclophosphamide (CYTOXAN) chemo infusion  1,296 mg/m2 (Treatment Plan Recorded) Intravenous Once     [START ON 6/5/2019] dexamethasone  8 mg Oral Daily     etoposide (TOPOSAR) chemo infusion  86.4 mg/m2 (Treatment Plan Recorded) Intravenous Q22H  "    fluconazole  100 mg Oral Daily     [START ON 6/3/2019] fosaprepitant (EMEND) 150 mg intermittent infusion  150 mg Intravenous Once     heparin lock flush  5-10 mL Intracatheter Q24H     [START ON 6/3/2019] INTRATHECAL chemo - Cytarabine and/or methotrexate and/or Hydrocortisone   Intrathecal Once     ondansetron  16 mg Oral Q22H     predniSONE  60 mg/m2 (Treatment Plan Recorded) Oral BID     prochlorperazine  10 mg Oral Q8H     ranitidine  150 mg Oral BID     senna-docusate  2 tablet Oral BID     vinCRIStine/DOXOrubicin (ONCOVIN/ADRIAMYCIN) chemo infusion  0.4 mg/m2 (Treatment Plan Recorded) Intravenous Q22H       Data   Results for orders placed or performed during the hospital encounter of 05/30/19 (from the past 24 hour(s))   ENT IP Consult: pt with lymphoma, s/p 3 cycles chemo including vincristine; presents for C4 chemo but also c/o 3 weeks voice hoarseness; r/o vocal neuropathy due to chemo?; Consultant may enter orders: Yes; Patient to be seen: Routine - within 24 ...    Narrative    Destin Gordillo MD     5/30/2019  5:01 PM  Otolaryngology Consult Note  May 30, 2019      CC: Hoarseness     HPI: Christiano Molina is a 43 year old male with a past medical   history of Burkitt lymphoma s/p 3 cycles of DA-EPOCH-R who we   were asked to evaluate for new onset hoarseness following his   third cycle of vincristine. The patient notes that shortly after   completing his 3rd round of chemotherapy he developed   progressively worsening hoarseness. He thinks that his voice   sounds \"gravelly\" and his cough has been weak. He feels that the   worst part of his voice is his inability to increase the volume.   He denies difficulty swallowing and does not cough after eating   or feel like he is choking on food. He denies a recent illness   but does have two sick children at home. He denies headaches,   vision or hearing changes, vertigo or instability, congestion,   anterior or posterior drainage, odynaphagia, " dysphagia, shortness   of breath or chest pain. He denies recent intubations. 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        No Known Allergies    Social History     Socioeconomic History     Marital status:      Spouse name: Not on file     Number of children: Not on file     Years of education: Not on file     Highest education level: Not on file   Occupational History     Not on file   Social Needs     Financial resource strain: Not on file     Food insecurity:     Worry: Not on file     Inability: Not on file     Transportation needs:     Medical: Not on file     Non-medical: Not on file   Tobacco Use     Smoking status: Never Smoker     Smokeless tobacco: Never Used   Substance and Sexual Activity     Alcohol use: Not on file     Drug use: Not on file     Sexual activity: Not on file   Lifestyle     Physical activity:     Days per week: Not on file     Minutes per session: Not on file     Stress: Not on file   Relationships     Social connections:     Talks on phone: Not on file     Gets together: Not on file     Attends [employer] service: Not on file     Active member of club or organization: Not on file     Attends meetings of clubs or organizations: Not on file     Relationship status: Not on file     Intimate partner violence:     Fear of current or ex partner: Not on file     Emotionally abused: Not on file     Physically abused: Not on file     Forced sexual activity: Not on file   Other Topics Concern     Parent/sibling w/ CABG, MI or angioplasty before 65F 55M? Not   Asked   Social History Narrative     Not on file       No family history on file.    ROS: 12 point review of systems is negative  unless noted in HPI.    PHYSICAL EXAM:  General: laying in bed, no acute distress  /69 (BP Location: Right arm)   Pulse 75   Temp 98.1  F   (36.7  C) (Oral)   Resp 18   Wt 80.4 kg (177 lb 3.2 oz)   SpO2   97%   BMI 25.43 kg/m    HEAD: normocephalic, atraumatic  Face: symmetrical, CN VII intact bilaterally (HB 1), no swelling,   edema, or erythema. Sensation V1-V3 intact and equal bilaterally.     Eyes: EOMI without spontaneous or gaze evoked nystagmus, PERRL,   clear sclera  Ears: no tragal tenderness, external ear canal open and clear   bilaterally  Nose: no anterior drainage, intact and midline septum without   perforation or hematoma   Mouth: moist, no ulcers, no jaw or tooth tenderness, tongue   midline and symmetric  Oropharynx: tonsils within normal limits, uvula midline, no   oropharyngeal erythema, small white imer over left tonsil that   was not scraped away with tongue blade most consistent with a   tonsillolith   Neck: no LAD, trachea midline  Neuro: cranial nerves 2-12 grossly intact  Respiratory: breathing non-labored on RA, no stridor  Skin: no rashes or skin lesions of the face/neck  Psych: pleasant affect  Cardio: extremities warm and well perfused     FIBEROPTIC ENDOSCOPY:  Due to hoarseness and a concern for vocal   cord dysfunction an exam with a fiberoptic laryngoscopy was   indicated. After obtaining verbal consent the fiberoptic   laryngoscope was passed under endoscopic vision through the right   nasal passage. The turbinates were normal. The inferior and   middle meati were clear bilaterally without purulence, masses, or   polyps. The nasopharynx was clear. The eustachian tubes were   clear. The soft palate appeared normal with good mobility. The   epiglottis was sharp, and the visualized portion of the vallecula   was clear. The arytenoids were clear, and there was no pooling in   the hypopharynx. The left TVC has a small area of granulation   tissue on the posterior aspect.  Bilateral cords mobile but with a   small amount of midline asymmetric contact resulting in near   complete but not total glottic closure. Patent airway. The   patient tolerated the procedure well.      ROUTINE IP LABS (Last four results)  BMP  Recent Labs   Lab 05/30/19  1115      POTASSIUM 4.6   CHLORIDE 105   TAMMY 9.4   CO2 28   BUN 18   CR 1.08   GLC 76     CBC  Recent Labs   Lab 05/30/19  1115   WBC 6.8   RBC 4.16*   HGB 12.7*   HCT 38.5*   MCV 93   MCH 30.5   MCHC 33.0   RDW 14.9        INRNo lab results found in last 7 days.    Imaging:  Results for orders placed or performed during the hospital   encounter of 05/30/19   XR Chest 1 View    Narrative    Exam: XR CHEST 1 VW, 5/30/2019 1:41 PM    Indication: PICC verification    Comparison: Chest x-ray 5/8/2019    Findings:   Right PICC has been removed. Left PICC tip projects over the low   SVC.  No pneumothorax. Heart size is normal. No focal consolidation.   Bones  are unremarkable.      Impression    Impression: Left PICC tip projects over the low SVC. No   pneumothorax.    RAFAEL CANADA MD         Assessment and Plan  Christiano Molina is a 43 year old male with a past medical   history of Burkitt lymphoma s/p 3 cycles of DA-EPOCH-R with new   onset hoarseness over the past 2.5 weeks. On direct visualization   the patient has mobility in bilateral vocal cords. There is some   asymmetry in adduction of the cords thus there is near complete   but not total glottic closure. This is most commonly seen in   patients with trauma to the cords. More specifically in patients   who were recently intubated. The patient also has a small area of   granulation tissue over the left posterior cord. Overall the   patient has good mobility of bilateral cords. There is no further   intervention from us at this time. We would recommend Speech   therapy consult for hoarseness if patient is to remain in the   hospital. If the patient will be discharging than they  could see   him as an outpatient. We will notify our schedules for follow up   in ENT clinic in 2-3 weeks to assess improvement and reevaluate.        - No ENT intervention at this time   - SLP consult for assistance with hoarseness   - Follow up ENT clinic in 2-3 weeks   - ENT will sign off at this time. Please contact with questions   or concerns.    The patient and plan were discussed with Dr. Nascimento who was in   agreement.      [place]   Otolaryngology-Head & Neck Surgery  Please page ENT with questions by dialing [phone] and entering   job code [phone] when prompted.     CBC with platelets differential   Result Value Ref Range    WBC 9.3 4.0 - 11.0 10e9/L    RBC Count 3.87 (L) 4.4 - 5.9 10e12/L    Hemoglobin 11.8 (L) 13.3 - 17.7 g/dL    Hematocrit 35.7 (L) 40.0 - 53.0 %    MCV 92 78 - 100 fl    MCH 30.5 26.5 - 33.0 pg    MCHC 33.1 31.5 - 36.5 g/dL    RDW 15.2 (H) 10.0 - 15.0 %    Platelet Count 232 150 - 450 10e9/L    Diff Method Automated Method     % Neutrophils 79.9 %    % Lymphocytes 11.9 %    % Monocytes 6.5 %    % Eosinophils 0.1 %    % Basophils 0.5 %    % Immature Granulocytes 1.1 %    Nucleated RBCs 0 0 /100    Absolute Neutrophil 7.4 1.6 - 8.3 10e9/L    Absolute Lymphocytes 1.1 0.8 - 5.3 10e9/L    Absolute Monocytes 0.6 0.0 - 1.3 10e9/L    Absolute Eosinophils 0.0 0.0 - 0.7 10e9/L    Absolute Basophils 0.1 0.0 - 0.2 10e9/L    Abs Immature Granulocytes 0.1 0 - 0.4 10e9/L    Absolute Nucleated RBC 0.0    Comprehensive metabolic panel   Result Value Ref Range    Sodium 140 133 - 144 mmol/L    Potassium 3.8 3.4 - 5.3 mmol/L    Chloride 106 94 - 109 mmol/L    Carbon Dioxide 29 20 - 32 mmol/L    Anion Gap 5 3 - 14 mmol/L    Glucose 122 (H) 70 - 99 mg/dL    Urea Nitrogen 17 7 - 30 mg/dL    Creatinine 1.16 0.66 - 1.25 mg/dL    GFR Estimate 77 >60 mL/min/[1.73_m2]    GFR Estimate If Black 89 >60 mL/min/[1.73_m2]    Calcium 8.8 8.5 - 10.1 mg/dL    Bilirubin Total 0.2 0.2 - 1.3 mg/dL    Albumin 3.8 3.4 - 5.0  g/dL    Protein Total 6.7 (L) 6.8 - 8.8 g/dL    Alkaline Phosphatase 57 40 - 150 U/L    ALT 41 0 - 70 U/L    AST 21 0 - 45 U/L   Magnesium   Result Value Ref Range    Magnesium 2.3 1.6 - 2.3 mg/dL   Phosphorus   Result Value Ref Range    Phosphorus 2.6 2.5 - 4.5 mg/dL   CBC with platelets differential   Result Value Ref Range    WBC 12.3 (H) 4.0 - 11.0 10e9/L    RBC Count 3.72 (L) 4.4 - 5.9 10e12/L    Hemoglobin 11.1 (L) 13.3 - 17.7 g/dL    Hematocrit 34.7 (L) 40.0 - 53.0 %    MCV 93 78 - 100 fl    MCH 29.8 26.5 - 33.0 pg    MCHC 32.0 31.5 - 36.5 g/dL    RDW 15.1 (H) 10.0 - 15.0 %    Platelet Count 229 150 - 450 10e9/L    Diff Method Automated Method     % Neutrophils 95.3 %    % Lymphocytes 2.7 %    % Monocytes 0.7 %    % Eosinophils 0.0 %    % Basophils 0.2 %    % Immature Granulocytes 1.1 %    Nucleated RBCs 0 0 /100    Absolute Neutrophil 11.7 (H) 1.6 - 8.3 10e9/L    Absolute Lymphocytes 0.3 (L) 0.8 - 5.3 10e9/L    Absolute Monocytes 0.1 0.0 - 1.3 10e9/L    Absolute Eosinophils 0.0 0.0 - 0.7 10e9/L    Absolute Basophils 0.0 0.0 - 0.2 10e9/L    Abs Immature Granulocytes 0.1 0 - 0.4 10e9/L    Absolute Nucleated RBC 0.0    Basic metabolic panel   Result Value Ref Range    Sodium 140 133 - 144 mmol/L    Potassium 4.2 3.4 - 5.3 mmol/L    Chloride 111 (H) 94 - 109 mmol/L    Carbon Dioxide 26 20 - 32 mmol/L    Anion Gap 4 3 - 14 mmol/L    Glucose 184 (H) 70 - 99 mg/dL    Urea Nitrogen 18 7 - 30 mg/dL    Creatinine 0.94 0.66 - 1.25 mg/dL    GFR Estimate >90 >60 mL/min/[1.73_m2]    GFR Estimate If Black >90 >60 mL/min/[1.73_m2]    Calcium 8.9 8.5 - 10.1 mg/dL   INR   Result Value Ref Range    INR 1.08 0.86 - 1.14     Recent Labs   Lab 05/31/19  0823 05/31/19  0540 05/30/19  1537 05/30/19  1115   WBC  --  12.3* 9.3 6.8   HGB  --  11.1* 11.8* 12.7*   MCV  --  93 92 93   PLT  --  229 232 230   INR 1.08  --   --   --    NA  --  140 140 138   POTASSIUM  --  4.2 3.8 4.6   CHLORIDE  --  111* 106 105   CO2  --  26 29 28   BUN   --  18 17 18   CR  --  0.94 1.16 1.08   ANIONGAP  --  4 5 5   TAMMY  --  8.9 8.8 9.4   GLC  --  184* 122* 76   ALBUMIN  --   --  3.8 3.6   PROTTOTAL  --   --  6.7* 7.0   BILITOTAL  --   --  0.2 0.2   ALKPHOS  --   --  57 64   ALT  --   --  41 40   AST  --   --  21 19     Recent Results (from the past 24 hour(s))   XR Chest 1 View    Narrative    Exam: XR CHEST 1 VW, 5/30/2019 1:41 PM    Indication: PICC verification    Comparison: Chest x-ray 5/8/2019    Findings:   Right PICC has been removed. Left PICC tip projects over the low SVC.  No pneumothorax. Heart size is normal. No focal consolidation. Bones  are unremarkable.      Impression    Impression: Left PICC tip projects over the low SVC. No pneumothorax.    RAFAEL CANADA MD     I have seen, interviewed, and examined the patient independently.  I have reviewed the vital signs and labs.  This note reflects my assessment and plan.    Prudencio is feeling well, tolerated LP well. He is asking about the frequency of LP's and I recommended he review this with his primary oncologist.    Appreciate ENT assistance, will get SLP to eval    Proceed with chemo as planned      Jessica Bauer MD/PhD

## 2019-05-31 NOTE — PLAN OF CARE
Patient cares from 19:30 to 23:30. Patient denies pain, no nausea, PICC line patent with good blood return. Pt is voiding and ambulating without difficulty, independent with cares. Rituximab given, Etoposide and Vincristine infusing.

## 2019-06-01 LAB
ANION GAP SERPL CALCULATED.3IONS-SCNC: 5 MMOL/L (ref 3–14)
BASOPHILS # BLD AUTO: 0 10E9/L (ref 0–0.2)
BASOPHILS NFR BLD AUTO: 0.1 %
BUN SERPL-MCNC: 13 MG/DL (ref 7–30)
CALCIUM SERPL-MCNC: 8.8 MG/DL (ref 8.5–10.1)
CHLORIDE SERPL-SCNC: 111 MMOL/L (ref 94–109)
CO2 SERPL-SCNC: 26 MMOL/L (ref 20–32)
CREAT SERPL-MCNC: 0.82 MG/DL (ref 0.66–1.25)
DIFFERENTIAL METHOD BLD: ABNORMAL
EOSINOPHIL # BLD AUTO: 0 10E9/L (ref 0–0.7)
EOSINOPHIL NFR BLD AUTO: 0 %
ERYTHROCYTE [DISTWIDTH] IN BLOOD BY AUTOMATED COUNT: 15.6 % (ref 10–15)
GFR SERPL CREATININE-BSD FRML MDRD: >90 ML/MIN/{1.73_M2}
GLUCOSE SERPL-MCNC: 159 MG/DL (ref 70–99)
HCT VFR BLD AUTO: 33.1 % (ref 40–53)
HGB BLD-MCNC: 10.6 G/DL (ref 13.3–17.7)
IMM GRANULOCYTES # BLD: 0.1 10E9/L (ref 0–0.4)
IMM GRANULOCYTES NFR BLD: 0.8 %
LYMPHOCYTES # BLD AUTO: 0.7 10E9/L (ref 0.8–5.3)
LYMPHOCYTES NFR BLD AUTO: 4.6 %
MCH RBC QN AUTO: 30.3 PG (ref 26.5–33)
MCHC RBC AUTO-ENTMCNC: 32 G/DL (ref 31.5–36.5)
MCV RBC AUTO: 95 FL (ref 78–100)
MONOCYTES # BLD AUTO: 0.9 10E9/L (ref 0–1.3)
MONOCYTES NFR BLD AUTO: 6.1 %
NEUTROPHILS # BLD AUTO: 13.7 10E9/L (ref 1.6–8.3)
NEUTROPHILS NFR BLD AUTO: 88.4 %
NRBC # BLD AUTO: 0 10*3/UL
NRBC BLD AUTO-RTO: 0 /100
PLATELET # BLD AUTO: 200 10E9/L (ref 150–450)
POTASSIUM SERPL-SCNC: 3.7 MMOL/L (ref 3.4–5.3)
RBC # BLD AUTO: 3.5 10E12/L (ref 4.4–5.9)
SODIUM SERPL-SCNC: 142 MMOL/L (ref 133–144)
WBC # BLD AUTO: 15.5 10E9/L (ref 4–11)

## 2019-06-01 PROCEDURE — 12000001 ZZH R&B MED SURG/OB UMMC

## 2019-06-01 PROCEDURE — 25000131 ZZH RX MED GY IP 250 OP 636 PS 637: Performed by: INTERNAL MEDICINE

## 2019-06-01 PROCEDURE — 85025 COMPLETE CBC W/AUTO DIFF WBC: CPT | Performed by: NURSE PRACTITIONER

## 2019-06-01 PROCEDURE — 25000128 H RX IP 250 OP 636: Performed by: INTERNAL MEDICINE

## 2019-06-01 PROCEDURE — 80048 BASIC METABOLIC PNL TOTAL CA: CPT | Performed by: NURSE PRACTITIONER

## 2019-06-01 PROCEDURE — 99233 SBSQ HOSP IP/OBS HIGH 50: CPT | Performed by: INTERNAL MEDICINE

## 2019-06-01 PROCEDURE — 25000132 ZZH RX MED GY IP 250 OP 250 PS 637: Performed by: INTERNAL MEDICINE

## 2019-06-01 PROCEDURE — 25000132 ZZH RX MED GY IP 250 OP 250 PS 637: Performed by: NURSE PRACTITIONER

## 2019-06-01 PROCEDURE — 25800030 ZZH RX IP 258 OP 636: Performed by: INTERNAL MEDICINE

## 2019-06-01 PROCEDURE — 36592 COLLECT BLOOD FROM PICC: CPT | Performed by: NURSE PRACTITIONER

## 2019-06-01 RX ADMIN — ACYCLOVIR 400 MG: 400 TABLET ORAL at 08:11

## 2019-06-01 RX ADMIN — RANITIDINE 150 MG: 150 TABLET ORAL at 19:38

## 2019-06-01 RX ADMIN — FLUCONAZOLE 100 MG: 100 TABLET ORAL at 08:12

## 2019-06-01 RX ADMIN — ONDANSETRON HYDROCHLORIDE 16 MG: 8 TABLET, FILM COATED ORAL at 16:58

## 2019-06-01 RX ADMIN — PROCHLORPERAZINE MALEATE 10 MG: 10 TABLET, FILM COATED ORAL at 13:08

## 2019-06-01 RX ADMIN — ETOPOSIDE 170 MG: 20 INJECTION, SOLUTION, CONCENTRATE INTRAVENOUS at 18:33

## 2019-06-01 RX ADMIN — ACYCLOVIR 400 MG: 400 TABLET ORAL at 19:38

## 2019-06-01 RX ADMIN — PREDNISONE 120 MG: 50 TABLET ORAL at 08:12

## 2019-06-01 RX ADMIN — SENNOSIDES AND DOCUSATE SODIUM 2 TABLET: 8.6; 5 TABLET ORAL at 19:38

## 2019-06-01 RX ADMIN — RANITIDINE 150 MG: 150 TABLET ORAL at 08:12

## 2019-06-01 RX ADMIN — Medication 5 MG: at 21:53

## 2019-06-01 RX ADMIN — Medication 5 ML: at 22:06

## 2019-06-01 RX ADMIN — PROCHLORPERAZINE MALEATE 10 MG: 10 TABLET, FILM COATED ORAL at 19:38

## 2019-06-01 RX ADMIN — ALLOPURINOL 300 MG: 300 TABLET ORAL at 08:12

## 2019-06-01 RX ADMIN — SENNOSIDES AND DOCUSATE SODIUM 2 TABLET: 8.6; 5 TABLET ORAL at 08:12

## 2019-06-01 RX ADMIN — VINCRISTINE SULFATE 0.78 MG: 1 INJECTION, SOLUTION INTRAVENOUS at 18:33

## 2019-06-01 RX ADMIN — PREDNISONE 120 MG: 50 TABLET ORAL at 16:58

## 2019-06-01 RX ADMIN — PROCHLORPERAZINE MALEATE 10 MG: 10 TABLET, FILM COATED ORAL at 04:02

## 2019-06-01 ASSESSMENT — ACTIVITIES OF DAILY LIVING (ADL)
ADLS_ACUITY_SCORE: 10

## 2019-06-01 NOTE — PLAN OF CARE
VSS. Denies pain/nausea. CIVI chemo infusing with good blood return q4hr. Sleeping between cares. No acute events. Continue to monitor.

## 2019-06-01 NOTE — PLAN OF CARE
Afebrile, VSS. Denies pain/nausea. Good appetite. Good UOP, BM x1 this shift. Up ambulating in halls frequently. Dose #2 of CIVI chemo hung (see chemo note). Melatonin given at bedtime. Continue to monitor.

## 2019-06-01 NOTE — PLAN OF CARE
[id number]:  Afebrile.  VSS on room air.  Nausea controlled with scheduled Zofran and Compazine.  Denies headache, LP site, C/D/I, open to air s/p IT chemo yesterday (5/31).  Up ad jessica, frequently ambulates hallways.  Day #4 CIVI etoposide and Vincristine/Doxorubicin hung, brisk blood return noted (see chemo).  Continue monitor and with POC.

## 2019-06-01 NOTE — PROGRESS NOTES
Nursing Focus: Chemotherapy     D: Positive blood return via PICC. Insertion site is clean/dry/intact, dressing intact with no complaints of pain.  Urine output is recorded in intake in Doc Flowsheet.       I: Premedications given per order (see electronic medical administration record). Dose #3 of CIVI Vincristine/Doxorubicin and dose #3 CIVI etoposide both started to infuse over 22 hours.  Pt denies need for further teaching. Chemotherapy double checked per protocol by two chemotherapy competent RN's.      A: Tolerating chemo well. Denies nausea and or pain.      P: Continue to monitor urine output and symptoms of nausea. Screen for symptoms of toxicity.

## 2019-06-01 NOTE — PROGRESS NOTES
[facility], [place]    Hematology / Oncology Progress Note     Assessment & Plan   Christiano Molina is a 43 year old male with Burkitt lymphoma, now s/p 3 cycles of DA-EPOCH-R. He is being admitted for Cycle 4.      #Burkitt Lymphoma, EBV+, Stage IA   Follows with Dr. Ramirez. Initially presented in mid-March with a right axillary mass, found to have high-grade B-cell lymphoma. Initial pathology was not fully clear whether DLBCL or Burkitt, but more recently this was finalized by [facility] Heme Path review as Burkitt lymphoma (EBV+). Plan is to do 6 cycles of DA-R-EPOCH with IT chemo prophylaxis during C3-6. PET/CT after 2 cycles with NC.   - PICC placed prior to admission   - Allopurinol        Treatment Plan: DA REPOCH C4 today is day 3   Rituxan 375mg/m2 D0   Prednisone 60mg/m2 BID D1-5   Etoposide 86.4mg/m2 D1-4 -- 20% dose increase per protocol   Vincristine 0.4mg/m2 D1-4   Doxorubicin, 17.28mg/m2, D1-4  Cytoxan 1296mg/m2 D5  -- 20% dose increase per protocol   - dexamethasone 8mg D6,7   IT triple therapy chemotherapy (will need D1 , D 5) completed D1 IT chemo at bedside via CAPS team.  CSF flow/cultures sent.  Will repeat D5 LP on Monday 6/3.   -- will give emend on D1 and again on D 4 (ordered).    -- zofran, compazine scheduled   - ativan prn      #ID PPx  - continue ppx ACV and Fluc  - add Levaquin at discharge, per Dr. Ramirez -- dose level is high, so will not wait until neutropenia.      #Chemotherapy induced nausea  Improved with scheduled antiemetics during chemo.   - scheduled antiemetics again with this cycle  - consider zyprexa if increased difficulty with nausea     #Constipation, chemotherapy induced  Currently improved but occurs during chemo per pt.   - Senna-S BID  - Miralax PRN     #GERD  - continue home Zantac     #Peripheral neuropathy.   Mild numbness/tingling affecting first 3 digits of b/l hands. Not interfering with function. Has mild numbness in b/l toes as well.    - monitor     # Hoarseness.  Patient endorses 3 weeks of vocal hoarseness.  No other URI symptoms.  No recent sick contact.  No dysphagia.   -  ENT evaluated patient on admission prior to initiating chemotherapy, as patient has h/o peripheral neuropathy and as dose adjustments are increases, concern for possible vocal cord neuropathy. ENT scoped patient, saw small area of granulation tissue or polyp on posterior aspect of TVC. No concern for neuropathy of vocal cords, most likely viral in nature and cleared to begin chemotherapy   - recommending SLP eval - ordered.      #H/o post-LP HA.   - monitor closely for this after LP done this cycle. Will encourage laying flat >1hr and caffeine/IVFs.   - no headache after this procedure     FEN  - no current IVFs in addition to CIVI chemo   - lyte repletion per unit protocol   - regular diet     PPx  - VTE: will hold VTE ppx given LP on 5/31.  Will need another LP on 6/3. Could consider starting on 6/1, though patient is mobile   - GI: Zantac     Dispo: anticipate discharge on D5 after Cytoxan -- may be 6/4 AM based on chemo timing.   - will need Neulasta scheduled prior to discharge and labs/transfusions twice weekly   - possible could get D5 IT chemo in clinic at discharge           Disposition Plan   Expected discharge: 3-4 days, recommended to prior living arrangement once chemotherapy completed.  Entered: Berto Trivedi 06/01/2019, 7:55 AM   Information in the above section will display in the discharge planner report.    Discussed with Dr. Bauer, staff attending.       Berto Trivedi MD  Hematology/oncology  [id number]      Interval History   No acute issues overnight.   Denies headaches, chest pain, SOB, nausea/vomiting.  No abdominal pain or loose stools/constipation. LP with IT chemo done yesterday, tolerated well.     Ambulating in triplett way, denies other discomfort.         Physical Exam   Temp: 96.3  F (35.7  C) Temp src: Oral BP: 117/58   Heart Rate: 80 Resp: 16 SpO2: 98 %  O2 Device: None (Room air)    Vitals:    05/30/19 1347 05/31/19 0800   Weight: 80.4 kg (177 lb 3.2 oz) 81.5 kg (179 lb 9.6 oz)     Vital Signs with Ranges  Temp:  [96.3  F (35.7  C)-98.4  F (36.9  C)] 96.3  F (35.7  C)  Heart Rate:  [80-88] 80  Resp:  [12-18] 16  BP: (117-138)/(58-68) 117/58  SpO2:  [95 %-98 %] 98 %  I/O last 3 completed shifts:  In: 2417.2 [P.O.:700; I.V.:20; IV Piggyback:1697.2]  Out: 3145 [Urine:3145]    Constitutional: Awake, alert, cooperative, no apparent distress, and appears stated age.   Eyes: Lids and lashes normal, pupils equal, round and reactive to light, extra ocular muscles intact, sclera clear, conjunctiva normal.  ENT: Normocephalic, without obvious abnormality, atraumatic.  Respiratory: No increased work of breathing. No oxygen. LS clear, no crackles or wheezes   Cardiovascular: Regular rate, regular rhythm, no murmurs.  GI: Normal bowel sounds, soft, non-distended, non-tender.  Musculoskeletal: No edema B/L LE. No obvious joint swelling or deformities.   Neurologic: A&O x4, cranial nerves II-XII appear to be grossly intact.  No focal deficits.   Neuropsychiatric: Calm, normal eye contact, alert, normal affect memory for past and recent events intact and thought process normal.      Medications     - MEDICATION INSTRUCTIONS -       - MEDICATION INSTRUCTIONS -       sodium chloride         acyclovir  400 mg Oral BID     allopurinol  300 mg Oral Daily     Chemotherapy Infusing-Continuous Infusion   Does not apply Q8H     [START ON 6/3/2019] cyclophosphamide (CYTOXAN) chemo infusion  1,296 mg/m2 (Treatment Plan Recorded) Intravenous Once     [START ON 6/5/2019] dexamethasone  8 mg Oral Daily     etoposide (TOPOSAR) chemo infusion  86.4 mg/m2 (Treatment Plan Recorded) Intravenous Q22H     fluconazole  100 mg Oral Daily     [START ON 6/2/2019] fosaprepitant (EMEND) 150 mg intermittent infusion  150 mg Intravenous Once     heparin lock flush  5-10 mL Intracatheter Q24H     [START ON  6/3/2019] INTRATHECAL chemo - Cytarabine and/or methotrexate and/or Hydrocortisone   Intrathecal Once     melatonin  5 mg Oral At Bedtime     ondansetron  16 mg Oral Q22H     predniSONE  60 mg/m2 (Treatment Plan Recorded) Oral BID     prochlorperazine  10 mg Oral Q8H     ranitidine  150 mg Oral BID     senna-docusate  2 tablet Oral BID     vinCRIStine/DOXOrubicin (ONCOVIN/ADRIAMYCIN) chemo infusion  0.4 mg/m2 (Treatment Plan Recorded) Intravenous Q22H       Data   Results for orders placed or performed during the hospital encounter of 05/30/19 (from the past 24 hour(s))   INR   Result Value Ref Range    INR 1.08 0.86 - 1.14   Glucose CSF:   Result Value Ref Range    Glucose CSF 92 (H) 40 - 70 mg/dL   Protein total CSF:   Result Value Ref Range    Protein Total CSF 75 (H) 15 - 60 mg/dL   Cell count with differential CSF:   Result Value Ref Range    WBC CSF 2 0 - 5 /uL    RBC  (H) 0 - 2 /uL    Tube Number 3 #    Color CSF Colorless CLRL^Colorless    Appearance CSF Slightly Cloudy (A) CLER^Clear   Cell count with differential CSF:   Result Value Ref Range    WBC CSF 10 (H) 0 - 5 /uL    RBC CSF 1,958 (H) 0 - 2 /uL    Tube Number 1 #    Color CSF Pink (A) CLRL^Colorless    Appearance CSF Slightly Cloudy (A) CLER^Clear   Gram stain CSF Tube 2   Result Value Ref Range    Specimen Description Cerebrospinal fluid     Special Requests TUBE 2     Gram Stain No organisms seen     Gram Stain Few  WBC'S seen  predominantly mononuclear cells       Gram Stain       Quantification of host cells and microbiological organisms was done on a cytocentrifuged   preparation.     CSF Culture Aerobic Bacterial Tube 2   Result Value Ref Range    Specimen Description Cerebrospinal fluid     Special Requests TUBE 2     Culture Micro Culture negative monitoring continues    CBC with platelets differential   Result Value Ref Range    WBC 15.5 (H) 4.0 - 11.0 10e9/L    RBC Count 3.50 (L) 4.4 - 5.9 10e12/L    Hemoglobin 10.6 (L) 13.3 - 17.7  g/dL    Hematocrit 33.1 (L) 40.0 - 53.0 %    MCV 95 78 - 100 fl    MCH 30.3 26.5 - 33.0 pg    MCHC 32.0 31.5 - 36.5 g/dL    RDW 15.6 (H) 10.0 - 15.0 %    Platelet Count 200 150 - 450 10e9/L    Diff Method Automated Method     % Neutrophils 88.4 %    % Lymphocytes 4.6 %    % Monocytes 6.1 %    % Eosinophils 0.0 %    % Basophils 0.1 %    % Immature Granulocytes 0.8 %    Nucleated RBCs 0 0 /100    Absolute Neutrophil 13.7 (H) 1.6 - 8.3 10e9/L    Absolute Lymphocytes 0.7 (L) 0.8 - 5.3 10e9/L    Absolute Monocytes 0.9 0.0 - 1.3 10e9/L    Absolute Eosinophils 0.0 0.0 - 0.7 10e9/L    Absolute Basophils 0.0 0.0 - 0.2 10e9/L    Abs Immature Granulocytes 0.1 0 - 0.4 10e9/L    Absolute Nucleated RBC 0.0    Basic metabolic panel   Result Value Ref Range    Sodium 142 133 - 144 mmol/L    Potassium 3.7 3.4 - 5.3 mmol/L    Chloride 111 (H) 94 - 109 mmol/L    Carbon Dioxide PENDING 20 - 32 mmol/L    Anion Gap PENDING 3 - 14 mmol/L    Glucose PENDING 70 - 99 mg/dL    Urea Nitrogen PENDING 7 - 30 mg/dL    Creatinine PENDING 0.66 - 1.25 mg/dL    GFR Estimate PENDING >60 mL/min/[1.73_m2]    GFR Estimate If Black PENDING >60 mL/min/[1.73_m2]    Calcium PENDING 8.5 - 10.1 mg/dL     Recent Labs   Lab 06/01/19  0717 05/31/19  0823 05/31/19  0540 05/30/19  1537 05/30/19  1115   WBC 15.5*  --  12.3* 9.3 6.8   HGB 10.6*  --  11.1* 11.8* 12.7*   MCV 95  --  93 92 93     --  229 232 230   INR  --  1.08  --   --   --      --  140 140 138   POTASSIUM 3.7  --  4.2 3.8 4.6   CHLORIDE 111*  --  111* 106 105   CO2 PENDING  --  26 29 28   BUN PENDING  --  18 17 18   CR PENDING  --  0.94 1.16 1.08   ANIONGAP PENDING  --  4 5 5   TAMMY PENDING  --  8.9 8.8 9.4   GLC PENDING  --  184* 122* 76   ALBUMIN  --   --   --  3.8 3.6   PROTTOTAL  --   --   --  6.7* 7.0   BILITOTAL  --   --   --  0.2 0.2   ALKPHOS  --   --   --  57 64   ALT  --   --   --  41 40   AST  --   --   --  21 19     No results found for this or any previous visit (from the past  24 hour(s)).    I have seen, interviewed, and examined the patient independently.  I have reviewed the vital signs and labs.  This note reflects my assessment and plan.    Feels well, tolerating chemo well, no complaints, eating well, walking around, breathing comfortably. No HA except if sneezes, bends over, etc.    LP site leaked a bit of clear fluid+blood last night, few drops, then stopped. Will monitor to make sure this doesn't continue, otherwise, ok to monitor    Jessica Bauer MD/PhD

## 2019-06-01 NOTE — PLAN OF CARE
Prudencio continues with Day #2 Cycle 4 CIVI Etoposide, Vincristine and Doxorubicin infusing via PICC with + blood return.  Nausea controlled with Zofran and Compazine on scheduled, eating okay.  Up walking halls often.  Denies and headache s/p IT chemo yesterday and site is [facility].  Plan for Day #3 chemo this evening as ordered

## 2019-06-01 NOTE — PROGRESS NOTES
Nursing Focus: Chemotherapy    D: Positive blood return via PICC. Insertion site is clean/dry/intact, dressing intact with no complaints of pain.  Urine output is recorded in intake in Doc Flowsheet.      I: Premedications given per order (see electronic medical administration record). Dose #2 of Vincristine/Doxorubicin started to infuse over 22 hours. Dose #2 of Etoposide also started to infuse over 22 hours. Reviewed pt teaching on chemotherapy side effects.  Pt denies need for further teaching. Chemotherapy double checked per protocol by two chemotherapy competent RN's.     A: Tolerating chemo well. Denies nausea and or pain.     P: Continue to monitor urine output and symptoms of nausea. Screen for symptoms of toxicity.

## 2019-06-02 LAB
ANION GAP SERPL CALCULATED.3IONS-SCNC: 3 MMOL/L (ref 3–14)
BASOPHILS # BLD AUTO: 0 10E9/L (ref 0–0.2)
BASOPHILS NFR BLD AUTO: 0.1 %
BUN SERPL-MCNC: 15 MG/DL (ref 7–30)
CALCIUM SERPL-MCNC: 8.8 MG/DL (ref 8.5–10.1)
CHLORIDE SERPL-SCNC: 109 MMOL/L (ref 94–109)
CO2 SERPL-SCNC: 28 MMOL/L (ref 20–32)
CREAT SERPL-MCNC: 0.93 MG/DL (ref 0.66–1.25)
DIFFERENTIAL METHOD BLD: ABNORMAL
EOSINOPHIL # BLD AUTO: 0 10E9/L (ref 0–0.7)
EOSINOPHIL NFR BLD AUTO: 0 %
ERYTHROCYTE [DISTWIDTH] IN BLOOD BY AUTOMATED COUNT: 15.6 % (ref 10–15)
GFR SERPL CREATININE-BSD FRML MDRD: >90 ML/MIN/{1.73_M2}
GLUCOSE SERPL-MCNC: 118 MG/DL (ref 70–99)
HCT VFR BLD AUTO: 35.3 % (ref 40–53)
HGB BLD-MCNC: 11.3 G/DL (ref 13.3–17.7)
IMM GRANULOCYTES # BLD: 0.1 10E9/L (ref 0–0.4)
IMM GRANULOCYTES NFR BLD: 0.8 %
LYMPHOCYTES # BLD AUTO: 0.6 10E9/L (ref 0.8–5.3)
LYMPHOCYTES NFR BLD AUTO: 4.7 %
MCH RBC QN AUTO: 30 PG (ref 26.5–33)
MCHC RBC AUTO-ENTMCNC: 32 G/DL (ref 31.5–36.5)
MCV RBC AUTO: 94 FL (ref 78–100)
MONOCYTES # BLD AUTO: 0.7 10E9/L (ref 0–1.3)
MONOCYTES NFR BLD AUTO: 5.5 %
NEUTROPHILS # BLD AUTO: 11.7 10E9/L (ref 1.6–8.3)
NEUTROPHILS NFR BLD AUTO: 88.9 %
NRBC # BLD AUTO: 0 10*3/UL
NRBC BLD AUTO-RTO: 0 /100
PLATELET # BLD AUTO: 223 10E9/L (ref 150–450)
POTASSIUM SERPL-SCNC: 3.6 MMOL/L (ref 3.4–5.3)
RBC # BLD AUTO: 3.77 10E12/L (ref 4.4–5.9)
SODIUM SERPL-SCNC: 141 MMOL/L (ref 133–144)
WBC # BLD AUTO: 13.1 10E9/L (ref 4–11)

## 2019-06-02 PROCEDURE — 80048 BASIC METABOLIC PNL TOTAL CA: CPT | Performed by: NURSE PRACTITIONER

## 2019-06-02 PROCEDURE — 25000128 H RX IP 250 OP 636: Performed by: INTERNAL MEDICINE

## 2019-06-02 PROCEDURE — 99232 SBSQ HOSP IP/OBS MODERATE 35: CPT | Performed by: INTERNAL MEDICINE

## 2019-06-02 PROCEDURE — 25000132 ZZH RX MED GY IP 250 OP 250 PS 637: Performed by: INTERNAL MEDICINE

## 2019-06-02 PROCEDURE — 36592 COLLECT BLOOD FROM PICC: CPT | Performed by: NURSE PRACTITIONER

## 2019-06-02 PROCEDURE — 25800030 ZZH RX IP 258 OP 636: Performed by: INTERNAL MEDICINE

## 2019-06-02 PROCEDURE — 40000802 ZZH SITE CHECK

## 2019-06-02 PROCEDURE — 85025 COMPLETE CBC W/AUTO DIFF WBC: CPT | Performed by: NURSE PRACTITIONER

## 2019-06-02 PROCEDURE — 25000132 ZZH RX MED GY IP 250 OP 250 PS 637: Performed by: NURSE PRACTITIONER

## 2019-06-02 PROCEDURE — 25000131 ZZH RX MED GY IP 250 OP 636 PS 637: Performed by: INTERNAL MEDICINE

## 2019-06-02 PROCEDURE — 12000001 ZZH R&B MED SURG/OB UMMC

## 2019-06-02 RX ADMIN — PROCHLORPERAZINE MALEATE 10 MG: 10 TABLET, FILM COATED ORAL at 04:09

## 2019-06-02 RX ADMIN — Medication 5 MG: at 22:12

## 2019-06-02 RX ADMIN — FLUCONAZOLE 100 MG: 100 TABLET ORAL at 08:19

## 2019-06-02 RX ADMIN — ACYCLOVIR 400 MG: 400 TABLET ORAL at 08:19

## 2019-06-02 RX ADMIN — CALCIUM CARBONATE (ANTACID) CHEW TAB 500 MG 500 MG: 500 CHEW TAB at 15:08

## 2019-06-02 RX ADMIN — ETOPOSIDE 170 MG: 20 INJECTION, SOLUTION, CONCENTRATE INTRAVENOUS at 17:12

## 2019-06-02 RX ADMIN — PREDNISONE 120 MG: 50 TABLET ORAL at 15:12

## 2019-06-02 RX ADMIN — SENNOSIDES AND DOCUSATE SODIUM 2 TABLET: 8.6; 5 TABLET ORAL at 19:27

## 2019-06-02 RX ADMIN — PREDNISONE 120 MG: 50 TABLET ORAL at 08:19

## 2019-06-02 RX ADMIN — SENNOSIDES AND DOCUSATE SODIUM 2 TABLET: 8.6; 5 TABLET ORAL at 08:19

## 2019-06-02 RX ADMIN — SODIUM CHLORIDE 150 MG: 9 INJECTION, SOLUTION INTRAVENOUS at 15:12

## 2019-06-02 RX ADMIN — ACYCLOVIR 400 MG: 400 TABLET ORAL at 19:26

## 2019-06-02 RX ADMIN — RANITIDINE 150 MG: 150 TABLET ORAL at 08:19

## 2019-06-02 RX ADMIN — Medication 5 ML: at 17:12

## 2019-06-02 RX ADMIN — ALLOPURINOL 300 MG: 300 TABLET ORAL at 08:19

## 2019-06-02 RX ADMIN — PROCHLORPERAZINE MALEATE 10 MG: 10 TABLET, FILM COATED ORAL at 19:27

## 2019-06-02 RX ADMIN — RANITIDINE 150 MG: 150 TABLET ORAL at 19:26

## 2019-06-02 RX ADMIN — ONDANSETRON HYDROCHLORIDE 16 MG: 8 TABLET, FILM COATED ORAL at 15:06

## 2019-06-02 RX ADMIN — PROCHLORPERAZINE MALEATE 10 MG: 10 TABLET, FILM COATED ORAL at 13:16

## 2019-06-02 RX ADMIN — VINCRISTINE SULFATE 0.78 MG: 1 INJECTION, SOLUTION INTRAVENOUS at 17:12

## 2019-06-02 RX ADMIN — SODIUM CHLORIDE 500 ML: 9 INJECTION, SOLUTION INTRAVENOUS at 13:59

## 2019-06-02 ASSESSMENT — ACTIVITIES OF DAILY LIVING (ADL)
ADLS_ACUITY_SCORE: 10

## 2019-06-02 NOTE — PROGRESS NOTES
Nursing Focus: Chemotherapy    D: Positive blood return via PICC. Insertion site is clean/dry/intact, dressing intact with no complaints of pain.  Urine output is recorded in intake in Doc Flowsheet.      I: Premedications given per order (see electronic medical administration record). Dose #4 of Etoposide started to infuse over 22 hours and Dose #4 of Vincristine/Doxorubicin started to infuse over 22 hours. Reviewed pt teaching on chemotherapy side effects.  Pt denies need for further teaching. Chemotherapy double checked per protocol by two chemotherapy competent RN's.     A: Tolerating chemotherapy well. Denies nausea and or pain.     P: Continue to monitor urine output and symptoms of nausea. Screen for symptoms of toxicity.

## 2019-06-02 NOTE — PROGRESS NOTES
[facility], [place]    Hematology / Oncology Progress Note     Assessment & Plan   Christiano Molina is a 43 year old male with Burkitt lymphoma, now s/p 3 cycles of DA-EPOCH-R. He is being admitted for Cycle 4.      #Burkitt Lymphoma, EBV+, Stage IA   Follows with Dr. Ramirez. Initially presented in mid-March with a right axillary mass, found to have high-grade B-cell lymphoma. Initial pathology was not fully clear whether DLBCL or Burkitt, but more recently this was finalized by [facility] Heme Path review as Burkitt lymphoma (EBV+). Plan is to do 6 cycles of DA-R-EPOCH with IT chemo prophylaxis during C3-6. PET/CT after 2 cycles with TX.   - PICC placed prior to admission   - Allopurinol        Treatment Plan: DA REPOCH C4 today is day 4   Rituxan 375mg/m2 D0   Prednisone 60mg/m2 BID D1-5   Etoposide 86.4mg/m2 D1-4 -- 20% dose increase per protocol   Vincristine 0.4mg/m2 D1-4   Doxorubicin, 17.28mg/m2, D1-4  Cytoxan 1296mg/m2 D5  -- 20% dose increase per protocol   - dexamethasone 8mg D6,7   IT triple therapy chemotherapy (will need D1 , D 5) completed D1 IT chemo at bedside via CAPS team.  CSF flow/cultures sent.  Will repeat D5 LP on Monday 6/3.   -- will give emend on D1 and again on D 4 (ordered).    -- zofran, compazine scheduled   - ativan prn      #ID PPx  - continue ppx ACV and Fluc  - add Levaquin at discharge, per Dr. Ramirez -- dose level is high, so will not wait until neutropenia.      #Chemotherapy induced nausea  Improved with scheduled antiemetics during chemo.   - scheduled antiemetics again with this cycle  - consider zyprexa if increased difficulty with nausea     #Constipation, chemotherapy induced  Currently improved but occurs during chemo per pt.   - Senna-S BID  - Miralax PRN     #GERD  - continue home Zantac     #Peripheral neuropathy.   Mild numbness/tingling affecting first 3 digits of b/l hands. Not interfering with function. Has mild numbness in b/l toes as well.    - monitor     # Hoarseness.  Patient endorses 3 weeks of vocal hoarseness.  No other URI symptoms.  No recent sick contact.  No dysphagia.   -  ENT evaluated patient on admission prior to initiating chemotherapy, as patient has h/o peripheral neuropathy and as dose adjustments are increases, concern for possible vocal cord neuropathy. ENT scoped patient, saw small area of granulation tissue or polyp on posterior aspect of TVC. No concern for neuropathy of vocal cords, most likely viral in nature and cleared to begin chemotherapy   - recommending SLP eval - ordered.      #H/o post-LP HA.   - monitor closely for this after LP done this cycle. Will encourage laying flat >1hr and caffeine/IVFs.   - no headache after this procedure     FEN  - no current IVFs in addition to CIVI chemo   - lyte repletion per unit protocol   - regular diet     PPx  - VTE: will hold VTE ppx given LP on 5/31.  Will need another LP on 6/3. Could consider starting on 6/1, though patient is mobile   - GI: Zantac     Dispo: anticipate discharge on D5 after Cytoxan -- may be 6/4 AM based on chemo timing.   - will need Neulasta scheduled prior to discharge and labs/transfusions twice weekly   - possible could get D5 IT chemo in clinic at discharge           Discussed with Dr. Bauer, staff attending.       Berto Trivedi MD  Hematology/oncology  [id number]      Interval History   No acute issues overnight.     Prudencio reports he is doing well. Denies headaches, chest pain, SOB, he has nausea but  No vomiting. He feels his appetite is getting worse. No abdominal pain or loose stools/constipation. LP with IT chemo done yesterday, tolerated well.     Ambulating in [address] way, denies other discomfort.         Physical Exam   Temp: 96.5  F (35.8  C) Temp src: Oral BP: 115/61   Heart Rate: 69 Resp: 16 SpO2: 97 % O2 Device: None (Room air)    Vitals:    05/30/19 1347 05/31/19 0800 06/01/19 0900   Weight: 80.4 kg (177 lb 3.2 oz) 81.5 kg (179 lb 9.6 oz) 82.5 kg (181 lb  14.4 oz)     Vital Signs with Ranges  Temp:  [96.5  F (35.8  C)-97.6  F (36.4  C)] 96.5  F (35.8  C)  Heart Rate:  [56-81] 69  Resp:  [16-18] 16  BP: (115-136)/(61-73) 115/61  SpO2:  [96 %-99 %] 97 %  I/O last 3 completed shifts:  In: 2543.4 [P.O.:800; I.V.:20; IV Piggyback:1723.4]  Out: 4200 [Urine:4200]    Constitutional: Awake, alert, cooperative, no apparent distress, and appears stated age.   Eyes: Lids and lashes normal, pupils equal, round and reactive to light, extra ocular muscles intact, sclera clear, conjunctiva normal.  ENT: Normocephalic, without obvious abnormality, atraumatic.  Respiratory: No increased work of breathing. No oxygen. LS clear, no crackles or wheezes   Cardiovascular: Regular rate, regular rhythm, no murmurs.  GI: Normal bowel sounds, soft, non-distended, non-tender.  Musculoskeletal: No edema B/L LE. No obvious joint swelling or deformities.   Neurologic: A&O x4, cranial nerves II-XII appear to be grossly intact.  No focal deficits.   Neuropsychiatric: Calm, normal eye contact, alert, normal affect memory for past and recent events intact and thought process normal.      Medications     - MEDICATION INSTRUCTIONS -       - MEDICATION INSTRUCTIONS -       sodium chloride         acyclovir  400 mg Oral BID     allopurinol  300 mg Oral Daily     Chemotherapy Infusing-Continuous Infusion   Does not apply Q8H     [START ON 6/3/2019] cyclophosphamide (CYTOXAN) chemo infusion  1,296 mg/m2 (Treatment Plan Recorded) Intravenous Once     [START ON 6/5/2019] dexamethasone  8 mg Oral Daily     etoposide (TOPOSAR) chemo infusion  86.4 mg/m2 (Treatment Plan Recorded) Intravenous Q22H     fluconazole  100 mg Oral Daily     fosaprepitant (EMEND) 150 mg intermittent infusion  150 mg Intravenous Once     heparin lock flush  5-10 mL Intracatheter Q24H     [START ON 6/3/2019] INTRATHECAL chemo - Cytarabine and/or methotrexate and/or Hydrocortisone   Intrathecal Once     melatonin  5 mg Oral At Bedtime      ondansetron  16 mg Oral Q22H     predniSONE  60 mg/m2 (Treatment Plan Recorded) Oral BID     prochlorperazine  10 mg Oral Q8H     ranitidine  150 mg Oral BID     senna-docusate  2 tablet Oral BID     vinCRIStine/DOXOrubicin (ONCOVIN/ADRIAMYCIN) chemo infusion  0.4 mg/m2 (Treatment Plan Recorded) Intravenous Q22H       Data   Results for orders placed or performed during the hospital encounter of 05/30/19 (from the past 24 hour(s))   CBC with platelets differential   Result Value Ref Range    WBC 13.1 (H) 4.0 - 11.0 10e9/L    RBC Count 3.77 (L) 4.4 - 5.9 10e12/L    Hemoglobin 11.3 (L) 13.3 - 17.7 g/dL    Hematocrit 35.3 (L) 40.0 - 53.0 %    MCV 94 78 - 100 fl    MCH 30.0 26.5 - 33.0 pg    MCHC 32.0 31.5 - 36.5 g/dL    RDW 15.6 (H) 10.0 - 15.0 %    Platelet Count 223 150 - 450 10e9/L    Diff Method Automated Method     % Neutrophils 88.9 %    % Lymphocytes 4.7 %    % Monocytes 5.5 %    % Eosinophils 0.0 %    % Basophils 0.1 %    % Immature Granulocytes 0.8 %    Nucleated RBCs 0 0 /100    Absolute Neutrophil 11.7 (H) 1.6 - 8.3 10e9/L    Absolute Lymphocytes 0.6 (L) 0.8 - 5.3 10e9/L    Absolute Monocytes 0.7 0.0 - 1.3 10e9/L    Absolute Eosinophils 0.0 0.0 - 0.7 10e9/L    Absolute Basophils 0.0 0.0 - 0.2 10e9/L    Abs Immature Granulocytes 0.1 0 - 0.4 10e9/L    Absolute Nucleated RBC 0.0    Basic metabolic panel   Result Value Ref Range    Sodium 141 133 - 144 mmol/L    Potassium 3.6 3.4 - 5.3 mmol/L    Chloride 109 94 - 109 mmol/L    Carbon Dioxide 28 20 - 32 mmol/L    Anion Gap 3 3 - 14 mmol/L    Glucose 118 (H) 70 - 99 mg/dL    Urea Nitrogen 15 7 - 30 mg/dL    Creatinine 0.93 0.66 - 1.25 mg/dL    GFR Estimate >90 >60 mL/min/[1.73_m2]    GFR Estimate If Black >90 >60 mL/min/[1.73_m2]    Calcium 8.8 8.5 - 10.1 mg/dL     Recent Labs   Lab 06/02/19  0615 06/01/19  0717 05/31/19  0823 05/31/19  0540 05/30/19  1537 05/30/19  1115   WBC 13.1* 15.5*  --  12.3* 9.3 6.8   HGB 11.3* 10.6*  --  11.1* 11.8* 12.7*   MCV 94 95  --   93 92 93    200  --  229 232 230   INR  --   --  1.08  --   --   --     142  --  140 140 138   POTASSIUM 3.6 3.7  --  4.2 3.8 4.6   CHLORIDE 109 111*  --  111* 106 105   CO2 28 26  --  26 29 28   BUN 15 13  --  18 17 18   CR 0.93 0.82  --  0.94 1.16 1.08   ANIONGAP 3 5  --  4 5 5   TAMMY 8.8 8.8  --  8.9 8.8 9.4   * 159*  --  184* 122* 76   ALBUMIN  --   --   --   --  3.8 3.6   PROTTOTAL  --   --   --   --  6.7* 7.0   BILITOTAL  --   --   --   --  0.2 0.2   ALKPHOS  --   --   --   --  57 64   ALT  --   --   --   --  41 40   AST  --   --   --   --  21 19     No results found for this or any previous visit (from the past 24 hour(s)).    I have seen, interviewed, and examined the patient independently.  I have reviewed the vital signs and labs.  This note reflects my assessment and plan.    Feel lethargic and no appetite now, this is much worse than with prior cycles. Otherwise, feels well. No HA, breathing fine, walking around. Still eating adequately. We discussed that this is fairly common, that patients feel worse with subsequent cycles.    Jessica Bauer MD/PhD

## 2019-06-02 NOTE — PLAN OF CARE
[id number]: AVSS, afebrile. Denies nausea. Reports HA, pt declines Tylenol, asked for IV fluid bolus, notified moonlighter. Nausea is controlled. Reports poor appetite, reports had a hotdog yesterday and taking it easy today. Ambulated in halls. Independent. Continue with POC.

## 2019-06-03 VITALS
WEIGHT: 180 LBS | RESPIRATION RATE: 16 BRPM | DIASTOLIC BLOOD PRESSURE: 72 MMHG | BODY MASS INDEX: 25.83 KG/M2 | HEART RATE: 80 BPM | OXYGEN SATURATION: 95 % | TEMPERATURE: 98 F | SYSTOLIC BLOOD PRESSURE: 131 MMHG

## 2019-06-03 LAB
ANION GAP SERPL CALCULATED.3IONS-SCNC: 4 MMOL/L (ref 3–14)
APPEARANCE CSF: ABNORMAL
APPEARANCE CSF: ABNORMAL
BASOPHILS # BLD AUTO: 0 10E9/L (ref 0–0.2)
BASOPHILS NFR BLD AUTO: 0.1 %
BUN SERPL-MCNC: 14 MG/DL (ref 7–30)
CALCIUM SERPL-MCNC: 8.9 MG/DL (ref 8.5–10.1)
CHLORIDE SERPL-SCNC: 108 MMOL/L (ref 94–109)
CO2 SERPL-SCNC: 29 MMOL/L (ref 20–32)
COLOR CSF: ABNORMAL
COLOR CSF: COLORLESS
COPATH REPORT: NORMAL
CREAT SERPL-MCNC: 0.9 MG/DL (ref 0.66–1.25)
DIFFERENTIAL METHOD BLD: ABNORMAL
EOSINOPHIL # BLD AUTO: 0 10E9/L (ref 0–0.7)
EOSINOPHIL NFR BLD AUTO: 0 %
ERYTHROCYTE [DISTWIDTH] IN BLOOD BY AUTOMATED COUNT: 15.3 % (ref 10–15)
GFR SERPL CREATININE-BSD FRML MDRD: >90 ML/MIN/{1.73_M2}
GLUCOSE CSF-MCNC: 52 MG/DL (ref 40–70)
GLUCOSE SERPL-MCNC: 86 MG/DL (ref 70–99)
GRAM STN SPEC: NORMAL
HCT VFR BLD AUTO: 33.9 % (ref 40–53)
HGB BLD-MCNC: 11 G/DL (ref 13.3–17.7)
IMM GRANULOCYTES # BLD: 0.1 10E9/L (ref 0–0.4)
IMM GRANULOCYTES NFR BLD: 0.7 %
INR PPP: 1.09 (ref 0.86–1.14)
LYMPH ABN NFR CSF MANUAL: 20 %
LYMPHOCYTES # BLD AUTO: 0.5 10E9/L (ref 0.8–5.3)
LYMPHOCYTES NFR BLD AUTO: 6 %
MAGNESIUM SERPL-MCNC: 2.1 MG/DL (ref 1.6–2.3)
MCH RBC QN AUTO: 30.4 PG (ref 26.5–33)
MCHC RBC AUTO-ENTMCNC: 32.4 G/DL (ref 31.5–36.5)
MCV RBC AUTO: 94 FL (ref 78–100)
MONOCYTES # BLD AUTO: 0.5 10E9/L (ref 0–1.3)
MONOCYTES NFR BLD AUTO: 6.3 %
MONOS+MACROS NFR CSF MANUAL: 3 %
NEUTROPHILS # BLD AUTO: 6.7 10E9/L (ref 1.6–8.3)
NEUTROPHILS NFR BLD AUTO: 86.9 %
NEUTROPHILS NFR CSF MANUAL: 77 %
NRBC # BLD AUTO: 0 10*3/UL
NRBC BLD AUTO-RTO: 0 /100
PHOSPHATE SERPL-MCNC: 3 MG/DL (ref 2.5–4.5)
PLATELET # BLD AUTO: 219 10E9/L (ref 150–450)
POTASSIUM SERPL-SCNC: 3.4 MMOL/L (ref 3.4–5.3)
PROT CSF-MCNC: 48 MG/DL (ref 15–60)
RBC # BLD AUTO: 3.62 10E12/L (ref 4.4–5.9)
RBC # CSF MANUAL: 1958 /UL (ref 0–2)
RBC # CSF MANUAL: 289 /UL (ref 0–2)
RBC # CSF MANUAL: NORMAL /UL (ref 0–2)
SODIUM SERPL-SCNC: 141 MMOL/L (ref 133–144)
SPECIMEN SOURCE: NORMAL
TUBE # CSF: 1 #
TUBE # CSF: 3 #
WBC # BLD AUTO: 7.7 10E9/L (ref 4–11)
WBC # CSF MANUAL: 10 /UL (ref 0–5)
WBC # CSF MANUAL: 2 /UL (ref 0–5)
WBC # CSF MANUAL: NORMAL /UL (ref 0–5)

## 2019-06-03 PROCEDURE — 25000128 H RX IP 250 OP 636: Performed by: INTERNAL MEDICINE

## 2019-06-03 PROCEDURE — 25000128 H RX IP 250 OP 636: Performed by: PHYSICIAN ASSISTANT

## 2019-06-03 PROCEDURE — 87205 SMEAR GRAM STAIN: CPT | Performed by: PHYSICIAN ASSISTANT

## 2019-06-03 PROCEDURE — 25000132 ZZH RX MED GY IP 250 OP 250 PS 637: Performed by: NURSE PRACTITIONER

## 2019-06-03 PROCEDURE — 80048 BASIC METABOLIC PNL TOTAL CA: CPT | Performed by: NURSE PRACTITIONER

## 2019-06-03 PROCEDURE — 83735 ASSAY OF MAGNESIUM: CPT | Performed by: NURSE PRACTITIONER

## 2019-06-03 PROCEDURE — 84157 ASSAY OF PROTEIN OTHER: CPT | Performed by: PHYSICIAN ASSISTANT

## 2019-06-03 PROCEDURE — 89050 BODY FLUID CELL COUNT: CPT | Performed by: PHYSICIAN ASSISTANT

## 2019-06-03 PROCEDURE — 82945 GLUCOSE OTHER FLUID: CPT | Performed by: PHYSICIAN ASSISTANT

## 2019-06-03 PROCEDURE — 88185 FLOWCYTOMETRY/TC ADD-ON: CPT | Performed by: PHYSICIAN ASSISTANT

## 2019-06-03 PROCEDURE — 25800030 ZZH RX IP 258 OP 636: Performed by: INTERNAL MEDICINE

## 2019-06-03 PROCEDURE — 85610 PROTHROMBIN TIME: CPT | Performed by: PHYSICIAN ASSISTANT

## 2019-06-03 PROCEDURE — 85025 COMPLETE CBC W/AUTO DIFF WBC: CPT | Performed by: NURSE PRACTITIONER

## 2019-06-03 PROCEDURE — 87070 CULTURE OTHR SPECIMN AEROBIC: CPT | Performed by: PHYSICIAN ASSISTANT

## 2019-06-03 PROCEDURE — 87015 SPECIMEN INFECT AGNT CONCNTJ: CPT | Performed by: PHYSICIAN ASSISTANT

## 2019-06-03 PROCEDURE — 84100 ASSAY OF PHOSPHORUS: CPT | Performed by: NURSE PRACTITIONER

## 2019-06-03 PROCEDURE — 36592 COLLECT BLOOD FROM PICC: CPT | Performed by: NURSE PRACTITIONER

## 2019-06-03 PROCEDURE — 25000131 ZZH RX MED GY IP 250 OP 636 PS 637: Performed by: INTERNAL MEDICINE

## 2019-06-03 PROCEDURE — 62270 DX LMBR SPI PNXR: CPT | Performed by: STUDENT IN AN ORGANIZED HEALTH CARE EDUCATION/TRAINING PROGRAM

## 2019-06-03 PROCEDURE — 25000128 H RX IP 250 OP 636: Performed by: NURSE PRACTITIONER

## 2019-06-03 PROCEDURE — 36592 COLLECT BLOOD FROM PICC: CPT | Performed by: PHYSICIAN ASSISTANT

## 2019-06-03 PROCEDURE — 25000125 ZZHC RX 250

## 2019-06-03 PROCEDURE — 40000802 ZZH SITE CHECK

## 2019-06-03 PROCEDURE — 88184 FLOWCYTOMETRY/ TC 1 MARKER: CPT | Performed by: PHYSICIAN ASSISTANT

## 2019-06-03 PROCEDURE — 25000132 ZZH RX MED GY IP 250 OP 250 PS 637: Performed by: INTERNAL MEDICINE

## 2019-06-03 PROCEDURE — 40001004 ZZHCL STATISTIC FLOW INT 9-15 ABY TC 88188: Performed by: PHYSICIAN ASSISTANT

## 2019-06-03 PROCEDURE — 99239 HOSP IP/OBS DSCHRG MGMT >30: CPT | Performed by: INTERNAL MEDICINE

## 2019-06-03 RX ORDER — PREDNISONE 20 MG/1
60 TABLET ORAL DAILY
Qty: 6 TABLET | Refills: 0 | Status: SHIPPED | OUTPATIENT
Start: 2019-06-03 | End: 2019-06-05

## 2019-06-03 RX ORDER — LEVOFLOXACIN 250 MG/1
250 TABLET, FILM COATED ORAL DAILY
Qty: 30 TABLET | Refills: 0 | Status: SHIPPED | OUTPATIENT
Start: 2019-06-03 | End: 2019-06-03

## 2019-06-03 RX ORDER — DEXAMETHASONE 4 MG/1
8 TABLET ORAL DAILY
Qty: 4 TABLET | Refills: 0 | Status: SHIPPED | OUTPATIENT
Start: 2019-06-03 | End: 2019-06-05

## 2019-06-03 RX ORDER — LIDOCAINE HYDROCHLORIDE 10 MG/ML
INJECTION, SOLUTION EPIDURAL; INFILTRATION; INTRACAUDAL; PERINEURAL
Status: COMPLETED
Start: 2019-06-03 | End: 2019-06-03

## 2019-06-03 RX ORDER — LEVOFLOXACIN 250 MG/1
250 TABLET, FILM COATED ORAL DAILY
Qty: 30 TABLET | Refills: 0 | Status: ON HOLD
Start: 2019-06-03 | End: 2019-07-01

## 2019-06-03 RX ADMIN — ALLOPURINOL 300 MG: 300 TABLET ORAL at 09:01

## 2019-06-03 RX ADMIN — RANITIDINE 150 MG: 150 TABLET ORAL at 09:01

## 2019-06-03 RX ADMIN — SODIUM CHLORIDE 1000 ML: 9 INJECTION, SOLUTION INTRAVENOUS at 12:02

## 2019-06-03 RX ADMIN — METHOTREXATE: 25 INJECTION, SOLUTION INTRA-ARTERIAL; INTRAMUSCULAR; INTRATHECAL; INTRAVENOUS at 11:15

## 2019-06-03 RX ADMIN — PROCHLORPERAZINE MALEATE 10 MG: 10 TABLET, FILM COATED ORAL at 12:02

## 2019-06-03 RX ADMIN — FLUCONAZOLE 100 MG: 100 TABLET ORAL at 09:01

## 2019-06-03 RX ADMIN — PROCHLORPERAZINE MALEATE 10 MG: 10 TABLET, FILM COATED ORAL at 03:26

## 2019-06-03 RX ADMIN — PREDNISONE 120 MG: 50 TABLET ORAL at 15:35

## 2019-06-03 RX ADMIN — LORAZEPAM 1 MG: 2 INJECTION INTRAMUSCULAR; INTRAVENOUS at 11:13

## 2019-06-03 RX ADMIN — ACYCLOVIR 400 MG: 400 TABLET ORAL at 09:01

## 2019-06-03 RX ADMIN — ONDANSETRON HYDROCHLORIDE 16 MG: 8 TABLET, FILM COATED ORAL at 12:02

## 2019-06-03 RX ADMIN — LIDOCAINE HYDROCHLORIDE: 10 INJECTION, SOLUTION EPIDURAL; INFILTRATION; INTRACAUDAL; PERINEURAL at 11:45

## 2019-06-03 RX ADMIN — Medication 5 ML: at 06:09

## 2019-06-03 RX ADMIN — SENNOSIDES AND DOCUSATE SODIUM 2 TABLET: 8.6; 5 TABLET ORAL at 09:01

## 2019-06-03 RX ADMIN — PREDNISONE 120 MG: 50 TABLET ORAL at 09:01

## 2019-06-03 RX ADMIN — CYCLOPHOSPHAMIDE 2525 MG: 2 INJECTION, POWDER, FOR SOLUTION INTRAVENOUS; ORAL at 15:30

## 2019-06-03 ASSESSMENT — ACTIVITIES OF DAILY LIVING (ADL)
ADLS_ACUITY_SCORE: 10

## 2019-06-03 NOTE — PROGRESS NOTES
CIVI Doxorubicin/Vincristine and Etoposide completed. Cytoxan hung over 1 hour via left PICC with great blood return. Denies nausea. Denies HA. Upon completion of therapy, left PICC was removed per policy and a sterile Tegaderm was placed. Pt remained supine for 15 min. Paperwork was reviewed with patient and he is aware of the triage number to call if problems arise. Discharge prescriptions picked up by Prudencio earlier today. Pt to RTC on 6/5 for Neulasta.

## 2019-06-03 NOTE — PLAN OF CARE
VSS. Denies pain. Nausea controlled with scheduled anti-emetics. Dose #4 CIVI chemo hung and infusing, good blood return q4hr. Fair appetite this evening. Up ambulating in halls throughout the shift. No acute events. Continue to monitor.

## 2019-06-03 NOTE — PROCEDURES
Consult and Procedure Service - Procedure Note    Attending:  Yrn Bach  Resident: none  Procedure: Lumbar Puncture  Indication:  IT chemo  Risk Assessment:  standard  =    The risks and benefits of the procedure were explained to the patient who expressed understanding and opted to proceed.  Consent was obtained previously and placed in the chart.  A time out was performed.      The patient was placed in the seated position and the L4-5 innerspace palpated and marked.  4 ml of 1% lidocaine was instilled.  A 3.5 inch 22 gauge needle was inserted and bloody fluid obtained on the first attempt without flow.  The stylet was replaced and the needle removed.   Attention was turned to the L3-4 space and 3 ml lidocaine instilled and a 3.5 inch 22 gauge needle inserted and clear fluid obtained on the first attempt. A total of 6 ml was removed.     Patient tolerated the procedure well. Please do not hesitate to contact our service if any complications or concerns arise.   Yrn Bach MD  DOS:  6/3/19

## 2019-06-03 NOTE — PLAN OF CARE
: LP with chemotherapy administration performed at bedside by MD. Pt remained flat for 1 hour and 1 liter NS bolus running now. Pt now up walking in the halls with his wife and son- reports feeling great. Chemotherapy infusing CIVI and expected to be completed around 1515, when he will then receive Cytoxan prior to discharge.

## 2019-06-03 NOTE — DISCHARGE SUMMARY
[facility] [place]    Discharge Summary  Hematology / Oncology    Date of Admission:  5/30/2019  Date of Discharge:  6/3/2019  Discharging Provider: Sugey Shaver  Date of Service (when I saw the patient): 06/03/19    Discharge Diagnoses   Burkitt lymphoma  Chemotherapy-induced neutropenia    History of Present Illness   Christiano Molina is a 43 year old male with Burkitt lymphoma who was admitted for cycle 4 DA-EPOCH-R.      Please see H&P for further detail of history.    Hospital Course   Christiano Molina was admitted on 5/30/2019.  The following problems were addressed during his hospitalization:     #Burkitt Lymphoma, EBV+, Stage IA   Follows with Dr. Ramirez. Initially presented in mid-March with a right axillary mass, found to have high-grade B-cell lymphoma. Initial pathology was not fully clear whether DLBCL or Burkitt, but more recently this was finalized by [facility] Heme Path review as Burkitt lymphoma (EBV+). Plan is to do 6 cycles of DA-R-EPOCH with IT chemo prophylaxis during C3-6. PET/CT after 2 cycles with AR.     Prudencio was admitted on 5/30/19 for cycle 4 of DA-REPOCH. Due to hoarseness, ENT evaluated patient prior to initiation of therapy and saw small area of granulation tissue or polyp on posterior aspect of TVC. No concern for neuropathy of vocal cords, most likely viral in nature and cleared to begin chemotherapy. Patient received ppx IT chemotherapy on 5/31 and 6/3. Flow pending from both. Tolerated chemotherapy well with restlessness due to steroid being the most significant side effect. Labs/vitals are stable on discharge.  - Last dose of Prednisone 120 mg on 6/4 AM  - Dexamethasone 8 mg on 6/5 and 6/6  - Neulasta scheduled for 6/5  - Labs on 6/10 and 6/13  - PET scan on 6/18  - ANTHONY visit on 6/20 with subsequent admission for cycle 5 DA-EPOCH-R     #ID PPx  - Continue ppx ACV and Fluc. Prescribed Levofloxacin 250 mg daily on discharge.     #GERD  - Continue home  Zantac     #Peripheral neuropathy.   Mild numbness/tingling affecting first 3 digits of b/l hands. Not interfering with function. Has mild numbness in b/l toes as well.   - Continue to monitor     # Hoarseness.    Patient endorses 3 weeks of vocal hoarseness on discharge. ENT evaluated patient on admission prior to initiating chemotherapy, as patient has h/o peripheral neuropathy and as dose adjustments are increases, concern for possible vocal cord neuropathy. ENT scoped patient, saw small area of granulation tissue or polyp on posterior aspect of TVC. No concern for neuropathy of vocal cords, most likely viral in nature and cleared to begin chemotherapy. SLP consulted as well.  - Stable on discharge     #H/o post-LP HA.   Monitor closely for this after LP done this cycle. Will encourage laying flat >1hr and caffeine/IVFs.     Dispo: Discharged to home on 6/3/19.     Above plan discussed with staff physician, Dr. Dona Shaver PA-C  Hematology/Oncology  Pager: [phone]    Significant Results and Procedures   Results for orders placed or performed during the hospital encounter of 05/30/19   XR Chest 1 View    Narrative    Exam: XR CHEST 1 VW, 5/30/2019 1:41 PM    Indication: PICC verification    Comparison: Chest x-ray 5/8/2019    Findings:   Right PICC has been removed. Left PICC tip projects over the low SVC.  No pneumothorax. Heart size is normal. No focal consolidation. Bones  are unremarkable.      Impression    Impression: Left PICC tip projects over the low SVC. No pneumothorax.    RAFAEL CANADA MD     Pending Results   These results will be followed up in clinic  Unresulted Labs Ordered in the Past 30 Days of this Admission     Date and Time Order Name Status Description    5/31/2019 0743 Leukemia Lymphoma Evaluation CSF In process     5/31/2019 0743 CSF Culture Aerobic Bacterial Tube 2 Preliminary           Code Status   Full Code    Primary Care Physician   Layton ECHAVARRIA  [place]    Physical Exam   Temp: 96.8  F (36  C) Temp src: Oral BP: 129/66   Heart Rate: 75 Resp: 16 SpO2: 96 % O2 Device: None (Room air)    Vitals:    06/01/19 0900 06/02/19 0900 06/03/19 0900   Weight: 82.5 kg (181 lb 14.4 oz) 82 kg (180 lb 11.2 oz) 81.6 kg (180 lb)     Vital Signs with Ranges  Temp:  [96.7  F (35.9  C)-97.4  F (36.3  C)] 96.8  F (36  C)  Heart Rate:  [61-77] 75  Resp:  [16-18] 16  BP: (121-145)/(66-74) 129/66  SpO2:  [94 %-98 %] 96 %  I/O last 3 completed shifts:  In: 3103.2 [P.O.:1410; I.V.:530; IV Piggyback:1163.2]  Out: 4000 [Urine:4000]    Time Spent on this Encounter   I, Sugey Shaver, personally saw the patient today and spent greater than 30 minutes discharging this patient.    Discharge Disposition   Discharged to home  Condition at discharge: Stable    Consultations This Hospital Stay   ENT IP CONSULT  SPEECH LANGUAGE PATH ADULT IP CONSULT  INTERNAL MEDICINE PROCEDURE TEAM ADULT IP CONSULT [facility] - LUMBAR PUNCTURE  INTERNAL MEDICINE PROCEDURE TEAM ADULT IP CONSULT [facility] - LUMBAR PUNCTURE    Discharge Orders      Reason for your hospital stay    You were admitted for scheduled cycle 4 REPOCH chemotherapy. You tolerated this well. Your labs/vitals are stable and you are safe to discharge home.     Activity    Your activity upon discharge: Regular activity as tolerated.     When to contact your care team    Call the [facility] 24-hour triage line at [phone] or [phone] for temp >100.4, uncontrolled nausea/vomiting/diarrhea/constipation, unrelieved pain, bleeding not relieved with pressure, dizziness, chest pain, shortness of breath, loss of consciousness, and any new or concerning symptoms.     Call [phone] and ask for the care coordinator that works with your oncologist if you have questions about scans, appointments, hospital follow-up, or other concerns.     Adult [facility] Follow-up and recommended labs and tests    - You will have Neulasta on 6/5  at [facility], labs on 6/10 and 6/13 at [facility], PET scan on 6/18 and follow-up with Dr. Ramirez's PA (Keyes) on 6/20 with subsequent admission to  for cycle 5 chemotherapy.    Appointments on [place] and/or [facility] (with [facility] or [facility] provider or service). Call [phone] if you haven't heard regarding these appointments within 7 days of discharge.    See detailed appointments below:     Discharge Instructions    - Please start taking Levofloxacin 250 mg (1 tab) daily tomorrow (6/4). The clinic will let you know when you can stop this. Continue to take daily Fluconazole and Allopurinol and twice daily Acyclovir.   - Take one dose of Prednisone 120 mg (6 tabs) tomorrow morning (6/5)  - Take Dexamethasone 8 mg (2 tabs) daily on 6/6 and 6/7.     Diet    Follow this diet upon discharge: Regular diet as tolerated.     Discharge Medications   Current Discharge Medication List      CONTINUE these medications which have CHANGED    Details   dexamethasone (DECADRON) 4 MG tablet Take 2 tablets (8 mg) by mouth daily for 2 days . Take on 6/5 and 6/6.  Qty: 4 tablet, Refills: 0    Associated Diagnoses: High grade B-cell lymphoma (H)      levofloxacin (LEVAQUIN) 250 MG tablet Take 1 tablet (250 mg) by mouth daily . Continue until advised by clinic team to stop.  Qty: 30 tablet, Refills: 0    Associated Diagnoses: Chemotherapy-induced neutropenia (H)      predniSONE (DELTASONE) 20 MG tablet Take 6 tablets (120 mg) by mouth daily for 1 dose . Take on 6/4 morning.  Qty: 6 tablet, Refills: 0    Associated Diagnoses: High grade B-cell lymphoma (H)         CONTINUE these medications which have NOT CHANGED    Details   acyclovir (ZOVIRAX) 200 MG capsule Take 2 capsules (400 mg) by mouth 2 times daily  Qty: 120 capsule, Refills: 0    Associated Diagnoses: High grade malignant lymphoma (H)      allopurinol (ZYLOPRIM) 300 MG tablet Take 1 tablet (300 mg) by mouth daily  Qty: 30 tablet, Refills: 0    Associated Diagnoses:  High grade malignant lymphoma (H)      fluconazole (DIFLUCAN) 100 MG tablet Take 1 tablet (100 mg) by mouth daily  Qty: 30 tablet, Refills: 0    Associated Diagnoses: High grade malignant lymphoma (H)      ranitidine (ZANTAC) 150 MG tablet Take 1 tablet (150 mg) by mouth 2 times daily  Qty: 60 tablet, Refills: 1    Associated Diagnoses: Gastroesophageal reflux disease without esophagitis      senna-docusate (SENOKOT-S/PERICOLACE) 8.6-50 MG tablet Take 2 tablets by mouth 2 times daily as needed for constipation    Associated Diagnoses: High grade B-cell lymphoma (H)      TUMS 500 MG OR CHEW 1 TABLET  PO  Qty: 90, Refills: 0      acetaminophen (TYLENOL) 325 MG tablet Take 2 tablets (650 mg) by mouth every 4 hours as needed for mild pain  Qty: 30 tablet, Refills: 0    Associated Diagnoses: Burkitt lymphoma, unspecified body region (H)      ondansetron (ZOFRAN) 8 MG tablet Take 1 tablet (8 mg) by mouth every 8 hours as needed for nausea  Qty: 30 tablet, Refills: 0    Associated Diagnoses: High grade B-cell lymphoma (H)      ondansetron (ZOFRAN-ODT) 8 MG ODT tab Take 1 tablet (8 mg) by mouth every 8 hours At first continue scheduled (but can back off as nausea improves)  Qty: 30 tablet, Refills: 0    Associated Diagnoses: High grade malignant lymphoma (H)      prochlorperazine (COMPAZINE) 10 MG tablet Take 1 tablet (10 mg) by mouth every 6 hours as needed for nausea or vomiting (Try second.)  Qty: 30 tablet, Refills: 0    Associated Diagnoses: High grade B-cell lymphoma (H)         STOP taking these medications       triamcinolone (KENALOG) 0.1 % external cream Comments:   Reason for Stopping:             Allergies   No Known Allergies  Data   ROUTINE IP LABS (Last four results)  BMP  Recent Labs   Lab 06/03/19  0607 06/02/19  0615 06/01/19  0717 05/31/19  0540    141 142 140   POTASSIUM 3.4 3.6 3.7 4.2   CHLORIDE 108 109 111* 111*   TAMMY 8.9 8.8 8.8 8.9   CO2 29 28 26 26   BUN 14 15 13 18   CR 0.90 0.93 0.82 0.94    GLC 86 118* 159* 184*     CBC  Recent Labs   Lab 06/03/19  0607 06/02/19  0615 06/01/19  0717 05/31/19  0540   WBC 7.7 13.1* 15.5* 12.3*   RBC 3.62* 3.77* 3.50* 3.72*   HGB 11.0* 11.3* 10.6* 11.1*   HCT 33.9* 35.3* 33.1* 34.7*   MCV 94 94 95 93   MCH 30.4 30.0 30.3 29.8   MCHC 32.4 32.0 32.0 32.0   RDW 15.3* 15.6* 15.6* 15.1*    223 200 229     INR  Recent Labs   Lab 06/03/19  0916 05/31/19  0823   INR 1.09 1.08

## 2019-06-03 NOTE — PLAN OF CARE
VSS. Nausea managed with scheduled compazine and walking in the triplett. Day 4 CIVI chemo infusing with good blood return. Will have LP today. Anxious to discharge this evening after final cytoxan dose. Continue with plan of care.

## 2019-06-03 NOTE — PROCEDURES
Lumbar Puncture Intrathecal Chemotherapy Administration Procedure Note   Patient: Christiano Molina  : 1975  Date of Procedure: 2019                 DIAGNOSIS: Burkitt lymphoma  PROCEDURE: Lumbar puncture with intrathecal administration   SITE: Inpatient Room   INDICATION: Intrathecal administration   Dr. Yrn Bach discussed the procedure, benefits, risks and alternatives to the patient, who voiced understanding of the information and agreed to proceed with the lumbar puncture. Risks include bleeding, infection, and post-procedure headache. Verbal and written consent were obtained.   Description: See note from Yrn Bach MD regarding procedure details. Specimen was sent for cell count and differential, protein and glucose, gram stain, culture, and flow cytometry.  Sugey Shaver PA-C then administered hydrocortisone sodium succinate PF 50 mg, methotrexate PF 12 mg in sodium chloride PF 0.9% 6 mL without apparent complication. Chemotherapy previously double checked by two RNs and also verified by this provider. Needle stylet was replaced and then needle removed and site cleaned and dressed with a bandage.  Complications: None. Patient tolerated procedure very well. Atraumatic tap with minimal discomfort during procedure. No significant bleeding or other immediate complication observed.   Disposition: Patient was instructed that patient should rest flat on back x 1 hour. He should notify RN if HA or pain develop. Also giving 1L NS and caffeine due to previous LP induced headache.  Procedure performed by: WINSTON Montanez PA-C  Hematology/Oncology  Pager: [phone]

## 2019-06-04 LAB — COPATH REPORT: NORMAL

## 2019-06-05 ENCOUNTER — INFUSION THERAPY VISIT (OUTPATIENT)
Dept: INFUSION THERAPY | Facility: CLINIC | Age: 44
End: 2019-06-05
Attending: PHYSICIAN ASSISTANT
Payer: COMMERCIAL

## 2019-06-05 VITALS
HEART RATE: 77 BPM | TEMPERATURE: 98.6 F | SYSTOLIC BLOOD PRESSURE: 129 MMHG | DIASTOLIC BLOOD PRESSURE: 82 MMHG | RESPIRATION RATE: 16 BRPM

## 2019-06-05 DIAGNOSIS — C85.10 HIGH GRADE B-CELL LYMPHOMA (H): Primary | ICD-10-CM

## 2019-06-05 LAB
BACTERIA SPEC CULT: NO GROWTH
Lab: NORMAL
SPECIMEN SOURCE: NORMAL

## 2019-06-05 PROCEDURE — 25000128 H RX IP 250 OP 636: Performed by: INTERNAL MEDICINE

## 2019-06-05 RX ADMIN — PEGFILGRASTIM 6 MG: 6 INJECTION SUBCUTANEOUS at 15:12

## 2019-06-05 ASSESSMENT — PAIN SCALES - GENERAL: PAINLEVEL: NO PAIN (0)

## 2019-06-05 NOTE — PROGRESS NOTES
Infusion Nursing Note:  Christiano Molina presents today for neulasta  Patient seen by provider today: No   present during visit today: Not Applicable.    Note: N/A.    Intravenous Access:  No Intravenous access/labs at this visit.    Treatment Conditions:  Not Applicable.      Post Infusion Assessment:  Patient tolerated injection without incident.  Site patent and intact, free from redness, edema or discomfort.       Discharge Plan:   AVS to patient via MYCHART.  Patient will return 6/10 for next appointment.   Patient discharged in stable condition accompanied by: self.  Departure Mode: Ambulatory.    Greg Amador RN

## 2019-06-06 ENCOUNTER — PATIENT OUTREACH (OUTPATIENT)
Dept: ONCOLOGY | Facility: CLINIC | Age: 44
End: 2019-06-06

## 2019-06-06 LAB
APPEARANCE CSF: CLEAR
COLOR CSF: COLORLESS
RBC # CSF MANUAL: 0 /UL (ref 0–2)
TUBE # CSF: 3 #
WBC # CSF MANUAL: 2 /UL (ref 0–5)

## 2019-06-08 ENCOUNTER — MYC REFILL (OUTPATIENT)
Dept: ONCOLOGY | Facility: CLINIC | Age: 44
End: 2019-06-08

## 2019-06-08 DIAGNOSIS — K21.9 GASTROESOPHAGEAL REFLUX DISEASE WITHOUT ESOPHAGITIS: ICD-10-CM

## 2019-06-08 DIAGNOSIS — C85.90: ICD-10-CM

## 2019-06-08 LAB
BACTERIA SPEC CULT: NO GROWTH
SPECIMEN SOURCE: NORMAL

## 2019-06-10 ENCOUNTER — HOSPITAL ENCOUNTER (OUTPATIENT)
Facility: CLINIC | Age: 44
Setting detail: SPECIMEN
Discharge: HOME | End: 2019-06-10
Attending: PHYSICIAN ASSISTANT | Admitting: PHYSICIAN ASSISTANT
Payer: COMMERCIAL

## 2019-06-10 ENCOUNTER — INFUSION THERAPY VISIT (OUTPATIENT)
Dept: INFUSION THERAPY | Facility: CLINIC | Age: 44
End: 2019-06-10
Attending: PHYSICIAN ASSISTANT
Payer: COMMERCIAL

## 2019-06-10 DIAGNOSIS — C85.10 HIGH GRADE B-CELL LYMPHOMA (H): ICD-10-CM

## 2019-06-10 LAB
ALBUMIN SERPL-MCNC: 3.7 G/DL (ref 3.4–5)
ALP SERPL-CCNC: 66 U/L (ref 40–150)
ALT SERPL W P-5'-P-CCNC: 31 U/L (ref 0–70)
ANION GAP SERPL CALCULATED.3IONS-SCNC: 5 MMOL/L (ref 3–14)
AST SERPL W P-5'-P-CCNC: 12 U/L (ref 0–45)
BASOPHILS # BLD AUTO: 0 10E9/L (ref 0–0.2)
BASOPHILS NFR BLD AUTO: 0.9 %
BILIRUB SERPL-MCNC: 0.3 MG/DL (ref 0.2–1.3)
BUN SERPL-MCNC: 14 MG/DL (ref 7–30)
CALCIUM SERPL-MCNC: 9.3 MG/DL (ref 8.5–10.1)
CHLORIDE SERPL-SCNC: 105 MMOL/L (ref 94–109)
CO2 SERPL-SCNC: 29 MMOL/L (ref 20–32)
CREAT SERPL-MCNC: 1 MG/DL (ref 0.66–1.25)
DIFFERENTIAL METHOD BLD: ABNORMAL
EOSINOPHIL # BLD AUTO: 0.1 10E9/L (ref 0–0.7)
EOSINOPHIL NFR BLD AUTO: 2.1 %
ERYTHROCYTE [DISTWIDTH] IN BLOOD BY AUTOMATED COUNT: 14.4 % (ref 10–15)
GFR SERPL CREATININE-BSD FRML MDRD: >90 ML/MIN/{1.73_M2}
GLUCOSE SERPL-MCNC: 131 MG/DL (ref 70–99)
HCT VFR BLD AUTO: 35.3 % (ref 40–53)
HGB BLD-MCNC: 12.1 G/DL (ref 13.3–17.7)
IMM GRANULOCYTES # BLD: 0 10E9/L (ref 0–0.4)
IMM GRANULOCYTES NFR BLD: 0.9 %
LYMPHOCYTES # BLD AUTO: 0.6 10E9/L (ref 0.8–5.3)
LYMPHOCYTES NFR BLD AUTO: 19.6 %
MCH RBC QN AUTO: 30.7 PG (ref 26.5–33)
MCHC RBC AUTO-ENTMCNC: 34.3 G/DL (ref 31.5–36.5)
MCV RBC AUTO: 90 FL (ref 78–100)
MONOCYTES # BLD AUTO: 0.4 10E9/L (ref 0–1.3)
MONOCYTES NFR BLD AUTO: 13.5 %
NEUTROPHILS # BLD AUTO: 2.1 10E9/L (ref 1.6–8.3)
NEUTROPHILS NFR BLD AUTO: 63 %
NRBC # BLD AUTO: 0 10*3/UL
NRBC BLD AUTO-RTO: 0 /100
PLATELET # BLD AUTO: 104 10E9/L (ref 150–450)
POTASSIUM SERPL-SCNC: 4.2 MMOL/L (ref 3.4–5.3)
PROT SERPL-MCNC: 6.9 G/DL (ref 6.8–8.8)
RBC # BLD AUTO: 3.94 10E12/L (ref 4.4–5.9)
SODIUM SERPL-SCNC: 139 MMOL/L (ref 133–144)
WBC # BLD AUTO: 3.3 10E9/L (ref 4–11)

## 2019-06-10 PROCEDURE — 80053 COMPREHEN METABOLIC PANEL: CPT | Performed by: PHYSICIAN ASSISTANT

## 2019-06-10 PROCEDURE — 36415 COLL VENOUS BLD VENIPUNCTURE: CPT

## 2019-06-10 PROCEDURE — 85025 COMPLETE CBC W/AUTO DIFF WBC: CPT | Performed by: PHYSICIAN ASSISTANT

## 2019-06-10 RX ORDER — FLUCONAZOLE 100 MG/1
100 TABLET ORAL DAILY
Qty: 30 TABLET | Refills: 0 | Status: SHIPPED | OUTPATIENT
Start: 2019-06-10 | End: 2019-07-08

## 2019-06-10 RX ORDER — ALLOPURINOL 300 MG/1
300 TABLET ORAL DAILY
Qty: 30 TABLET | Refills: 0 | Status: SHIPPED | OUTPATIENT
Start: 2019-06-10 | End: 2019-06-20

## 2019-06-10 NOTE — PROGRESS NOTES
Medical Assistant Note:  Christiano Molina presents today for BLOOD DRAW.    Patient seen by provider today: Yes: NO.   present during visit today: Not Applicable.    Concerns: No Concerns.    Procedure:  Lab draw site: Left hand, Needle type: BF, Gauge: 23.    Post Assessment:  Labs drawn without difficulty: Yes.    Discharge Plan:  Departure Mode: Ambulatory.    Face to Face Time: 5 MIN.    Omaira Reyes

## 2019-06-13 ENCOUNTER — INFUSION THERAPY VISIT (OUTPATIENT)
Dept: INFUSION THERAPY | Facility: CLINIC | Age: 44
End: 2019-06-13
Attending: PHYSICIAN ASSISTANT
Payer: COMMERCIAL

## 2019-06-13 ENCOUNTER — HOSPITAL ENCOUNTER (OUTPATIENT)
Facility: CLINIC | Age: 44
Setting detail: SPECIMEN
Discharge: HOME | End: 2019-06-13
Attending: PHYSICIAN ASSISTANT | Admitting: PHYSICIAN ASSISTANT
Payer: COMMERCIAL

## 2019-06-13 DIAGNOSIS — C85.10 HIGH GRADE B-CELL LYMPHOMA (H): Primary | ICD-10-CM

## 2019-06-13 DIAGNOSIS — C85.10 HIGH GRADE B-CELL LYMPHOMA (H): ICD-10-CM

## 2019-06-13 LAB
ALBUMIN SERPL-MCNC: 3.6 G/DL (ref 3.4–5)
ALP SERPL-CCNC: 74 U/L (ref 40–150)
ALT SERPL W P-5'-P-CCNC: 46 U/L (ref 0–70)
ANION GAP SERPL CALCULATED.3IONS-SCNC: 5 MMOL/L (ref 3–14)
ANISOCYTOSIS BLD QL SMEAR: SLIGHT
AST SERPL W P-5'-P-CCNC: 20 U/L (ref 0–45)
BASOPHILS # BLD AUTO: 0 10E9/L (ref 0–0.2)
BASOPHILS NFR BLD AUTO: 0 %
BILIRUB SERPL-MCNC: 0.2 MG/DL (ref 0.2–1.3)
BITE CELLS BLD QL SMEAR: SLIGHT
BUN SERPL-MCNC: 11 MG/DL (ref 7–30)
CALCIUM SERPL-MCNC: 8.7 MG/DL (ref 8.5–10.1)
CHLORIDE SERPL-SCNC: 106 MMOL/L (ref 94–109)
CO2 SERPL-SCNC: 30 MMOL/L (ref 20–32)
CREAT SERPL-MCNC: 1.2 MG/DL (ref 0.66–1.25)
DACRYOCYTES BLD QL SMEAR: SLIGHT
DIFFERENTIAL METHOD BLD: ABNORMAL
EOSINOPHIL # BLD AUTO: 0.1 10E9/L (ref 0–0.7)
EOSINOPHIL NFR BLD AUTO: 1 %
ERYTHROCYTE [DISTWIDTH] IN BLOOD BY AUTOMATED COUNT: 15.9 % (ref 10–15)
GFR SERPL CREATININE-BSD FRML MDRD: 74 ML/MIN/{1.73_M2}
GLUCOSE SERPL-MCNC: 103 MG/DL (ref 70–99)
HCT VFR BLD AUTO: 34.7 % (ref 40–53)
HGB BLD-MCNC: 11.5 G/DL (ref 13.3–17.7)
LYMPHOCYTES # BLD AUTO: 1.7 10E9/L (ref 0.8–5.3)
LYMPHOCYTES NFR BLD AUTO: 14 %
MCH RBC QN AUTO: 30.9 PG (ref 26.5–33)
MCHC RBC AUTO-ENTMCNC: 33.1 G/DL (ref 31.5–36.5)
MCV RBC AUTO: 93 FL (ref 78–100)
METAMYELOCYTES # BLD: 0.2 10E9/L
METAMYELOCYTES NFR BLD MANUAL: 2 %
MICROCYTES BLD QL SMEAR: PRESENT
MONOCYTES # BLD AUTO: 0.7 10E9/L (ref 0–1.3)
MONOCYTES NFR BLD AUTO: 6 %
NEUTROPHILS # BLD AUTO: 9.5 10E9/L (ref 1.6–8.3)
NEUTROPHILS NFR BLD AUTO: 77 %
NRBC # BLD AUTO: 0.1 10*3/UL
NRBC BLD AUTO-RTO: 1 /100
PLATELET # BLD AUTO: 128 10E9/L (ref 150–450)
PLATELET # BLD EST: ABNORMAL 10*3/UL
POLYCHROMASIA BLD QL SMEAR: SLIGHT
POTASSIUM SERPL-SCNC: 4.4 MMOL/L (ref 3.4–5.3)
PROT SERPL-MCNC: 6.9 G/DL (ref 6.8–8.8)
RBC # BLD AUTO: 3.72 10E12/L (ref 4.4–5.9)
SODIUM SERPL-SCNC: 141 MMOL/L (ref 133–144)
WBC # BLD AUTO: 12.3 10E9/L (ref 4–11)

## 2019-06-13 PROCEDURE — 85025 COMPLETE CBC W/AUTO DIFF WBC: CPT | Performed by: PHYSICIAN ASSISTANT

## 2019-06-13 PROCEDURE — 80053 COMPREHEN METABOLIC PANEL: CPT | Performed by: PHYSICIAN ASSISTANT

## 2019-06-13 PROCEDURE — 36415 COLL VENOUS BLD VENIPUNCTURE: CPT

## 2019-06-13 NOTE — PROGRESS NOTES
Infusion Nursing Note:  Christiano Molina presents today for possible transfusion.    Patient seen by provider today: No    Note: Patient reports feeling well and has no complaints.  Patient does not require any transfusions as he does not meet parameters for transfusion.    Intravenous Access:  Labs drawn with MA today.      Treatment Conditions:  Lab Results   Component Value Date    HGB 11.5 06/13/2019     Lab Results   Component Value Date    WBC 12.3 06/13/2019      Lab Results   Component Value Date    ANEU 9.5 06/13/2019     Lab Results   Component Value Date     06/13/2019      Lab Results   Component Value Date     06/13/2019                   Lab Results   Component Value Date    POTASSIUM 4.4 06/13/2019           Lab Results   Component Value Date    MAG 2.1 06/03/2019            Lab Results   Component Value Date    CR 1.20 06/13/2019                   Lab Results   Component Value Date    TAMMY 8.7 06/13/2019                Lab Results   Component Value Date    BILITOTAL 0.2 06/13/2019           Lab Results   Component Value Date    ALBUMIN 3.6 06/13/2019                    Lab Results   Component Value Date    ALT 46 06/13/2019           Lab Results   Component Value Date    AST 20 06/13/2019       Results reviewed, labs MET treatment parameters, ok to proceed with treatment.      Discharge Plan:   Patient declined prescription refills.  Discharge instructions reviewed with: Patient.  Patient and/or family verbalized understanding of discharge instructions and all questions answered.  AVS to patient via [employer].  Patient will return 6/18/ PET scan, 6/20 for labs and to see Mariah OH for next appointment.   Patient discharged in stable condition accompanied by: self.  Departure Mode: Ambulatory.    Monica Lopez RN

## 2019-06-13 NOTE — PROGRESS NOTES
Medical Assistant Note:  Christiano Molina presents today for lab draw.    Patient seen by provider today: No.   present during visit today: Not Applicable.    Concerns: No Concerns.    Procedure:  Lab draw site: LAC, Needle type: BF, Gauge: 23g with gauze and coban.    Post Assessment:  Labs drawn without difficulty: Yes.    Discharge Plan:  Departure Mode: Ambulatory.    Face to Face Time: 4mins.    Shannan Avilez CMA

## 2019-06-18 ENCOUNTER — HOSPITAL ENCOUNTER (OUTPATIENT)
Dept: PET IMAGING | Facility: CLINIC | Age: 44
Discharge: HOME | End: 2019-06-18
Attending: INTERNAL MEDICINE | Admitting: INTERNAL MEDICINE
Payer: COMMERCIAL

## 2019-06-18 DIAGNOSIS — C85.10 HIGH GRADE B-CELL LYMPHOMA (H): ICD-10-CM

## 2019-06-18 LAB — GLUCOSE BLDC GLUCOMTR-MCNC: 93 MG/DL (ref 70–99)

## 2019-06-18 PROCEDURE — 82962 GLUCOSE BLOOD TEST: CPT

## 2019-06-18 PROCEDURE — A9552 F18 FDG: HCPCS | Performed by: INTERNAL MEDICINE

## 2019-06-18 PROCEDURE — 78816 PET IMAGE W/CT FULL BODY: CPT | Mod: PS

## 2019-06-18 PROCEDURE — 34300033 ZZH RX 343: Performed by: INTERNAL MEDICINE

## 2019-06-18 RX ADMIN — FLUDEOXYGLUCOSE F-18 10.77 MCI.: 500 INJECTION, SOLUTION INTRAVENOUS at 09:09

## 2019-06-20 ENCOUNTER — HOSPITAL ENCOUNTER (INPATIENT)
Facility: CLINIC | Age: 44
LOS: 4 days | Discharge: HOME | DRG: 847 | End: 2019-06-24
Attending: INTERNAL MEDICINE | Admitting: INTERNAL MEDICINE
Payer: COMMERCIAL

## 2019-06-20 ENCOUNTER — APPOINTMENT (OUTPATIENT)
Dept: GENERAL RADIOLOGY | Facility: CLINIC | Age: 44
DRG: 847 | End: 2019-06-20
Attending: PHYSICIAN ASSISTANT
Payer: COMMERCIAL

## 2019-06-20 ENCOUNTER — ONCOLOGY VISIT (OUTPATIENT)
Dept: ONCOLOGY | Facility: CLINIC | Age: 44
DRG: 847 | End: 2019-06-20
Attending: PHYSICIAN ASSISTANT
Payer: COMMERCIAL

## 2019-06-20 ENCOUNTER — APPOINTMENT (OUTPATIENT)
Dept: LAB | Facility: CLINIC | Age: 44
DRG: 847 | End: 2019-06-20
Attending: INTERNAL MEDICINE
Payer: COMMERCIAL

## 2019-06-20 VITALS
SYSTOLIC BLOOD PRESSURE: 121 MMHG | RESPIRATION RATE: 18 BRPM | WEIGHT: 178.7 LBS | TEMPERATURE: 98.1 F | DIASTOLIC BLOOD PRESSURE: 79 MMHG | HEIGHT: 70 IN | BODY MASS INDEX: 25.58 KG/M2 | OXYGEN SATURATION: 98 % | HEART RATE: 83 BPM

## 2019-06-20 DIAGNOSIS — C85.10 HIGH GRADE B-CELL LYMPHOMA (H): ICD-10-CM

## 2019-06-20 DIAGNOSIS — C85.10 HIGH GRADE B-CELL LYMPHOMA (H): Primary | ICD-10-CM

## 2019-06-20 PROBLEM — C83.30 DLBCL (DIFFUSE LARGE B CELL LYMPHOMA) (H): Status: ACTIVE | Noted: 2019-06-20

## 2019-06-20 LAB
ALBUMIN SERPL-MCNC: 3.9 G/DL (ref 3.4–5)
ALP SERPL-CCNC: 68 U/L (ref 40–150)
ALT SERPL W P-5'-P-CCNC: 42 U/L (ref 0–70)
ANION GAP SERPL CALCULATED.3IONS-SCNC: 6 MMOL/L (ref 3–14)
AST SERPL W P-5'-P-CCNC: 24 U/L (ref 0–45)
BASOPHILS # BLD AUTO: 0 10E9/L (ref 0–0.2)
BASOPHILS NFR BLD AUTO: 0.8 %
BILIRUB SERPL-MCNC: 0.2 MG/DL (ref 0.2–1.3)
BUN SERPL-MCNC: 15 MG/DL (ref 7–30)
CALCIUM SERPL-MCNC: 8.6 MG/DL (ref 8.5–10.1)
CHLORIDE SERPL-SCNC: 108 MMOL/L (ref 94–109)
CO2 SERPL-SCNC: 27 MMOL/L (ref 20–32)
CREAT SERPL-MCNC: 1.15 MG/DL (ref 0.66–1.25)
DIFFERENTIAL METHOD BLD: ABNORMAL
EOSINOPHIL # BLD AUTO: 0 10E9/L (ref 0–0.7)
EOSINOPHIL NFR BLD AUTO: 0.4 %
ERYTHROCYTE [DISTWIDTH] IN BLOOD BY AUTOMATED COUNT: 15.6 % (ref 10–15)
GFR SERPL CREATININE-BSD FRML MDRD: 77 ML/MIN/{1.73_M2}
GLUCOSE SERPL-MCNC: 70 MG/DL (ref 70–99)
HCT VFR BLD AUTO: 38.3 % (ref 40–53)
HGB BLD-MCNC: 12.5 G/DL (ref 13.3–17.7)
IMM GRANULOCYTES # BLD: 0.1 10E9/L (ref 0–0.4)
IMM GRANULOCYTES NFR BLD: 1.4 %
LYMPHOCYTES # BLD AUTO: 0.8 10E9/L (ref 0.8–5.3)
LYMPHOCYTES NFR BLD AUTO: 14.9 %
MAGNESIUM SERPL-MCNC: 2.2 MG/DL (ref 1.6–2.3)
MCH RBC QN AUTO: 30.9 PG (ref 26.5–33)
MCHC RBC AUTO-ENTMCNC: 32.6 G/DL (ref 31.5–36.5)
MCV RBC AUTO: 95 FL (ref 78–100)
MONOCYTES # BLD AUTO: 0.5 10E9/L (ref 0–1.3)
MONOCYTES NFR BLD AUTO: 10.7 %
NEUTROPHILS # BLD AUTO: 3.6 10E9/L (ref 1.6–8.3)
NEUTROPHILS NFR BLD AUTO: 71.8 %
NRBC # BLD AUTO: 0 10*3/UL
NRBC BLD AUTO-RTO: 0 /100
PHOSPHATE SERPL-MCNC: 2.4 MG/DL (ref 2.5–4.5)
PLATELET # BLD AUTO: 212 10E9/L (ref 150–450)
POTASSIUM SERPL-SCNC: 4.5 MMOL/L (ref 3.4–5.3)
PROT SERPL-MCNC: 7.1 G/DL (ref 6.8–8.8)
RBC # BLD AUTO: 4.04 10E12/L (ref 4.4–5.9)
SODIUM SERPL-SCNC: 141 MMOL/L (ref 133–144)
WBC # BLD AUTO: 5 10E9/L (ref 4–11)

## 2019-06-20 PROCEDURE — 25800030 ZZH RX IP 258 OP 636: Performed by: PHYSICIAN ASSISTANT

## 2019-06-20 PROCEDURE — 25000128 H RX IP 250 OP 636: Performed by: PHYSICIAN ASSISTANT

## 2019-06-20 PROCEDURE — 25000131 ZZH RX MED GY IP 250 OP 636 PS 637: Performed by: PHYSICIAN ASSISTANT

## 2019-06-20 PROCEDURE — 85025 COMPLETE CBC W/AUTO DIFF WBC: CPT | Performed by: PHYSICIAN ASSISTANT

## 2019-06-20 PROCEDURE — 40000986 XR CHEST 1 VW

## 2019-06-20 PROCEDURE — G0463 HOSPITAL OUTPT CLINIC VISIT: HCPCS | Mod: 25

## 2019-06-20 PROCEDURE — 80053 COMPREHEN METABOLIC PANEL: CPT | Performed by: PHYSICIAN ASSISTANT

## 2019-06-20 PROCEDURE — 25000132 ZZH RX MED GY IP 250 OP 250 PS 637: Performed by: PHYSICIAN ASSISTANT

## 2019-06-20 PROCEDURE — 12000001 ZZH R&B MED SURG/OB UMMC

## 2019-06-20 PROCEDURE — C1751 CATH, INF, PER/CENT/MIDLINE: HCPCS

## 2019-06-20 PROCEDURE — 25000125 ZZHC RX 250: Performed by: PHYSICIAN ASSISTANT

## 2019-06-20 PROCEDURE — 36569 INSJ PICC 5 YR+ W/O IMAGING: CPT

## 2019-06-20 PROCEDURE — G0463 HOSPITAL OUTPT CLINIC VISIT: HCPCS | Mod: ZF

## 2019-06-20 PROCEDURE — 36415 COLL VENOUS BLD VENIPUNCTURE: CPT

## 2019-06-20 PROCEDURE — 83735 ASSAY OF MAGNESIUM: CPT | Performed by: PHYSICIAN ASSISTANT

## 2019-06-20 PROCEDURE — 84100 ASSAY OF PHOSPHORUS: CPT | Performed by: PHYSICIAN ASSISTANT

## 2019-06-20 PROCEDURE — 27211417 ZZ H KIT, VPS RHYTHM STYLET

## 2019-06-20 PROCEDURE — 99214 OFFICE O/P EST MOD 30 MIN: CPT | Mod: ZP | Performed by: PHYSICIAN ASSISTANT

## 2019-06-20 PROCEDURE — 3E04305 INTRODUCTION OF OTHER ANTINEOPLASTIC INTO CENTRAL VEIN, PERCUTANEOUS APPROACH: ICD-10-PCS | Performed by: PHYSICIAN ASSISTANT

## 2019-06-20 RX ORDER — MAGNESIUM SULFATE HEPTAHYDRATE 40 MG/ML
4 INJECTION, SOLUTION INTRAVENOUS EVERY 4 HOURS PRN
Status: DISCONTINUED | OUTPATIENT
Start: 2019-06-20 | End: 2019-06-24 | Stop reason: HOSPADM

## 2019-06-20 RX ORDER — LANOLIN ALCOHOL/MO/W.PET/CERES
3 CREAM (GRAM) TOPICAL AT BEDTIME
Status: DISCONTINUED | OUTPATIENT
Start: 2019-06-20 | End: 2019-06-24 | Stop reason: HOSPADM

## 2019-06-20 RX ORDER — PROCHLORPERAZINE MALEATE 10 MG
10 TABLET ORAL EVERY 8 HOURS
Status: CANCELLED
Start: 2019-06-20

## 2019-06-20 RX ORDER — ONDANSETRON 2 MG/ML
8 INJECTION INTRAMUSCULAR; INTRAVENOUS EVERY 8 HOURS PRN
Status: DISCONTINUED | OUTPATIENT
Start: 2019-06-20 | End: 2019-06-24 | Stop reason: HOSPADM

## 2019-06-20 RX ORDER — POTASSIUM CHLORIDE 750 MG/1
20-40 TABLET, EXTENDED RELEASE ORAL
Status: DISCONTINUED | OUTPATIENT
Start: 2019-06-20 | End: 2019-06-24 | Stop reason: HOSPADM

## 2019-06-20 RX ORDER — LORAZEPAM 2 MG/ML
1 INJECTION INTRAMUSCULAR ONCE
Status: CANCELLED
Start: 2019-06-20

## 2019-06-20 RX ORDER — LANOLIN ALCOHOL/MO/W.PET/CERES
3 CREAM (GRAM) TOPICAL AT BEDTIME
Status: CANCELLED
Start: 2019-06-20

## 2019-06-20 RX ORDER — DIPHENHYDRAMINE HYDROCHLORIDE 50 MG/ML
50 INJECTION INTRAMUSCULAR; INTRAVENOUS
Status: CANCELLED
Start: 2019-06-20

## 2019-06-20 RX ORDER — NALOXONE HYDROCHLORIDE 0.4 MG/ML
.1-.4 INJECTION, SOLUTION INTRAMUSCULAR; INTRAVENOUS; SUBCUTANEOUS
Status: DISCONTINUED | OUTPATIENT
Start: 2019-06-20 | End: 2019-06-24 | Stop reason: HOSPADM

## 2019-06-20 RX ORDER — POTASSIUM CHLORIDE 1.5 G/1.58G
20-40 POWDER, FOR SOLUTION ORAL
Status: DISCONTINUED | OUTPATIENT
Start: 2019-06-20 | End: 2019-06-24 | Stop reason: HOSPADM

## 2019-06-20 RX ORDER — AMOXICILLIN 250 MG
2 CAPSULE ORAL 2 TIMES DAILY
Status: DISCONTINUED | OUTPATIENT
Start: 2019-06-20 | End: 2019-06-24 | Stop reason: HOSPADM

## 2019-06-20 RX ORDER — ACYCLOVIR 200 MG/1
400 CAPSULE ORAL 2 TIMES DAILY
Status: DISCONTINUED | OUTPATIENT
Start: 2019-06-20 | End: 2019-06-24 | Stop reason: HOSPADM

## 2019-06-20 RX ORDER — LORAZEPAM 2 MG/ML
.5-1 INJECTION INTRAMUSCULAR EVERY 6 HOURS PRN
Status: DISCONTINUED | OUTPATIENT
Start: 2019-06-20 | End: 2019-06-24 | Stop reason: HOSPADM

## 2019-06-20 RX ORDER — DIPHENHYDRAMINE HCL 25 MG
50 CAPSULE ORAL ONCE
Status: CANCELLED
Start: 2019-06-20

## 2019-06-20 RX ORDER — ONDANSETRON 8 MG/1
8 TABLET, ORALLY DISINTEGRATING ORAL EVERY 8 HOURS PRN
Status: DISCONTINUED | OUTPATIENT
Start: 2019-06-20 | End: 2019-06-24 | Stop reason: HOSPADM

## 2019-06-20 RX ORDER — EPINEPHRINE 1 MG/ML
0.3 INJECTION, SOLUTION INTRAMUSCULAR; SUBCUTANEOUS EVERY 5 MIN PRN
Status: CANCELLED | OUTPATIENT
Start: 2019-06-20

## 2019-06-20 RX ORDER — LORAZEPAM 0.5 MG/1
.5-1 TABLET ORAL EVERY 6 HOURS PRN
Status: DISCONTINUED | OUTPATIENT
Start: 2019-06-20 | End: 2019-06-24 | Stop reason: HOSPADM

## 2019-06-20 RX ORDER — ACETAMINOPHEN 325 MG/1
650 TABLET ORAL ONCE
Status: COMPLETED | OUTPATIENT
Start: 2019-06-20 | End: 2019-06-20

## 2019-06-20 RX ORDER — LIDOCAINE 40 MG/G
CREAM TOPICAL
Status: DISCONTINUED | OUTPATIENT
Start: 2019-06-20 | End: 2019-06-24 | Stop reason: HOSPADM

## 2019-06-20 RX ORDER — ACETAMINOPHEN 325 MG/1
650 TABLET ORAL EVERY 4 HOURS PRN
Status: DISCONTINUED | OUTPATIENT
Start: 2019-06-20 | End: 2019-06-24 | Stop reason: HOSPADM

## 2019-06-20 RX ORDER — METHYLPREDNISOLONE SODIUM SUCCINATE 125 MG/2ML
125 INJECTION, POWDER, LYOPHILIZED, FOR SOLUTION INTRAMUSCULAR; INTRAVENOUS
Status: CANCELLED
Start: 2019-06-20

## 2019-06-20 RX ORDER — DEXAMETHASONE 4 MG/1
8 TABLET ORAL DAILY
Status: CANCELLED
Start: 2019-06-26

## 2019-06-20 RX ORDER — ALBUTEROL SULFATE 90 UG/1
1-2 AEROSOL, METERED RESPIRATORY (INHALATION)
Status: DISCONTINUED | OUTPATIENT
Start: 2019-06-20 | End: 2019-06-24 | Stop reason: HOSPADM

## 2019-06-20 RX ORDER — HEPARIN SODIUM,PORCINE 10 UNIT/ML
2-5 VIAL (ML) INTRAVENOUS
Status: DISCONTINUED | OUTPATIENT
Start: 2019-06-20 | End: 2019-06-24 | Stop reason: HOSPADM

## 2019-06-20 RX ORDER — EPINEPHRINE 1 MG/ML
0.3 INJECTION, SOLUTION, CONCENTRATE INTRAVENOUS EVERY 5 MIN PRN
Status: DISCONTINUED | OUTPATIENT
Start: 2019-06-20 | End: 2019-06-24 | Stop reason: HOSPADM

## 2019-06-20 RX ORDER — SODIUM CHLORIDE 9 MG/ML
1000 INJECTION, SOLUTION INTRAVENOUS CONTINUOUS PRN
Status: CANCELLED
Start: 2019-06-20

## 2019-06-20 RX ORDER — ONDANSETRON 8 MG/1
16 TABLET, FILM COATED ORAL
Status: COMPLETED | OUTPATIENT
Start: 2019-06-20 | End: 2019-06-24

## 2019-06-20 RX ORDER — POTASSIUM CHLORIDE 7.45 MG/ML
10 INJECTION INTRAVENOUS
Status: DISCONTINUED | OUTPATIENT
Start: 2019-06-20 | End: 2019-06-24 | Stop reason: HOSPADM

## 2019-06-20 RX ORDER — MEPERIDINE HYDROCHLORIDE 25 MG/ML
25 INJECTION INTRAMUSCULAR; INTRAVENOUS; SUBCUTANEOUS EVERY 30 MIN PRN
Status: DISCONTINUED | OUTPATIENT
Start: 2019-06-20 | End: 2019-06-24 | Stop reason: HOSPADM

## 2019-06-20 RX ORDER — DEXAMETHASONE 4 MG/1
8 TABLET ORAL DAILY
Status: DISCONTINUED | OUTPATIENT
Start: 2019-06-26 | End: 2019-06-24 | Stop reason: HOSPADM

## 2019-06-20 RX ORDER — ALBUTEROL SULFATE 0.83 MG/ML
2.5 SOLUTION RESPIRATORY (INHALATION)
Status: DISCONTINUED | OUTPATIENT
Start: 2019-06-20 | End: 2019-06-24 | Stop reason: HOSPADM

## 2019-06-20 RX ORDER — ALBUTEROL SULFATE 0.83 MG/ML
2.5 SOLUTION RESPIRATORY (INHALATION)
Status: CANCELLED | OUTPATIENT
Start: 2019-06-20

## 2019-06-20 RX ORDER — ONDANSETRON 8 MG/1
8 TABLET, FILM COATED ORAL EVERY 8 HOURS PRN
Status: DISCONTINUED | OUTPATIENT
Start: 2019-06-20 | End: 2019-06-24 | Stop reason: HOSPADM

## 2019-06-20 RX ORDER — LORAZEPAM 2 MG/ML
1 INJECTION INTRAMUSCULAR ONCE
Status: COMPLETED | OUTPATIENT
Start: 2019-06-21 | End: 2019-06-21

## 2019-06-20 RX ORDER — ALBUTEROL SULFATE 90 UG/1
1-2 AEROSOL, METERED RESPIRATORY (INHALATION)
Status: CANCELLED
Start: 2019-06-20

## 2019-06-20 RX ORDER — POTASSIUM CL/LIDO/0.9 % NACL 10MEQ/0.1L
10 INTRAVENOUS SOLUTION, PIGGYBACK (ML) INTRAVENOUS
Status: DISCONTINUED | OUTPATIENT
Start: 2019-06-20 | End: 2019-06-24 | Stop reason: HOSPADM

## 2019-06-20 RX ORDER — ONDANSETRON 8 MG/1
16 TABLET, FILM COATED ORAL
Status: CANCELLED
Start: 2019-06-20

## 2019-06-20 RX ORDER — PROCHLORPERAZINE MALEATE 10 MG
10 TABLET ORAL EVERY 6 HOURS PRN
Status: CANCELLED
Start: 2019-06-20

## 2019-06-20 RX ORDER — POLYETHYLENE GLYCOL 3350 17 G/17G
17 POWDER, FOR SOLUTION ORAL DAILY PRN
Status: DISCONTINUED | OUTPATIENT
Start: 2019-06-20 | End: 2019-06-21

## 2019-06-20 RX ORDER — ACETAMINOPHEN 325 MG/1
650 TABLET ORAL ONCE
Status: CANCELLED
Start: 2019-06-20

## 2019-06-20 RX ORDER — POTASSIUM CHLORIDE 29.8 MG/ML
20 INJECTION INTRAVENOUS
Status: DISCONTINUED | OUTPATIENT
Start: 2019-06-20 | End: 2019-06-24 | Stop reason: HOSPADM

## 2019-06-20 RX ORDER — DIPHENHYDRAMINE HCL 50 MG
50 CAPSULE ORAL ONCE
Status: COMPLETED | OUTPATIENT
Start: 2019-06-20 | End: 2019-06-20

## 2019-06-20 RX ORDER — MEPERIDINE HYDROCHLORIDE 25 MG/ML
25 INJECTION INTRAMUSCULAR; INTRAVENOUS; SUBCUTANEOUS EVERY 30 MIN PRN
Status: CANCELLED | OUTPATIENT
Start: 2019-06-20

## 2019-06-20 RX ORDER — PROCHLORPERAZINE MALEATE 10 MG
10 TABLET ORAL EVERY 8 HOURS
Status: DISCONTINUED | OUTPATIENT
Start: 2019-06-20 | End: 2019-06-24 | Stop reason: HOSPADM

## 2019-06-20 RX ORDER — AMOXICILLIN 250 MG
2 CAPSULE ORAL 2 TIMES DAILY
Status: CANCELLED | OUTPATIENT
Start: 2019-06-20

## 2019-06-20 RX ORDER — SODIUM CHLORIDE 9 MG/ML
1000 INJECTION, SOLUTION INTRAVENOUS CONTINUOUS PRN
Status: DISCONTINUED | OUTPATIENT
Start: 2019-06-20 | End: 2019-06-24 | Stop reason: HOSPADM

## 2019-06-20 RX ORDER — LORAZEPAM 2 MG/ML
1 INJECTION INTRAMUSCULAR ONCE
Status: DISCONTINUED | OUTPATIENT
Start: 2019-06-24 | End: 2019-06-23

## 2019-06-20 RX ORDER — METHYLPREDNISOLONE SODIUM SUCCINATE 125 MG/2ML
125 INJECTION, POWDER, LYOPHILIZED, FOR SOLUTION INTRAMUSCULAR; INTRAVENOUS
Status: DISCONTINUED | OUTPATIENT
Start: 2019-06-20 | End: 2019-06-24 | Stop reason: HOSPADM

## 2019-06-20 RX ORDER — PROCHLORPERAZINE MALEATE 10 MG
10 TABLET ORAL EVERY 6 HOURS PRN
Status: DISCONTINUED | OUTPATIENT
Start: 2019-06-20 | End: 2019-06-24 | Stop reason: HOSPADM

## 2019-06-20 RX ORDER — CALCIUM CARBONATE 500 MG/1
500-1000 TABLET, CHEWABLE ORAL 3 TIMES DAILY PRN
Status: DISCONTINUED | OUTPATIENT
Start: 2019-06-20 | End: 2019-06-24 | Stop reason: HOSPADM

## 2019-06-20 RX ORDER — LORAZEPAM 0.5 MG/1
.5-1 TABLET ORAL EVERY 6 HOURS PRN
Status: CANCELLED
Start: 2019-06-20

## 2019-06-20 RX ORDER — LORAZEPAM 2 MG/ML
.5-1 INJECTION INTRAMUSCULAR EVERY 6 HOURS PRN
Status: CANCELLED | OUTPATIENT
Start: 2019-06-20

## 2019-06-20 RX ORDER — DIPHENHYDRAMINE HYDROCHLORIDE 50 MG/ML
50 INJECTION INTRAMUSCULAR; INTRAVENOUS
Status: DISCONTINUED | OUTPATIENT
Start: 2019-06-20 | End: 2019-06-24 | Stop reason: HOSPADM

## 2019-06-20 RX ORDER — LORAZEPAM 2 MG/ML
1 INJECTION INTRAMUSCULAR ONCE
Status: CANCELLED
Start: 2019-06-24

## 2019-06-20 RX ADMIN — SODIUM CHLORIDE 150 MG: 9 INJECTION, SOLUTION INTRAVENOUS at 15:07

## 2019-06-20 RX ADMIN — VINCRISTINE SULFATE 0.78 MG: 1 INJECTION, SOLUTION INTRAVENOUS at 15:52

## 2019-06-20 RX ADMIN — ETOPOSIDE 200 MG: 20 INJECTION, SOLUTION, CONCENTRATE INTRAVENOUS at 15:52

## 2019-06-20 RX ADMIN — ACYCLOVIR 400 MG: 200 CAPSULE ORAL at 20:38

## 2019-06-20 RX ADMIN — RANITIDINE 150 MG: 150 TABLET ORAL at 20:38

## 2019-06-20 RX ADMIN — PROCHLORPERAZINE MALEATE 10 MG: 10 TABLET ORAL at 22:03

## 2019-06-20 RX ADMIN — PREDNISONE 60 MG: 10 TABLET ORAL at 14:14

## 2019-06-20 RX ADMIN — ACETAMINOPHEN 650 MG: 325 TABLET, FILM COATED ORAL at 13:39

## 2019-06-20 RX ADMIN — DIPHENHYDRAMINE HYDROCHLORIDE 50 MG: 50 CAPSULE ORAL at 13:39

## 2019-06-20 RX ADMIN — PROCHLORPERAZINE MALEATE 10 MG: 10 TABLET ORAL at 15:07

## 2019-06-20 RX ADMIN — RITUXIMAB 700 MG: 10 INJECTION, SOLUTION INTRAVENOUS at 14:02

## 2019-06-20 RX ADMIN — MELATONIN TAB 3 MG 3 MG: 3 TAB at 22:03

## 2019-06-20 RX ADMIN — LIDOCAINE HYDROCHLORIDE 1 ML: 10 INJECTION, SOLUTION INFILTRATION; PERINEURAL at 13:22

## 2019-06-20 RX ADMIN — SENNOSIDES AND DOCUSATE SODIUM 2 TABLET: 8.6; 5 TABLET ORAL at 20:38

## 2019-06-20 RX ADMIN — POTASSIUM PHOSPHATE, MONOBASIC AND POTASSIUM PHOSPHATE, DIBASIC 15 MMOL: 224; 236 INJECTION, SOLUTION INTRAVENOUS at 20:36

## 2019-06-20 RX ADMIN — ONDANSETRON HYDROCHLORIDE 16 MG: 8 TABLET, FILM COATED ORAL at 15:07

## 2019-06-20 ASSESSMENT — MIFFLIN-ST. JEOR
SCORE: 1711.83
SCORE: 1710.01

## 2019-06-20 ASSESSMENT — PAIN SCALES - GENERAL: PAINLEVEL: NO PAIN (0)

## 2019-06-20 ASSESSMENT — ACTIVITIES OF DAILY LIVING (ADL)
ADLS_ACUITY_SCORE: 10

## 2019-06-20 NOTE — NURSING NOTE
Chief Complaint   Patient presents with     Blood Draw     Labs drawn via  by RN in lab. VS taken.     Felicity Dozier RN

## 2019-06-20 NOTE — PLAN OF CARE
VSS, afebrile. Pt admitted today for cycle 5 R-EPOCH, scans show CR and pt in good spirits. Denies pain. No nausea currently, but has experienced w/ past chemo cycles, scheduled anti-emetics. Phos 2.4, replacement ordered from pharmacy. Up ad jessica, ambulating frequently. Voiding well, BM this AM. Received rapid rituxan w/out incident, CIVI etoposide/vincristine/dox infusing w/ brisk blood return via PICC. Plan for IT chemo Friday and Monday. Continue to monitor and w/ POC.

## 2019-06-20 NOTE — PROGRESS NOTES
"SPIRITUAL HEALTH SERVICES  SPIRITUAL ASSESSMENT Progress Note  [facility] ([place] 7D     REFERRAL SOURCE: Patient Request    Christiano \"Prudencio\" Tracy who is known to me from previous visits stopped me in the hallway and requested Congregational Communion tomorrow.    PLAN: Prudencio will receive Congregational Communion on 6/21/19.    Stephanie Stoddard  Oncology   Pager [phone]    [facility] remains available 24/7 for emergent requests/referrals, either by having the switchboard page the on-call  or by entering an ASAP/STAT consult in Epic (this will also page the on-call ).          "

## 2019-06-20 NOTE — Clinical Note
6/20/2019       RE: Christiano Molina  [address]  [place] [address]     Dear Colleague,    Thank you for referring your patient, Christiano Molina, to the [facility]. Please see a copy of my visit note below.    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 He presented with R axillary mass. PET/CT showed stage IA disease. Bone marrow biopsy and LP done 3/25 were negative for disease. Please see Dr. Ramirez's note for further discussion of diagnostic work-up.     Plan for 2 cycles of DA-R-EPOCH followed by PET/CT. He presented for cycle 1 on 3/27/19 and was discharged on 3/31/19. He tolerated chemotherapy well apart from mild hives and facial itching from Rituxan (was able to complete dose), chemotherapy-induced nausea, and mild post-LP occipital headache.     Following discharge, his pathology was finalized here with Ki-67 positive in 95-99% of lymphoma cells. TDT negative, EBV was strongly positive. The positive findings for CD20, CD10, BCL6, very high Ki-67 with negative BCL2, as well as MYC translocation supports diagnoses of Burkitt's lymphoma.     Cycle 2 of R-EPOCH was given 4/17-4/21/19. He presents for routine follow up and to review his recent PET/CT prior to cycle 3.   He is here today for routine follow up prior to cycle 5.    Interval History:       Current Outpatient Medications   Medication     acetaminophen (TYLENOL) 325 MG tablet     acyclovir (ZOVIRAX) 200 MG capsule     allopurinol (ZYLOPRIM) 300 MG tablet     fluconazole (DIFLUCAN) 100 MG tablet     levofloxacin (LEVAQUIN) 250 MG tablet     ondansetron (ZOFRAN) 8 MG tablet     ondansetron (ZOFRAN-ODT) 8 MG ODT tab      prochlorperazine (COMPAZINE) 10 MG tablet     ranitidine (ZANTAC) 150 MG tablet     senna-docusate (SENOKOT-S/PERICOLACE) 8.6-50 MG tablet     TUMS 500 MG OR CHEW     No current facility-administered medications for this visit.      PHYSICAL EXAM:  General: The patient is a pleasant male in no acute distress.  There were no vitals taken for this visit.  Wt Readings from Last 10 Encounters:   06/03/19 81.6 kg (180 lb)   05/30/19 80.9 kg (178 lb 6.4 oz)   05/13/19 81.1 kg (178 lb 12.8 oz)   05/12/19 80.2 kg (176 lb 12.8 oz)   05/08/19 80.3 kg (177 lb 1.6 oz)   04/25/19 80.3 kg (177 lb)   04/23/19 80.1 kg (176 lb 8 oz)   04/21/19 78.4 kg (172 lb 12.8 oz)   04/17/19 79.5 kg (175 lb 3.2 oz)   04/09/19 80.1 kg (176 lb 8 oz)   HEENT: EOMI, PERRL. Sclerae are anicteric. Oral mucosa is pink and moist with no lesions or thrush.   Lymph: Neck is supple with no lymphadenopathy in the cervical or supraclavicular areas. No axillary adenopathy palpable.   Heart: Regular rate and rhythm.   Lungs: Clear to auscultation bilaterally.   Abdomen: Bowel sounds present, soft, nontender with no palpable hepatosplenomegaly or masses.   Extremities: No lower extremity edema noted bilaterally.   Neuro: Cranial nerves II through XII are grossly intact.  Skin: No rashes, petechiae, or bruising noted on exposed skin.      Labs:       Assessment & Plan:     1. Stage Ia Burkitt's lymphoma: Negative marrow or CNS involvement. S/p 2 cycles of R-EPOCH with Neulasta support which he tolerated well beyond mild rituxan reaction and nausea. He had a great clinical response with resolution of his LAD. His PET/CT on 5/7/19 shows a very good response to treatment. I will request his outside PET/CT images be uploaded into our system. He will be admitted for cycle 3 R-EPOCH, which will be dose escalated again, per protocol. Overall plan is R-EPOCH x 6 cycles with IT methotrexate days 1&5 cycles 3-6. He will follow up with Dr. Ramirez in 3 weeks prior to cycle  4.     2. HEME: No transfusion needs today Plan to transfuse for Hgb <8 and platelets <10K. He will [facility]. He has signed a blood consent.     3. ID: No active concerns. Continue ppx ACV and fluconazole. Start Levaquin when ANC <1000. Plan to send home at discharge with Levaquin.    4. Vascular access: PICC placements with admission. He may consider keeping the PICC line in this cycle at discharge.     5. NEURO: Post-LP headaches resolved. Discussed pushing fluids and using caffeine if recurs. If post-LP headache lasts more than 48 hours, would consider a blood patch. Has minimal grade 1 neuropathy in fingertips and toes from vincristine, monitor.     6. GI:   -GERD. Under good control. Continue ranitidine 150 mg BID. Avoid offensive foods.   -Nausea. Chemotherapy induced. Will plan to schedule Compazine 10 mg tid and Zofran 16 mg daily during his admission, along with Emend on days 1 and 5. This regimen worked well for him with cycle 2. Continue prn antiemetics for nausea once home.   -Constipation. Chemotherapy induced. Continue stool softeners + Miralax scheduled during hospital admission, then prn once home.    7. Herbal supplements:  -Patient wondered about taking milk thistle. I reviewed with pharmacy. Milk thistle could increase serum concentrations of etoposide, vincristine, and Emend, so I recommend avoiding taking it. He is agreeable to avoiding it.     Mariah Baker PA-C  [facility] [address] [address] [phone]      Again, thank you for allowing me to participate in the care of your patient.      Sincerely,    Mariah Baker PA-C

## 2019-06-20 NOTE — PROGRESS NOTES
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 He presented with R axillary mass. PET/CT showed stage IA disease. Bone marrow biopsy and LP done 3/25 were negative for disease. Please see Dr. Ramirez's note for further discussion of diagnostic work-up.     Plan for 2 cycles of DA-R-EPOCH followed by PET/CT. He presented for cycle 1 on 3/27/19 and was discharged on 3/31/19. He tolerated chemotherapy well apart from mild hives and facial itching from Rituxan (was able to complete dose), chemotherapy-induced nausea, and mild post-LP occipital headache.     Following discharge, his pathology was finalized here with Ki-67 positive in 95-99% of lymphoma cells. TDT negative, EBV was strongly positive. The positive findings for CD20, CD10, BCL6, very high Ki-67 with negative BCL2, as well as MYC translocation supports diagnoses of Burkitt's lymphoma.     Cycle 2 of R-EPOCH was given 4/17-4/21/19. He had a partial response seen on imaging after 2 cycles. He presents for routine follow up and to review his recent PET/CT prior to cycle 5.     Interval History:   Patient reports that he has had ongoing weakness which she notices when he tries to lift heavier things and feels unable to go for his long walks.  He is now walking about 30 minutes a day instead of 1 hour/day.  But he does have occasional headaches that resolve on their own without medication.  He notes that when he adjusts his focus with his vision his eyes are slower to adjust.  He did have loose stools with Levaquin that resolved once he stopped the Levaquin.  He has ongoing numbness and tingling in his fingertips and minimally in his toes that is similar to when he  "started his last cycle of chemotherapy.  He reports that his fingertips now feels sensitive but he denies any pain or loss of function.  He reports that his energy remains low and he is napping about 1 hour/day.  He did have a significant increase in his anxiety associated with taking the increased dose of steroids.  He has discussed this with Dr. Ramirez and there is a plan to go back down on his steroid dosing.  He denies other concerns.    Review of Systems:  Patient denies any of the following except if noted above: fevers, chills, vision or hearing changes, chest pain, dyspnea, abdominal pain, nausea, vomiting, diarrhea, constipation, urinary concerns, issues with sleep or mood.     Current Outpatient Medications   Medication     acyclovir (ZOVIRAX) 200 MG capsule     fluconazole (DIFLUCAN) 100 MG tablet     levofloxacin (LEVAQUIN) 250 MG tablet     ranitidine (ZANTAC) 150 MG tablet     TUMS 500 MG OR CHEW     acetaminophen (TYLENOL) 325 MG tablet     ondansetron (ZOFRAN) 8 MG tablet     ondansetron (ZOFRAN-ODT) 8 MG ODT tab     prochlorperazine (COMPAZINE) 10 MG tablet     senna-docusate (SENOKOT-S/PERICOLACE) 8.6-50 MG tablet     No current facility-administered medications for this visit.      PHYSICAL EXAM:  General: The patient is a pleasant male in no acute distress.  /79 (BP Location: Right arm, Patient Position: Sitting, Cuff Size: Adult Regular)   Pulse 83   Temp 98.1  F (36.7  C) (Oral)   Resp 18   Ht 1.778 m (5' 10\")   Wt 81.1 kg (178 lb 11.2 oz)   SpO2 98%   BMI 25.64 kg/m    Wt Readings from Last 10 Encounters:   06/20/19 81.1 kg (178 lb 11.2 oz)   06/03/19 81.6 kg (180 lb)   05/30/19 80.9 kg (178 lb 6.4 oz)   05/13/19 81.1 kg (178 lb 12.8 oz)   05/12/19 80.2 kg (176 lb 12.8 oz)   05/08/19 80.3 kg (177 lb 1.6 oz)   04/25/19 80.3 kg (177 lb)   04/23/19 80.1 kg (176 lb 8 oz)   04/21/19 78.4 kg (172 lb 12.8 oz)   04/17/19 79.5 kg (175 lb 3.2 oz)   HEENT: EOMI, PERRL. Sclerae are " anicteric. Oral mucosa is pink and moist with no lesions or thrush.   Lymph: Neck is supple with no lymphadenopathy in the cervical or supraclavicular areas.   Heart: Regular rate and rhythm.   Lungs: Clear to auscultation bilaterally.   Abdomen: Bowel sounds present, soft, nontender with no palpable hepatosplenomegaly or masses.   Extremities: No lower extremity edema noted bilaterally.   Neuro: Cranial nerves II through XII are grossly intact.  Skin: No rashes, petechiae, or bruising noted on exposed skin.    Labs:    6/20/2019 09:02   Sodium 141   Potassium 4.5   Chloride 108   Carbon Dioxide 27   Urea Nitrogen 15   Creatinine 1.15   GFR Estimate 77   GFR Estimate If Black 90   Calcium 8.6   Anion Gap 6   Albumin 3.9   Protein Total 7.1   Bilirubin Total 0.2   Alkaline Phosphatase 68   ALT 42   AST 24   Glucose 70   WBC 5.0   Hemoglobin 12.5 (L)   Hematocrit 38.3 (L)   Platelet Count 212   RBC Count 4.04 (L)   MCV 95   MCH 30.9   MCHC 32.6   RDW 15.6 (H)   Diff Method Automated Method   % Neutrophils 71.8   % Lymphocytes 14.9   % Monocytes 10.7   % Eosinophils 0.4   % Basophils 0.8   % Immature Granulocytes 1.4   Nucleated RBCs 0   Absolute Neutrophil 3.6   Absolute Lymphocytes 0.8   Absolute Monocytes 0.5   Absolute Eosinophils 0.0   Absolute Basophils 0.0   Abs Immature Granulocytes 0.1   Absolute Nucleated RBC 0.0     Imaging:  PET/CT on 6/18/19 shows the following:  In this patient with a history of diffuse large B-cell lymphoma status  post 4 cycles of da-r-EPOCH, there is complete response by the [place]  criteria.  1. Continued decrease in size and FDG avidity of the right axillary  lymph node (Deau[place] 3).  2. Diffuse marrow activity, likely secondary to treatment response.    Assessment & Plan:     1. Stage Ia Burkitt's lymphoma: Negative marrow or CNS involvement. S/p 4 cycles of R-EPOCH with Neulasta support which he is tolerating well. PET/CT on 6/18/19 shows CR, which was reviewed with the patient.  He will be admitted for cycle 5 R-EPOCH, which will be dose escalated again, per protocol. Overall plan is R-EPOCH x 6 cycles with IT methotrexate days 1&5 cycles 3-6. Due to increased anxiety with the increased prednisone dose, we will decrease back down to 30 mg/m2 bid again with the second dose given at 4pm. He will take melatonin to help with steroid induced insomnia.  He will return to clinic in 3 weeks for follow up with Holly Wheat PA-C prior to admission for cycle 6.     2. HEME: No transfusion needs today. Plan to transfuse for Hgb <8 and platelets <10K. He will have labs on twice weekly at [facility]. He has signed a blood consent.     3. ID: No active concerns. Continue ppx ACV and fluconazole. Start Levaquin when ANC <1000.     4. Vascular access: PICC placements with admission.     5. NEURO: Has stable grade 1 neuropathy in fingertips and toes from vincristine, monitor.     6. GI:   -GERD. Under good control. Continue ranitidine 150 mg BID. Avoid offensive foods.   -Nausea. Chemotherapy induced. Will continue with scheduled Compazine 10 mg tid and Zofran 16 mg daily during his admission, along with Emend on days 1 and 5. This regimen has been working well for him since  cycle 2. Continue prn antiemetics for nausea once home.   -Constipation. Chemotherapy induced. Continue stool softeners scheduled during hospital admission, then prn once home.    7. Musculoskeletal:  -Deconditioning. Discussed continuing with daily walking and trying some light weights. He declined a referral for cancer rehab.     Mariah Baker PA-C  [facility]  [address] [place] [address]  [phone]

## 2019-06-20 NOTE — PROVIDER NOTIFICATION
DATE/TIME  (DOT-TD, DOT-NOW) CHEMO CHECK ACTIVITY (REGIMEN & DOSE CHECK, DAY, DOSE #, NAME OF CHEMO #1)  CHEMO DRUG #2  CHEMO DRUG #3 NAME OF RN #1 (USE DOT-ME HERE) NAME OF RN#2 (2ND RN TO LOG IN SEPARATELY)   6/20/2019  10:33 AM   DA R EPOCH protocol check   Mariah Ghotra   Toña Youngblood    6/20/2019  3:31 PM   Etoposide dose #1  Dox/vinc dose #1  Mariah Ghotra   radha   6/21/2019  1:00 PM   IT Methotrexate   Mary S. Katzung   Deirdre   6/21/2019  1:37 PM. Doxorubicin with Vincristine Day 2 Etoposide day 2  Mary S. Katzung   Radha   6/22/2019  11:52 AM   Day 3 doxorubicin/vincristine    Ondina Warner   (lucrecia)   6/23/2019  9:14 AM   Day 4 Etoposide Day 4  Doxorubicin Vincristine  Mary S. Katzung   Lorena   06/24/19  3:30 AM   Cytoxan on day 5    Marion GARNER   6/24/2019  0900am IT Chemo   Jane Walker RN

## 2019-06-20 NOTE — LETTER
Transition Communication Hand-off for Care Transitions to Next Level of Care Provider    Name: Christiano Molina  : 1975  MRN #: [id number]  Primary Care Provider: Physician No Ref-Primary     Primary Clinic: No address on file     Reason for Hospitalization:  LYMPHOMA  DLBCL (diffuse large B cell lymphoma) (H)  Admit Date/Time: 2019 10:22 AM  Discharge Date: 19  Payor Source: Payor: MEDICA / Plan: MEDICA CHOICE / Product Type: Indemnity /     Christiano Molina is a 43 year old male with Burkitt lymphoma, now s/p 4 cycles of DA-EPOCH-R. PET/CT on 19 demonstrated CR. He is being admitted for Cycle 5.      Discharge Plan: Home with clinic follow-up as recommended.     Follow-up plan:    Future Appointments   Date Time Provider Department Center   2019  3:00 PM  [facility]   2019  2:00 PM SH [facility]ION [facility]   2019  2:30 PM  [facility]AIR [facility]   2019  2:00 PM SH IN[facility]USION CHAIR 6 [facility]   2019  8:30 AM  [facility]   2019  9:20 AM Holly Wheat PA-C [facility]       Yissel Grier, RN, BSN, PHN  Care Coordinator       AVS/Discharge Summary is the source of truth; this is a helpful guide for improved communication of patient story

## 2019-06-20 NOTE — H&P
[facility], [place]    History and Physical  Hematology / Oncology     Date of Admission:  6/20/2019  Date of Service (when I saw the patient): 06/20/19    Assessment & Plan   Christiano Molina is a 43 year old male with Burkitt lymphoma, now s/p 4 cycles of DA-EPOCH-R. PET/CT on 6/18/19 demonstrated CR. He is being admitted for Cycle 5.      #Burkitt Lymphoma, EBV+, Stage IA   Follows with Dr. Ramirez. Initially presented in mid-March with a right axillary mass, found to have high-grade B-cell lymphoma. Initial pathology was not fully clear whether DLBCL or Burkitt, but subsequently this was finalized by [facility] review as Burkitt lymphoma (EBV+). Plan is to do 6 cycles of DA-R-EPOCH with IT chemo prophylaxis during C3-6. PET/CT (done 6/18) after 4 cycles demonstrated complete response.   - place PICC on admission  - 20% dose escalation per protocol for C5  - can stop Allopurinol as d/w outpatient team. This provider relayed this to Prudencio as well.     Treatment Plan: Cycle 5 DA-EPOCH-R (Day 0/1=6/20/19)  -Rituxan 375mg/m2 (700mg) on D0   -Prednisone (decreased to 30mg/m2=60mg) BID on D1-5   -Etoposide 200mg CIVI on D1-4   -Vincristine 0.4mg/m2 (0.78) / Doxorubicin 40mg CIVI on D1-4   -Cytoxan 3000mg IV on D5    -Dexamethasone 8mg daily on D6,7   -IT triple therapy chemotherapy on D1 and D5. Plan to give D1 IT chemo on Friday, 6/21 with procedure being done by Inpatient Procedure team. Would be due again on Monday, 6/24. Of note, Dr. Bach (part of IP Med Procedure team) did both LPs for pt during last admission. Pt requests that Dr. Bach or another experienced member of the procedure team do his LPs. Will need to consult CAPS team tomorrow. Plan for 1L NS bolus shama-procedurally as well as Ativan premed prior to LPs. Pt brought this own [employer] products for caffeine intake after procedures since this has worked well in past.   -supportive care/premeds: Tylenol/Benadryl prior to  "Rituxan, Emend on D1 and D5, Compazine scheduled 10mg q8h, Zofran 16mg daily D1-5, Senna-S, and melatonin for assist with sleep inpatient/steroid-induced insomnia    #Mild anemia  2/2 chemotherapy. Do not anticipate transfusion needs during inpatient admission.     #ID PPx  - continue ppx ACV  - can hold Fluconazole during admission (ANC >1.0) and RESTART on discharge  - hold Levaquin ppx given ANC >1.0  PPx Levaquin was added at time of last discharge (per Dr. Ramirez since dose level was high, he did not want to wait for neutropenia), but ANC did not drop <1.0. Given bowel issues with Levaquin after last cycle, may be worth reviewing with Dr. Ramirez to ask if pt could hold off on starting while we follow counts closely, though understand this dose is again higher than even last cycle).      #Chemotherapy-induced nausea  Improved with scheduled antiemetics during chemo.   - scheduled antiemetics again with this cycle  - consider zyprexa if increased difficulty with nausea     #Constipation, chemotherapy induced  No current constipation or diarrhea. Of note, had \"bowel upset\"/increased stools which he attributes to Levaquin after last cycle.   - Senna-S BID  - Miralax PRN     #GERD  - continue home Zantac     #Peripheral neuropathy.   Mild numbness/tingling affecting first 4 digits of b/l hands. Not interfering with function. No current c/o neuropathy in feet.  - monitor    #Deconditioning.   He has declined PT inpatient or outpatient CA rehab and elects to continue walking/working out on his own. Continue to monitor.      #H/o Hoarseness, improved.    At time of admission for C4, patient endorsed approx 3 weeks of vocal hoarseness in absence of other URI symptoms. ENT evaluated pt prior to initiating chemotherapy and saw small area of granulation tissue or polyp on posterior aspect of TVC. No concern for neuropathy of vocal cords, most likely viral in nature and he was cleared to begin chemotherapy.      #H/o " "post-LP HA.   - improved with lying flat >1hr, IV fluid bolus shama-procedurally, and oral caffeine ([employer]oca[employer]) after procedure.      FEN  - no current IVFs in addition to fluids with CIVI chemo  - lyte repletion per unit protocol  - regular diet     PPx  - VTE: hold VTE ppx given plan for LP on 6/21  - GI: Zantac as above     Dispo: Anticipate discharge on D5 after Cytoxan on Monday, 6/24.  - outpatient team has requested clinic f/u after this cycle (Neulasta, labs/transfusions twice weekly)     Discussed and seen with staff attending, Dr. Booker.      Heidy Jones PA-C  Heme/Onc  [phone]    Code Status   Full Code    Chief Complaint   Scheduled admission for chemotherapy    History is obtained from the patient and chart review.    History of Present Illness   Christiano Molina is a 44 year old male who presents for Cycle 5 DA-EPOCH-R. He understands plan to increase his chemo this cycle. He does note feeling more deconditioned after the most recent cycle. No focal weakness but notes his activity tolerance is diminished in terms of being able to lift things and stamina for length of walks. He also felt increased anxiety and jittery feeling with the high dose steroids as part of last cycle. Understands plan to reduce the dose with this cycle. He is otherwise doing ok. Some intermittent headaches with changing position (i.e. after bending over) or with sneezing/coughing but nothing near as bad as his initial post LP HAs. Denies fevers or cold symptoms. Denies mouth pain/sores. Denies SOB or difficulty breathing. Denies nausea. Had \"bowel upset\"/increased stools with the prophy Levaquin but these improved after stopping this medication. Denies extremity edema. He does note ongoing peripheral neuropathy in his fingers. It is low grade in the the first 4 digits of each hand. This tends to improve the further he gets from chemo. No pain, not affecting function. Denies any neuropathy in his feet/toes at present. " He also notes ongoing slight fullness in his R axilla (in the area where the team has been monitoring a LN) but no pain here and no distal edema in the R arm/hand. He is ready to begin this cycle.         Past Medical History    Past Medical History:   Diagnosis Date     Fourth CraniaNerve Palsy 2008         Past Surgical History   Past Surgical History:   Procedure Laterality Date     IR PICC VASCULAR  3/28/2019     PICC INSERTION Right 2019    5Fr - 42cm, Basilic vein, mid SVC         Prior to Admission Medications   Prior to Admission Medications   Prescriptions Last Dose Informant Patient Reported? Taking?   TUMS 500 MG OR CHEW   Yes No   Si TABLET  PO   acetaminophen (TYLENOL) 325 MG tablet   No No   Sig: Take 2 tablets (650 mg) by mouth every 4 hours as needed for mild pain   Patient not taking: Reported on 2019   acyclovir (ZOVIRAX) 200 MG capsule 2019 at Unknown time  No Yes   Sig: Take 2 capsules (400 mg) by mouth 2 times daily   fluconazole (DIFLUCAN) 100 MG tablet 2019 at Unknown time  No Yes   Sig: Take 1 tablet (100 mg) by mouth daily   levofloxacin (LEVAQUIN) 250 MG tablet Past Month at Unknown time  No Yes   Sig: Take 1 tablet (250 mg) by mouth daily . Continue until advised by clinic team to stop.   ondansetron (ZOFRAN) 8 MG tablet   No No   Sig: Take 1 tablet (8 mg) by mouth every 8 hours as needed for nausea   Patient not taking: Reported on 2019   ondansetron (ZOFRAN-ODT) 8 MG ODT tab   No No   Sig: Take 1 tablet (8 mg) by mouth every 8 hours At first continue scheduled (but can back off as nausea improves)   Patient not taking: Reported on 2019   prochlorperazine (COMPAZINE) 10 MG tablet   No No   Sig: Take 1 tablet (10 mg) by mouth every 6 hours as needed for nausea or vomiting (Try second.)   Patient not taking: Reported on 2019   ranitidine (ZANTAC) 150 MG tablet 2019 at Unknown time  No Yes   Sig: Take 1 tablet (150 mg) by mouth 2 times daily    senna-docusate (SENOKOT-S/PERICOLACE) 8.6-50 MG tablet   No No   Sig: Take 2 tablets by mouth 2 times daily as needed for constipation   Patient not taking: Reported on 6/20/2019      Facility-Administered Medications: None     Allergies   No Known Allergies    Social History    Prudencio is here with his wife today. They live in [place], [place], and have 3 young children at home. 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     Social History     Socioeconomic History     Marital status:      Spouse name: Not on file     Number of children: Not on file     Years of education: Not on file     Highest education level: Not on file   Occupational History     Not on file   Social Needs     Financial resource strain: Not on file     Food insecurity:     Worry: Not on file     Inability: Not on file     Transportation needs:     Medical: Not on file     Non-medical: Not on file   Tobacco Use     Smoking status: Never Smoker     Smokeless tobacco: Never Used   Substance and Sexual Activity     Alcohol use: Not on file     Drug use: Not on file     Sexual activity: Not on file   Lifestyle     Physical activity:     Days per week: Not on file     Minutes per session: Not on file     Stress: Not on file   Relationships     Social connections:     Talks on phone: Not on file     Gets together: Not on file     Attends Sikh service: Not on file     Active member of club or organization: Not on file     Attends meetings of clubs or organizations: Not on file     Relationship status: Not on file     Intimate partner violence:     Fear of current or ex partner: Not on file     Emotionally abused: Not on file     Physically abused: Not on file     Forced sexual activity: Not on file   Other Topics Concern     Parent/sibling w/ CABG, MI or angioplasty before 65F 55M? Not Asked   Social History Narrative     Not on file         Family History   Non-contributory.    Review of Systems   The 10 point Review of Systems is negative  other than noted in the HPI or here.     Physical Exam   Temp: 98.6  F (37  C) Temp src: Oral BP: 134/77 Pulse: 84   Resp: 20 SpO2: 99 % O2 Device: None (Room air)    Vital Signs with Ranges  Temp:  [98.1  F (36.7  C)-98.6  F (37  C)] 98.6  F (37  C)  Pulse:  [83-84] 84  Resp:  [18-20] 20  BP: (121-134)/(77-79) 134/77  SpO2:  [98 %-99 %] 99 %  178 lbs 4.8 oz  General: Pleasant, generally well-appearing male seen dressed in street clothes, sitting up at EOB. Alert, interactive. NAD. Wife at bedside for support.   HEENT: NC/AT. EOMI, PERRL. Sclerae are anicteric. Oral mucosa is pink and moist with no lesions.   Heart: Regular rate and rhythm. No murmur appreciated.  Lungs: Breathing even and non-labored on RA. Lungs clear to auscultation bilaterally. No wheezing or crackles.   Abdomen: Bowel sounds present, abd soft, nontender.  Extremities: Grossly normal in appearance. No edema.   Neuro: Alert, speech normal, grossly nonfocal. Mentation appears normal, affect congruent.        Data   Results for orders placed or performed in visit on 06/20/19 (from the past 24 hour(s))   Comprehensive metabolic panel   Result Value Ref Range    Sodium 141 133 - 144 mmol/L    Potassium 4.5 3.4 - 5.3 mmol/L    Chloride 108 94 - 109 mmol/L    Carbon Dioxide 27 20 - 32 mmol/L    Anion Gap 6 3 - 14 mmol/L    Glucose 70 70 - 99 mg/dL    Urea Nitrogen 15 7 - 30 mg/dL    Creatinine 1.15 0.66 - 1.25 mg/dL    GFR Estimate 77 >60 mL/min/[1.73_m2]    GFR Estimate If Black 90 >60 mL/min/[1.73_m2]    Calcium 8.6 8.5 - 10.1 mg/dL    Bilirubin Total 0.2 0.2 - 1.3 mg/dL    Albumin 3.9 3.4 - 5.0 g/dL    Protein Total 7.1 6.8 - 8.8 g/dL    Alkaline Phosphatase 68 40 - 150 U/L    ALT 42 0 - 70 U/L    AST 24 0 - 45 U/L   CBC with platelets differential   Result Value Ref Range    WBC 5.0 4.0 - 11.0 10e9/L    RBC Count 4.04 (L) 4.4 - 5.9 10e12/L    Hemoglobin 12.5 (L) 13.3 - 17.7 g/dL    Hematocrit 38.3 (L) 40.0 - 53.0 %    MCV 95 78 - 100 fl    MCH 30.9  26.5 - 33.0 pg    MCHC 32.6 31.5 - 36.5 g/dL    RDW 15.6 (H) 10.0 - 15.0 %    Platelet Count 212 150 - 450 10e9/L    Diff Method Automated Method     % Neutrophils 71.8 %    % Lymphocytes 14.9 %    % Monocytes 10.7 %    % Eosinophils 0.4 %    % Basophils 0.8 %    % Immature Granulocytes 1.4 %    Nucleated RBCs 0 0 /100    Absolute Neutrophil 3.6 1.6 - 8.3 10e9/L    Absolute Lymphocytes 0.8 0.8 - 5.3 10e9/L    Absolute Monocytes 0.5 0.0 - 1.3 10e9/L    Absolute Eosinophils 0.0 0.0 - 0.7 10e9/L    Absolute Basophils 0.0 0.0 - 0.2 10e9/L    Abs Immature Granulocytes 0.1 0 - 0.4 10e9/L    Absolute Nucleated RBC 0.0    Magnesium   Result Value Ref Range    Magnesium 2.2 1.6 - 2.3 mg/dL   Phosphorus   Result Value Ref Range    Phosphorus 2.4 (L) 2.5 - 4.5 mg/dL

## 2019-06-20 NOTE — NURSING NOTE
"Oncology Rooming Note    June 20, 2019 9:21 AM   Christiano Molina is a 44 year old male who presents for:    Chief Complaint   Patient presents with     Blood Draw     Labs drawn via  by RN in lab. VS taken.     Oncology Clinic Visit     RETURN VISIT; BURKITT LYMPHOMA FOLLOW UP      Initial Vitals: /79 (BP Location: Right arm, Patient Position: Sitting, Cuff Size: Adult Regular)   Pulse 83   Temp 98.1  F (36.7  C) (Oral)   Resp 18   Ht 1.778 m (5' 10\")   Wt 81.1 kg (178 lb 11.2 oz)   SpO2 98%   BMI 25.64 kg/m   Estimated body mass index is 25.64 kg/m  as calculated from the following:    Height as of this encounter: 1.778 m (5' 10\").    Weight as of this encounter: 81.1 kg (178 lb 11.2 oz). Body surface area is 2 meters squared.  No Pain (0) Comment: Data Unavailable   No LMP for male patient.  Allergies reviewed: Yes  Medications reviewed: Yes    Medications: Medication refills not needed today.  Pharmacy name entered into [employer]:    [id number] - [place], [place] - [address] [place] [address]  [id number] - [place], [place] - [address] [facility] - [place], [place] - [address] AT [facility] OF [place] [address]    Clinical concerns: No new concerns today  Mariah Samuel  was notified.      Kimmie Martinez              "

## 2019-06-20 NOTE — PROGRESS NOTES
Nursing Focus: Admission  D: Arrived at 1100 from clinic via private vehicle. Patient accompanied by spouse. Admitted for cycle 5 DA-R-EPOCH.   I: Admission process began.  Patient oriented to room, enviroment, call light.  Md. notified of patients arrival on unit.     A: Vital signs stable, afebrile.  Patient stable at this time.  No current complaints.     P: Implement plan of care when available. Continue to monitor patient. Nursing interventions as appropriate. Notify md with changes in pt status.

## 2019-06-20 NOTE — PROGRESS NOTES
Nursing Focus: Chemotherapy  D: Positive blood return via PICC. Insertion site is clean/dry/intact, dressing intact with no complaints of pain.  Urine output is recorded in intake in Doc Flowsheet.    I: Premedications given per order (see electronic medical administration record). Dose #1 of Rituxan infused over 90 minutes, tolerated well. Dose #1 of CIVI Etoposide/Vinc/Dox started to infuse over 24 hour. Reviewed pt teaching on chemotherapy side effects.  Pt denies need for further teaching. Chemotherapy double checked per protocol by two chemotherapy competent RN's.   A: Tolerating procedure well. Denies nausea and or pain.   P: Continue to monitor urine output and symptoms of nausea. Screen for symptoms of toxicity.

## 2019-06-20 NOTE — LETTER
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 He presented with R axillary mass. PET/CT showed stage IA disease. Bone marrow biopsy and LP done 3/25 were negative for disease. Please see Dr. Ramirez's note for further discussion of diagnostic work-up.     Plan for 2 cycles of DA-R-EPOCH followed by PET/CT. He presented for cycle 1 on 3/27/19 and was discharged on 3/31/19. He tolerated chemotherapy well apart from mild hives and facial itching from Rituxan (was able to complete dose), chemotherapy-induced nausea, and mild post-LP occipital headache.     Following discharge, his pathology was finalized here with Ki-67 positive in 95-99% of lymphoma cells. TDT negative, EBV was strongly positive. The positive findings for CD20, CD10, BCL6, very high Ki-67 with negative BCL2, as well as MYC translocation supports diagnoses of Burkitt's lymphoma.     Cycle 2 of R-EPOCH was given 4/17-4/21/19. He had a partial response seen on imaging after 2 cycles. He presents for routine follow up and to review his recent PET/CT prior to cycle 5.     Interval History:   Patient reports that he has had ongoing weakness which she notices when he tries to lift heavier things and feels unable to go for his long walks.  He is now walking about 30 minutes a day instead of 1 hour/day.  But he does have occasional headaches that resolve on their own without medication.  He notes that when he adjusts his focus with his vision his eyes are slower to adjust.  He did have loose stools with Levaquin that resolved once he stopped the Levaquin.  He has ongoing numbness  "and tingling in his fingertips and minimally in his toes that is similar to when he started his last cycle of chemotherapy.  He reports that his fingertips now feels sensitive but he denies any pain or loss of function.  He reports that his energy remains low and he is napping about 1 hour/day.  He did have a significant increase in his anxiety associated with taking the increased dose of steroids.  He has discussed this with Dr. Ramirez and there is a plan to go back down on his steroid dosing.  He denies other concerns.    Review of Systems:  Patient denies any of the following except if noted above: fevers, chills, vision or hearing changes, chest pain, dyspnea, abdominal pain, nausea, vomiting, diarrhea, constipation, urinary concerns, issues with sleep or mood.     Current Outpatient Medications   Medication     acyclovir (ZOVIRAX) 200 MG capsule     fluconazole (DIFLUCAN) 100 MG tablet     levofloxacin (LEVAQUIN) 250 MG tablet     ranitidine (ZANTAC) 150 MG tablet     TUMS 500 MG OR CHEW     acetaminophen (TYLENOL) 325 MG tablet     ondansetron (ZOFRAN) 8 MG tablet     ondansetron (ZOFRAN-ODT) 8 MG ODT tab     prochlorperazine (COMPAZINE) 10 MG tablet     senna-docusate (SENOKOT-S/PERICOLACE) 8.6-50 MG tablet     No current facility-administered medications for this visit.      PHYSICAL EXAM:  General: The patient is a pleasant male in no acute distress.  /79 (BP Location: Right arm, Patient Position: Sitting, Cuff Size: Adult Regular)   Pulse 83   Temp 98.1  F (36.7  C) (Oral)   Resp 18   Ht 1.778 m (5' 10\")   Wt 81.1 kg (178 lb 11.2 oz)   SpO2 98%   BMI 25.64 kg/m     Wt Readings from Last 10 Encounters:   06/20/19 81.1 kg (178 lb 11.2 oz)   06/03/19 81.6 kg (180 lb)   05/30/19 80.9 kg (178 lb 6.4 oz)   05/13/19 81.1 kg (178 lb 12.8 oz)   05/12/19 80.2 kg (176 lb 12.8 oz)   05/08/19 80.3 kg (177 lb 1.6 oz)   04/25/19 80.3 kg (177 lb)   04/23/19 80.1 kg (176 lb 8 oz)   04/21/19 78.4 kg (172 lb " 12.8 oz)   04/17/19 79.5 kg (175 lb 3.2 oz)   HEENT: EOMI, PERRL. Sclerae are anicteric. Oral mucosa is pink and moist with no lesions or thrush.   Lymph: Neck is supple with no lymphadenopathy in the cervical or supraclavicular areas.   Heart: Regular rate and rhythm.   Lungs: Clear to auscultation bilaterally.   Abdomen: Bowel sounds present, soft, nontender with no palpable hepatosplenomegaly or masses.   Extremities: No lower extremity edema noted bilaterally.   Neuro: Cranial nerves II through XII are grossly intact.  Skin: No rashes, petechiae, or bruising noted on exposed skin.    Labs:    6/20/2019 09:02   Sodium 141   Potassium 4.5   Chloride 108   Carbon Dioxide 27   Urea Nitrogen 15   Creatinine 1.15   GFR Estimate 77   GFR Estimate If Black 90   Calcium 8.6   Anion Gap 6   Albumin 3.9   Protein Total 7.1   Bilirubin Total 0.2   Alkaline Phosphatase 68   ALT 42   AST 24   Glucose 70   WBC 5.0   Hemoglobin 12.5 (L)   Hematocrit 38.3 (L)   Platelet Count 212   RBC Count 4.04 (L)   MCV 95   MCH 30.9   MCHC 32.6   RDW 15.6 (H)   Diff Method Automated Method   % Neutrophils 71.8   % Lymphocytes 14.9   % Monocytes 10.7   % Eosinophils 0.4   % Basophils 0.8   % Immature Granulocytes 1.4   Nucleated RBCs 0   Absolute Neutrophil 3.6   Absolute Lymphocytes 0.8   Absolute Monocytes 0.5   Absolute Eosinophils 0.0   Absolute Basophils 0.0   Abs Immature Granulocytes 0.1   Absolute Nucleated RBC 0.0     Imaging:  PET/CT on 6/18/19 shows the following:  In this patient with a history of diffuse large B-cell lymphoma status  post 4 cycles of da-r-EPOCH, there is complete response by the [place]  criteria.  1. Continued decrease in size and FDG avidity of the right axillary  lymph node ([place] 3).  2. Diffuse marrow activity, likely secondary to treatment response.    Assessment & Plan:     1. Stage Ia Burkitt's lymphoma: Negative marrow or CNS involvement. S/p 4 cycles of R-EPOCH with Neulasta support which he is  tolerating well. PET/CT on 6/18/19 shows CR, which was reviewed with the patient. He will be admitted for cycle 5 R-EPOCH, which will be dose escalated again, per protocol. Overall plan is R-EPOCH x 6 cycles with IT methotrexate days 1&5 cycles 3-6. Due to increased anxiety with the increased prednisone dose, we will decrease back down to 30 mg/m2 bid again with the second dose given at 4pm. He will take melatonin to help with steroid induced insomnia.  He will return to clinic in 3 weeks for follow up with Holly Wheat PA-C prior to admission for cycle 6.     2. HEME: No transfusion needs today. Plan to transfuse for Hgb <8 and platelets <10K. He will have labs on twice weekly at [facility]. He has signed a blood consent.     3. ID: No active concerns. Continue ppx ACV and fluconazole. Start Levaquin when ANC <1000.     4. Vascular access: PICC placements with admission.     5. NEURO: Has stable grade 1 neuropathy in fingertips and toes from vincristine, monitor.     6. GI:   -GERD. Under good control. Continue ranitidine 150 mg BID. Avoid offensive foods.   -Nausea. Chemotherapy induced. Will continue with scheduled Compazine 10 mg tid and Zofran 16 mg daily during his admission, along with Emend on days 1 and 5. This regimen has been working well for him since  cycle 2. Continue prn antiemetics for nausea once home.   -Constipation. Chemotherapy induced. Continue stool softeners scheduled during hospital admission, then prn once home.    7. Musculoskeletal:  -Deconditioning. Discussed continuing with daily walking and trying some light weights. He declined a referral for cancer rehab.     Mariah Baker PA-C  [facility]  [address], [place] [id number] [phone]

## 2019-06-21 LAB
ANION GAP SERPL CALCULATED.3IONS-SCNC: 5 MMOL/L (ref 3–14)
APPEARANCE CSF: CLEAR
BASOPHILS # BLD AUTO: 0 10E9/L (ref 0–0.2)
BASOPHILS NFR BLD AUTO: 0.2 %
BUN SERPL-MCNC: 10 MG/DL (ref 7–30)
CALCIUM SERPL-MCNC: 8.5 MG/DL (ref 8.5–10.1)
CHLORIDE SERPL-SCNC: 111 MMOL/L (ref 94–109)
CO2 SERPL-SCNC: 25 MMOL/L (ref 20–32)
COLOR CSF: COLORLESS
CREAT SERPL-MCNC: 0.92 MG/DL (ref 0.66–1.25)
DIFFERENTIAL METHOD BLD: ABNORMAL
EOSINOPHIL # BLD AUTO: 0 10E9/L (ref 0–0.7)
EOSINOPHIL NFR BLD AUTO: 0 %
ERYTHROCYTE [DISTWIDTH] IN BLOOD BY AUTOMATED COUNT: 16 % (ref 10–15)
GFR SERPL CREATININE-BSD FRML MDRD: >90 ML/MIN/{1.73_M2}
GLUCOSE CSF-MCNC: 64 MG/DL (ref 40–70)
GLUCOSE SERPL-MCNC: 91 MG/DL (ref 70–99)
GRAM STN SPEC: NORMAL
HCT VFR BLD AUTO: 33.1 % (ref 40–53)
HGB BLD-MCNC: 10.5 G/DL (ref 13.3–17.7)
IMM GRANULOCYTES # BLD: 0.1 10E9/L (ref 0–0.4)
IMM GRANULOCYTES NFR BLD: 1 %
INR PPP: 1.04 (ref 0.86–1.14)
LYMPHOCYTES # BLD AUTO: 0.7 10E9/L (ref 0.8–5.3)
LYMPHOCYTES NFR BLD AUTO: 5.7 %
Lab: NORMAL
MCH RBC QN AUTO: 30.5 PG (ref 26.5–33)
MCHC RBC AUTO-ENTMCNC: 31.7 G/DL (ref 31.5–36.5)
MCV RBC AUTO: 96 FL (ref 78–100)
MONOCYTES # BLD AUTO: 0.6 10E9/L (ref 0–1.3)
MONOCYTES NFR BLD AUTO: 4.7 %
NEUTROPHILS # BLD AUTO: 11 10E9/L (ref 1.6–8.3)
NEUTROPHILS NFR BLD AUTO: 88.4 %
NRBC # BLD AUTO: 0 10*3/UL
NRBC BLD AUTO-RTO: 0 /100
PHOSPHATE SERPL-MCNC: 2.9 MG/DL (ref 2.5–4.5)
PLATELET # BLD AUTO: 210 10E9/L (ref 150–450)
POTASSIUM SERPL-SCNC: 3.7 MMOL/L (ref 3.4–5.3)
PROT CSF-MCNC: 57 MG/DL (ref 15–60)
RBC # BLD AUTO: 3.44 10E12/L (ref 4.4–5.9)
RBC # CSF MANUAL: NORMAL /UL (ref 0–2)
SODIUM SERPL-SCNC: 141 MMOL/L (ref 133–144)
SPECIMEN SOURCE: NORMAL
TUBE # CSF: 1 #
WBC # BLD AUTO: 12.5 10E9/L (ref 4–11)
WBC # CSF MANUAL: NORMAL /UL (ref 0–5)

## 2019-06-21 PROCEDURE — 009U3ZX DRAINAGE OF SPINAL CANAL, PERCUTANEOUS APPROACH, DIAGNOSTIC: ICD-10-PCS | Performed by: PEDIATRICS

## 2019-06-21 PROCEDURE — 87205 SMEAR GRAM STAIN: CPT | Performed by: NURSE PRACTITIONER

## 2019-06-21 PROCEDURE — 62270 DX LMBR SPI PNXR: CPT | Performed by: PEDIATRICS

## 2019-06-21 PROCEDURE — 25000131 ZZH RX MED GY IP 250 OP 636 PS 637: Performed by: PHYSICIAN ASSISTANT

## 2019-06-21 PROCEDURE — 25000132 ZZH RX MED GY IP 250 OP 250 PS 637: Performed by: STUDENT IN AN ORGANIZED HEALTH CARE EDUCATION/TRAINING PROGRAM

## 2019-06-21 PROCEDURE — 3E0R305 INTRODUCTION OF OTHER ANTINEOPLASTIC INTO SPINAL CANAL, PERCUTANEOUS APPROACH: ICD-10-PCS | Performed by: INTERNAL MEDICINE

## 2019-06-21 PROCEDURE — 25000132 ZZH RX MED GY IP 250 OP 250 PS 637: Performed by: PHYSICIAN ASSISTANT

## 2019-06-21 PROCEDURE — 84157 ASSAY OF PROTEIN OTHER: CPT | Performed by: NURSE PRACTITIONER

## 2019-06-21 PROCEDURE — 25000128 H RX IP 250 OP 636: Performed by: PHYSICIAN ASSISTANT

## 2019-06-21 PROCEDURE — 40001004 ZZHCL STATISTIC FLOW INT 9-15 ABY TC 88188: Performed by: NURSE PRACTITIONER

## 2019-06-21 PROCEDURE — 25000128 H RX IP 250 OP 636: Performed by: NURSE PRACTITIONER

## 2019-06-21 PROCEDURE — 40000802 ZZH SITE CHECK

## 2019-06-21 PROCEDURE — 87070 CULTURE OTHR SPECIMN AEROBIC: CPT | Performed by: NURSE PRACTITIONER

## 2019-06-21 PROCEDURE — 89050 BODY FLUID CELL COUNT: CPT | Performed by: NURSE PRACTITIONER

## 2019-06-21 PROCEDURE — 84100 ASSAY OF PHOSPHORUS: CPT | Performed by: PHYSICIAN ASSISTANT

## 2019-06-21 PROCEDURE — 80048 BASIC METABOLIC PNL TOTAL CA: CPT | Performed by: PHYSICIAN ASSISTANT

## 2019-06-21 PROCEDURE — 87015 SPECIMEN INFECT AGNT CONCNTJ: CPT | Performed by: NURSE PRACTITIONER

## 2019-06-21 PROCEDURE — 85025 COMPLETE CBC W/AUTO DIFF WBC: CPT | Performed by: PHYSICIAN ASSISTANT

## 2019-06-21 PROCEDURE — 85610 PROTHROMBIN TIME: CPT | Performed by: NURSE PRACTITIONER

## 2019-06-21 PROCEDURE — 12000001 ZZH R&B MED SURG/OB UMMC

## 2019-06-21 PROCEDURE — 36592 COLLECT BLOOD FROM PICC: CPT | Performed by: PHYSICIAN ASSISTANT

## 2019-06-21 PROCEDURE — 88184 FLOWCYTOMETRY/ TC 1 MARKER: CPT | Performed by: NURSE PRACTITIONER

## 2019-06-21 PROCEDURE — 88185 FLOWCYTOMETRY/TC ADD-ON: CPT | Performed by: NURSE PRACTITIONER

## 2019-06-21 PROCEDURE — 36592 COLLECT BLOOD FROM PICC: CPT | Performed by: NURSE PRACTITIONER

## 2019-06-21 PROCEDURE — 82945 GLUCOSE OTHER FLUID: CPT | Performed by: NURSE PRACTITIONER

## 2019-06-21 PROCEDURE — 25800030 ZZH RX IP 258 OP 636: Performed by: PHYSICIAN ASSISTANT

## 2019-06-21 RX ORDER — TRIAMCINOLONE ACETONIDE 1 MG/G
1 CREAM TOPICAL 2 TIMES DAILY PRN
COMMUNITY
End: 2019-07-11

## 2019-06-21 RX ORDER — CALCIUM CARBONATE 500 MG/1
1 TABLET, CHEWABLE ORAL 2 TIMES DAILY PRN
COMMUNITY
End: 2019-08-15

## 2019-06-21 RX ORDER — ALLOPURINOL 300 MG/1
300 TABLET ORAL DAILY
Status: ON HOLD | COMMUNITY
End: 2019-06-24

## 2019-06-21 RX ORDER — POLYETHYLENE GLYCOL 3350 17 G/17G
17 POWDER, FOR SOLUTION ORAL DAILY
Status: DISCONTINUED | OUTPATIENT
Start: 2019-06-21 | End: 2019-06-24 | Stop reason: HOSPADM

## 2019-06-21 RX ADMIN — ONDANSETRON HYDROCHLORIDE 16 MG: 8 TABLET, FILM COATED ORAL at 13:00

## 2019-06-21 RX ADMIN — PROCHLORPERAZINE MALEATE 10 MG: 10 TABLET ORAL at 15:24

## 2019-06-21 RX ADMIN — RANITIDINE 150 MG: 150 TABLET ORAL at 08:28

## 2019-06-21 RX ADMIN — VINCRISTINE SULFATE 0.78 MG: 1 INJECTION, SOLUTION INTRAVENOUS at 13:41

## 2019-06-21 RX ADMIN — LORAZEPAM 1 MG: 2 INJECTION INTRAMUSCULAR; INTRAVENOUS at 13:28

## 2019-06-21 RX ADMIN — ACYCLOVIR 400 MG: 200 CAPSULE ORAL at 08:28

## 2019-06-21 RX ADMIN — SENNOSIDES AND DOCUSATE SODIUM 2 TABLET: 8.6; 5 TABLET ORAL at 08:28

## 2019-06-21 RX ADMIN — RANITIDINE 150 MG: 150 TABLET ORAL at 20:53

## 2019-06-21 RX ADMIN — ACYCLOVIR 400 MG: 200 CAPSULE ORAL at 20:53

## 2019-06-21 RX ADMIN — PREDNISONE 60 MG: 10 TABLET ORAL at 15:24

## 2019-06-21 RX ADMIN — PREDNISONE 60 MG: 10 TABLET ORAL at 08:28

## 2019-06-21 RX ADMIN — PROCHLORPERAZINE MALEATE 10 MG: 10 TABLET ORAL at 06:20

## 2019-06-21 RX ADMIN — METHOTREXATE: 25 INJECTION, SOLUTION INTRA-ARTERIAL; INTRAMUSCULAR; INTRATHECAL; INTRAVENOUS at 13:29

## 2019-06-21 RX ADMIN — SODIUM CHLORIDE 1000 ML: 9 INJECTION, SOLUTION INTRAVENOUS at 14:21

## 2019-06-21 RX ADMIN — ETOPOSIDE 200 MG: 20 INJECTION, SOLUTION, CONCENTRATE INTRAVENOUS at 13:40

## 2019-06-21 RX ADMIN — PROCHLORPERAZINE MALEATE 10 MG: 10 TABLET ORAL at 22:20

## 2019-06-21 RX ADMIN — MELATONIN TAB 3 MG 3 MG: 3 TAB at 22:20

## 2019-06-21 RX ADMIN — POLYETHYLENE GLYCOL 3350 17 G: 17 POWDER, FOR SOLUTION ORAL at 16:53

## 2019-06-21 RX ADMIN — SENNOSIDES AND DOCUSATE SODIUM 2 TABLET: 8.6; 5 TABLET ORAL at 20:53

## 2019-06-21 ASSESSMENT — ACTIVITIES OF DAILY LIVING (ADL)
ADLS_ACUITY_SCORE: 10

## 2019-06-21 ASSESSMENT — MIFFLIN-ST. JEOR: SCORE: 1719.09

## 2019-06-21 NOTE — PROGRESS NOTES
[facility], [place]    Hematology / Oncology Progress Note     Assessment & Plan   Christiano Molina is a 43 year old male with Burkitt lymphoma, now s/p 4 cycles of DA-EPOCH-R. PET/CT on 6/18/19 demonstrated CR. He is being admitted for Cycle 5.        #Burkitt Lymphoma, EBV+, Stage IA   Follows with Dr. Ramirez. Initially presented in mid-March with a right axillary mass, found to have high-grade B-cell lymphoma. Initial pathology was not fully clear whether DLBCL or Burkitt, but subsequently this was finalized by [facility] Heme Path review as Burkitt lymphoma (EBV+). Plan is to do 6 cycles of DA-R-EPOCH with IT chemo prophylaxis during C3-6. PET/CT (done 6/18) after 4 cycles demonstrated complete response.   - place PICC on admission  - 20% dose escalation per protocol for C5  - can stop Allopurinol as d/w outpatient team. This provider relayed this to Prudencio as well.      Treatment Plan: Cycle 5 DA-EPOCH-R (Day 0/1=6/20/19)  -Rituxan 375mg/m2 (700mg) on D0   -Prednisone (decreased to 30mg/m2=60mg) BID on D1-5   -Etoposide 200mg CIVI on D1-4   -Vincristine 0.4mg/m2 (0.78) / Doxorubicin 40mg CIVI on D1-4   -Cytoxan 3000mg IV on D5    -Dexamethasone 8mg daily on D6,7   -IT triple therapy chemotherapy on D1 and D5. Plan to give D1 IT chemo on Friday, 6/21 with procedure being done by Inpatient Procedure team. Would be due again on Monday, 6/24. Will need CAPS consult again Monday AM.  Plan for 1L NS bolus shama-procedurally as well as Ativan premed prior to LPs. Pt brought this own [employer] products for caffeine intake after procedures since this has worked well in past.   -supportive care/premeds: Tylenol/Benadryl prior to Rituxan, Emend on D1 and D4, Compazine scheduled 10mg q8h, Zofran 16mg daily D1-5, Senna-S, and melatonin for assist with sleep inpatient/steroid-induced insomnia     #Mild anemia  2/2 chemotherapy. Do not anticipate transfusion needs during inpatient admission.      #ID PPx  -  "continue ppx ACV  - can hold Fluconazole during admission (ANC >1.0) and RESTART on discharge  - hold Levaquin ppx given ANC >1.0  PPx Levaquin was added at time of last discharge (per Dr. Ramirez since dose level was high, he did not want to wait for neutropenia), but ANC did not drop <1.0. Given bowel issues with Levaquin after last cycle, may be worth reviewing with Dr. Ramirez to ask if pt could hold off on starting while we follow counts closely, though understand this dose is again higher than even last cycle).      #Chemotherapy-induced nausea  Improved with scheduled antiemetics during chemo.   - scheduled antiemetics again with this cycle --  Will give Emend again on D4   - consider zyprexa if increased difficulty with nausea     #Constipation, chemotherapy induced  No current constipation or diarrhea. Of note, had \"bowel upset\"/increased stools which he attributes to Levaquin after last cycle.   - Senna-S BID  - Miralax PRN     #GERD  - continue home Zantac     #Peripheral neuropathy.   Mild numbness/tingling affecting first 4 digits of b/l hands. Not interfering with function. No current c/o neuropathy in feet.  - monitor     #Deconditioning.   He has declined PT inpatient or outpatient CA rehab and elects to continue walking/working out on his own. Continue to monitor.      #H/o Hoarseness, improved.    At time of admission for C4, patient endorsed approx 3 weeks of vocal hoarseness in absence of other URI symptoms. ENT evaluated pt prior to initiating chemotherapy and saw small area of granulation tissue or polyp on posterior aspect of TVC. No concern for neuropathy of vocal cords, most likely viral in nature and he was cleared to begin chemotherapy.      #H/o post-LP HA.   - improved with lying flat >1hr, IV fluid bolus shama-procedurally, and oral caffeine ([employer]) after procedure.      FEN  - no current IVFs in addition to fluids with CIVI chemo  - lyte repletion per unit protocol  - regular " diet     PPx  - VTE: hold VTE ppx given plan for LP on 6/21  - GI: Zantac as above     Dispo: Anticipate discharge on D5 after Cytoxan on Monday, 6/24.  - outpatient team has requested clinic f/u after this cycle (Neulasta, labs/transfusions twice weekly)       Disposition Plan   Expected discharge: 2 - 3 days, recommended to prior living arrangement once chemotherapy completed.     Entered: Radha Krishnamurthy 06/21/2019, 12:13 PM   Information in the above section will display in the discharge planner report.    Discussed and seen with staff attending, Dr. Booker.    Radha Krishnamurthy, DNP, APRN, CNP  Hematology/oncology      Interval History   Prudencio is doing well this morning. No acute events overnight. Afebrile.  Denies headaches, chest pain, SOB, nausea/vomiting. Ambulating. Discussed plan for IT chemo.  Will give fluids and ativan.     Physical Exam   Temp: 97.9  F (36.6  C) Temp src: Oral BP: 123/62 Pulse: 76   Resp: 20 SpO2: 97 % O2 Device: None (Room air)    Vitals:    06/20/19 1108 06/21/19 0937   Weight: 80.9 kg (178 lb 4.8 oz) 81.8 kg (180 lb 4.8 oz)     Vital Signs with Ranges  Temp:  [96.7  F (35.9  C)-98.1  F (36.7  C)] 97.9  F (36.6  C)  Pulse:  [69-78] 76  Resp:  [20] 20  BP: (106-123)/(54-62) 123/62  SpO2:  [97 %-99 %] 97 %  I/O last 3 completed shifts:  In: 1912.6 [P.O.:1160; I.V.:30; IV Piggyback:722.6]  Out: 3475 [Urine:3475]    Constitutional: Awake, alert, cooperative, no apparent distress, and appears stated age.   Eyes: Lids and lashes normal, pupils equal, round and reactive to light, extra ocular muscles intact, sclera clear, conjunctiva normal.  ENT: Normocephalic, without obvious abnormality, atraumatic.  Respiratory: No increased work of breathing. No oxygen. LS clear, no crackles or wheezes  Cardiovascular: Regular rate, regular rhythm, no murmurs.  GI: Normal bowel sounds, soft, non-distended, non-tender.  Musculoskeletal: No edema B/L LE. No obvious joint swelling or deformities.    Neurologic: A&O x4, cranial nerves II-XII appear to be grossly intact.  No focal deficits.   Neuropsychiatric: Calm, normal eye contact, alert, normal affect memory for past and recent events intact and thought process normal.      Medications     - MEDICATION INSTRUCTIONS -       - MEDICATION INSTRUCTIONS -       sodium chloride         sodium chloride 0.9%  1,000 mL Intravenous Once     acyclovir  400 mg Oral BID     Chemotherapy Infusing-Continuous Infusion   Does not apply Q8H     [START ON 6/24/2019] cyclophosphamide (CYTOXAN) chemo infusion  3,000 mg Intravenous Once     [START ON 6/26/2019] dexamethasone  8 mg Oral Daily     etoposide (TOPOSAR) chemo infusion  103.68 mg/m2 (Treatment Plan Recorded) Intravenous Q22H     [START ON 6/24/2019] fosaprepitant (EMEND) 150 mg intermittent infusion  150 mg Intravenous Once     INTRATHECAL chemo - Cytarabine and/or methotrexate and/or Hydrocortisone   Intrathecal Once     [START ON 6/24/2019] INTRATHECAL chemo - Cytarabine and/or methotrexate and/or Hydrocortisone   Intrathecal Once     [START ON 6/24/2019] LORazepam  1 mg Intravenous Once     LORazepam  1 mg Intravenous Once     melatonin  3 mg Oral At Bedtime     ondansetron  16 mg Oral Q22H     predniSONE  30 mg/m2 (Treatment Plan Recorded) Oral BID     prochlorperazine  10 mg Oral Q8H     ranitidine  150 mg Oral BID     senna-docusate  2 tablet Oral BID     sodium chloride (PF)  10 mL Intracatheter Q7 Days     vinCRIStine/DOXOrubicin (ONCOVIN/ADRIAMYCIN) chemo infusion  0.4 mg/m2 (Treatment Plan Recorded) Intravenous Q22H       Data   Results for orders placed or performed during the hospital encounter of 06/20/19 (from the past 24 hour(s))   XR Chest 1 View    Narrative    Exam: XR CHEST 1 VW, 6/20/2019 1:02 PM    Indication: PICC line    Comparison: 5/30/2019    Findings:   PA view of the chest. Right PICC distal tip projects over the high  superior vena cava. Cardiomediastinal silhouette is not enlarged.  No  focal airspace opacity. No pneumothorax. No large pleural effusion.  Visualized upper abdomen is unremarkable. No acute osseous  abnormalities.      Impression    Impression: Right PICC distal tip projects over the high superior vena  cava. No focal airspace opacities    I have personally reviewed the examination and initial interpretation  and I agree with the findings.    JARVIS FAY MD   Basic metabolic panel   Result Value Ref Range    Sodium 141 133 - 144 mmol/L    Potassium 3.7 3.4 - 5.3 mmol/L    Chloride 111 (H) 94 - 109 mmol/L    Carbon Dioxide 25 20 - 32 mmol/L    Anion Gap 5 3 - 14 mmol/L    Glucose 91 70 - 99 mg/dL    Urea Nitrogen 10 7 - 30 mg/dL    Creatinine 0.92 0.66 - 1.25 mg/dL    GFR Estimate >90 >60 mL/min/[1.73_m2]    GFR Estimate If Black >90 >60 mL/min/[1.73_m2]    Calcium 8.5 8.5 - 10.1 mg/dL   CBC with platelets differential   Result Value Ref Range    WBC 12.5 (H) 4.0 - 11.0 10e9/L    RBC Count 3.44 (L) 4.4 - 5.9 10e12/L    Hemoglobin 10.5 (L) 13.3 - 17.7 g/dL    Hematocrit 33.1 (L) 40.0 - 53.0 %    MCV 96 78 - 100 fl    MCH 30.5 26.5 - 33.0 pg    MCHC 31.7 31.5 - 36.5 g/dL    RDW 16.0 (H) 10.0 - 15.0 %    Platelet Count 210 150 - 450 10e9/L    Diff Method Automated Method     % Neutrophils 88.4 %    % Lymphocytes 5.7 %    % Monocytes 4.7 %    % Eosinophils 0.0 %    % Basophils 0.2 %    % Immature Granulocytes 1.0 %    Nucleated RBCs 0 0 /100    Absolute Neutrophil 11.0 (H) 1.6 - 8.3 10e9/L    Absolute Lymphocytes 0.7 (L) 0.8 - 5.3 10e9/L    Absolute Monocytes 0.6 0.0 - 1.3 10e9/L    Absolute Eosinophils 0.0 0.0 - 0.7 10e9/L    Absolute Basophils 0.0 0.0 - 0.2 10e9/L    Abs Immature Granulocytes 0.1 0 - 0.4 10e9/L    Absolute Nucleated RBC 0.0    INR   Result Value Ref Range    INR 1.04 0.86 - 1.14     Recent Labs   Lab 06/21/19  0814 06/21/19  0615 06/20/19  0902   WBC  --  12.5* 5.0   HGB  --  10.5* 12.5*   MCV  --  96 95   PLT  --  210 212   INR 1.04  --   --    NA  --  141 141    POTASSIUM  --  3.7 4.5   CHLORIDE  --  111* 108   CO2  --  25 27   BUN  --  10 15   CR  --  0.92 1.15   ANIONGAP  --  5 6   TAMMY  --  8.5 8.6   GLC  --  91 70   ALBUMIN  --   --  3.9   PROTTOTAL  --   --  7.1   BILITOTAL  --   --  0.2   ALKPHOS  --   --  68   ALT  --   --  42   AST  --   --  24     Recent Results (from the past 24 hour(s))   XR Chest 1 View    Narrative    Exam: XR CHEST 1 VW, 6/20/2019 1:02 PM    Indication: PICC line    Comparison: 5/30/2019    Findings:   PA view of the chest. Right PICC distal tip projects over the high  superior vena cava. Cardiomediastinal silhouette is not enlarged. No  focal airspace opacity. No pneumothorax. No large pleural effusion.  Visualized upper abdomen is unremarkable. No acute osseous  abnormalities.      Impression    Impression: Right PICC distal tip projects over the high superior vena  cava. No focal airspace opacities    I have personally reviewed the examination and initial interpretation  and I agree with the findings.    JARVIS FAY MD

## 2019-06-21 NOTE — PLAN OF CARE
[id number]: VSS on room air, afebrile. Denies pain/nausea/SOB. Pt concerned about lack of appetite that he anticipates tomorrow, RN recommended ordering boosts and small frequent meals. Up ad jessica, ambulating hallway. Friends visiting.CIVI etoposide/vincristine/dox infusing. Phosphorus infusing for a level of 2.4.

## 2019-06-21 NOTE — PROCEDURES
ONC Adult Lumbar Puncture and Intrathecal Chemotherapy Administration Procedure Note    June 21, 2019    Procedure:  intrathecal chemotherapy administration    Diagnosis: burkitt lymphoma of lymph nodes of axilla    Learning needs assessment complete within 12 months: YES     Vitals reviewed:  YES    Informed Consent: Procedure, benefits, risks, and alternatives explained to the patient who voiced understanding of the information and agreed to proceed with lumbar puncture. Risks include bleeding, headache, and or infection.   Patient safety checklist completed.    Labs: Reviewed:     Lab Results   Component Value Date    INR 1.04 06/21/2019     06/21/2019     Description:. The patient was seated at the bedside with knees dangling. Intrathecal access was obtained by medicine procedure team.  Please see separate procedure note from today.       Methotrexate and Hydrocortisone sodium succinate in 6mL of 0.9% preservative free sodium chloride was administered intrathecally slowly over 5 minutes in a barbotage fashion.  The needle was removed and a bandage was applied to the site.  Chemotherapy double-check was performed per institutional policy.  Patient tolerated well.  Post-procedure pain assessment: 2 out of 10 on the numeric pain rating scale  Interventions: No    Complications: No     Disposition: Patient will remain on back for 1 hour post procedure. Avoid soaking in a tub tonight.  Call if develops headaches, fevers and or chills.     Performed by:   Ugo Shelby

## 2019-06-21 NOTE — PROGRESS NOTES
SPIRITUAL HEALTH SERVICES  [facility] ([place] 7D  ON-CALL VISIT     REFERRAL SOURCE: referred to pt/family by unit  Miroslava Stoddard - pt requesting Holy Communion.     Holy Communion celebrated with pt Prudencio and spouse Halie - they are both active in their [employer], [facility] [place]. Communion has deep meaning for pt when he is in hospital. Pt shared regarding plan of care, that treatment is going well for him and he is quite optimistic.  Pt and spouse both asked for prayers for their three children.     PLAN: unit  will be informed of visit.     Miah Alston M.Div (Bill)., [facility]  Staff   Pager [phone]

## 2019-06-21 NOTE — PLAN OF CARE
Chemotherapy  D: Blood return is brisk per PICC catheter demonstrated every 4 hours and before Day 2 chemo started. Urine output is good as recorded in intake and output flowsheet. Denies nausea, vomiting or loss of appetite as yet.  Ambulating in halls frequently and for several laps at a time.  I: Premedications given (see electronic medical administration record). Dose #2 of Etoposide and Dose #2 of vincristine with Doxorubicin each started to infuse over 22 hours.   LP done at bedside with installation of Methotrexate.  IV bolus of 100cc over 2 hours started immediately after LP and patient laying flat for 1 hour post LP.  A: Tolerating chemotherapy and LP well thus far.  P: Continue to monitor urine output and symptoms of nausea. Screen for symptoms of toxicity.  MOnitor for post LP headache and notify MD should it occur.

## 2019-06-21 NOTE — PROCEDURES
Consult and Procedure Service - Procedure Note    Attending: Sina Morrow  Procedure: Lumbar Puncture  Indication: IT chemotherapy and diagnostic  Risk Assessment: Patient medium risk, thin with normal INR and ok plt  Pre-procedure diagnosis: Burkitt lymphoma  Post-procedure diagnosis: Burkitt Lymphoma    The risks and benefits of the procedure were explained to the patient who expressed understanding and opted to proceed.  Consent was obtained and placed in the chart.  A time out was performed.      The patient was placed in the seated position and the L4-5 innerspace palpated and marked.  4 ml of 1% lidocaine was instilled.  A 3.5 inch 22 gauge needle was inserted and clear fluid obtained on the 3rd attempt.   A total of 6 ml was removed.   Chemotherapy given by Heme/onc fellow. The stylet was replaced and the needle removed.      Patient tolerated the procedure well. Please do not hesitate to contact our service if any complications or concerns arise.   Sina Morrow   DOS:  06/21/19

## 2019-06-21 NOTE — PHARMACY-ADMISSION MEDICATION HISTORY
Admission medication history interview status for the 6/20/2019 admission is complete. See Epic admission navigator for allergy information, pharmacy, prior to admission medications and immunization status.     Medication history interview sources: Patient, SureScripts, and Care Everywhere.    Changes made to PTA medication list (reason)  Added: Allopurinol and triamcinolone (per pt report and SureScripts fill records)  Deleted: None  Changed: Tums (from scheduled to PRN, per pt report).    Additional medication history information (including reliability of information, actions taken by pharmacist):None      Prior to Admission medications    Medication Sig Last Dose Taking? Auth Provider   acyclovir (ZOVIRAX) 200 MG capsule Take 2 capsules (400 mg) by mouth 2 times daily 6/20/2019 at Unknown time Yes Holly Wheat PA-C   allopurinol (ZYLOPRIM) 300 MG tablet Take 300 mg by mouth daily 6/20/2019 at Unknown time Yes Unknown, Entered By History   calcium carbonate (TUMS) 500 MG chewable tablet Take 1 chew tab by mouth 2 times daily as needed for heartburn Past Month at Unknown time Yes Unknown, Entered By History   fluconazole (DIFLUCAN) 100 MG tablet Take 1 tablet (100 mg) by mouth daily 6/20/2019 at Unknown time Yes Holly Wheat PA-C   levofloxacin (LEVAQUIN) 250 MG tablet Take 1 tablet (250 mg) by mouth daily . Continue until advised by clinic team to stop. As Directed at Unknown time Yes Sugey Shaver PA-C   ondansetron (ZOFRAN) 8 MG tablet Take 1 tablet (8 mg) by mouth every 8 hours as needed for nausea Past Month at Unknown time Yes Radha Krishnamurthy APRN CNP   ondansetron (ZOFRAN-ODT) 8 MG ODT tab Take 1 tablet (8 mg) by mouth every 8 hours At first continue scheduled (but can back off as nausea improves) Past Month at Unknown time Yes Emily Farfan APRN CNP   prochlorperazine (COMPAZINE) 10 MG tablet Take 1 tablet (10 mg) by mouth every 6 hours as needed for nausea or  vomiting (Try second.) Past Month at Unknown time Yes Geraldine Myers PA-C   ranitidine (ZANTAC) 150 MG tablet Take 1 tablet (150 mg) by mouth 2 times daily 6/20/2019 at Unknown time Yes Holly Wheat PA-C   senna-docusate (SENOKOT-S/PERICOLACE) 8.6-50 MG tablet Take 2 tablets by mouth 2 times daily as needed for constipation Past Month at Unknown time Yes Geraldine Myers PA-C   triamcinolone (KENALOG) 0.1 % external cream Apply 1 applicator topically 2 times daily as needed for irritation Past Month at Unknown time Yes Unknown, Entered By History   acetaminophen (TYLENOL) 325 MG tablet Take 2 tablets (650 mg) by mouth every 4 hours as needed for mild pain More than a month at Unknown time  Vito Llanos MD     Medication history completed by: Odalis Anne, PharmD IV Student

## 2019-06-22 LAB
ANION GAP SERPL CALCULATED.3IONS-SCNC: 5 MMOL/L (ref 3–14)
BASOPHILS # BLD AUTO: 0 10E9/L (ref 0–0.2)
BASOPHILS NFR BLD AUTO: 0.2 %
BUN SERPL-MCNC: 11 MG/DL (ref 7–30)
CALCIUM SERPL-MCNC: 8.4 MG/DL (ref 8.5–10.1)
CHLORIDE SERPL-SCNC: 112 MMOL/L (ref 94–109)
CO2 SERPL-SCNC: 25 MMOL/L (ref 20–32)
CREAT SERPL-MCNC: 0.92 MG/DL (ref 0.66–1.25)
DIFFERENTIAL METHOD BLD: ABNORMAL
EOSINOPHIL # BLD AUTO: 0 10E9/L (ref 0–0.7)
EOSINOPHIL NFR BLD AUTO: 0 %
ERYTHROCYTE [DISTWIDTH] IN BLOOD BY AUTOMATED COUNT: 16 % (ref 10–15)
GFR SERPL CREATININE-BSD FRML MDRD: >90 ML/MIN/{1.73_M2}
GLUCOSE SERPL-MCNC: 126 MG/DL (ref 70–99)
HCT VFR BLD AUTO: 32.4 % (ref 40–53)
HGB BLD-MCNC: 10.3 G/DL (ref 13.3–17.7)
IMM GRANULOCYTES # BLD: 0.2 10E9/L (ref 0–0.4)
IMM GRANULOCYTES NFR BLD: 1.4 %
LYMPHOCYTES # BLD AUTO: 0.5 10E9/L (ref 0.8–5.3)
LYMPHOCYTES NFR BLD AUTO: 4.8 %
MCH RBC QN AUTO: 30.3 PG (ref 26.5–33)
MCHC RBC AUTO-ENTMCNC: 31.8 G/DL (ref 31.5–36.5)
MCV RBC AUTO: 95 FL (ref 78–100)
MONOCYTES # BLD AUTO: 0.7 10E9/L (ref 0–1.3)
MONOCYTES NFR BLD AUTO: 6 %
NEUTROPHILS # BLD AUTO: 9.9 10E9/L (ref 1.6–8.3)
NEUTROPHILS NFR BLD AUTO: 87.6 %
NRBC # BLD AUTO: 0 10*3/UL
NRBC BLD AUTO-RTO: 0 /100
PLATELET # BLD AUTO: 219 10E9/L (ref 150–450)
POTASSIUM SERPL-SCNC: 3.6 MMOL/L (ref 3.4–5.3)
RBC # BLD AUTO: 3.4 10E12/L (ref 4.4–5.9)
SODIUM SERPL-SCNC: 142 MMOL/L (ref 133–144)
WBC # BLD AUTO: 11.3 10E9/L (ref 4–11)

## 2019-06-22 PROCEDURE — 25000132 ZZH RX MED GY IP 250 OP 250 PS 637: Performed by: PHYSICIAN ASSISTANT

## 2019-06-22 PROCEDURE — 85025 COMPLETE CBC W/AUTO DIFF WBC: CPT | Performed by: PHYSICIAN ASSISTANT

## 2019-06-22 PROCEDURE — 25000131 ZZH RX MED GY IP 250 OP 636 PS 637: Performed by: PHYSICIAN ASSISTANT

## 2019-06-22 PROCEDURE — 40000802 ZZH SITE CHECK

## 2019-06-22 PROCEDURE — 25000132 ZZH RX MED GY IP 250 OP 250 PS 637: Performed by: STUDENT IN AN ORGANIZED HEALTH CARE EDUCATION/TRAINING PROGRAM

## 2019-06-22 PROCEDURE — 36592 COLLECT BLOOD FROM PICC: CPT | Performed by: PHYSICIAN ASSISTANT

## 2019-06-22 PROCEDURE — 80048 BASIC METABOLIC PNL TOTAL CA: CPT | Performed by: PHYSICIAN ASSISTANT

## 2019-06-22 PROCEDURE — 25800030 ZZH RX IP 258 OP 636: Performed by: PHYSICIAN ASSISTANT

## 2019-06-22 PROCEDURE — 25000128 H RX IP 250 OP 636: Performed by: PHYSICIAN ASSISTANT

## 2019-06-22 PROCEDURE — 12000001 ZZH R&B MED SURG/OB UMMC

## 2019-06-22 RX ADMIN — MELATONIN TAB 3 MG 3 MG: 3 TAB at 22:14

## 2019-06-22 RX ADMIN — RANITIDINE 150 MG: 150 TABLET ORAL at 17:28

## 2019-06-22 RX ADMIN — POLYETHYLENE GLYCOL 3350 17 G: 17 POWDER, FOR SOLUTION ORAL at 08:11

## 2019-06-22 RX ADMIN — ETOPOSIDE 200 MG: 20 INJECTION, SOLUTION, CONCENTRATE INTRAVENOUS at 11:57

## 2019-06-22 RX ADMIN — SENNOSIDES AND DOCUSATE SODIUM 2 TABLET: 8.6; 5 TABLET ORAL at 20:19

## 2019-06-22 RX ADMIN — PROCHLORPERAZINE MALEATE 10 MG: 10 TABLET ORAL at 22:14

## 2019-06-22 RX ADMIN — PREDNISONE 60 MG: 10 TABLET ORAL at 14:23

## 2019-06-22 RX ADMIN — PROCHLORPERAZINE MALEATE 10 MG: 10 TABLET ORAL at 06:10

## 2019-06-22 RX ADMIN — PREDNISONE 60 MG: 10 TABLET ORAL at 08:11

## 2019-06-22 RX ADMIN — ACYCLOVIR 400 MG: 200 CAPSULE ORAL at 08:08

## 2019-06-22 RX ADMIN — RANITIDINE 150 MG: 150 TABLET ORAL at 08:09

## 2019-06-22 RX ADMIN — ACYCLOVIR 400 MG: 200 CAPSULE ORAL at 20:19

## 2019-06-22 RX ADMIN — SENNOSIDES AND DOCUSATE SODIUM 2 TABLET: 8.6; 5 TABLET ORAL at 08:10

## 2019-06-22 RX ADMIN — PROCHLORPERAZINE MALEATE 10 MG: 10 TABLET ORAL at 14:23

## 2019-06-22 RX ADMIN — CALCIUM CARBONATE (ANTACID) CHEW TAB 500 MG 500 MG: 500 CHEW TAB at 12:09

## 2019-06-22 RX ADMIN — ONDANSETRON HYDROCHLORIDE 16 MG: 8 TABLET, FILM COATED ORAL at 11:30

## 2019-06-22 RX ADMIN — VINCRISTINE SULFATE 0.78 MG: 1 INJECTION, SOLUTION INTRAVENOUS at 11:57

## 2019-06-22 RX ADMIN — CALCIUM CARBONATE (ANTACID) CHEW TAB 500 MG 500 MG: 500 CHEW TAB at 15:32

## 2019-06-22 ASSESSMENT — ACTIVITIES OF DAILY LIVING (ADL)
ADLS_ACUITY_SCORE: 10

## 2019-06-22 ASSESSMENT — MIFFLIN-ST. JEOR: SCORE: 1727.25

## 2019-06-22 NOTE — PLAN OF CARE
AF, VSS.  LP done day shift.  Bedrest completed.  Post LP flush completed, denies headache. Up ad jessica, out of room for walk x 3..  Denies nausea on scheduled antiemetics.  Reports appetite fading, fair appetite this evening, had sandwich and boost for supper.  Day 2 chemo infusing.

## 2019-06-22 NOTE — PLAN OF CARE
00:00-07:00  am  AF VSS  Day 2 CIVI chemo continues infusing  Tolerating chemo well   No nausea/vomiting with scheduled antiemetics   Denied pain  LP site C/D/I an denied headache  Up ad jessica  Voiding well, good UOP   Pt is doing well and no acute issues overnight   Continue w/POC

## 2019-06-22 NOTE — PLAN OF CARE
Pt afebrile with stable vs. Day 3 Etoposide, Doxorubicin/Vincristine initiated (see note). Denies pain. Denies Nausea. Good po intake. Having regular stools. UAL walking outside. Without complaints.

## 2019-06-22 NOTE — PROGRESS NOTES
[facility], [place]    Hematology / Oncology Progress Note     Assessment & Plan   Christiano Molina is a 43 year old male with Burkitt lymphoma, now s/p 4 cycles of DA-EPOCH-R. PET/CT on 6/18/19 demonstrated CR. He is being admitted for Cycle 5.        #Burkitt Lymphoma, EBV+, Stage IA   Follows with Dr. Ramirez. Initially presented in mid-March with a right axillary mass, found to have high-grade B-cell lymphoma. Initial pathology was not fully clear whether DLBCL or Burkitt, but subsequently this was finalized by [facility] Heme Path review as Burkitt lymphoma (EBV+). Plan is to do 6 cycles of DA-R-EPOCH with IT chemo prophylaxis during C3-6. PET/CT (done 6/18) after 4 cycles demonstrated complete response.   - place PICC on admission  - 20% dose escalation per protocol for C5  - can stop Allopurinol as d/w outpatient team, and relayed to patient      Treatment Plan: Cycle 5 DA-EPOCH-R (Day 0/1=6/20/19) 6/22/19 is D3  -Rituxan 375mg/m2 (700mg) on D0   -Prednisone (decreased to 30mg/m2=60mg) BID on D1-5   -Etoposide 200mg CIVI on D1-4   -Vincristine 0.4mg/m2 (0.78) / Doxorubicin 40mg CIVI on D1-4   -Cytoxan 3000mg IV on D5    -Dexamethasone 8mg daily on D6,7   -IT triple therapy chemotherapy on D1 and D5. Plan to give D1 IT chemo on Friday, 6/21 with procedure being done by Inpatient Procedure team. Would be due again on Monday, 6/24. Will need CAPS consult again Monday AM.  Plan for 1L NS bolus shama-procedurally as well as Ativan premed prior to LPs. Pt brought this own [employer] products for caffeine intake after procedures since this has worked well in past.   -supportive care/premeds: Tylenol/Benadryl prior to Rituxan, Emend on D1 and D4, Compazine scheduled 10mg q8h, Zofran 16mg daily D1-5, Senna-S, and melatonin for assist with sleep inpatient/steroid-induced insomnia     #Mild anemia  2/2 chemotherapy. Do not anticipate transfusion needs during inpatient admission.      #ID PPx  - continue  "ppx ACV  - can hold Fluconazole during admission (ANC >1.0) and RESTART on discharge  - hold Levaquin ppx given ANC >1.0  PPx Levaquin was added at time of last discharge (per Dr. Ramirez since dose level was high, he did not want to wait for neutropenia), but ANC did not drop <1.0. Given bowel issues with Levaquin after last cycle, may be worth reviewing with Dr. Ramirez to ask if pt could hold off on starting while we follow counts closely, though understand this dose is again higher than even last cycle).      #Chemotherapy-induced nausea  Improved with scheduled antiemetics during chemo.   - scheduled antiemetics again with this cycle --  Will give Emend again on D4   - consider zyprexa if increased difficulty with nausea     #Constipation, chemotherapy induced  No current constipation or diarrhea. Of note, had \"bowel upset\"/increased stools which he attributes to Levaquin after last cycle.   - Senna-S BID  - Miralax PRN     #GERD  - continue home Zantac     #Peripheral neuropathy.   Mild numbness/tingling affecting first 4 digits of b/l hands. Not interfering with function. No current c/o neuropathy in feet.  - monitor     #Deconditioning.   He has declined PT inpatient or outpatient CA rehab and elects to continue walking/working out on his own. Continue to monitor.      #H/o Hoarseness, improved.    At time of admission for C4, patient endorsed approx 3 weeks of vocal hoarseness in absence of other URI symptoms. ENT evaluated pt prior to initiating chemotherapy and saw small area of granulation tissue or polyp on posterior aspect of TVC. No concern for neuropathy of vocal cords, most likely viral in nature and he was cleared to begin chemotherapy.      #H/o post-LP HA.   - improved with lying flat >1hr, IV fluid bolus shama-procedurally, and oral caffeine ([employer]) after procedure.      FEN  - no current IVFs in addition to fluids with CIVI chemo  - lyte repletion per unit protocol  - regular diet     PPx  - " VTE: hold VTE ppx given plan for LP on 6/21  - GI: Zantac as above     Dispo: Anticipate discharge on D5 after Cytoxan on Monday, 6/24.  - outpatient team has requested clinic f/u after this cycle (Neulasta, labs/transfusions twice weekly)       Disposition Plan   Expected discharge: 2 - 3 days, recommended to prior living arrangement once chemotherapy completed.     Entered: Ugo Shelby 06/22/2019, 1:04 PM   Information in the above section will display in the discharge planner report.    Discussed and seen with staff attending, Dr. Booker.    Ugo Shelby MD, PhD  Hematology/Oncology Fellow  Pager: [phone]    Interval History   No acute events overnight.  No fevers, chills, vomiting, CP, or SOB.  Some nausea which is responding to PRNs.  No post-LP headache. Minimal pain and no bleeding at LP site. Up and walking halls regularly.      Remainder of 10 point ROS reviewed and negative except as in HPI.      Physical Exam   Temp: 96.9  F (36.1  C) Temp src: Oral BP: 121/63 Pulse: 70   Resp: 18 SpO2: 98 % O2 Device: None (Room air)    Vitals:    06/20/19 1108 06/21/19 0937 06/22/19 0819   Weight: 80.9 kg (178 lb 4.8 oz) 81.8 kg (180 lb 4.8 oz) 82.6 kg (182 lb 1.6 oz)     Vital Signs with Ranges  Temp:  [96.4  F (35.8  C)-98.2  F (36.8  C)] 96.9  F (36.1  C)  Pulse:  [70-87] 70  Resp:  [16-20] 18  BP: (113-130)/(63-66) 121/63  SpO2:  [97 %-100 %] 98 %  I/O last 3 completed shifts:  In: 4099.2 [P.O.:1140; I.V.:1030; IV Piggyback:1929.2]  Out: 3775 [Urine:3775]    Constitutional: Awake, alert, cooperative, no apparent distress, and appears stated age.   Eyes: PERRL, anicteric sclera  ENT: Normocephalic, without obvious abnormality, atraumatic.  Respiratory: No increased work of breathing. CTAB  Cardiovascular: Regular rate, regular rhythm, no m/r/g  GI: Normal bowel sounds, soft, non-distended, non-tender.  Musculoskeletal: No edema B/L LE.   Neurologic: A&O x4, cranial nerves II-XII appear to be grossly intact.   No focal deficits.   Neuropsychiatric: Calm, normal eye contact, alert, normal affect memory for past and recent events intact and thought process normal.      Medications     - MEDICATION INSTRUCTIONS -       - MEDICATION INSTRUCTIONS -       sodium chloride         acyclovir  400 mg Oral BID     Chemotherapy Infusing-Continuous Infusion   Does not apply Q8H     [START ON 6/24/2019] cyclophosphamide (CYTOXAN) chemo infusion  3,000 mg Intravenous Once     [START ON 6/26/2019] dexamethasone  8 mg Oral Daily     etoposide (TOPOSAR) chemo infusion  103.68 mg/m2 (Treatment Plan Recorded) Intravenous Q22H     [START ON 6/24/2019] fosaprepitant (EMEND) 150 mg intermittent infusion  150 mg Intravenous Once     [START ON 6/24/2019] INTRATHECAL chemo - Cytarabine and/or methotrexate and/or Hydrocortisone   Intrathecal Once     [START ON 6/24/2019] LORazepam  1 mg Intravenous Once     melatonin  3 mg Oral At Bedtime     ondansetron  16 mg Oral Q22H     polyethylene glycol  17 g Oral Daily     predniSONE  30 mg/m2 (Treatment Plan Recorded) Oral BID     prochlorperazine  10 mg Oral Q8H     ranitidine  150 mg Oral BID     senna-docusate  2 tablet Oral BID     sodium chloride (PF)  10 mL Intracatheter Q7 Days     vinCRIStine/DOXOrubicin (ONCOVIN/ADRIAMYCIN) chemo infusion  0.4 mg/m2 (Treatment Plan Recorded) Intravenous Q22H       Data   Results for orders placed or performed during the hospital encounter of 06/20/19 (from the past 24 hour(s))   Glucose CSF:   Result Value Ref Range    Glucose CSF 64 40 - 70 mg/dL   Protein total CSF:   Result Value Ref Range    Protein Total CSF 57 15 - 60 mg/dL   Cell count with differential CSF:   Result Value Ref Range    WBC CSF 2 0 - 5 /uL    RBC CSF 0 0 - 2 /uL    Tube Number 4 #    Color CSF Colorless CLRL^Colorless    Appearance CSF Clear CLER^Clear   Gram stain CSF Tube 2   Result Value Ref Range    Specimen Description Cerebrospinal fluid     Special Requests TUBE 2     Gram Stain No  organisms seen     Gram Stain       Moderate  WBC'S seen  predominantly mononuclear cells      Gram Stain       Quantification of host cells and microbiological organisms was done on a cytocentrifuged   preparation.     CSF Culture Aerobic Bacterial Tube 2   Result Value Ref Range    Specimen Description Cerebrospinal fluid     Special Requests TUBE 2     Culture Micro Culture negative monitoring continues    Cell count with differential CSF:   Result Value Ref Range    WBC CSF  0 - 5 /uL     Test not performed. Criteria not met for second cell count.    RBC CSF  0 - 2 /uL     Test not performed. Criteria not met for second cell count.    Tube Number 1 #    Color CSF Colorless CLRL^Colorless    Appearance CSF Clear CLER^Clear   Basic metabolic panel   Result Value Ref Range    Sodium 142 133 - 144 mmol/L    Potassium 3.6 3.4 - 5.3 mmol/L    Chloride 112 (H) 94 - 109 mmol/L    Carbon Dioxide 25 20 - 32 mmol/L    Anion Gap 5 3 - 14 mmol/L    Glucose 126 (H) 70 - 99 mg/dL    Urea Nitrogen 11 7 - 30 mg/dL    Creatinine 0.92 0.66 - 1.25 mg/dL    GFR Estimate >90 >60 mL/min/[1.73_m2]    GFR Estimate If Black >90 >60 mL/min/[1.73_m2]    Calcium 8.4 (L) 8.5 - 10.1 mg/dL   CBC with platelets differential   Result Value Ref Range    WBC 11.3 (H) 4.0 - 11.0 10e9/L    RBC Count 3.40 (L) 4.4 - 5.9 10e12/L    Hemoglobin 10.3 (L) 13.3 - 17.7 g/dL    Hematocrit 32.4 (L) 40.0 - 53.0 %    MCV 95 78 - 100 fl    MCH 30.3 26.5 - 33.0 pg    MCHC 31.8 31.5 - 36.5 g/dL    RDW 16.0 (H) 10.0 - 15.0 %    Platelet Count 219 150 - 450 10e9/L    Diff Method Automated Method     % Neutrophils 87.6 %    % Lymphocytes 4.8 %    % Monocytes 6.0 %    % Eosinophils 0.0 %    % Basophils 0.2 %    % Immature Granulocytes 1.4 %    Nucleated RBCs 0 0 /100    Absolute Neutrophil 9.9 (H) 1.6 - 8.3 10e9/L    Absolute Lymphocytes 0.5 (L) 0.8 - 5.3 10e9/L    Absolute Monocytes 0.7 0.0 - 1.3 10e9/L    Absolute Eosinophils 0.0 0.0 - 0.7 10e9/L    Absolute  Basophils 0.0 0.0 - 0.2 10e9/L    Abs Immature Granulocytes 0.2 0 - 0.4 10e9/L    Absolute Nucleated RBC 0.0      Recent Labs   Lab 06/22/19  0617 06/21/19  0814 06/21/19  0615 06/20/19  0902   WBC 11.3*  --  12.5* 5.0   HGB 10.3*  --  10.5* 12.5*   MCV 95  --  96 95     --  210 212   INR  --  1.04  --   --      --  141 141   POTASSIUM 3.6  --  3.7 4.5   CHLORIDE 112*  --  111* 108   CO2 25  --  25 27   BUN 11  --  10 15   CR 0.92  --  0.92 1.15   ANIONGAP 5  --  5 6   TAMMY 8.4*  --  8.5 8.6   *  --  91 70   ALBUMIN  --   --   --  3.9   PROTTOTAL  --   --   --  7.1   BILITOTAL  --   --   --  0.2   ALKPHOS  --   --   --  68   ALT  --   --   --  42   AST  --   --   --  24     No results found for this or any previous visit (from the past 24 hour(s)).

## 2019-06-22 NOTE — PROGRESS NOTES
Chemo note:  D/I: pt received zofran as premed. Brisk blood return from PICC . Day 3 Etoposide and Doxorubicin/Vincristine initiated to infuse over 22 hr.   A/P: Pt tolerating well, thus far. Good urine output. Denies nausea. Continue to monitor for side effects.

## 2019-06-23 LAB
ANION GAP SERPL CALCULATED.3IONS-SCNC: 5 MMOL/L (ref 3–14)
BASOPHILS # BLD AUTO: 0 10E9/L (ref 0–0.2)
BASOPHILS NFR BLD AUTO: 0.1 %
BUN SERPL-MCNC: 12 MG/DL (ref 7–30)
CALCIUM SERPL-MCNC: 8.3 MG/DL (ref 8.5–10.1)
CHLORIDE SERPL-SCNC: 110 MMOL/L (ref 94–109)
CO2 SERPL-SCNC: 26 MMOL/L (ref 20–32)
CREAT SERPL-MCNC: 0.84 MG/DL (ref 0.66–1.25)
DIFFERENTIAL METHOD BLD: ABNORMAL
EOSINOPHIL # BLD AUTO: 0 10E9/L (ref 0–0.7)
EOSINOPHIL NFR BLD AUTO: 0 %
ERYTHROCYTE [DISTWIDTH] IN BLOOD BY AUTOMATED COUNT: 15.9 % (ref 10–15)
GFR SERPL CREATININE-BSD FRML MDRD: >90 ML/MIN/{1.73_M2}
GLUCOSE SERPL-MCNC: 124 MG/DL (ref 70–99)
HCT VFR BLD AUTO: 31.9 % (ref 40–53)
HGB BLD-MCNC: 10 G/DL (ref 13.3–17.7)
IMM GRANULOCYTES # BLD: 0.1 10E9/L (ref 0–0.4)
IMM GRANULOCYTES NFR BLD: 0.7 %
LYMPHOCYTES # BLD AUTO: 0.5 10E9/L (ref 0.8–5.3)
LYMPHOCYTES NFR BLD AUTO: 6.6 %
MCH RBC QN AUTO: 30.8 PG (ref 26.5–33)
MCHC RBC AUTO-ENTMCNC: 31.3 G/DL (ref 31.5–36.5)
MCV RBC AUTO: 98 FL (ref 78–100)
MONOCYTES # BLD AUTO: 0.7 10E9/L (ref 0–1.3)
MONOCYTES NFR BLD AUTO: 7.9 %
NEUTROPHILS # BLD AUTO: 7 10E9/L (ref 1.6–8.3)
NEUTROPHILS NFR BLD AUTO: 84.7 %
NRBC # BLD AUTO: 0 10*3/UL
NRBC BLD AUTO-RTO: 0 /100
PLATELET # BLD AUTO: 221 10E9/L (ref 150–450)
POTASSIUM SERPL-SCNC: 3.2 MMOL/L (ref 3.4–5.3)
POTASSIUM SERPL-SCNC: 3.7 MMOL/L (ref 3.4–5.3)
RBC # BLD AUTO: 3.25 10E12/L (ref 4.4–5.9)
SODIUM SERPL-SCNC: 141 MMOL/L (ref 133–144)
WBC # BLD AUTO: 8.2 10E9/L (ref 4–11)

## 2019-06-23 PROCEDURE — 25000132 ZZH RX MED GY IP 250 OP 250 PS 637: Performed by: PHYSICIAN ASSISTANT

## 2019-06-23 PROCEDURE — 25000128 H RX IP 250 OP 636: Performed by: PHYSICIAN ASSISTANT

## 2019-06-23 PROCEDURE — 25000131 ZZH RX MED GY IP 250 OP 636 PS 637: Performed by: PHYSICIAN ASSISTANT

## 2019-06-23 PROCEDURE — 12000001 ZZH R&B MED SURG/OB UMMC

## 2019-06-23 PROCEDURE — 85025 COMPLETE CBC W/AUTO DIFF WBC: CPT | Performed by: PHYSICIAN ASSISTANT

## 2019-06-23 PROCEDURE — 84132 ASSAY OF SERUM POTASSIUM: CPT | Performed by: PHYSICIAN ASSISTANT

## 2019-06-23 PROCEDURE — 36592 COLLECT BLOOD FROM PICC: CPT | Performed by: PHYSICIAN ASSISTANT

## 2019-06-23 PROCEDURE — 80048 BASIC METABOLIC PNL TOTAL CA: CPT | Performed by: PHYSICIAN ASSISTANT

## 2019-06-23 PROCEDURE — 25800030 ZZH RX IP 258 OP 636: Performed by: PHYSICIAN ASSISTANT

## 2019-06-23 PROCEDURE — 25000132 ZZH RX MED GY IP 250 OP 250 PS 637: Performed by: STUDENT IN AN ORGANIZED HEALTH CARE EDUCATION/TRAINING PROGRAM

## 2019-06-23 RX ORDER — LORAZEPAM 2 MG/ML
1 INJECTION INTRAMUSCULAR ONCE
Status: COMPLETED | OUTPATIENT
Start: 2019-06-24 | End: 2019-06-24

## 2019-06-23 RX ORDER — LORAZEPAM 2 MG/ML
1 INJECTION INTRAMUSCULAR ONCE
Status: DISCONTINUED | OUTPATIENT
Start: 2019-06-23 | End: 2019-06-23

## 2019-06-23 RX ADMIN — PREDNISONE 60 MG: 10 TABLET ORAL at 15:09

## 2019-06-23 RX ADMIN — ACYCLOVIR 400 MG: 200 CAPSULE ORAL at 21:25

## 2019-06-23 RX ADMIN — PROCHLORPERAZINE MALEATE 10 MG: 10 TABLET ORAL at 21:52

## 2019-06-23 RX ADMIN — POLYETHYLENE GLYCOL 3350 17 G: 17 POWDER, FOR SOLUTION ORAL at 09:25

## 2019-06-23 RX ADMIN — ONDANSETRON HYDROCHLORIDE 16 MG: 8 TABLET, FILM COATED ORAL at 09:00

## 2019-06-23 RX ADMIN — MELATONIN TAB 3 MG 3 MG: 3 TAB at 21:53

## 2019-06-23 RX ADMIN — PROCHLORPERAZINE MALEATE 10 MG: 10 TABLET ORAL at 06:38

## 2019-06-23 RX ADMIN — POTASSIUM CHLORIDE 20 MEQ: 29.8 INJECTION, SOLUTION INTRAVENOUS at 10:27

## 2019-06-23 RX ADMIN — ACYCLOVIR 400 MG: 200 CAPSULE ORAL at 09:25

## 2019-06-23 RX ADMIN — SENNOSIDES AND DOCUSATE SODIUM 2 TABLET: 8.6; 5 TABLET ORAL at 09:25

## 2019-06-23 RX ADMIN — RANITIDINE 150 MG: 150 TABLET ORAL at 06:38

## 2019-06-23 RX ADMIN — VINCRISTINE SULFATE 0.78 MG: 1 INJECTION, SOLUTION INTRAVENOUS at 10:02

## 2019-06-23 RX ADMIN — RANITIDINE 150 MG: 150 TABLET ORAL at 16:29

## 2019-06-23 RX ADMIN — SENNOSIDES AND DOCUSATE SODIUM 2 TABLET: 8.6; 5 TABLET ORAL at 21:26

## 2019-06-23 RX ADMIN — POTASSIUM CHLORIDE 20 MEQ: 29.8 INJECTION, SOLUTION INTRAVENOUS at 09:26

## 2019-06-23 RX ADMIN — PREDNISONE 60 MG: 10 TABLET ORAL at 09:25

## 2019-06-23 RX ADMIN — PROCHLORPERAZINE MALEATE 10 MG: 10 TABLET ORAL at 15:08

## 2019-06-23 RX ADMIN — ETOPOSIDE 200 MG: 20 INJECTION, SOLUTION, CONCENTRATE INTRAVENOUS at 10:02

## 2019-06-23 ASSESSMENT — ACTIVITIES OF DAILY LIVING (ADL)
ADLS_ACUITY_SCORE: 10

## 2019-06-23 ASSESSMENT — MIFFLIN-ST. JEOR: SCORE: 1718.18

## 2019-06-23 NOTE — PLAN OF CARE
AF, VSS.  Up and active.  Day 3/4 chemo infusing.  Denies nausea on scheduled antiemetics.  Does report decreasing appetite but eating at each meal time. Stable.

## 2019-06-23 NOTE — PLAN OF CARE
00:00-07:00 am  AF VSS  Day 3 CIVI chemo Vincristine/Doxorubicin and Etoposide infusing  Tolerating well thus far  No nausea/vomiting  Denied pain  Voiding well, good UOP  Resting/sleeping well and no new issues overnight  Continue w/POC

## 2019-06-23 NOTE — PROGRESS NOTES
[facility], [place]    Hematology / Oncology Progress Note     Assessment & Plan   Christiano Molina is a 43 year old male with Burkitt lymphoma, now s/p 4 cycles of DA-EPOCH-R. PET/CT on 6/18/19 demonstrated CR. He is being admitted for Cycle 5.        #Burkitt Lymphoma, EBV+, Stage IA   Follows with Dr. Ramirez. Initially presented in mid-March with a right axillary mass, found to have high-grade B-cell lymphoma. Initial pathology was not fully clear whether DLBCL or Burkitt, but subsequently this was finalized by [facility] Path review as Burkitt lymphoma (EBV+). Plan is to do 6 cycles of DA-R-EPOCH with IT chemo prophylaxis during C3-6. PET/CT (done 6/18) after 4 cycles demonstrated complete response.   - place PICC on admission  - 20% dose escalation per protocol for C5  - can stop Allopurinol as d/w outpatient team, and relayed to patient      Treatment Plan: Cycle 5 DA-EPOCH-R (Day 0/1=6/20/19) 6/23/19 is D4  -Rituxan 375mg/m2 (700mg) on D0   -Prednisone (decreased to 30mg/m2=60mg) BID on D1-5   -Etoposide 200mg CIVI on D1-4   -Vincristine 0.4mg/m2 (0.78) / Doxorubicin 40mg CIVI on D1-4   -Cytoxan 3000mg IV on D5    -Dexamethasone 8mg daily on D6,7   -IT triple therapy chemotherapy on D1 and D5. Plan to give D1 IT chemo on Friday, 6/21 with procedure being done by Inpatient Procedure team. Would be due again on Monday, 6/24. Will need CAPS consult again Monday AM.  Plan for 1L NS bolus shama-procedurally as well as Ativan premed prior to LPs. Pt brought this own [employer] products for caffeine intake after procedures since this has worked well in past.   -supportive care/premeds: Tylenol/Benadryl prior to Rituxan, Emend on D1 and D5, Compazine scheduled 10mg q8h, Zofran 16mg daily D1-5, Senna-S, and melatonin for assist with sleep inpatient/steroid-induced insomnia  -Spoke to CAPS team on 06/23. They will do LP in the AM. Consult order placed, routine labs ordered (no gm stain, culture),  "1 L NS bolus before LP has been ordered       #Mild anemia  2/2 chemotherapy. Do not anticipate transfusion needs during inpatient admission.      #ID PPx  - continue ppx ACV  - can hold Fluconazole during admission (ANC >1.0) and RESTART on discharge  - hold Levaquin ppx given ANC >1.0  PPx Levaquin was added at time of last discharge (per Dr. Ramirez since dose level was high, he did not want to wait for neutropenia), but ANC did not drop <1.0. Given bowel issues with Levaquin after last cycle, may be worth reviewing with Dr. Ramirez to ask if pt could hold off on starting while we follow counts closely, though understand this dose is again higher than even last cycle).      #Chemotherapy-induced nausea  Improved with scheduled antiemetics during chemo.   - scheduled antiemetics again with this cycle --  Will give Emend again on D5 (ordered)  - consider zyprexa if increased difficulty with nausea     #Constipation, chemotherapy induced  No current constipation or diarrhea. Of note, had \"bowel upset\"/increased stools which he attributes to Levaquin after last cycle.   - Senna-S BID  - Miralax PRN     #GERD  - continue home Zantac     #Peripheral neuropathy.   Mild numbness/tingling affecting first 4 digits of b/l hands. Not interfering with function. No current c/o neuropathy in feet.  - monitor     #Deconditioning.   He has declined PT inpatient or outpatient CA rehab and elects to continue walking/working out on his own. Continue to monitor.      #H/o Hoarseness, improved.    At time of admission for C4, patient endorsed approx 3 weeks of vocal hoarseness in absence of other URI symptoms. ENT evaluated pt prior to initiating chemotherapy and saw small area of granulation tissue or polyp on posterior aspect of TVC. No concern for neuropathy of vocal cords, most likely viral in nature and he was cleared to begin chemotherapy.      #H/o post-LP HA.   - improved with lying flat >1hr, IV fluid bolus shama-procedurally, " "and oral caffeine ([employer]) after procedure.      FEN  - no current IVFs in addition to fluids with CIVI chemo  - lyte repletion per unit protocol  - regular diet     PPx  - VTE: hold VTE ppx given plan for LP on 6/21  - GI: Zantac as above     Dispo: Anticipate discharge on D5 after Cytoxan on Monday, 6/24.  - outpatient team has requested clinic f/u after this cycle (Neulasta, labs/transfusions twice weekly)       Disposition Plan   Expected discharge: 2 - 3 days, recommended to prior living arrangement once chemotherapy completed.     Entered: Ravi Mcdaniel 06/23/2019, 10:28 AM   Information in the above section will display in the discharge planner report.    Discussed and seen with staff attending, Dr. Booker.    Electronically signed by:  Ravi Mcdaniel M.D.   Pager: [phone]  6/23/2019, 10:30 AM  [facility]     Interval History   No acute events overnight.  No fevers, chills, vomiting, CP, or SOB.  No nausea \"yet.\"  No diarrhea, no pain.     Remainder of 10 point ROS reviewed and negative except as in HPI.      Physical Exam   Temp: 97.2  F (36.2  C) Temp src: Oral BP: 120/62 Pulse: 70   Resp: 18 SpO2: 98 % O2 Device: None (Room air)    Vitals:    06/21/19 0937 06/22/19 0819 06/23/19 0937   Weight: 81.8 kg (180 lb 4.8 oz) 82.6 kg (182 lb 1.6 oz) 81.7 kg (180 lb 1.6 oz)     Vital Signs with Ranges  Temp:  [96.8  F (36  C)-97.8  F (36.6  C)] 97.2  F (36.2  C)  Pulse:  [58-78] 70  Resp:  [16-18] 18  BP: (118-123)/(62-68) 120/62  SpO2:  [96 %-99 %] 98 %  I/O last 3 completed shifts:  In: 3359.2 [P.O.:1410; I.V.:20; IV Piggyback:1929.2]  Out: 4200 [Urine:4200]    Constitutional: Awake, alert, cooperative, no apparent distress, and appears stated age.   Eyes: PERRL, anicteric sclera  ENT: Normocephalic, without obvious abnormality, atraumatic.  Respiratory: No increased work of breathing. CTAB  Cardiovascular: Regular rate, regular rhythm, no m/r/g  GI: Normal bowel sounds, soft, non-distended, " non-tender.  Musculoskeletal: No edema B/L LE.   Neurologic: A&O x4, cranial nerves II-XII appear to be grossly intact.  No focal deficits.   Neuropsychiatric: Calm, normal eye contact, alert, normal affect memory for past and recent events intact and thought process normal.      Medications     - MEDICATION INSTRUCTIONS -       - MEDICATION INSTRUCTIONS -       sodium chloride         acyclovir  400 mg Oral BID     Chemotherapy Infusing-Continuous Infusion   Does not apply Q8H     [START ON 6/24/2019] cyclophosphamide (CYTOXAN) chemo infusion  3,000 mg Intravenous Once     [START ON 6/26/2019] dexamethasone  8 mg Oral Daily     etoposide (TOPOSAR) chemo infusion  103.68 mg/m2 (Treatment Plan Recorded) Intravenous Q22H     [START ON 6/24/2019] fosaprepitant (EMEND) 150 mg intermittent infusion  150 mg Intravenous Once     [START ON 6/24/2019] INTRATHECAL chemo - Cytarabine and/or methotrexate and/or Hydrocortisone   Intrathecal Once     [START ON 6/24/2019] LORazepam  1 mg Intravenous Once     melatonin  3 mg Oral At Bedtime     ondansetron  16 mg Oral Q22H     polyethylene glycol  17 g Oral Daily     predniSONE  30 mg/m2 (Treatment Plan Recorded) Oral BID     prochlorperazine  10 mg Oral Q8H     ranitidine  150 mg Oral BID     senna-docusate  2 tablet Oral BID     sodium chloride (PF)  10 mL Intracatheter Q7 Days     vinCRIStine/DOXOrubicin (ONCOVIN/ADRIAMYCIN) chemo infusion  0.4 mg/m2 (Treatment Plan Recorded) Intravenous Q22H       Data   Results for orders placed or performed during the hospital encounter of 06/20/19 (from the past 24 hour(s))   Basic metabolic panel   Result Value Ref Range    Sodium 141 133 - 144 mmol/L    Potassium 3.2 (L) 3.4 - 5.3 mmol/L    Chloride 110 (H) 94 - 109 mmol/L    Carbon Dioxide 26 20 - 32 mmol/L    Anion Gap 5 3 - 14 mmol/L    Glucose 124 (H) 70 - 99 mg/dL    Urea Nitrogen 12 7 - 30 mg/dL    Creatinine 0.84 0.66 - 1.25 mg/dL    GFR Estimate >90 >60 mL/min/[1.73_m2]    GFR  Estimate If Black >90 >60 mL/min/[1.73_m2]    Calcium 8.3 (L) 8.5 - 10.1 mg/dL   CBC with platelets differential   Result Value Ref Range    WBC 8.2 4.0 - 11.0 10e9/L    RBC Count 3.25 (L) 4.4 - 5.9 10e12/L    Hemoglobin 10.0 (L) 13.3 - 17.7 g/dL    Hematocrit 31.9 (L) 40.0 - 53.0 %    MCV 98 78 - 100 fl    MCH 30.8 26.5 - 33.0 pg    MCHC 31.3 (L) 31.5 - 36.5 g/dL    RDW 15.9 (H) 10.0 - 15.0 %    Platelet Count 221 150 - 450 10e9/L    Diff Method Automated Method     % Neutrophils 84.7 %    % Lymphocytes 6.6 %    % Monocytes 7.9 %    % Eosinophils 0.0 %    % Basophils 0.1 %    % Immature Granulocytes 0.7 %    Nucleated RBCs 0 0 /100    Absolute Neutrophil 7.0 1.6 - 8.3 10e9/L    Absolute Lymphocytes 0.5 (L) 0.8 - 5.3 10e9/L    Absolute Monocytes 0.7 0.0 - 1.3 10e9/L    Absolute Eosinophils 0.0 0.0 - 0.7 10e9/L    Absolute Basophils 0.0 0.0 - 0.2 10e9/L    Abs Immature Granulocytes 0.1 0 - 0.4 10e9/L    Absolute Nucleated RBC 0.0      Recent Labs   Lab 06/23/19  0538 06/22/19  0617 06/21/19  0814 06/21/19  0615 06/20/19  0902   WBC 8.2 11.3*  --  12.5* 5.0   HGB 10.0* 10.3*  --  10.5* 12.5*   MCV 98 95  --  96 95    219  --  210 212   INR  --   --  1.04  --   --     142  --  141 141   POTASSIUM 3.2* 3.6  --  3.7 4.5   CHLORIDE 110* 112*  --  111* 108   CO2 26 25  --  25 27   BUN 12 11  --  10 15   CR 0.84 0.92  --  0.92 1.15   ANIONGAP 5 5  --  5 6   TAMMY 8.3* 8.4*  --  8.5 8.6   * 126*  --  91 70   ALBUMIN  --   --   --   --  3.9   PROTTOTAL  --   --   --   --  7.1   BILITOTAL  --   --   --   --  0.2   ALKPHOS  --   --   --   --  68   ALT  --   --   --   --  42   AST  --   --   --   --  24     No results found for this or any previous visit (from the past 24 hour(s)).

## 2019-06-23 NOTE — PLAN OF CARE
Chemotherapy  D: Blood return is brisk per PICC catheter.  Complained of some nausea this morning, already improving a tad after eating breakfast.  Given zofran 16mg, his chemo premed which was due, with relief.  Urine output is good as recorded in intake and output flowsheet. Ambulating in halls several times.  Wife at bedside this afternoon.  I: Premedications given (see electronic medical administration record). Dose #4 of Etoposide and dose #4 of Vincristine with Doxorubicin each started to infuse over 22 hours.   A: Tolerating chemotherapy with some nausea, loss of appetite, controlled with current antiemetic regimen.  P: Continue to monitor urine output and symptoms of nausea. Screen for symptoms of toxicity.  Plan LP with chemo instillation tomorrow morning at 0800; needs labs drawn at 0530 and fluid bolus administered 1-2 hours prior to LP.  He also needs ativan 1mg immediately prior to LP procedure.  Will receive Cytoxan after Dose 4 chemos finish then will discharge to home.

## 2019-06-24 VITALS
RESPIRATION RATE: 20 BRPM | SYSTOLIC BLOOD PRESSURE: 135 MMHG | DIASTOLIC BLOOD PRESSURE: 69 MMHG | OXYGEN SATURATION: 97 % | HEIGHT: 70 IN | TEMPERATURE: 97.8 F | WEIGHT: 179 LBS | BODY MASS INDEX: 25.62 KG/M2 | HEART RATE: 76 BPM

## 2019-06-24 LAB
ALBUMIN SERPL-MCNC: 3.3 G/DL (ref 3.4–5)
ALP SERPL-CCNC: 48 U/L (ref 40–150)
ALT SERPL W P-5'-P-CCNC: 30 U/L (ref 0–70)
ANION GAP SERPL CALCULATED.3IONS-SCNC: 7 MMOL/L (ref 3–14)
APPEARANCE CSF: CLEAR
AST SERPL W P-5'-P-CCNC: 14 U/L (ref 0–45)
BASOPHILS # BLD AUTO: 0 10E9/L (ref 0–0.2)
BASOPHILS NFR BLD AUTO: 0.2 %
BILIRUB DIRECT SERPL-MCNC: 0.1 MG/DL (ref 0–0.2)
BILIRUB SERPL-MCNC: 0.6 MG/DL (ref 0.2–1.3)
BUN SERPL-MCNC: 15 MG/DL (ref 7–30)
CALCIUM SERPL-MCNC: 8.5 MG/DL (ref 8.5–10.1)
CHLORIDE SERPL-SCNC: 109 MMOL/L (ref 94–109)
CO2 SERPL-SCNC: 24 MMOL/L (ref 20–32)
COLOR CSF: COLORLESS
COPATH REPORT: NORMAL
CREAT SERPL-MCNC: 0.9 MG/DL (ref 0.66–1.25)
DIFFERENTIAL METHOD BLD: ABNORMAL
EOSINOPHIL # BLD AUTO: 0 10E9/L (ref 0–0.7)
EOSINOPHIL NFR BLD AUTO: 0 %
ERYTHROCYTE [DISTWIDTH] IN BLOOD BY AUTOMATED COUNT: 15.8 % (ref 10–15)
GFR SERPL CREATININE-BSD FRML MDRD: >90 ML/MIN/{1.73_M2}
GLUCOSE CSF-MCNC: 52 MG/DL (ref 40–70)
GLUCOSE SERPL-MCNC: 117 MG/DL (ref 70–99)
HCT VFR BLD AUTO: 32.1 % (ref 40–53)
HGB BLD-MCNC: 10.3 G/DL (ref 13.3–17.7)
IMM GRANULOCYTES # BLD: 0 10E9/L (ref 0–0.4)
IMM GRANULOCYTES NFR BLD: 0.7 %
INR PPP: 1.16 (ref 0.86–1.14)
LYMPHOCYTES # BLD AUTO: 0.5 10E9/L (ref 0.8–5.3)
LYMPHOCYTES NFR BLD AUTO: 8.5 %
MAGNESIUM SERPL-MCNC: 2 MG/DL (ref 1.6–2.3)
MCH RBC QN AUTO: 30.4 PG (ref 26.5–33)
MCHC RBC AUTO-ENTMCNC: 32.1 G/DL (ref 31.5–36.5)
MCV RBC AUTO: 95 FL (ref 78–100)
MONOCYTES # BLD AUTO: 0.2 10E9/L (ref 0–1.3)
MONOCYTES NFR BLD AUTO: 2.8 %
NEUTROPHILS # BLD AUTO: 5.3 10E9/L (ref 1.6–8.3)
NEUTROPHILS NFR BLD AUTO: 87.8 %
NRBC # BLD AUTO: 0 10*3/UL
NRBC BLD AUTO-RTO: 0 /100
PHOSPHATE SERPL-MCNC: 2.1 MG/DL (ref 2.5–4.5)
PLATELET # BLD AUTO: 238 10E9/L (ref 150–450)
POTASSIUM SERPL-SCNC: 3 MMOL/L (ref 3.4–5.3)
PROT CSF-MCNC: 45 MG/DL (ref 15–60)
PROT SERPL-MCNC: 6.2 G/DL (ref 6.8–8.8)
RBC # BLD AUTO: 3.39 10E12/L (ref 4.4–5.9)
RBC # CSF MANUAL: NORMAL /UL (ref 0–2)
SODIUM SERPL-SCNC: 140 MMOL/L (ref 133–144)
TUBE # CSF: 1 #
WBC # BLD AUTO: 6 10E9/L (ref 4–11)
WBC # CSF MANUAL: NORMAL /UL (ref 0–5)

## 2019-06-24 PROCEDURE — 25000132 ZZH RX MED GY IP 250 OP 250 PS 637: Performed by: NURSE PRACTITIONER

## 2019-06-24 PROCEDURE — 25000128 H RX IP 250 OP 636: Performed by: INTERNAL MEDICINE

## 2019-06-24 PROCEDURE — 80076 HEPATIC FUNCTION PANEL: CPT | Performed by: PHYSICIAN ASSISTANT

## 2019-06-24 PROCEDURE — 25000131 ZZH RX MED GY IP 250 OP 636 PS 637: Performed by: PHYSICIAN ASSISTANT

## 2019-06-24 PROCEDURE — 62270 DX LMBR SPI PNXR: CPT | Performed by: PEDIATRICS

## 2019-06-24 PROCEDURE — 80048 BASIC METABOLIC PNL TOTAL CA: CPT | Performed by: PHYSICIAN ASSISTANT

## 2019-06-24 PROCEDURE — 25000132 ZZH RX MED GY IP 250 OP 250 PS 637: Performed by: PHYSICIAN ASSISTANT

## 2019-06-24 PROCEDURE — 84100 ASSAY OF PHOSPHORUS: CPT | Performed by: PHYSICIAN ASSISTANT

## 2019-06-24 PROCEDURE — 89050 BODY FLUID CELL COUNT: CPT | Performed by: INTERNAL MEDICINE

## 2019-06-24 PROCEDURE — 84157 ASSAY OF PROTEIN OTHER: CPT | Performed by: INTERNAL MEDICINE

## 2019-06-24 PROCEDURE — 85610 PROTHROMBIN TIME: CPT | Performed by: PHYSICIAN ASSISTANT

## 2019-06-24 PROCEDURE — 83735 ASSAY OF MAGNESIUM: CPT | Performed by: PHYSICIAN ASSISTANT

## 2019-06-24 PROCEDURE — 82945 GLUCOSE OTHER FLUID: CPT | Performed by: INTERNAL MEDICINE

## 2019-06-24 PROCEDURE — 3E0R305 INTRODUCTION OF OTHER ANTINEOPLASTIC INTO SPINAL CANAL, PERCUTANEOUS APPROACH: ICD-10-PCS | Performed by: INTERNAL MEDICINE

## 2019-06-24 PROCEDURE — 36592 COLLECT BLOOD FROM PICC: CPT | Performed by: PHYSICIAN ASSISTANT

## 2019-06-24 PROCEDURE — 25000128 H RX IP 250 OP 636: Performed by: PHYSICIAN ASSISTANT

## 2019-06-24 PROCEDURE — 40000802 ZZH SITE CHECK

## 2019-06-24 PROCEDURE — 85025 COMPLETE CBC W/AUTO DIFF WBC: CPT | Performed by: PHYSICIAN ASSISTANT

## 2019-06-24 PROCEDURE — 25800030 ZZH RX IP 258 OP 636: Performed by: PHYSICIAN ASSISTANT

## 2019-06-24 RX ORDER — SODIUM PHOSPHATE, DIBASIC, ANHYDROUS, POTASSIUM PHOSPHATE, MONOBASIC, AND SODIUM PHOSPHATE, MONOBASIC, MONOHYDRATE 852; 155; 130 MG/1; MG/1; MG/1
1 TABLET, COATED ORAL 4 TIMES DAILY
Qty: 8 TABLET | Refills: 0 | Status: SHIPPED | OUTPATIENT
Start: 2019-06-24 | End: 2019-06-24

## 2019-06-24 RX ORDER — SODIUM PHOSPHATE, DIBASIC, ANHYDROUS, POTASSIUM PHOSPHATE, MONOBASIC, AND SODIUM PHOSPHATE, MONOBASIC, MONOHYDRATE 852; 155; 130 MG/1; MG/1; MG/1
TABLET, COATED ORAL
Qty: 3 TABLET | Refills: 0 | Status: ON HOLD | OUTPATIENT
Start: 2019-06-24 | End: 2019-07-01

## 2019-06-24 RX ORDER — POTASSIUM CHLORIDE 1500 MG/1
20 TABLET, EXTENDED RELEASE ORAL DAILY
Qty: 7 TABLET | Refills: 0 | Status: ON HOLD | OUTPATIENT
Start: 2019-06-24 | End: 2019-07-01

## 2019-06-24 RX ORDER — DEXAMETHASONE 4 MG/1
8 TABLET ORAL DAILY
Qty: 4 TABLET | Refills: 0 | Status: ON HOLD | OUTPATIENT
Start: 2019-06-26 | End: 2019-07-01

## 2019-06-24 RX ADMIN — POTASSIUM & SODIUM PHOSPHATES POWDER PACK 280-160-250 MG 1 PACKET: 280-160-250 PACK at 10:54

## 2019-06-24 RX ADMIN — ACYCLOVIR 400 MG: 200 CAPSULE ORAL at 08:00

## 2019-06-24 RX ADMIN — RANITIDINE 150 MG: 150 TABLET ORAL at 07:07

## 2019-06-24 RX ADMIN — SODIUM CHLORIDE 1000 ML: 9 INJECTION, SOLUTION INTRAVENOUS at 06:11

## 2019-06-24 RX ADMIN — POTASSIUM CHLORIDE 40 MEQ: 750 TABLET, EXTENDED RELEASE ORAL at 10:19

## 2019-06-24 RX ADMIN — LORAZEPAM 0.5 MG: 2 INJECTION INTRAMUSCULAR; INTRAVENOUS at 02:06

## 2019-06-24 RX ADMIN — LORAZEPAM 1 MG: 2 INJECTION INTRAMUSCULAR; INTRAVENOUS at 09:09

## 2019-06-24 RX ADMIN — SODIUM CHLORIDE 150 MG: 9 INJECTION, SOLUTION INTRAVENOUS at 07:09

## 2019-06-24 RX ADMIN — POTASSIUM CHLORIDE 20 MEQ: 750 TABLET, EXTENDED RELEASE ORAL at 11:45

## 2019-06-24 RX ADMIN — CYCLOPHOSPHAMIDE 3000 MG: 2 INJECTION, POWDER, FOR SOLUTION INTRAVENOUS; ORAL at 08:02

## 2019-06-24 RX ADMIN — PREDNISONE 60 MG: 10 TABLET ORAL at 08:01

## 2019-06-24 RX ADMIN — METHOTREXATE: 25 INJECTION, SOLUTION INTRA-ARTERIAL; INTRAMUSCULAR; INTRATHECAL; INTRAVENOUS at 09:18

## 2019-06-24 RX ADMIN — ONDANSETRON HYDROCHLORIDE 16 MG: 8 TABLET, FILM COATED ORAL at 07:08

## 2019-06-24 ASSESSMENT — ACTIVITIES OF DAILY LIVING (ADL)
ADLS_ACUITY_SCORE: 10

## 2019-06-24 ASSESSMENT — MIFFLIN-ST. JEOR: SCORE: 1713.19

## 2019-06-24 NOTE — PLAN OF CARE
00:00-07:00 am  AF with stable VS  Day 4 of 4 CIVI chemo Etoposide and Doxorubicin/Vincristine infusing  Tolerating chemo well thus far  No nausea/vomiting with scheduled antiemetics   Denied pain  Up ad jessica  Voiding well, good UOP   Plan for LP today at 08:00 am  IVF  ml/hr x 2 started at 06:00 am  Due for Cytoxan on day 5 today at 08:00 am and premeds given  Ativan 0.5 mg IV x1  Pt resting/sleeping well and no new issues overnight    Plan to discharge home today pending chemo completion and picc out prior discharge  Continue w/POC

## 2019-06-24 NOTE — DISCHARGE INSTRUCTIONS
Contact Numbers    [facility] Main Line: [phone]  [facility] Triage and after hours / weekends / holidays:  [phone]      Please call the triage or after hours line if you experience a temperature greater than or equal to 100.5, shaking chills, have uncontrolled nausea, vomiting and/or diarrhea, dizziness, shortness of breath, chest pain, bleeding, unexplained bruising, or if you have any other new/concerning symptoms, questions or concerns.      If you are having any concerning symptoms or wish to speak to a provider before your next infusion visit, please call your care coordinator or triage to notify them so we can adequately serve you.     If you need a refill on a narcotic prescription or other medication, please call before your infusion appointment.

## 2019-06-24 NOTE — PLAN OF CARE
AF, VSS.  Reports stomach irritation from prednisone, zantac BID with relief.  Up walking at least 3 times this evening.  Decreasing appetite, poor intake at supper time but reported good lunch and breakfast. Day 4 chemo infusing.  Plan LP tomorrow morning, see IV flush orders, night shift will initiate.  Plan day 5 chemo at about 08:00, night shift will give antiemetics at 07:00.  If all goes well Prudencio will discharge after completion of bed rest and IV chemo.  Plans picc out prior to discharge, no new teaching needs.

## 2019-06-24 NOTE — DISCHARGE SUMMARY
[facility], [place]    Discharge Summary  Hematology / Oncology    Date of Admission:  6/20/2019  Date of Discharge:  6/24/2019  Discharging Provider: DEJA Gotti CNP    Discharge Diagnoses   Active Problems:    DLBCL (diffuse large B cell lymphoma) (H)      History of Present Illness   Christiano Molina is a 43 year old male with Burkitt lymphoma, now s/p 4 cycles of DA-EPOCH-R. PET/CT on 6/18/19 demonstrated CR. He is being admitted for Cycle 5.  Tolerated chemo well.  Will discharge today with plans for Neulasta tomorrow at [facility], labs/PRN transfusions arranged twice weekly.  ANTHONY follow up July 7th as scheduled.  Patient complaining of voice hoarseness on day of discharge.  No other respiratory symptoms and afebrile.  Patient was evaluated for similar complaints with last cycle of chemo by ENT inpatient.  Discussed with patient to monitor, notify clinic if symptoms not improving or worsening. Patient instructed to continue acyclovir, fluconazole at discharge. Will start levaquin when WBC count drops below 1000.       Hospital Course   Christiano Molina was admitted on 6/20/2019.  The following problems were addressed during his hospitalization:    Burkitt Lymphoma, EBV+, Stage IA   Follows with Dr. Ramirez. Initially presented in mid-March with a right axillary mass, found to have high-grade B-cell lymphoma. Initial pathology was not fully clear whether DLBCL or Burkitt, but subsequently this was finalized by [facility] Heme Path review as Burkitt lymphoma (EBV+). Plan is to do 6 cycles of DA-R-EPOCH with IT chemo prophylaxis during C3-6. PET/CT (done 6/18) after 4 cycles demonstrated complete response.   - place PICC on admission  - 20% dose escalation per protocol for C5  - can stop Allopurinol as d/w outpatient team, and relayed to patient      Treatment Plan: Cycle 5 DA-EPOCH-R (Day 0/1=6/20/19) 6/24/19 is D5  -Rituxan 375mg/m2 (700mg) on D0   -Prednisone (decreased to  "30mg/m2=60mg) BID on D1-5   -Etoposide 200mg CIVI on D1-4   -Vincristine 0.4mg/m2 (0.78) / Doxorubicin 40mg CIVI on D1-4   -Cytoxan 3000mg IV on D5    -Dexamethasone 8mg daily on D6,7   -IT triple therapy chemotherapy on D1 and D5 via CAPS team.  Flow pending.      #Mild anemia  2/2 chemotherapy. Do not anticipate transfusion needs during inpatient admission.      #ID PPx  - continue ppx ACV  - can hold Fluconazole during admission (ANC >1.0) and RESTART on discharge  - hold Levaquin ppx given ANC >1.0  PPx Levaquin was added at time of last discharge (per Dr. Ramirez since dose level was high, he did not want to wait for neutropenia), but ANC did not drop <1.0. Given bowel issues with Levaquin after last cycle, ok to wait to begin levaquin until counts drop.      #Chemotherapy-induced nausea  Improved with scheduled antiemetics during chemo.   - scheduled antiemetics again with this cycle -- received Emend on D1, D5.     #Constipation, chemotherapy induced  No current constipation or diarrhea. Of note, had \"bowel upset\"/increased stools which he attributes to Levaquin after last cycle.   - Senna-S BID  - Miralax PRN     #GERD  - continue home Zantac     #Peripheral neuropathy.   Mild numbness/tingling affecting first 4 digits of b/l hands. Not interfering with function. No current c/o neuropathy in feet.  - monitor     #Deconditioning.   He has declined PT inpatient or outpatient CA rehab and elects to continue walking/working out on his own. Continue to monitor.      #H/o Hoarseness, improved.    At time of admission for C4, patient endorsed approx 3 weeks of vocal hoarseness in absence of other URI symptoms. ENT evaluated pt prior to initiating chemotherapy and saw small area of granulation tissue or polyp on posterior aspect of TVC. No concern for neuropathy of vocal cords, most likely viral in nature and he was cleared to begin chemotherapy.   -- noting worsening hoarseness on day of discharge. Patient to f/u " in clinic      #H/o post-LP HA.   - improved with lying flat >1hr, IV fluid bolus shama-procedurally, and oral caffeine (coca-cola) after procedure.      FEN  - no current IVFs in addition to fluids with CIVI chemo  - lyte repletion per unit protocol  - regular diet     PPx  - VTE: hold VTE ppx given plan for LP on 6/21  - GI: Zantac as above     Dispo: Anticipate discharge on D5 after Cytoxan on Monday, 6/24.  - outpatient team has requested clinic f/u after this cycle (Neulasta, labs/transfusions twice weekly)       Discussed with Dr. Booker.     Radha Krishnamurthy, APRN CNP    Significant Results and Procedures   N/a     Pending Results   These results will be followed up by [facility]   Unresulted Labs Ordered in the Past 30 Days of this Admission     Date and Time Order Name Status Description    6/22/2019 1217 Leukemia Lymphoma Evaluation CSF In process     6/21/2019 0753 Leukemia Lymphoma Evaluation (Flow Cytometry) In process     6/21/2019 0753 CSF Culture Aerobic Bacterial Tube 2 Preliminary           Code Status   Full Code    Primary Care Physician   Physician No Ref-Primary    Physical Exam   Temp: 97.8  F (36.6  C) Temp src: Oral BP: 135/69 Pulse: 76   Resp: 20 SpO2: 97 % O2 Device: None (Room air)    Vitals:    06/22/19 0819 06/23/19 0937 06/24/19 0736   Weight: 82.6 kg (182 lb 1.6 oz) 81.7 kg (180 lb 1.6 oz) 81.2 kg (179 lb)     Vital Signs with Ranges  Temp:  [96.1  F (35.6  C)-98.6  F (37  C)] 97.8  F (36.6  C)  Pulse:  [55-76] 76  Resp:  [16-20] 20  BP: (124-142)/(67-75) 135/69  SpO2:  [95 %-98 %] 97 %  I/O last 3 completed shifts:  In: 3675 [P.O.:1170; I.V.:150; IV Piggyback:2355]  Out: 4950 [Urine:4950]    Constitutional: Awake, alert, cooperative, no apparent distress, and appears stated age.   Eyes: Lids and lashes normal, pupils equal, round and reactive to light, extra ocular muscles intact, sclera clear, conjunctiva normal.  ENT: Normocephalic, without obvious abnormality,  atraumatic.  Respiratory: No increased work of breathing. No oxygen. LS clear, no crackles or wheezes  Cardiovascular: Regular rate, regular rhythm, no murmurs.  GI: Normal bowel sounds, soft, non-distended, non-tender.  Musculoskeletal: No edema B/L LE. No obvious joint swelling or deformities.   Neurologic: A&O x4, cranial nerves II-XII appear to be grossly intact.  No focal deficits.   Neuropsychiatric: Calm, normal eye contact, alert, normal affect memory for past and recent events intact and thought process normal.      Time Spent on this Encounter   I, Radha Krishnamurthy, personally saw the patient today and spent greater than 30 minutes discharging this patient.    Discharge Disposition   Discharged to home  Condition at discharge: Stable    Consultations This Hospital Stay   VASCULAR ACCESS ADULT IP CONSULT  INTERNAL MEDICINE PROCEDURE TEAM ADULT IP CONSULT [facility] - LUMBAR PUNCTURE  INTERNAL MEDICINE PROCEDURE TEAM ADULT IP CONSULT [facility] - LUMBAR PUNCTURE    Discharge Orders      *CBC with platelets differential    Last Lab Result: Hemoglobin (g/dL)       Date                     Value                 06/24/2019               10.3 (L)         ----------     Comprehensive metabolic panel     Reason for your hospital stay    You were admitted for cycle 5 chemotherapy     Activity    Your activity upon discharge: as tolerated     When to contact your care team    [facility] cancer clinic triage line at [phone] for temp >100.4, uncontrolled nausea/vomiting/diarrhea/constipation, unrelieved pain, bleeding not relieved with pressure, dizziness, chest pain, shortness of breath, loss of consciousness, and any new or concerning symptoms.     Follow Up and recommended labs and tests    Follow up as scheduled.   You should continue taking acyclovir and fluconazole at discharge.  You will need to start antibiotic (levaquin) if your WBC or ANC drop below 1000. I am sending you home with potassium/phos  supplements.     Full Code     Diet    Follow this diet upon discharge: regular     Discharge Medications   Current Discharge Medication List      START taking these medications    Details   dexamethasone (DECADRON) 4 MG tablet Take 2 tablets (8 mg) by mouth daily ON 6/25 & 6/26  Qty: 4 tablet, Refills: 0    Associated Diagnoses: High grade B-cell lymphoma (H)      K-PHOS-NEUTRAL [id number] MG tablet Take 1 tablet by mouth every 6 hours to complete phos replacement today (6/24).  Qty: 3 tablet, Refills: 0    Associated Diagnoses: High grade B-cell lymphoma (H)      potassium chloride ER (K-TAB) 20 MEQ CR tablet Take 1 tablet (20 mEq) by mouth daily for 7 days  Qty: 7 tablet, Refills: 0    Associated Diagnoses: High grade B-cell lymphoma (H)         CONTINUE these medications which have NOT CHANGED    Details   acyclovir (ZOVIRAX) 200 MG capsule Take 2 capsules (400 mg) by mouth 2 times daily  Qty: 120 capsule, Refills: 0    Associated Diagnoses: High grade malignant lymphoma (H)      calcium carbonate (TUMS) 500 MG chewable tablet Take 1 chew tab by mouth 2 times daily as needed for heartburn      fluconazole (DIFLUCAN) 100 MG tablet Take 1 tablet (100 mg) by mouth daily  Qty: 30 tablet, Refills: 0    Associated Diagnoses: High grade malignant lymphoma (H)      levofloxacin (LEVAQUIN) 250 MG tablet Take 1 tablet (250 mg) by mouth daily . Continue until advised by clinic team to stop.  Qty: 30 tablet, Refills: 0    Associated Diagnoses: Chemotherapy-induced neutropenia (H)      ondansetron (ZOFRAN) 8 MG tablet Take 1 tablet (8 mg) by mouth every 8 hours as needed for nausea  Qty: 30 tablet, Refills: 0    Associated Diagnoses: High grade B-cell lymphoma (H)      ondansetron (ZOFRAN-ODT) 8 MG ODT tab Take 1 tablet (8 mg) by mouth every 8 hours At first continue scheduled (but can back off as nausea improves)  Qty: 30 tablet, Refills: 0    Associated Diagnoses: High grade malignant lymphoma (H)      prochlorperazine  (COMPAZINE) 10 MG tablet Take 1 tablet (10 mg) by mouth every 6 hours as needed for nausea or vomiting (Try second.)  Qty: 30 tablet, Refills: 0    Associated Diagnoses: High grade B-cell lymphoma (H)      ranitidine (ZANTAC) 150 MG tablet Take 1 tablet (150 mg) by mouth 2 times daily  Qty: 60 tablet, Refills: 1    Associated Diagnoses: Gastroesophageal reflux disease without esophagitis      senna-docusate (SENOKOT-S/PERICOLACE) 8.6-50 MG tablet Take 2 tablets by mouth 2 times daily as needed for constipation    Associated Diagnoses: High grade B-cell lymphoma (H)      triamcinolone (KENALOG) 0.1 % external cream Apply 1 applicator topically 2 times daily as needed for irritation      acetaminophen (TYLENOL) 325 MG tablet Take 2 tablets (650 mg) by mouth every 4 hours as needed for mild pain  Qty: 30 tablet, Refills: 0    Associated Diagnoses: Burkitt lymphoma, unspecified body region (H)         STOP taking these medications       allopurinol (ZYLOPRIM) 300 MG tablet Comments:   Reason for Stopping:             Allergies   No Known Allergies  Data   Most Recent 3 CBC's:  Recent Labs   Lab Test 06/24/19  0608 06/23/19  0538 06/22/19  0617   WBC 6.0 8.2 11.3*   HGB 10.3* 10.0* 10.3*   MCV 95 98 95    221 219      Most Recent 3 BMP's:  Recent Labs   Lab Test 06/24/19  0608 06/23/19  1620 06/23/19  0538 06/22/19  0617     --  141 142   POTASSIUM 3.0* 3.7 3.2* 3.6   CHLORIDE 109  --  110* 112*   CO2 24  --  26 25   BUN 15  --  12 11   CR 0.90  --  0.84 0.92   ANIONGAP 7  --  5 5   TAMMY 8.5  --  8.3* 8.4*   *  --  124* 126*     Most Recent 2 LFT's:  Recent Labs   Lab Test 06/24/19  0608 06/20/19  0902   AST 14 24   ALT 30 42   ALKPHOS 48 68   BILITOTAL 0.6 0.2     Most Recent INR's and Anticoagulation Dosing History:  Anticoagulation Dose History     Recent Dosing and Labs Latest Ref Rng & Units 5/9/2019 5/13/2019 5/31/2019 6/3/2019 6/21/2019 6/24/2019    INR 0.86 - 1.14 1.06 1.10 1.08 1.09 1.04  1.16(H)        Most Recent 3 Troponin's:No lab results found.  Most Recent Cholesterol Panel:No lab results found.  Most Recent 6 Bacteria Isolates From Any Culture (See EPIC Reports for Culture Details):  Recent Labs   Lab Test 06/21/19  1420 06/03/19  1140 05/31/19  1100 05/13/19  1550 05/09/19  1445 03/25/19  1120   CULT Culture negative monitoring continues No growth No growth On day 5, isolated in broth only:  Propionibacterium (Cutibacterium) acnes  *  Critical Value/Significant Value, preliminary result only, called to and read back by  Dr. Diogo Crenshaw on 5/18/2019 at 11:18 am. NS   On day 3, isolated in broth only:  Propionibacterium (Cutibacterium) acnes  Susceptibility testing in progress  *  Critical Value/Significant Value, preliminary result only, called to and read back by  Mary Lew RN U7D @ 1249 5.12.19 JE   No growth     Most Recent TSH, T4 and A1c Labs:No lab results found.  Results for orders placed or performed during the hospital encounter of 06/20/19   XR Chest 1 View    Narrative    Exam: XR CHEST 1 VW, 6/20/2019 1:02 PM    Indication: PICC line    Comparison: 5/30/2019    Findings:   PA view of the chest. Right PICC distal tip projects over the high  superior vena cava. Cardiomediastinal silhouette is not enlarged. No  focal airspace opacity. No pneumothorax. No large pleural effusion.  Visualized upper abdomen is unremarkable. No acute osseous  abnormalities.      Impression    Impression: Right PICC distal tip projects over the high superior vena  cava. No focal airspace opacities    I have personally reviewed the examination and initial interpretation  and I agree with the findings.    JARVIS FAY MD

## 2019-06-24 NOTE — PROGRESS NOTES
[facility], [place]    Hematology / Oncology Progress Note     Physician Attestation   I, Erna Booker, saw and evaluated Christiano Molina as part of a shared visit.  I have reviewed and discussed with the resident their history, physical and plan.     I personally reviewed the vital signs, medications, labs and imaging.     My key history or physical exam findings:   45 yo M with Burkitt Lymphoma stage 1A, here for DA REPOCH cycle #5. 20% dose escalation per protocol.      No nausea, feels well, up walking.      Key management decisions made by me:   # Burkitt lymphoma stage 1A- C5 DA REPOCH, IT chemo on 6/21. D4. Next IT chemo on 6/24. Will discharge following this.   # ID ppx: ACV. Fluc/ACV to start when ANC < 1000.      DIspo: D5 on 6/24      Erna Booker MD   of Medicine  Hematology, Oncology and Transplantation   Pager: [phone]     Date of Service (when I saw the patient): 06/23/19        Assessment & Plan   Christiano Molina is a 43 year old male with Burkitt lymphoma, now s/p 4 cycles of DA-EPOCH-R. PET/CT on 6/18/19 demonstrated CR. He is being admitted for Cycle 5.        #Burkitt Lymphoma, EBV+, Stage IA   Follows with Dr. Ramirez. Initially presented in mid-March with a right axillary mass, found to have high-grade B-cell lymphoma. Initial pathology was not fully clear whether DLBCL or Burkitt, but subsequently this was finalized by [facility] review as Burkitt lymphoma (EBV+). Plan is to do 6 cycles of DA-R-EPOCH with IT chemo prophylaxis during C3-6. PET/CT (done 6/18) after 4 cycles demonstrated complete response.   - place PICC on admission  - 20% dose escalation per protocol for C5  - can stop Allopurinol as d/w outpatient team, and relayed to patient      Treatment Plan: Cycle 5 DA-EPOCH-R (Day 0/1=6/20/19) 6/23/19 is D4  -Rituxan 375mg/m2 (700mg) on D0   -Prednisone (decreased to 30mg/m2=60mg) BID on D1-5   -Etoposide 200mg CIVI on  "D1-4   -Vincristine 0.4mg/m2 (0.78) / Doxorubicin 40mg CIVI on D1-4   -Cytoxan 3000mg IV on D5    -Dexamethasone 8mg daily on D6,7   -IT triple therapy chemotherapy on D1 and D5. Plan to give D1 IT chemo on Friday, 6/21 with procedure being done by Inpatient Procedure team. Would be due again on Monday, 6/24. Will need CAPS consult again Monday AM.  Plan for 1L NS bolus shama-procedurally as well as Ativan premed prior to LPs. Pt brought this own [employer] products for caffeine intake after procedures since this has worked well in past.   -supportive care/premeds: Tylenol/Benadryl prior to Rituxan, Emend on D1 and D5, Compazine scheduled 10mg q8h, Zofran 16mg daily D1-5, Senna-S, and melatonin for assist with sleep inpatient/steroid-induced insomnia  -Spoke to CAPS team on 06/23. They will do LP in the AM. Consult order placed, routine labs ordered (no gm stain, culture), 1 L NS bolus before LP has been ordered       #Mild anemia  2/2 chemotherapy. Do not anticipate transfusion needs during inpatient admission.      #ID PPx  - continue ppx ACV  - can hold Fluconazole during admission (ANC >1.0) and RESTART on discharge  - hold Levaquin ppx given ANC >1.0  PPx Levaquin was added at time of last discharge (per Dr. Ramirez since dose level was high, he did not want to wait for neutropenia), but ANC did not drop <1.0. Given bowel issues with Levaquin after last cycle, may be worth reviewing with Dr. Ramirez to ask if pt could hold off on starting while we follow counts closely, though understand this dose is again higher than even last cycle).      #Chemotherapy-induced nausea  Improved with scheduled antiemetics during chemo.   - scheduled antiemetics again with this cycle --  Will give Emend again on D5 (ordered)  - consider zyprexa if increased difficulty with nausea     #Constipation, chemotherapy induced  No current constipation or diarrhea. Of note, had \"bowel upset\"/increased stools which he attributes to Levaquin " "after last cycle.   - Senna-S BID  - Miralax PRN     #GERD  - continue home Zantac     #Peripheral neuropathy.   Mild numbness/tingling affecting first 4 digits of b/l hands. Not interfering with function. No current c/o neuropathy in feet.  - monitor     #Deconditioning.   He has declined PT inpatient or outpatient CA rehab and elects to continue walking/working out on his own. Continue to monitor.      #H/o Hoarseness, improved.    At time of admission for C4, patient endorsed approx 3 weeks of vocal hoarseness in absence of other URI symptoms. ENT evaluated pt prior to initiating chemotherapy and saw small area of granulation tissue or polyp on posterior aspect of TVC. No concern for neuropathy of vocal cords, most likely viral in nature and he was cleared to begin chemotherapy.      #H/o post-LP HA.   - improved with lying flat >1hr, IV fluid bolus shama-procedurally, and oral caffeine ([employer]) after procedure.      FEN  - no current IVFs in addition to fluids with CIVI chemo  - lyte repletion per unit protocol  - regular diet     PPx  - VTE: hold VTE ppx given plan for LP on 6/21  - GI: Zantac as above     Dispo: Anticipate discharge on D5 after Cytoxan on Monday, 6/24.  - outpatient team has requested clinic f/u after this cycle (Neulasta, labs/transfusions twice weekly)       Disposition Plan   Expected discharge: 2 - 3 days, recommended to prior living arrangement once chemotherapy completed.     Entered: Erna Booker 06/23/2019, 9:15 PM   Information in the above section will display in the discharge planner report.    Discussed and seen with staff attending, Dr. Booker.    Electronically signed by:  Ravi Mcdaniel M.D.   Pager: [phone]  6/23/2019, 10:30 AM  [facility]     Interval History   No acute events overnight.  No fevers, chills, vomiting, CP, or SOB.  No nausea \"yet.\"  No diarrhea, no pain.     Remainder of 10 point ROS reviewed and negative except as in HPI.      Physical Exam "   Temp: 97.4  F (36.3  C) Temp src: Oral BP: 125/73 Pulse: 58   Resp: 16 SpO2: 95 % O2 Device: None (Room air)    Vitals:    06/21/19 0937 06/22/19 0819 06/23/19 0937   Weight: 81.8 kg (180 lb 4.8 oz) 82.6 kg (182 lb 1.6 oz) 81.7 kg (180 lb 1.6 oz)     Vital Signs with Ranges  Temp:  [96.1  F (35.6  C)-98.6  F (37  C)] 97.4  F (36.3  C)  Pulse:  [58-70] 58  Resp:  [16-18] 16  BP: (120-142)/(62-75) 125/73  SpO2:  [95 %-99 %] 95 %  I/O last 3 completed shifts:  In: 3229.2 [P.O.:1170; I.V.:130; IV Piggyback:1929.2]  Out: 4350 [Urine:4350]    Constitutional: Awake, alert, cooperative, no apparent distress, and appears stated age.   Eyes: PERRL, anicteric sclera  ENT: Normocephalic, without obvious abnormality, atraumatic.  Respiratory: No increased work of breathing. CTAB  Cardiovascular: Regular rate, regular rhythm, no m/r/g  GI: Normal bowel sounds, soft, non-distended, non-tender.  Musculoskeletal: No edema B/L LE.   Neurologic: A&O x4, cranial nerves II-XII appear to be grossly intact.  No focal deficits.   Neuropsychiatric: Calm, normal eye contact, alert, normal affect memory for past and recent events intact and thought process normal.      Medications     - MEDICATION INSTRUCTIONS -       - MEDICATION INSTRUCTIONS -       sodium chloride         acyclovir  400 mg Oral BID     Chemotherapy Infusing-Continuous Infusion   Does not apply Q8H     [START ON 6/24/2019] cyclophosphamide (CYTOXAN) chemo infusion  3,000 mg Intravenous Once     [START ON 6/26/2019] dexamethasone  8 mg Oral Daily     etoposide (TOPOSAR) chemo infusion  103.68 mg/m2 (Treatment Plan Recorded) Intravenous Q22H     [START ON 6/24/2019] fosaprepitant (EMEND) 150 mg intermittent infusion  150 mg Intravenous Once     [START ON 6/24/2019] INTRATHECAL chemo - Cytarabine and/or methotrexate and/or Hydrocortisone   Intrathecal Once     [START ON 6/24/2019] LORazepam  1 mg Intravenous Once     melatonin  3 mg Oral At Bedtime     ondansetron  16 mg  Oral Q22H     polyethylene glycol  17 g Oral Daily     predniSONE  30 mg/m2 (Treatment Plan Recorded) Oral BID     prochlorperazine  10 mg Oral Q8H     ranitidine  150 mg Oral BID     senna-docusate  2 tablet Oral BID     sodium chloride (PF)  10 mL Intracatheter Q7 Days     vinCRIStine/DOXOrubicin (ONCOVIN/ADRIAMYCIN) chemo infusion  0.4 mg/m2 (Treatment Plan Recorded) Intravenous Q22H       Data   Results for orders placed or performed during the hospital encounter of 06/20/19 (from the past 24 hour(s))   Basic metabolic panel   Result Value Ref Range    Sodium 141 133 - 144 mmol/L    Potassium 3.2 (L) 3.4 - 5.3 mmol/L    Chloride 110 (H) 94 - 109 mmol/L    Carbon Dioxide 26 20 - 32 mmol/L    Anion Gap 5 3 - 14 mmol/L    Glucose 124 (H) 70 - 99 mg/dL    Urea Nitrogen 12 7 - 30 mg/dL    Creatinine 0.84 0.66 - 1.25 mg/dL    GFR Estimate >90 >60 mL/min/[1.73_m2]    GFR Estimate If Black >90 >60 mL/min/[1.73_m2]    Calcium 8.3 (L) 8.5 - 10.1 mg/dL   CBC with platelets differential   Result Value Ref Range    WBC 8.2 4.0 - 11.0 10e9/L    RBC Count 3.25 (L) 4.4 - 5.9 10e12/L    Hemoglobin 10.0 (L) 13.3 - 17.7 g/dL    Hematocrit 31.9 (L) 40.0 - 53.0 %    MCV 98 78 - 100 fl    MCH 30.8 26.5 - 33.0 pg    MCHC 31.3 (L) 31.5 - 36.5 g/dL    RDW 15.9 (H) 10.0 - 15.0 %    Platelet Count 221 150 - 450 10e9/L    Diff Method Automated Method     % Neutrophils 84.7 %    % Lymphocytes 6.6 %    % Monocytes 7.9 %    % Eosinophils 0.0 %    % Basophils 0.1 %    % Immature Granulocytes 0.7 %    Nucleated RBCs 0 0 /100    Absolute Neutrophil 7.0 1.6 - 8.3 10e9/L    Absolute Lymphocytes 0.5 (L) 0.8 - 5.3 10e9/L    Absolute Monocytes 0.7 0.0 - 1.3 10e9/L    Absolute Eosinophils 0.0 0.0 - 0.7 10e9/L    Absolute Basophils 0.0 0.0 - 0.2 10e9/L    Abs Immature Granulocytes 0.1 0 - 0.4 10e9/L    Absolute Nucleated RBC 0.0    Potassium   Result Value Ref Range    Potassium 3.7 3.4 - 5.3 mmol/L     Recent Labs   Lab 06/23/19  1620 06/23/19  0532  06/22/19  0617 06/21/19  0814 06/21/19  0615 06/20/19  0902   WBC  --  8.2 11.3*  --  12.5* 5.0   HGB  --  10.0* 10.3*  --  10.5* 12.5*   MCV  --  98 95  --  96 95   PLT  --  221 219  --  210 212   INR  --   --   --  1.04  --   --    NA  --  141 142  --  141 141   POTASSIUM 3.7 3.2* 3.6  --  3.7 4.5   CHLORIDE  --  110* 112*  --  111* 108   CO2  --  26 25  --  25 27   BUN  --  12 11  --  10 15   CR  --  0.84 0.92  --  0.92 1.15   ANIONGAP  --  5 5  --  5 6   TAMMY  --  8.3* 8.4*  --  8.5 8.6   GLC  --  124* 126*  --  91 70   ALBUMIN  --   --   --   --   --  3.9   PROTTOTAL  --   --   --   --   --  7.1   BILITOTAL  --   --   --   --   --  0.2   ALKPHOS  --   --   --   --   --  68   ALT  --   --   --   --   --  42   AST  --   --   --   --   --  24     No results found for this or any previous visit (from the past 24 hour(s)).

## 2019-06-24 NOTE — PROCEDURES
Consult and Procedure Service - Procedure Note    Attending: Sina Morrow   Procedure: Lumbar Puncture  Indication: IT chemotherapy and diagnostic LP  Risk Assessment: Patient with INR and plt that are ok. Medium risk  Pre-procedure diagnosis: Burkitt lymphoma  Post-procedure diagnosis: Burkitt Lymphoma    The risks and benefits of the procedure were explained to the patient who expressed understanding and opted to proceed.  Consent was obtained and placed in the chart.  A time out was performed.      The patient was placed in the seated position and the L3-4 innerspace palpated and marked.  3 ml of 1% lidocaine was instilled.  A 3.5 inch 22 gauge needle was inserted and clear fluid obtained on the 2nd attempt.  The stylet was replaced and the needle removed.   A total of 6 ml was removed.     Patient tolerated the procedure well. Please do not hesitate to contact our service if any complications or concerns arise.   Sina Morrow   DOS:  06/24/19

## 2019-06-24 NOTE — PROCEDURES
Lumbar Puncture Procedure Note   Patient: Christiano Molina  : 1975  Date of Procedure: 19                DIAGNOSIS: Burkitt lymphoma  PROCEDURE: Lumbar puncture with intrathecal administration   SITE: Inpatient Room   INDICATION:  Intrathecal administration   Dr. Morrow discussed the procedure, benefits, risks and alternatives to the patient, who voiced understanding of the information and agreed to proceed with the lumbar puncture. Risks include bleeding, infection, and post-procedure headache. Verbal and written consent were obtained.   Description: See note from Dr. Cristhian MD regarding procedure details. Specimen was sent for cell count and differential, protein and glucose, gram stain, culture, and flow cytometry.  Sugey Shaver PA-C then administered hydrocortisone sodium succinate PF 50 mg, methotrexate PF 12 mg in sodium chloride PF 0.9% 6 mL without apparent complication. Chemotherapy previously double checked by two RNs and also verified by this provider and NP. Needle stylet was replaced and then needle removed and site cleaned and dressed with a bandage.  Complications: None. Patient tolerated procedure very well. Atraumatic tap with minimal discomfort during procedure. No significant bleeding or other immediate complication observed.   Disposition: Patient was instructed that patient should rest flat on back x 1 hour. He should notify RN if HA or pain develop.  Procedure performed by: WINSTON Montanez PA-C  Hematology/Oncology  Pager: [phone]

## 2019-06-24 NOTE — PLAN OF CARE
Prudencio is on Cycle 5 Day 5 of DA-EPOCH and tolerating it well.  Denies nausea and eating fair.  Day #5 Cytoxan given over 1 hours via PICC with + blood return.  IT chemo done in room and pt said it went well.  Pt flat for over 1 hour after LP and drinking caffeine.  Also received 1 L NS before LP.  PICC removed without difficulty and pressure to site for 5 minutes - pt had bleed at site after removal with his last PICC.  Discharge instructions and medications reviewed with pt.  No questions.  Pt received po supplements of KCL and Phos this am and will receive more at home.

## 2019-06-25 ENCOUNTER — INFUSION THERAPY VISIT (OUTPATIENT)
Dept: INFUSION THERAPY | Facility: CLINIC | Age: 44
DRG: 847 | End: 2019-06-25
Attending: INTERNAL MEDICINE
Payer: COMMERCIAL

## 2019-06-25 ENCOUNTER — MYC REFILL (OUTPATIENT)
Dept: ONCOLOGY | Facility: CLINIC | Age: 44
End: 2019-06-25

## 2019-06-25 VITALS
TEMPERATURE: 98.9 F | DIASTOLIC BLOOD PRESSURE: 75 MMHG | SYSTOLIC BLOOD PRESSURE: 119 MMHG | HEART RATE: 88 BPM | OXYGEN SATURATION: 96 % | RESPIRATION RATE: 16 BRPM

## 2019-06-25 DIAGNOSIS — C85.10 HIGH GRADE B-CELL LYMPHOMA (H): Primary | ICD-10-CM

## 2019-06-25 DIAGNOSIS — C85.90: ICD-10-CM

## 2019-06-25 PROCEDURE — 25000128 H RX IP 250 OP 636: Performed by: PHYSICIAN ASSISTANT

## 2019-06-25 RX ORDER — ACYCLOVIR 200 MG/1
400 CAPSULE ORAL 2 TIMES DAILY
Qty: 120 CAPSULE | Refills: 0 | Status: SHIPPED | OUTPATIENT
Start: 2019-06-25 | End: 2019-08-05

## 2019-06-25 RX ADMIN — PEGFILGRASTIM 6 MG: 6 INJECTION SUBCUTANEOUS at 15:12

## 2019-06-25 ASSESSMENT — PAIN SCALES - GENERAL: PAINLEVEL: NO PAIN (0)

## 2019-06-25 NOTE — PROGRESS NOTES
Infusion Nursing Note:  Christiano Molina presents today for neulasta.    Patient seen by provider today: No   present during visit today: Not Applicable.    Note: N/A.    Intravenous Access:  No Intravenous access/labs at this visit.    Treatment Conditions:  Not Applicable.      Post Infusion Assessment:  Patient tolerated injection without incident.  Site patent and intact, free from redness, edema or discomfort.       Discharge Plan:   [facility]  Patient will return 6/28 for next appointment.   Patient discharged in stable condition accompanied by: self.  Departure Mode: Ambulatory.    Greg Amador RN

## 2019-06-26 ENCOUNTER — PATIENT OUTREACH (OUTPATIENT)
Dept: ONCOLOGY | Facility: CLINIC | Age: 44
End: 2019-06-26

## 2019-06-26 LAB
BACTERIA SPEC CULT: NO GROWTH
Lab: NORMAL
SPECIMEN SOURCE: NORMAL

## 2019-06-27 LAB
APPEARANCE CSF: CLEAR
APPEARANCE CSF: CLEAR
COLOR CSF: COLORLESS
COLOR CSF: COLORLESS
RBC # CSF MANUAL: 0 /UL (ref 0–2)
RBC # CSF MANUAL: 31 /UL (ref 0–2)
TUBE # CSF: 4 #
TUBE # CSF: 4 #
WBC # CSF MANUAL: 0 /UL (ref 0–5)
WBC # CSF MANUAL: 2 /UL (ref 0–5)

## 2019-06-28 ENCOUNTER — HOSPITAL ENCOUNTER (OUTPATIENT)
Facility: CLINIC | Age: 44
Setting detail: SPECIMEN
Discharge: HOME | End: 2019-06-28
Attending: PHYSICIAN ASSISTANT | Admitting: PHYSICIAN ASSISTANT
Payer: COMMERCIAL

## 2019-06-28 ENCOUNTER — HOSPITAL ENCOUNTER (INPATIENT)
Facility: CLINIC | Age: 44
LOS: 3 days | Discharge: HOME | DRG: 342 | End: 2019-07-01
Attending: EMERGENCY MEDICINE | Admitting: INTERNAL MEDICINE
Payer: COMMERCIAL

## 2019-06-28 ENCOUNTER — INFUSION THERAPY VISIT (OUTPATIENT)
Dept: INFUSION THERAPY | Facility: CLINIC | Age: 44
End: 2019-06-28
Attending: INTERNAL MEDICINE
Payer: COMMERCIAL

## 2019-06-28 ENCOUNTER — TELEPHONE (OUTPATIENT)
Dept: ONCOLOGY | Facility: CLINIC | Age: 44
End: 2019-06-28

## 2019-06-28 ENCOUNTER — APPOINTMENT (OUTPATIENT)
Dept: CT IMAGING | Facility: CLINIC | Age: 44
DRG: 342 | End: 2019-06-28
Attending: EMERGENCY MEDICINE
Payer: COMMERCIAL

## 2019-06-28 DIAGNOSIS — K35.80 ACUTE APPENDICITIS, UNSPECIFIED ACUTE APPENDICITIS TYPE: Primary | ICD-10-CM

## 2019-06-28 DIAGNOSIS — C85.10 HIGH GRADE B-CELL LYMPHOMA (H): ICD-10-CM

## 2019-06-28 DIAGNOSIS — T45.1X5A CHEMOTHERAPY-INDUCED NEUTROPENIA (H): ICD-10-CM

## 2019-06-28 DIAGNOSIS — D70.1 CHEMOTHERAPY-INDUCED NEUTROPENIA (H): ICD-10-CM

## 2019-06-28 DIAGNOSIS — C83.74 BURKITT LYMPHOMA OF LYMPH NODES OF AXILLA (H): ICD-10-CM

## 2019-06-28 DIAGNOSIS — B37.0 THRUSH, ORAL: ICD-10-CM

## 2019-06-28 DIAGNOSIS — K35.30 ACUTE APPENDICITIS WITH LOCALIZED PERITONITIS, WITHOUT PERFORATION, ABSCESS, OR GANGRENE: ICD-10-CM

## 2019-06-28 LAB
ALBUMIN SERPL-MCNC: 3.7 G/DL (ref 3.4–5)
ALBUMIN UR-MCNC: NEGATIVE MG/DL
ALP SERPL-CCNC: 77 U/L (ref 40–150)
ALT SERPL W P-5'-P-CCNC: 22 U/L (ref 0–70)
ANION GAP SERPL CALCULATED.3IONS-SCNC: 5 MMOL/L (ref 3–14)
ANISOCYTOSIS BLD QL SMEAR: SLIGHT
APPEARANCE UR: CLEAR
AST SERPL W P-5'-P-CCNC: 6 U/L (ref 0–45)
BASOPHILS # BLD AUTO: 0 10E9/L (ref 0–0.2)
BASOPHILS # BLD AUTO: 0 10E9/L (ref 0–0.2)
BASOPHILS NFR BLD AUTO: 0 %
BASOPHILS NFR BLD AUTO: 0 %
BILIRUB SERPL-MCNC: 0.4 MG/DL (ref 0.2–1.3)
BILIRUB UR QL STRIP: NEGATIVE
BUN SERPL-MCNC: 18 MG/DL (ref 7–30)
CALCIUM SERPL-MCNC: 9.1 MG/DL (ref 8.5–10.1)
CHLORIDE SERPL-SCNC: 104 MMOL/L (ref 94–109)
CO2 SERPL-SCNC: 29 MMOL/L (ref 20–32)
COLOR UR AUTO: ABNORMAL
CREAT SERPL-MCNC: 0.96 MG/DL (ref 0.66–1.25)
CRP SERPL-MCNC: 24 MG/L (ref 0–8)
DIFFERENTIAL METHOD BLD: ABNORMAL
DIFFERENTIAL METHOD BLD: ABNORMAL
EOSINOPHIL # BLD AUTO: 0 10E9/L (ref 0–0.7)
EOSINOPHIL # BLD AUTO: 0.1 10E9/L (ref 0–0.7)
EOSINOPHIL NFR BLD AUTO: 0 %
EOSINOPHIL NFR BLD AUTO: 1 %
ERYTHROCYTE [DISTWIDTH] IN BLOOD BY AUTOMATED COUNT: 15.6 % (ref 10–15)
ERYTHROCYTE [DISTWIDTH] IN BLOOD BY AUTOMATED COUNT: 15.8 % (ref 10–15)
ERYTHROCYTE [SEDIMENTATION RATE] IN BLOOD BY WESTERGREN METHOD: 12 MM/H (ref 0–15)
GFR SERPL CREATININE-BSD FRML MDRD: >90 ML/MIN/{1.73_M2}
GLUCOSE SERPL-MCNC: 80 MG/DL (ref 70–99)
GLUCOSE UR STRIP-MCNC: NEGATIVE MG/DL
HCT VFR BLD AUTO: 34.8 % (ref 40–53)
HCT VFR BLD AUTO: 35.1 % (ref 40–53)
HGB BLD-MCNC: 11.6 G/DL (ref 13.3–17.7)
HGB BLD-MCNC: 11.7 G/DL (ref 13.3–17.7)
HGB UR QL STRIP: NEGATIVE
KETONES UR STRIP-MCNC: NEGATIVE MG/DL
LACTATE BLD-SCNC: 1.9 MMOL/L (ref 0.7–2)
LEUKOCYTE ESTERASE UR QL STRIP: NEGATIVE
LIPASE SERPL-CCNC: 80 U/L (ref 73–393)
LYMPHOCYTES # BLD AUTO: 0.1 10E9/L (ref 0.8–5.3)
LYMPHOCYTES # BLD AUTO: 0.6 10E9/L (ref 0.8–5.3)
LYMPHOCYTES NFR BLD AUTO: 1.7 %
LYMPHOCYTES NFR BLD AUTO: 6 %
MCH RBC QN AUTO: 30.9 PG (ref 26.5–33)
MCH RBC QN AUTO: 31.4 PG (ref 26.5–33)
MCHC RBC AUTO-ENTMCNC: 33 G/DL (ref 31.5–36.5)
MCHC RBC AUTO-ENTMCNC: 33.6 G/DL (ref 31.5–36.5)
MCV RBC AUTO: 93 FL (ref 78–100)
MCV RBC AUTO: 94 FL (ref 78–100)
METAMYELOCYTES # BLD: 0.1 10E9/L
METAMYELOCYTES NFR BLD MANUAL: 1 %
MONOCYTES # BLD AUTO: 0.1 10E9/L (ref 0–1.3)
MONOCYTES # BLD AUTO: 0.1 10E9/L (ref 0–1.3)
MONOCYTES NFR BLD AUTO: 0.9 %
MONOCYTES NFR BLD AUTO: 1 %
MYELOCYTES # BLD: 0.1 10E9/L
MYELOCYTES NFR BLD MANUAL: 1 %
NEUTROPHILS # BLD AUTO: 7.9 10E9/L (ref 1.6–8.3)
NEUTROPHILS # BLD AUTO: 9.7 10E9/L (ref 1.6–8.3)
NEUTROPHILS NFR BLD AUTO: 90 %
NEUTROPHILS NFR BLD AUTO: 97.4 %
NITRATE UR QL: NEGATIVE
OVALOCYTES BLD QL SMEAR: SLIGHT
PH UR STRIP: 7 PH (ref 5–7)
PLATELET # BLD AUTO: 152 10E9/L (ref 150–450)
PLATELET # BLD AUTO: 158 10E9/L (ref 150–450)
PLATELET # BLD EST: ABNORMAL 10*3/UL
PLATELET # BLD EST: ABNORMAL 10*3/UL
POTASSIUM SERPL-SCNC: 4.3 MMOL/L (ref 3.4–5.3)
PROCALCITONIN SERPL-MCNC: 0.34 NG/ML
PROT SERPL-MCNC: 7.1 G/DL (ref 6.8–8.8)
RADIOLOGIST FLAGS: ABNORMAL
RBC # BLD AUTO: 3.73 10E12/L (ref 4.4–5.9)
RBC # BLD AUTO: 3.75 10E12/L (ref 4.4–5.9)
RBC #/AREA URNS AUTO: 0 /HPF (ref 0–2)
SODIUM SERPL-SCNC: 138 MMOL/L (ref 133–144)
SOURCE: ABNORMAL
SP GR UR STRIP: >1.035 (ref 1–1.03)
UROBILINOGEN UR STRIP-MCNC: NORMAL MG/DL (ref 0–2)
WBC # BLD AUTO: 10.8 10E9/L (ref 4–11)
WBC # BLD AUTO: 8.1 10E9/L (ref 4–11)
WBC #/AREA URNS AUTO: ABNORMAL /HPF (ref 0–5)

## 2019-06-28 PROCEDURE — 85025 COMPLETE CBC W/AUTO DIFF WBC: CPT | Performed by: EMERGENCY MEDICINE

## 2019-06-28 PROCEDURE — 25000128 H RX IP 250 OP 636: Performed by: EMERGENCY MEDICINE

## 2019-06-28 PROCEDURE — 86140 C-REACTIVE PROTEIN: CPT | Performed by: EMERGENCY MEDICINE

## 2019-06-28 PROCEDURE — 80053 COMPREHEN METABOLIC PANEL: CPT | Performed by: PHYSICIAN ASSISTANT

## 2019-06-28 PROCEDURE — 87040 BLOOD CULTURE FOR BACTERIA: CPT | Performed by: EMERGENCY MEDICINE

## 2019-06-28 PROCEDURE — 93005 ELECTROCARDIOGRAM TRACING: CPT

## 2019-06-28 PROCEDURE — 85652 RBC SED RATE AUTOMATED: CPT | Performed by: EMERGENCY MEDICINE

## 2019-06-28 PROCEDURE — 74177 CT ABD & PELVIS W/CONTRAST: CPT

## 2019-06-28 PROCEDURE — 25800030 ZZH RX IP 258 OP 636: Performed by: EMERGENCY MEDICINE

## 2019-06-28 PROCEDURE — 83605 ASSAY OF LACTIC ACID: CPT | Performed by: EMERGENCY MEDICINE

## 2019-06-28 PROCEDURE — 81001 URINALYSIS AUTO W/SCOPE: CPT | Performed by: EMERGENCY MEDICINE

## 2019-06-28 PROCEDURE — 36415 COLL VENOUS BLD VENIPUNCTURE: CPT

## 2019-06-28 PROCEDURE — 83735 ASSAY OF MAGNESIUM: CPT | Performed by: EMERGENCY MEDICINE

## 2019-06-28 PROCEDURE — 83690 ASSAY OF LIPASE: CPT | Performed by: EMERGENCY MEDICINE

## 2019-06-28 PROCEDURE — 85025 COMPLETE CBC W/AUTO DIFF WBC: CPT | Performed by: PHYSICIAN ASSISTANT

## 2019-06-28 PROCEDURE — 12000012 ZZH R&B MS OVERFLOW UMMC

## 2019-06-28 PROCEDURE — 25000132 ZZH RX MED GY IP 250 OP 250 PS 637: Performed by: EMERGENCY MEDICINE

## 2019-06-28 PROCEDURE — 96361 HYDRATE IV INFUSION ADD-ON: CPT | Performed by: EMERGENCY MEDICINE

## 2019-06-28 PROCEDURE — 84145 PROCALCITONIN (PCT): CPT | Performed by: EMERGENCY MEDICINE

## 2019-06-28 PROCEDURE — 96365 THER/PROPH/DIAG IV INF INIT: CPT | Performed by: EMERGENCY MEDICINE

## 2019-06-28 PROCEDURE — 36415 COLL VENOUS BLD VENIPUNCTURE: CPT | Performed by: EMERGENCY MEDICINE

## 2019-06-28 PROCEDURE — 99285 EMERGENCY DEPT VISIT HI MDM: CPT | Mod: Z6 | Performed by: EMERGENCY MEDICINE

## 2019-06-28 PROCEDURE — 96375 TX/PRO/DX INJ NEW DRUG ADDON: CPT | Performed by: EMERGENCY MEDICINE

## 2019-06-28 PROCEDURE — 99285 EMERGENCY DEPT VISIT HI MDM: CPT | Mod: 25 | Performed by: EMERGENCY MEDICINE

## 2019-06-28 PROCEDURE — 84100 ASSAY OF PHOSPHORUS: CPT | Performed by: EMERGENCY MEDICINE

## 2019-06-28 RX ORDER — CHLORHEXIDINE GLUCONATE ORAL RINSE 1.2 MG/ML
15 SOLUTION DENTAL 2 TIMES DAILY
Status: DISCONTINUED | OUTPATIENT
Start: 2019-06-29 | End: 2019-06-29

## 2019-06-28 RX ORDER — SODIUM CHLORIDE, SODIUM LACTATE, POTASSIUM CHLORIDE, CALCIUM CHLORIDE 600; 310; 30; 20 MG/100ML; MG/100ML; MG/100ML; MG/100ML
INJECTION, SOLUTION INTRAVENOUS CONTINUOUS
Status: DISCONTINUED | OUTPATIENT
Start: 2019-06-28 | End: 2019-06-30

## 2019-06-28 RX ORDER — BISACODYL 5 MG
15 TABLET, DELAYED RELEASE (ENTERIC COATED) ORAL DAILY PRN
Status: DISCONTINUED | OUTPATIENT
Start: 2019-06-28 | End: 2019-07-01 | Stop reason: HOSPADM

## 2019-06-28 RX ORDER — LEVOFLOXACIN 5 MG/ML
500 INJECTION, SOLUTION INTRAVENOUS EVERY 24 HOURS
Status: DISCONTINUED | OUTPATIENT
Start: 2019-06-29 | End: 2019-06-30

## 2019-06-28 RX ORDER — ACYCLOVIR 200 MG/1
400 CAPSULE ORAL 2 TIMES DAILY
Status: DISCONTINUED | OUTPATIENT
Start: 2019-06-28 | End: 2019-07-01 | Stop reason: HOSPADM

## 2019-06-28 RX ORDER — IOPAMIDOL 755 MG/ML
107 INJECTION, SOLUTION INTRAVASCULAR ONCE
Status: COMPLETED | OUTPATIENT
Start: 2019-06-28 | End: 2019-06-28

## 2019-06-28 RX ORDER — HYDROMORPHONE HYDROCHLORIDE 1 MG/ML
0.5 INJECTION, SOLUTION INTRAMUSCULAR; INTRAVENOUS; SUBCUTANEOUS
Status: COMPLETED | OUTPATIENT
Start: 2019-06-28 | End: 2019-06-28

## 2019-06-28 RX ORDER — PROCHLORPERAZINE 25 MG
25 SUPPOSITORY, RECTAL RECTAL EVERY 12 HOURS PRN
Status: DISCONTINUED | OUTPATIENT
Start: 2019-06-28 | End: 2019-07-01 | Stop reason: HOSPADM

## 2019-06-28 RX ORDER — LIDOCAINE 40 MG/G
CREAM TOPICAL
Status: DISCONTINUED | OUTPATIENT
Start: 2019-06-28 | End: 2019-07-01 | Stop reason: HOSPADM

## 2019-06-28 RX ORDER — ONDANSETRON 2 MG/ML
4 INJECTION INTRAMUSCULAR; INTRAVENOUS ONCE
Status: COMPLETED | OUTPATIENT
Start: 2019-06-28 | End: 2019-06-28

## 2019-06-28 RX ORDER — SODIUM CHLORIDE 9 MG/ML
INJECTION, SOLUTION INTRAVENOUS CONTINUOUS
Status: DISCONTINUED | OUTPATIENT
Start: 2019-06-28 | End: 2019-06-28

## 2019-06-28 RX ORDER — ACETAMINOPHEN 325 MG/1
650 TABLET ORAL EVERY 4 HOURS PRN
Status: DISCONTINUED | OUTPATIENT
Start: 2019-06-28 | End: 2019-07-01 | Stop reason: HOSPADM

## 2019-06-28 RX ORDER — PROCHLORPERAZINE MALEATE 5 MG
10 TABLET ORAL EVERY 6 HOURS PRN
Status: DISCONTINUED | OUTPATIENT
Start: 2019-06-28 | End: 2019-07-01 | Stop reason: HOSPADM

## 2019-06-28 RX ORDER — BISACODYL 5 MG
5 TABLET, DELAYED RELEASE (ENTERIC COATED) ORAL DAILY PRN
Status: DISCONTINUED | OUTPATIENT
Start: 2019-06-28 | End: 2019-07-01 | Stop reason: HOSPADM

## 2019-06-28 RX ORDER — BISACODYL 5 MG
10 TABLET, DELAYED RELEASE (ENTERIC COATED) ORAL DAILY PRN
Status: DISCONTINUED | OUTPATIENT
Start: 2019-06-28 | End: 2019-07-01 | Stop reason: HOSPADM

## 2019-06-28 RX ORDER — PIPERACILLIN SODIUM, TAZOBACTAM SODIUM 3; .375 G/15ML; G/15ML
3.38 INJECTION, POWDER, LYOPHILIZED, FOR SOLUTION INTRAVENOUS ONCE
Status: COMPLETED | OUTPATIENT
Start: 2019-06-28 | End: 2019-06-28

## 2019-06-28 RX ORDER — NALOXONE HYDROCHLORIDE 0.4 MG/ML
.1-.4 INJECTION, SOLUTION INTRAMUSCULAR; INTRAVENOUS; SUBCUTANEOUS
Status: DISCONTINUED | OUTPATIENT
Start: 2019-06-28 | End: 2019-07-01 | Stop reason: HOSPADM

## 2019-06-28 RX ORDER — CALCIUM CARBONATE 500 MG/1
500 TABLET, CHEWABLE ORAL 2 TIMES DAILY PRN
Status: DISCONTINUED | OUTPATIENT
Start: 2019-06-28 | End: 2019-07-01 | Stop reason: HOSPADM

## 2019-06-28 RX ORDER — FLUCONAZOLE 100 MG/1
100 TABLET ORAL DAILY
Status: DISCONTINUED | OUTPATIENT
Start: 2019-06-29 | End: 2019-07-01 | Stop reason: HOSPADM

## 2019-06-28 RX ORDER — POLYETHYLENE GLYCOL 3350 17 G/17G
17 POWDER, FOR SOLUTION ORAL DAILY PRN
Status: DISCONTINUED | OUTPATIENT
Start: 2019-06-28 | End: 2019-07-01 | Stop reason: HOSPADM

## 2019-06-28 RX ADMIN — PIPERACILLIN SODIUM,TAZOBACTAM SODIUM 3.38 G: 3; .375 INJECTION, POWDER, FOR SOLUTION INTRAVENOUS at 22:05

## 2019-06-28 RX ADMIN — SODIUM CHLORIDE: 9 INJECTION, SOLUTION INTRAVENOUS at 20:04

## 2019-06-28 RX ADMIN — RANITIDINE 150 MG: 150 TABLET ORAL at 22:28

## 2019-06-28 RX ADMIN — HYDROMORPHONE HYDROCHLORIDE 0.5 MG: 1 INJECTION, SOLUTION INTRAMUSCULAR; INTRAVENOUS; SUBCUTANEOUS at 21:20

## 2019-06-28 RX ADMIN — ONDANSETRON 4 MG: 2 INJECTION INTRAMUSCULAR; INTRAVENOUS at 22:26

## 2019-06-28 RX ADMIN — IOPAMIDOL 107 ML: 755 INJECTION, SOLUTION INTRAVENOUS at 19:45

## 2019-06-28 ASSESSMENT — ACTIVITIES OF DAILY LIVING (ADL)
COGNITION: 0 - NO COGNITION ISSUES REPORTED
RETIRED_EATING: 0-->INDEPENDENT
DRESS: 0-->INDEPENDENT
BATHING: 0-->INDEPENDENT
TRANSFERRING: 0-->INDEPENDENT
AMBULATION: 0-->INDEPENDENT
FALL_HISTORY_WITHIN_LAST_SIX_MONTHS: NO
SWALLOWING: 0-->SWALLOWS FOODS/LIQUIDS WITHOUT DIFFICULTY
TOILETING: 0-->INDEPENDENT
RETIRED_COMMUNICATION: 0-->UNDERSTANDS/COMMUNICATES WITHOUT DIFFICULTY

## 2019-06-28 NOTE — LETTER
Transition Communication Hand-off for Care Transitions to Next Level of Care Provider    Name: Christiano Molina  : 1975  MRN #: [id number]  Primary Care Provider: Vern Ramirez     Primary Clinic: [address] [place] [id number]     Reason for Hospitalization:  Acute appendicitis with localized peritonitis, without perforation, abscess, or gangrene [K35.30]  Admit Date/Time: 2019  6:44 PM  Discharge Date: 19  Payor Source: Payor: MEDICA / Plan: MEDICA CHOICE / Product Type: Indemnity /     Christiano Molina is a 44M with Burkitt's lymphoma presenting with appendicitis and large appendicolith now s/p laparoscopic appendectomy on .    Discharge Plan: Home with clinic follow-up as recommended.     Discharge Needs Assessment:  Needs      Most Recent Value   Anticipated Changes Related to Illness  none          Follow-up plan:    Future Appointments   Date Time Provider Department Center   2019 10:50 AM  [facility]   2019  9:50 AM  [facility]   2019  9:50 AM  [facility]RAW [facility]   2019  8:30 AM  [facility]RAW [facility]   2019  9:20 AM Holly Wheat PA-C [facility]       Yissel Grier, RN, BSN, PHN  Care Coordinator       AVS/Discharge Summary is the source of truth; this is a helpful guide for improved communication of patient story

## 2019-06-28 NOTE — TELEPHONE ENCOUNTER
Beginning at 6:30 am stomach pain like a constant ache near navel to slightly below. Is a constant ache (rated 3/10) that doesn't change or throb. No alleviating factors. Aggravated by eating, occasionally stops for a minute or two with rest. No swelling, tenderness or discoloration, SOB.    Took 1 compazine and 1 Zofran it helped a bit for a short while.    I informed him that many providers were not in the office, and a recommendation for a UC visit may be suggested. He didn't feel that this was an emergency situation, just an FYI. I reminded the Pt that is he began to have issues with SOB, fevers, BMs, or nausea and vomiting to seek medical attention. He voiced understanding and was ok with the notion that someone from his care team may not return his call until Monday.

## 2019-06-28 NOTE — PROGRESS NOTES
Medical Assistant Note:  Christiano Molina presents today for blood draw.    Patient seen by provider today: No.   present during visit today: Not Applicable.    Concerns: No Concerns.    Procedure:  Lab draw site: LAC, Needle type: BF, Gauge: 23.    Post Assessment:  Labs drawn without difficulty: Yes.    Discharge Plan:  Departure Mode: Ambulatory.    Face to Face Time: 5 MIN.    Omaira Reyes

## 2019-06-28 NOTE — TELEPHONE ENCOUNTER
"Consulted with Sue Villalobos RNCC. Discussed what would constitute a reason to seek out an ED and recommended that the Pt take Miralax 1/day.    Called Pt to discuss. Pt verbalized understanding. Pt declined the miralax as he had \"two very good BMs this morning\" so is not yet concerned. I reiterated to seek medical attention for no/significantly decreased BMs, fever, chills, SOB, or intractable pain. Requested that he call back Monday and update the care team about his status.  "

## 2019-06-29 ENCOUNTER — ANESTHESIA (OUTPATIENT)
Dept: SURGERY | Facility: CLINIC | Age: 44
DRG: 342 | End: 2019-06-29
Payer: COMMERCIAL

## 2019-06-29 ENCOUNTER — ANESTHESIA EVENT (OUTPATIENT)
Dept: SURGERY | Facility: CLINIC | Age: 44
DRG: 342 | End: 2019-06-29
Payer: COMMERCIAL

## 2019-06-29 LAB
ANION GAP SERPL CALCULATED.3IONS-SCNC: 4 MMOL/L (ref 3–14)
ANISOCYTOSIS BLD QL SMEAR: SLIGHT
BASOPHILS # BLD AUTO: 0 10E9/L (ref 0–0.2)
BASOPHILS NFR BLD AUTO: 0 %
BUN SERPL-MCNC: 13 MG/DL (ref 7–30)
CALCIUM SERPL-MCNC: 8.7 MG/DL (ref 8.5–10.1)
CHLORIDE SERPL-SCNC: 105 MMOL/L (ref 94–109)
CO2 SERPL-SCNC: 27 MMOL/L (ref 20–32)
CREAT SERPL-MCNC: 0.89 MG/DL (ref 0.66–1.25)
CRP SERPL-MCNC: 62 MG/L (ref 0–8)
DIFFERENTIAL METHOD BLD: ABNORMAL
EOSINOPHIL # BLD AUTO: 0 10E9/L (ref 0–0.7)
EOSINOPHIL NFR BLD AUTO: 0 %
ERYTHROCYTE [DISTWIDTH] IN BLOOD BY AUTOMATED COUNT: 15.6 % (ref 10–15)
GFR SERPL CREATININE-BSD FRML MDRD: >90 ML/MIN/{1.73_M2}
GLUCOSE BLDC GLUCOMTR-MCNC: 94 MG/DL (ref 70–99)
GLUCOSE SERPL-MCNC: 105 MG/DL (ref 70–99)
HCT VFR BLD AUTO: 33.3 % (ref 40–53)
HGB BLD-MCNC: 10.6 G/DL (ref 13.3–17.7)
INR PPP: 1.05 (ref 0.86–1.14)
LYMPHOCYTES # BLD AUTO: 0.2 10E9/L (ref 0.8–5.3)
LYMPHOCYTES NFR BLD AUTO: 9.6 %
MAGNESIUM SERPL-MCNC: 2 MG/DL (ref 1.6–2.3)
MAGNESIUM SERPL-MCNC: 2.1 MG/DL (ref 1.6–2.3)
MCH RBC QN AUTO: 30.5 PG (ref 26.5–33)
MCHC RBC AUTO-ENTMCNC: 31.8 G/DL (ref 31.5–36.5)
MCV RBC AUTO: 96 FL (ref 78–100)
MONOCYTES # BLD AUTO: 0 10E9/L (ref 0–1.3)
MONOCYTES NFR BLD AUTO: 1.7 %
NEUTROPHILS # BLD AUTO: 2.2 10E9/L (ref 1.6–8.3)
NEUTROPHILS NFR BLD AUTO: 88.7 %
PHOSPHATE SERPL-MCNC: 3.9 MG/DL (ref 2.5–4.5)
PLATELET # BLD AUTO: 123 10E9/L (ref 150–450)
PLATELET # BLD EST: ABNORMAL 10*3/UL
POTASSIUM SERPL-SCNC: 4.3 MMOL/L (ref 3.4–5.3)
RBC # BLD AUTO: 3.48 10E12/L (ref 4.4–5.9)
SODIUM SERPL-SCNC: 136 MMOL/L (ref 133–144)
WBC # BLD AUTO: 2.5 10E9/L (ref 4–11)

## 2019-06-29 PROCEDURE — 85025 COMPLETE CBC W/AUTO DIFF WBC: CPT | Performed by: STUDENT IN AN ORGANIZED HEALTH CARE EDUCATION/TRAINING PROGRAM

## 2019-06-29 PROCEDURE — 25000132 ZZH RX MED GY IP 250 OP 250 PS 637: Performed by: NEUROLOGICAL SURGERY

## 2019-06-29 PROCEDURE — 88304 TISSUE EXAM BY PATHOLOGIST: CPT | Performed by: SURGERY

## 2019-06-29 PROCEDURE — 25800030 ZZH RX IP 258 OP 636: Performed by: NURSE ANESTHETIST, CERTIFIED REGISTERED

## 2019-06-29 PROCEDURE — 25000128 H RX IP 250 OP 636: Performed by: SURGERY

## 2019-06-29 PROCEDURE — 27210794 ZZH OR GENERAL SUPPLY STERILE: Performed by: SURGERY

## 2019-06-29 PROCEDURE — 36000057 ZZH SURGERY LEVEL 3 1ST 30 MIN - UMMC: Performed by: SURGERY

## 2019-06-29 PROCEDURE — 00000146 ZZHCL STATISTIC GLUCOSE BY METER IP

## 2019-06-29 PROCEDURE — 25000128 H RX IP 250 OP 636: Performed by: STUDENT IN AN ORGANIZED HEALTH CARE EDUCATION/TRAINING PROGRAM

## 2019-06-29 PROCEDURE — 37000009 ZZH ANESTHESIA TECHNICAL FEE, EACH ADDTL 15 MIN: Performed by: SURGERY

## 2019-06-29 PROCEDURE — 36000059 ZZH SURGERY LEVEL 3 EA 15 ADDTL MIN UMMC: Performed by: SURGERY

## 2019-06-29 PROCEDURE — 99233 SBSQ HOSP IP/OBS HIGH 50: CPT | Performed by: INTERNAL MEDICINE

## 2019-06-29 PROCEDURE — 25000132 ZZH RX MED GY IP 250 OP 250 PS 637: Performed by: STUDENT IN AN ORGANIZED HEALTH CARE EDUCATION/TRAINING PROGRAM

## 2019-06-29 PROCEDURE — 25800030 ZZH RX IP 258 OP 636: Performed by: NEUROLOGICAL SURGERY

## 2019-06-29 PROCEDURE — 86140 C-REACTIVE PROTEIN: CPT | Performed by: STUDENT IN AN ORGANIZED HEALTH CARE EDUCATION/TRAINING PROGRAM

## 2019-06-29 PROCEDURE — 36415 COLL VENOUS BLD VENIPUNCTURE: CPT | Performed by: STUDENT IN AN ORGANIZED HEALTH CARE EDUCATION/TRAINING PROGRAM

## 2019-06-29 PROCEDURE — 80048 BASIC METABOLIC PNL TOTAL CA: CPT | Performed by: STUDENT IN AN ORGANIZED HEALTH CARE EDUCATION/TRAINING PROGRAM

## 2019-06-29 PROCEDURE — 83735 ASSAY OF MAGNESIUM: CPT | Performed by: STUDENT IN AN ORGANIZED HEALTH CARE EDUCATION/TRAINING PROGRAM

## 2019-06-29 PROCEDURE — 0DTJ4ZZ RESECTION OF APPENDIX, PERCUTANEOUS ENDOSCOPIC APPROACH: ICD-10-PCS | Performed by: SURGERY

## 2019-06-29 PROCEDURE — 25000128 H RX IP 250 OP 636: Performed by: NURSE ANESTHETIST, CERTIFIED REGISTERED

## 2019-06-29 PROCEDURE — 85610 PROTHROMBIN TIME: CPT | Performed by: STUDENT IN AN ORGANIZED HEALTH CARE EDUCATION/TRAINING PROGRAM

## 2019-06-29 PROCEDURE — 25000566 ZZH SEVOFLURANE, EA 15 MIN: Performed by: SURGERY

## 2019-06-29 PROCEDURE — 25000128 H RX IP 250 OP 636: Performed by: ANESTHESIOLOGY

## 2019-06-29 PROCEDURE — 40000170 ZZH STATISTIC PRE-PROCEDURE ASSESSMENT II: Performed by: SURGERY

## 2019-06-29 PROCEDURE — 25000128 H RX IP 250 OP 636: Performed by: NEUROLOGICAL SURGERY

## 2019-06-29 PROCEDURE — 71000014 ZZH RECOVERY PHASE 1 LEVEL 2 FIRST HR: Performed by: SURGERY

## 2019-06-29 PROCEDURE — 37000008 ZZH ANESTHESIA TECHNICAL FEE, 1ST 30 MIN: Performed by: SURGERY

## 2019-06-29 PROCEDURE — 93010 ELECTROCARDIOGRAM REPORT: CPT | Performed by: INTERNAL MEDICINE

## 2019-06-29 PROCEDURE — 25000125 ZZHC RX 250: Performed by: NURSE ANESTHETIST, CERTIFIED REGISTERED

## 2019-06-29 PROCEDURE — 25800030 ZZH RX IP 258 OP 636: Performed by: STUDENT IN AN ORGANIZED HEALTH CARE EDUCATION/TRAINING PROGRAM

## 2019-06-29 PROCEDURE — 20600000 ZZH R&B BMT

## 2019-06-29 RX ORDER — LIDOCAINE 40 MG/G
CREAM TOPICAL
Status: DISCONTINUED | OUTPATIENT
Start: 2019-06-29 | End: 2019-07-01 | Stop reason: HOSPADM

## 2019-06-29 RX ORDER — FENTANYL CITRATE 50 UG/ML
INJECTION, SOLUTION INTRAMUSCULAR; INTRAVENOUS PRN
Status: DISCONTINUED | OUTPATIENT
Start: 2019-06-29 | End: 2019-06-29

## 2019-06-29 RX ORDER — LABETALOL 20 MG/4 ML (5 MG/ML) INTRAVENOUS SYRINGE
10
Status: DISCONTINUED | OUTPATIENT
Start: 2019-06-29 | End: 2019-06-29 | Stop reason: HOSPADM

## 2019-06-29 RX ORDER — NYSTATIN 100000/ML
1000000 SUSPENSION, ORAL (FINAL DOSE FORM) ORAL 4 TIMES DAILY
Status: DISCONTINUED | OUTPATIENT
Start: 2019-06-29 | End: 2019-07-01 | Stop reason: HOSPADM

## 2019-06-29 RX ORDER — NALOXONE HYDROCHLORIDE 0.4 MG/ML
.1-.4 INJECTION, SOLUTION INTRAMUSCULAR; INTRAVENOUS; SUBCUTANEOUS
Status: ACTIVE | OUTPATIENT
Start: 2019-06-29 | End: 2019-06-30

## 2019-06-29 RX ORDER — FENTANYL CITRATE 50 UG/ML
25-50 INJECTION, SOLUTION INTRAMUSCULAR; INTRAVENOUS
Status: DISCONTINUED | OUTPATIENT
Start: 2019-06-29 | End: 2019-06-29 | Stop reason: HOSPADM

## 2019-06-29 RX ORDER — HYDROMORPHONE HYDROCHLORIDE 1 MG/ML
.3-.5 INJECTION, SOLUTION INTRAMUSCULAR; INTRAVENOUS; SUBCUTANEOUS EVERY 5 MIN PRN
Status: DISCONTINUED | OUTPATIENT
Start: 2019-06-29 | End: 2019-06-29 | Stop reason: HOSPADM

## 2019-06-29 RX ORDER — SODIUM CHLORIDE, SODIUM LACTATE, POTASSIUM CHLORIDE, CALCIUM CHLORIDE 600; 310; 30; 20 MG/100ML; MG/100ML; MG/100ML; MG/100ML
INJECTION, SOLUTION INTRAVENOUS CONTINUOUS PRN
Status: DISCONTINUED | OUTPATIENT
Start: 2019-06-29 | End: 2019-06-29

## 2019-06-29 RX ORDER — PROPOFOL 10 MG/ML
INJECTION, EMULSION INTRAVENOUS PRN
Status: DISCONTINUED | OUTPATIENT
Start: 2019-06-29 | End: 2019-06-29

## 2019-06-29 RX ORDER — ONDANSETRON 2 MG/ML
INJECTION INTRAMUSCULAR; INTRAVENOUS PRN
Status: DISCONTINUED | OUTPATIENT
Start: 2019-06-29 | End: 2019-06-29

## 2019-06-29 RX ORDER — PANTOPRAZOLE SODIUM 40 MG/1
40 TABLET, DELAYED RELEASE ORAL
Status: DISCONTINUED | OUTPATIENT
Start: 2019-06-29 | End: 2019-07-01 | Stop reason: HOSPADM

## 2019-06-29 RX ORDER — ONDANSETRON 2 MG/ML
4 INJECTION INTRAMUSCULAR; INTRAVENOUS EVERY 30 MIN PRN
Status: DISCONTINUED | OUTPATIENT
Start: 2019-06-29 | End: 2019-06-29 | Stop reason: HOSPADM

## 2019-06-29 RX ORDER — DEXAMETHASONE SODIUM PHOSPHATE 4 MG/ML
INJECTION, SOLUTION INTRA-ARTICULAR; INTRALESIONAL; INTRAMUSCULAR; INTRAVENOUS; SOFT TISSUE PRN
Status: DISCONTINUED | OUTPATIENT
Start: 2019-06-29 | End: 2019-06-29

## 2019-06-29 RX ORDER — ONDANSETRON 4 MG/1
4 TABLET, ORALLY DISINTEGRATING ORAL EVERY 30 MIN PRN
Status: DISCONTINUED | OUTPATIENT
Start: 2019-06-29 | End: 2019-06-29 | Stop reason: HOSPADM

## 2019-06-29 RX ORDER — BUPIVACAINE HYDROCHLORIDE 2.5 MG/ML
INJECTION, SOLUTION INFILTRATION; PERINEURAL PRN
Status: DISCONTINUED | OUTPATIENT
Start: 2019-06-29 | End: 2019-06-29 | Stop reason: HOSPADM

## 2019-06-29 RX ORDER — SODIUM CHLORIDE, SODIUM LACTATE, POTASSIUM CHLORIDE, CALCIUM CHLORIDE 600; 310; 30; 20 MG/100ML; MG/100ML; MG/100ML; MG/100ML
INJECTION, SOLUTION INTRAVENOUS CONTINUOUS
Status: DISCONTINUED | OUTPATIENT
Start: 2019-06-29 | End: 2019-06-29 | Stop reason: HOSPADM

## 2019-06-29 RX ADMIN — DEXAMETHASONE SODIUM PHOSPHATE 8 MG: 4 INJECTION, SOLUTION INTRA-ARTICULAR; INTRALESIONAL; INTRAMUSCULAR; INTRAVENOUS; SOFT TISSUE at 14:18

## 2019-06-29 RX ADMIN — NYSTATIN 1000000 UNITS: 100000 SUSPENSION ORAL at 20:36

## 2019-06-29 RX ADMIN — PROPOFOL 150 MG: 10 INJECTION, EMULSION INTRAVENOUS at 13:37

## 2019-06-29 RX ADMIN — FENTANYL CITRATE 50 MCG: 50 INJECTION INTRAMUSCULAR; INTRAVENOUS at 15:14

## 2019-06-29 RX ADMIN — NYSTATIN 1000000 UNITS: 100000 SUSPENSION ORAL at 16:56

## 2019-06-29 RX ADMIN — ACYCLOVIR 400 MG: 200 CAPSULE ORAL at 00:19

## 2019-06-29 RX ADMIN — FENTANYL CITRATE 25 MCG: 50 INJECTION INTRAMUSCULAR; INTRAVENOUS at 14:58

## 2019-06-29 RX ADMIN — ACETAMINOPHEN 650 MG: 325 TABLET, FILM COATED ORAL at 16:03

## 2019-06-29 RX ADMIN — SUGAMMADEX 200 MG: 100 INJECTION, SOLUTION INTRAVENOUS at 14:38

## 2019-06-29 RX ADMIN — METRONIDAZOLE 500 MG: 500 INJECTION, SOLUTION INTRAVENOUS at 00:20

## 2019-06-29 RX ADMIN — ROCURONIUM BROMIDE 20 MG: 10 INJECTION INTRAVENOUS at 13:59

## 2019-06-29 RX ADMIN — METRONIDAZOLE 500 MG: 500 INJECTION, SOLUTION INTRAVENOUS at 05:44

## 2019-06-29 RX ADMIN — PANTOPRAZOLE SODIUM 40 MG: 40 TABLET, DELAYED RELEASE ORAL at 08:18

## 2019-06-29 RX ADMIN — SODIUM CHLORIDE, POTASSIUM CHLORIDE, SODIUM LACTATE AND CALCIUM CHLORIDE: 600; 310; 30; 20 INJECTION, SOLUTION INTRAVENOUS at 13:49

## 2019-06-29 RX ADMIN — FENTANYL CITRATE 25 MCG: 50 INJECTION INTRAMUSCULAR; INTRAVENOUS at 15:02

## 2019-06-29 RX ADMIN — FENTANYL CITRATE 100 MCG: 50 INJECTION, SOLUTION INTRAMUSCULAR; INTRAVENOUS at 13:59

## 2019-06-29 RX ADMIN — ONDANSETRON 4 MG: 2 INJECTION INTRAMUSCULAR; INTRAVENOUS at 14:26

## 2019-06-29 RX ADMIN — ACYCLOVIR 400 MG: 200 CAPSULE ORAL at 20:36

## 2019-06-29 RX ADMIN — ROCURONIUM BROMIDE 50 MG: 10 INJECTION INTRAVENOUS at 13:37

## 2019-06-29 RX ADMIN — MIDAZOLAM 2 MG: 1 INJECTION INTRAMUSCULAR; INTRAVENOUS at 13:25

## 2019-06-29 RX ADMIN — METRONIDAZOLE 500 MG: 500 INJECTION, SOLUTION INTRAVENOUS at 17:51

## 2019-06-29 RX ADMIN — LEVOFLOXACIN 500 MG: 5 INJECTION, SOLUTION INTRAVENOUS at 01:59

## 2019-06-29 RX ADMIN — ACYCLOVIR 400 MG: 200 CAPSULE ORAL at 08:18

## 2019-06-29 RX ADMIN — SODIUM CHLORIDE, POTASSIUM CHLORIDE, SODIUM LACTATE AND CALCIUM CHLORIDE: 600; 310; 30; 20 INJECTION, SOLUTION INTRAVENOUS at 20:37

## 2019-06-29 RX ADMIN — FLUCONAZOLE 100 MG: 100 TABLET ORAL at 08:18

## 2019-06-29 RX ADMIN — METRONIDAZOLE 500 MG: 500 INJECTION, SOLUTION INTRAVENOUS at 11:20

## 2019-06-29 RX ADMIN — FENTANYL CITRATE 50 MCG: 50 INJECTION, SOLUTION INTRAMUSCULAR; INTRAVENOUS at 13:37

## 2019-06-29 RX ADMIN — SODIUM CHLORIDE, POTASSIUM CHLORIDE, SODIUM LACTATE AND CALCIUM CHLORIDE: 600; 310; 30; 20 INJECTION, SOLUTION INTRAVENOUS at 00:19

## 2019-06-29 RX ADMIN — ACETAMINOPHEN 650 MG: 325 TABLET, FILM COATED ORAL at 20:36

## 2019-06-29 ASSESSMENT — ENCOUNTER SYMPTOMS
NECK PAIN: 0
SHORTNESS OF BREATH: 0
NAUSEA: 1
BACK PAIN: 0
FATIGUE: 0
COUGH: 0
ABDOMINAL PAIN: 1
PALPITATIONS: 0
COLOR CHANGE: 0
WEAKNESS: 0
DYSURIA: 0
DIARRHEA: 0
NECK STIFFNESS: 0
TROUBLE SWALLOWING: 0
BLOOD IN STOOL: 0
DIAPHORESIS: 0
CHILLS: 1
FEVER: 0
VOMITING: 0
CONSTIPATION: 0
SORE THROAT: 0
HEMATURIA: 0
FREQUENCY: 0

## 2019-06-29 ASSESSMENT — ACTIVITIES OF DAILY LIVING (ADL)
ADLS_ACUITY_SCORE: 10

## 2019-06-29 ASSESSMENT — PAIN DESCRIPTION - DESCRIPTORS
DESCRIPTORS: ACHING
DESCRIPTORS: ACHING;DISCOMFORT
DESCRIPTORS: DULL
DESCRIPTORS: ACHING;DISCOMFORT

## 2019-06-29 NOTE — H&P
History & Physical    Christiano Molina MRN: [id number]  Age: 44 year old, : 1975  Date of Admission:2019  Primary care provider: Vern Ramirez         History of Present Illness     45 y/o male with PMHx significant for EBV+, Burkitt lymhoma s/p 5 cycles of DA-REPOCH (last treated on 19) was admitted due to sudden RLQ pain and chills.     Patient states that on the morning of admission he had generalized abdominal pain that throughout the day localized to the RLQ. He also had some chills, but no fever. Pain was 6/10 and constant. Unable to identify exacerbating or relieving factors. He had two bowel movements in the morning which were loose, but without hematochezia or melena. Only PO intake was a banana and some apple sauce. He had another loose stool at around 1700 also without hematochezia or melena. This improved his pain. He experienced several episodes of nausea and dry heaves, but no vomiting.     He otherwise denies night sweats, headache, purulent rhinorrhea, sore throat, cough, chest pain, shortness of breath, change in urinary frequency, hematuria, dysuria, joint pain, joint swelling, new rash, presyncope or syncope. He does have mild right hand tingling since starting chemotherapy.     Upon arrival in the ED, he was hemodynamically stable. Labs were stable since discharged on 19. CT scan showed persistent large 1.7cm appendicolith with fat stranding, fluid and inflammation of the appendix. Patient was given a dose of pip/tazo and was admitted for potential appendectomy.      History is obtained from the patient      Past Medical History     Past Medical History:   Diagnosis Date     Cancer (H)     Lymphoma     Fourth CraniaNerve Palsy 2008          Past Surgical History     Past Surgical History:   Procedure Laterality Date     EYE SURGERY       IR PICC VASCULAR  3/28/2019     PICC INSERTION Right 2019    5Fr - 42cm, Basilic vein, mid SVC           Social History  "    Social History     Tobacco Use     Smoking status: Never Smoker     Smokeless tobacco: Never Used   Substance Use Topics     Alcohol use: Yes     Comment: rarely          Family History     History reviewed. No pertinent family history.  Family history reviewed and updated in EPIC       Allergies     No Known Allergies       Medications     Current Facility-Administered Medications   Medication     acetaminophen (TYLENOL) tablet 650 mg     acyclovir (ZOVIRAX) capsule 400 mg     bisacodyl (DULCOLAX) EC tablet 5 mg    Or     bisacodyl (DULCOLAX) EC tablet 10 mg    Or     bisacodyl (DULCOLAX) EC tablet 15 mg     calcium carbonate (TUMS) chewable tablet 500 mg     [START ON 6/29/2019] chlorhexidine (PERIDEX) 0.12 % solution 15 mL     [START ON 6/29/2019] fluconazole (DIFLUCAN) tablet 100 mg     lactated ringers infusion     [START ON 6/29/2019] levofloxacin (LEVAQUIN) infusion 500 mg     lidocaine (LMX4) cream     lidocaine 1 % 0.1-1 mL     melatonin tablet 1 mg     [START ON 6/29/2019] metroNIDAZOLE (FLAGYL) infusion 500 mg     naloxone (NARCAN) injection 0.1-0.4 mg     polyethylene glycol (MIRALAX/GLYCOLAX) Packet 17 g     prochlorperazine (COMPAZINE) injection 10 mg    Or     prochlorperazine (COMPAZINE) tablet 10 mg    Or     prochlorperazine (COMPAZINE) Suppository 25 mg     sodium chloride (PF) 0.9% PF flush 3 mL     sodium chloride (PF) 0.9% PF flush 3 mL     sodium chloride 0.9% infusion          Physical Exam     Vital signs:  Temp: 99.1  F (37.3  C) Temp src: Oral BP: 140/80   Heart Rate: 107 Resp: 18 SpO2: 98 % O2 Device: None (Room air)     Weight: 79.3 kg (174 lb 12.8 oz)  Estimated body mass index is 25.08 kg/m  as calculated from the following:    Height as of 6/20/19: 1.778 m (5' 10\").    Weight as of this encounter: 79.3 kg (174 lb 12.8 oz).      Gen: AAOX3, NAD  HEENT: Hair loss from chemo, PERRLA, EOMI, anicteric sclera, mucositis in buccal region bilaterally, no LAD  PULM/THORAX: CTA " bilaterally  CV: RRR, S1 and S2 appreciated, no extra heart sounds, murmurs or rub auscultated. No JVD  ABD:  soft, RLQ tenderness at Mcburney point, mild obturator and psoas sign, nondistended, +bs, no HSM appreciated.  NEURO: no focal neurological deficits  EXT: WWPx2, no edema or cyanosis  SKIN: Capillary refill normal,     LINES: 2PIV    Labs     Results for orders placed or performed during the hospital encounter of 06/28/19 (from the past 24 hour(s))   CBC with platelets differential   Result Value Ref Range    WBC 8.1 4.0 - 11.0 10e9/L    RBC Count 3.75 (L) 4.4 - 5.9 10e12/L    Hemoglobin 11.6 (L) 13.3 - 17.7 g/dL    Hematocrit 35.1 (L) 40.0 - 53.0 %    MCV 94 78 - 100 fl    MCH 30.9 26.5 - 33.0 pg    MCHC 33.0 31.5 - 36.5 g/dL    RDW 15.6 (H) 10.0 - 15.0 %    Platelet Count 152 150 - 450 10e9/L    Diff Method Manual Differential     % Neutrophils 97.4 %    % Lymphocytes 1.7 %    % Monocytes 0.9 %    % Eosinophils 0.0 %    % Basophils 0.0 %    Absolute Neutrophil 7.9 1.6 - 8.3 10e9/L    Absolute Lymphocytes 0.1 (L) 0.8 - 5.3 10e9/L    Absolute Monocytes 0.1 0.0 - 1.3 10e9/L    Absolute Eosinophils 0.0 0.0 - 0.7 10e9/L    Absolute Basophils 0.0 0.0 - 0.2 10e9/L    Anisocytosis Slight     Platelet Estimate Confirming automated cell count    Lipase   Result Value Ref Range    Lipase 80 73 - 393 U/L   Lactic acid whole blood   Result Value Ref Range    Lactic Acid 1.9 0.7 - 2.0 mmol/L   CRP inflammation   Result Value Ref Range    CRP Inflammation 24.0 (H) 0.0 - 8.0 mg/L   Erythrocyte sedimentation rate auto   Result Value Ref Range    Sed Rate 12 0 - 15 mm/h   Procalcitonin   Result Value Ref Range    Procalcitonin 0.34 ng/ml   CT Abdomen Pelvis w Contrast   Result Value Ref Range    Radiologist flags Appendicitis (Urgent)     Narrative    EXAMINATION: CT ABDOMEN PELVIS W CONTRAST, 6/28/2019 7:52 PM    TECHNIQUE:  Helical CT images from the lung bases through the  symphysis pubis were obtained with IV contrast.  Contrast dose:  iopamidol (ISOVUE-370) solution 107 mL     COMPARISON: 6/18/2019    HISTORY: ? appy, kidney stone, pyelo (gen abd pain, then localized to  RLQ/R suprapubic)    FINDINGS:    Abdomen and pelvis:     Redemonstration of a large appendicolith within the appendix measuring  up to 1.7 cm in diameter. The appendix appears acutely inflamed,  measuring up to 2.5 cm in diameter, with fluid distention and moderate  inflammatory stranding of the adjacent mesenteric fat. There is  associated thickening of the medial aspect of the cecum, adjacent to  the appendix, as well as mild mucosal thickening of the terminal  ileum. These findings are new from prior examination.    Scattered subcentimeter hypodensities within the liver, which are to  small to characterize. The gallbladder, pancreas, spleen, adrenal  glands, and kidneys are unremarkable in appearance. Mildly thickened  appearance of the rectosigmoid colon which may relate to the  predistention or be reactive given above appendiceal inflammatory  change. The remainder of the small and large bowel is unremarkable in  appearance, without findings to suggest small bowel obstruction.   There is no pneumatosis or portal venous gas. No intraperitoneal free  fluid or free air. The major abdominal vasculature is patent, without  evidence of infrarenal abdominal aortic aneurysm.    Lung bases: The visualized lung bases are clear. No pleural effusion.  The heart is normal in size, without pericardial effusion.    Bones and soft tissues: No acute or suspicious osseous abnormality.  Mild degenerative changes of the thoracolumbar spine.      Impression    IMPRESSION:   Acute uncomplicated appendicitis, with large calcified appendicolith.  There is reactive inflammatory thickening of the cecum and terminal  ileum. No findings to suggest perforation or abscess.    [Urgent Result: Appendicitis]    Finding was identified on 6/28/2019 7:54 PM.     Dr. Kim was contacted by  Dr. Morse at 6/28/2019 9:01 PM and  verbalized understanding of the urgent finding.     I have personally reviewed the examination and initial interpretation  and I agree with the findings.    CHANDRA MORSE MD   UA with Microscopic   Result Value Ref Range    Color Urine Straw     Appearance Urine Clear     Glucose Urine Negative NEG^Negative mg/dL    Bilirubin Urine Negative NEG^Negative    Ketones Urine Negative NEG^Negative mg/dL    Specific Gravity Urine >1.035 (H) 1.003 - 1.035    Blood Urine Negative NEG^Negative    pH Urine 7.0 5.0 - 7.0 pH    Protein Albumin Urine Negative NEG^Negative mg/dL    Urobilinogen mg/dL Normal 0.0 - 2.0 mg/dL    Nitrite Urine Negative NEG^Negative    Leukocyte Esterase Urine Negative NEG^Negative    Source Midstream Urine     WBC Urine None 0 - 5 /HPF    RBC Urine 0 0 - 2 /HPF   Blood Culture ONE site   Result Value Ref Range    Specimen Description Blood Right Arm     Special Requests Received in aerobic bottle only     Culture Micro PENDING    Blood Culture ONE site   Result Value Ref Range    Specimen Description Blood LARM     Special Requests Received in aerobic bottle only     Culture Micro PENDING        Assessment and Plan      43 y/o male with PMHx significant for EBV+, Burkitt lymhoma s/p 5 cycles of DA-REPOCH (last treated on 06/24/19) was admitted due to acute appendicitis.     Neurologic  #Pain  - Tylenol PRN  - Will judiciously give narcotics if pain not well controlled    #Right hand neuropathy  Stable at this time.   - Continue to monitor    Cardiovascular  No active issues. EKG pending.     Respiratory  No active issues.     Gastrointestinal  #RLQ pain  #Large appendicolith  #Acute appendicitis  Clinical history and imaging findings consistent with acute appendicitis. PET/CT on 05/07/19 showed large appendicolith of 1.8cm which was seen again on CT from this admission with unchanged size. CT also showed shama-appendiceal inflammation, fluid collection and fat  stranding with thickening of the terminal ileum and cecum adjacent to the appendix. No evidence of perforation or abscess. Surgical team evaluated patient and as blood counts and vital signs stable, patient will be taken to the OR on 06/30/19 for appendectomy.   - OR tomorrow  - NPO  - mIVF  - On levofloxacin and metronidazole, expect to continue abx 3-5 days after surgery  - Tylenol for pain control, will escalate if needed  - CRP every 48hrs  -INR in AM    Genitourinary  No active issues.    Infectious Disease  #ID ppx  - Continue home fluconazole  - Continue home acyclovir  - Already on levaquin as above for GI coverage    Hematology  #Burkitts lymphoma s/p 5 cycles of DA-REPOCH, EBV+, Stage IA  Was admitted 06/20/19 - 06/24/19 for chemotherapy. No complications during admission.  PT/CT on 06/18/19 showing complete response.   - Daily CBC with diff, transfuse if HGB < 7.0 or plt < 10k without bleeding or plt <20k with bleeding; irradiated products.    #Chronic anemia  Secondary to marrow suppression from malignancy and now on chemotherapy. No evidence of bleeding and Hgb stable.   - Continue to monitor    #Lymphopenia  Mainly from chemotherapy.   - CBC with diff daily    Endocrine  No active issues    Renal/Electolytes/FEN  Strict I/Os      Skin Care  No active issues    PPX: DVT mechanical due to surgery, GI PPI PO    CODE: FULL    Family: Patient and wife at bedside updated of above plan.     Cuba Mcnamara MD PhD  [facility]  Internal Medicine   Pager: [phone]

## 2019-06-29 NOTE — CONSULTS
General Surgery Consult Note     Patient name: Christiano Molina  Date of Service: 6/28/2019  Reason for Consult: Appendicitis   Requesting Physician: ED, Dr. Kim      Assessment/Plan:   44M with Burkitt's lymphoma presenting with appendicitis and large appendicolith.    - NPO at midnight  - IV antibiotics overnight  - To OR in AM for laparoscopic appendectomy. Consent discussed and signed.     Discussed with staff, Dr. Pearl.    Santosh Lovell MD  PGY-3 General Surgery    ------------------------------------------------------------------------  HPI:   45yo male with Burkitt's lymphoma currently undergoing chemo presenting with abdominal pain. Pain has been present for 12 hours though it has subsided at present. Pain was vague and broadly focal to periumbilical area though has become sharper in RLQ. Reports some chills, denies fevers.    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    Meds:   Medications Prior to Admission   Medication Sig Dispense Refill Last Dose     acyclovir (ZOVIRAX) 200 MG capsule Take 2 capsules (400 mg) by mouth 2 times daily 120 capsule 0 6/28/2019 at 0800     calcium carbonate (TUMS) 500 MG chewable tablet Take 1 chew tab by mouth 2 times daily as needed for heartburn   Past Week at Unknown time     dexamethasone (DECADRON) 4 MG tablet Take 2 tablets (8 mg) by mouth daily ON 6/25 & 6/26 4 tablet 0 Past Week at Unknown time     fluconazole (DIFLUCAN) 100 MG tablet Take 1 tablet (100 mg) by mouth daily 30 tablet 0 6/28/2019 at 0800     K-PHOS-NEUTRAL [id number] MG tablet Take 1 tablet by mouth every 6 hours to complete phos replacement today (6/24). 3 tablet 0 Past Week  at Unknown time     levofloxacin (LEVAQUIN) 250 MG tablet Take 1 tablet (250 mg) by mouth daily . Continue until advised by clinic team to stop. 30 tablet 0 Past Month at Unknown time     ondansetron (ZOFRAN) 8 MG tablet Take 1 tablet (8 mg) by mouth every 8 hours as needed for nausea 30 tablet 0 6/28/2019     ondansetron (ZOFRAN-ODT) 8 MG ODT tab Take 1 tablet (8 mg) by mouth every 8 hours At first continue scheduled (but can back off as nausea improves) 30 tablet 0 6/28/2019 at Unknown time     potassium chloride ER (K-TAB) 20 MEQ CR tablet Take 1 tablet (20 mEq) by mouth daily for 7 days 7 tablet 0 6/28/2019 at 0800     prochlorperazine (COMPAZINE) 10 MG tablet Take 1 tablet (10 mg) by mouth every 6 hours as needed for nausea or vomiting (Try second.) 30 tablet 0 6/28/2019 at 0900     ranitidine (ZANTAC) 150 MG tablet Take 1 tablet (150 mg) by mouth 2 times daily 60 tablet 1 6/28/2019 at Unknown time     senna-docusate (SENOKOT-S/PERICOLACE) 8.6-50 MG tablet Take 2 tablets by mouth 2 times daily as needed for constipation   Past Week at Unknown time     acetaminophen (TYLENOL) 325 MG tablet Take 2 tablets (650 mg) by mouth every 4 hours as needed for mild pain 30 tablet 0 More than a month at Unknown time     triamcinolone (KENALOG) 0.1 % external cream Apply 1 applicator topically 2 times daily as needed for irritation   More than a month at Unknown time     Allergies: No Known Allergies  FamHx: History reviewed. No pertinent family history.  SocHx:   Social History     Socioeconomic History     Marital status:      Spouse name: Not on file     Number of children: Not on file     Years of education: Not on file     Highest education level: Not on file   Occupational History     Not on file   Social Needs     Financial resource strain: Not on file     Food insecurity:     Worry: Not on file     Inability: Not on file     Transportation needs:     Medical: Not on file     Non-medical: Not on file   Tobacco  Use     Smoking status: Never Smoker     Smokeless tobacco: Never Used   Substance and Sexual Activity     Alcohol use: Not on file     Drug use: Not on file     Sexual activity: Not on file   Lifestyle     Physical activity:     Days per week: Not on file     Minutes per session: Not on file     Stress: Not on file   Relationships     Social connections:     Talks on phone: Not on file     Gets together: Not on file     Attends [employer] service: Not on file     Active member of club or organization: Not on file     Attends meetings of clubs or organizations: Not on file     Relationship status: Not on file     Intimate partner violence:     Fear of current or ex partner: Not on file     Emotionally abused: Not on file     Physically abused: Not on file     Forced sexual activity: Not on file   Other Topics Concern     Parent/sibling w/ CABG, MI or angioplasty before 65F 55M? Not Asked   Social History Narrative     Not on file        ROS: 10-pt ROS negative except as noted above.     Objective:   /71   Temp 98.1  F (36.7  C) (Axillary)   Resp 16   Wt 79.3 kg (174 lb 12.8 oz)   SpO2 97%   BMI 25.08 kg/m    General - no acute distress, comfortable  HEENT - normocephalic, atraumatic, EOMI  Cardio - regular rate, regular rhythm  Pulm - non labored respirations on room air  Abdomen - soft, nondistended. Mildly tender in RLQ. No rebound or guarding. No surgical scars.  Neuro - moves all extremities without apparent deficit, non-focal  Extremities - no lower extremity edema, warm, well-perfused  Skin - no rash or bruising  Psych - age appropriate mental status / engagement     Labs:  WBC 8.1  CRP 24  Lac 1.9  Cr 0.96    Imaging:  EXAMINATION: CT ABDOMEN PELVIS W CONTRAST, 6/28/2019 7:52 PM     TECHNIQUE:  Helical CT images from the lung bases through the  symphysis pubis were obtained with IV contrast. Contrast dose:  iopamidol (ISOVUE-370) solution 107 mL      COMPARISON: 6/18/2019     HISTORY: ? appy, kidney  stone, pyelo (gen abd pain, then localized to  RLQ/R suprapubic)     FINDINGS:     Abdomen and pelvis:      Redemonstration of a large appendicolith within the appendix measuring  up to 1.7 cm in diameter. The appendix appears acutely inflamed,  measuring up to 2.5 cm in diameter, with fluid distention and moderate  inflammatory stranding of the adjacent mesenteric fat. There is  associated thickening of the medial aspect of the cecum, adjacent to  the appendix, as well as mild mucosal thickening of the terminal  ileum. These findings are new from prior examination.     Scattered subcentimeter hypodensities within the liver, which are to  small to characterize. The gallbladder, pancreas, spleen, adrenal  glands, and kidneys are unremarkable in appearance. Mildly thickened  appearance of the rectosigmoid colon which may relate to the  predistention or be reactive given above appendiceal inflammatory  change. The remainder of the small and large bowel is unremarkable in  appearance, without findings to suggest small bowel obstruction.   There is no pneumatosis or portal venous gas. No intraperitoneal free  fluid or free air. The major abdominal vasculature is patent, without  evidence of infrarenal abdominal aortic aneurysm.     Lung bases: The visualized lung bases are clear. No pleural effusion.  The heart is normal in size, without pericardial effusion.     Bones and soft tissues: No acute or suspicious osseous abnormality.  Mild degenerative changes of the thoracolumbar spine.                                                                      IMPRESSION:   Acute uncomplicated appendicitis, with large calcified appendicolith.  There is reactive inflammatory thickening of the cecum and terminal  ileum. No findings to suggest perforation or abscess.     [Urgent Result: Appendicitis]

## 2019-06-29 NOTE — PLAN OF CARE
Patient went down for Appendectomy this afternoon, VSS, afebrile. Denies n/v. C/O dull pain upon arrival from OR, given Tylenol with good relief. He is now on regular diet and tolerating foods good. Site c/d/i. Up independently. Wife at bedside. He may discharge tomorrow. Continue with plan of care.

## 2019-06-29 NOTE — ED NOTES
[facility], [place]   ED Nurse to Floor Handoff     Christiano Molina is a 44 year old male who speaks English and lives with a spouse,  in a home  They arrived in the ED by car from home    ED Chief Complaint: Abdominal Pain    ED Dx;   Final diagnoses:   Acute appendicitis with localized peritonitis, without perforation, abscess, or gangrene         Needed?: No    Allergies: No Known Allergies.  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      Baseline Mental status: WDL  Current Mental Status changes: at basesline    Infection present or suspected this encounter: cultures pending  Sepsis suspected: No  Isolation type: No active isolations     Activity level - Baseline/Home:  Independent  Activity Level - Current:   Independent    Bariatric equipment needed?: No    In the ED these meds were given:   Medications   sodium chloride 0.9% infusion ( Intravenous Rate/Dose Verify 6/28/19 2212)   piperacillin-tazobactam (ZOSYN) 3.375 g vial to attach to  mL bag (3.375 g Intravenous New Bag 6/28/19 2205)   iopamidol (ISOVUE-370) solution 107 mL (107 mLs Intravenous Given 6/28/19 1945)   sodium chloride (PF) 0.9% PF flush 77 mL (77 mLs Intravenous Given 6/28/19 1946)   HYDROmorphone (PF) (DILAUDID) injection 0.5 mg (0.5 mg Intravenous Given 6/28/19 2120)       Drips running?  No    Home pump  No    Current LDAs  Peripheral IV 06/28/19 Right (Active)   Site Assessment WDL 6/28/2019  7:29 PM   Line Status Saline locked 6/28/2019  7:29 PM   Phlebitis Scale 0-->no symptoms 6/28/2019  7:29 PM   Infiltration Scale 0 6/28/2019  7:29 PM   Infiltration Site Treatment Method  None 6/28/2019  7:29 PM   Extravasation? No 6/28/2019  7:29 PM   Dressing Intervention New dressing  6/28/2019  7:29 PM   Number of days: 0       Labs results:   Labs Ordered and Resulted from Time of ED Arrival Up to the Time of  Departure from the ED   CBC WITH PLATELETS DIFFERENTIAL - Abnormal; Notable for the following components:       Result Value    RBC Count 3.75 (*)     Hemoglobin 11.6 (*)     Hematocrit 35.1 (*)     RDW 15.6 (*)     Absolute Lymphocytes 0.1 (*)     All other components within normal limits   CRP INFLAMMATION - Abnormal; Notable for the following components:    CRP Inflammation 24.0 (*)     All other components within normal limits   ROUTINE UA WITH MICROSCOPIC - Abnormal; Notable for the following components:    Specific Gravity Urine >1.035 (*)     All other components within normal limits   LIPASE   LACTIC ACID WHOLE BLOOD   ERYTHROCYTE SEDIMENTATION RATE AUTO   PROCALCITONIN       Imaging Studies:   Recent Results (from the past 24 hour(s))   CT Abdomen Pelvis w Contrast   Result Value    Radiologist flags Appendicitis (Urgent)    Narrative    EXAMINATION: CT ABDOMEN PELVIS W CONTRAST, 6/28/2019 7:52 PM    TECHNIQUE:  Helical CT images from the lung bases through the  symphysis pubis were obtained with IV contrast. Contrast dose:  iopamidol (ISOVUE-370) solution 107 mL     COMPARISON: 6/18/2019    HISTORY: ? appy, kidney stone, pyelo (gen abd pain, then localized to  RLQ/R suprapubic)    FINDINGS:    Abdomen and pelvis:     Redemonstration of a large appendicolith within the appendix measuring  up to 1.7 cm in diameter. The appendix appears acutely inflamed,  measuring up to 2.5 cm in diameter, with fluid distention and moderate  inflammatory stranding of the adjacent mesenteric fat. There is  associated thickening of the medial aspect of the cecum, adjacent to  the appendix, as well as mild mucosal thickening of the terminal  ileum. These findings are new from prior examination.    Scattered subcentimeter hypodensities within the liver, which are to  small to characterize. The gallbladder, pancreas, spleen, adrenal  glands, and kidneys are unremarkable in appearance. Mildly thickened  appearance of the  rectosigmoid colon which may relate to the  predistention or be reactive given above appendiceal inflammatory  change. The remainder of the small and large bowel is unremarkable in  appearance, without findings to suggest small bowel obstruction.   There is no pneumatosis or portal venous gas. No intraperitoneal free  fluid or free air. The major abdominal vasculature is patent, without  evidence of infrarenal abdominal aortic aneurysm.    Lung bases: The visualized lung bases are clear. No pleural effusion.  The heart is normal in size, without pericardial effusion.    Bones and soft tissues: No acute or suspicious osseous abnormality.  Mild degenerative changes of the thoracolumbar spine.      Impression    IMPRESSION:   Acute uncomplicated appendicitis, with large calcified appendicolith.  There is reactive inflammatory thickening of the cecum and terminal  ileum. No findings to suggest perforation or abscess.    [Urgent Result: Appendicitis]    Finding was identified on 6/28/2019 7:54 PM.     Dr. Kim was contacted by Dr. Morse at 6/28/2019 9:01 PM and  verbalized understanding of the urgent finding.     I have personally reviewed the examination and initial interpretation  and I agree with the findings.    CHANDRA MORSE MD       Recent vital signs:   /80   Temp 99.1  F (37.3  C) (Oral)   Resp 18   Wt 79.3 kg (174 lb 12.8 oz)   SpO2 98%   BMI 25.08 kg/m      Mariely Coma Scale Score: 15 (06/28/19 1912)       Cardiac Rhythm: Normal Sinus  Pt needs tele? No  Skin/wound Issues: None    Code Status: Full Code    Pain control: good    Nausea control: good    Abnormal labs/tests/findings requiring intervention: See EPIC    Family present during ED course? Yes   Family Comments/Social Situation comments: NA    Tasks needing completion: None    Sina Kilgore, RN  [phone] [facility]

## 2019-06-29 NOTE — OP NOTE
[facility], [place]    Operative Note    Pre-operative diagnosis: appendicitis   Post-operative diagnosis Acute appendicitis   Procedure: Procedure(s):  APPENDECTOMY, LAPAROSCOPIC   Surgeon: Surgeon(s) and Role:     * Lenard Pearl MD - Primary     * Jose Ahumada MD - Resident - Assisting     * Yesenia Walker MD - Resident - Assisting   Anesthesia: General    Estimated blood loss: Minimal   Drains: None   Specimens: ID Type Source Tests Collected by Time Destination   A :  Tissue Appendix SURGICAL PATHOLOGY EXAM Lenard Pearl MD 6/29/2019  2:22 PM       Findings: .   The appendix was  inflamed. There was  asmall amount of periappendiceal fluid. There was a large appendicolith.   Complications: None.   Implants: None.         OPERATIVE INDICATIONS:  Christiano Molina is a 44 year old-year-old male undergoing chemoTx for Burkitts lymphoma with a history of right lower quadrant abdominal pain.  The pain started 24 hours The white blood cell count was 10 thousand per microliter.     CT scan of the abdomen and pelvis showed acute appendicitis.    After understanding the risks and benefits of proceeding with surgery, the patient has an indication for laparoscopic appendectomy and consented to undergo surgery.      OPERATIVE DETAILS:  The patient was brought to the operating room and prepared in a routine fashion.  A timeout was performed prior to surgery and documented by the nursing team.    Under the benefits of general anesthesia, a left upper quadrant Veress needle was inserted and pneumoperitoneum was established using carbon dioxide gas to a maximum pressure of 15 mmHg.  A total of 3 ports were placed (1 12mm, 2 5mm) and the 5 mm laparoscope was utilized.    The patient was moved into a position with the right side up.    The cecum was identified and the appendix was identified at the confluence of the tenia coli.    The appendix was elevated.  Lysis of  adhesions was performed to mobilize the appendix.    A window was made at the base of the appendix in the mesoappendix.  A blue load endo-ROSCOE stapler (45 mm) was then used to divide the appendix at its base and a short stump was left.    Next, the appendix was elevated and the mesoappendix was divided using Ligasure.    The appendix was placed into an endocatch bag and removed from the 12mm port.    The periappendiceal fluid and area of the appendix was irrigated with saline.    Hemostasis was confirmed. 2 10mm clips were placed on a small area of bleeding on the stpale line.      The 12mm fascial incision was closed using a single 0 Vicryl suture in Figure of 8 fashion on the PMI suture passer.    All skin incisions were closed using 4-0 monocryl suture and dermabond was applied.    I was present for all critical components of the operation, and all needle and sponge counts were correct x2 at the end of the procedure.    Lenard Pearl

## 2019-06-29 NOTE — PROGRESS NOTES
[facility], [place]    Hematology / Oncology Progress Note     Assessment & Plan   43 y/o male with PMHx significant for EBV+, Burkitt lymhoma s/p 5 cycles of DA-REPOCH (last treated on 06/24/19) was admitted due to acute appendicitis.     #RLQ pain  #Acute appendicitis  Clinical history and imaging findings consistent with acute appendicitis. PET/CT on 05/07/19 showed large appendicolith of 1.8cm which was seen again on CT from this admission with unchanged size. CT also showed shama-appendiceal inflammation, fluid collection and fat stranding with thickening of the terminal ileum and cecum adjacent to the appendix. No evidence of perforation or abscess. Surgical team evaluated patient and as blood counts and vital signs stable, patient will be taken to the OR on 06/30/19 for appendectomy.   - OR this am  - continue NPO  - mIVF  - On levofloxacin and metronidazole, expect to continue abx 3-5 days after surgery  - Tylenol for pain control, will escalate if needed  - CRP every 48hrs     #Right hand neuropathy  Stable at this time.   - Continue to monitor     #ID ppx  - Continue home fluconazole  - Continue home acyclovir  - Already on levaquin as above for GI coverage     #Burkitts lymphoma s/p 5 cycles of DA-REPOCH, EBV+, Stage IA  Follows with Dr. Ramirez.  Presented in March of this year with axillary mass.  He completed cycle 5 of DA-EPOCH-R during last admission, 06/20/19 - 06/24/19.   No complications during admission.  PT/CT on 06/18/19 showing complete response.   - Daily CBC with diff, transfuse if HGB < 7.0 or plt < 10k without bleeding or plt <20k with bleeding; irradiated products.     #Chronic anemia  Secondary to marrow suppression from malignancy and now on chemotherapy. No evidence of bleeding and Hgb stable.   - Continue to monitor     #Lymphopenia  Mainly from chemotherapy.   - CBC with diff daily    #FEN  NPO for surgery, post-op diet per surgery  IVF LR @ 125/hour,  switch to NS after surgery     PPX: DVT mechanical due to surgery, GI PPI PO     CODE: FULL    The plan was staffed with Dr. Bauer.    Donna Conte PA-C    Interval History   His pain is currently under control.  He denies any current nausea/vomiting.  He also denies headache, chest pain, sob, diarrhea.    Physical Exam   Temp: 99.1  F (37.3  C) Temp src: Oral BP: 116/71   Heart Rate: 88 Resp: 16 SpO2: 99 % O2 Device: None (Room air)    Vitals:    06/28/19 1841 06/28/19 2330 06/29/19 0740   Weight: 79.3 kg (174 lb 12.8 oz) 80 kg (176 lb 5.9 oz) 77.4 kg (170 lb 9.6 oz)     Vital Signs with Ranges  Temp:  [97.9  F (36.6  C)-99.1  F (37.3  C)] 99.1  F (37.3  C)  Heart Rate:  [] 88  Resp:  [16-18] 16  BP: (116-140)/(71-80) 116/71  SpO2:  [97 %-99 %] 99 %  I/O last 3 completed shifts:  In: 800 [I.V.:800]  Out: 400 [Urine:400]    Constitutional: very pleasant, in NAD  ENT: mmm, neck is supple  Respiratory: CTAB, normal effort  Cardiovascular: nl s1/s2 no MRGs  GI: abdomen is soft, +tender RLQ, no rebound tenderness, +BS  Skin: warm, dry  Musculoskeletal: no edema bilat lower extremities  Neurologic: awake/alert, speech is clear, answers questions appropriately        Medications     lactated ringers 125 mL/hr at 06/29/19 0019     Patient RECEIVING antibiotic to treat a different condition and it provides ADEQUATE COVERAGE for this surgical procedure.         acyclovir  400 mg Oral BID     fluconazole  100 mg Oral Daily     levofloxacin  500 mg Intravenous Q24H     metroNIDAZOLE  500 mg Intravenous Q6H     pantoprazole  40 mg Oral QAM AC     sodium chloride (PF)  3 mL Intracatheter Q8H       Data   Results for orders placed or performed during the hospital encounter of 06/28/19 (from the past 24 hour(s))   CBC with platelets differential   Result Value Ref Range    WBC 8.1 4.0 - 11.0 10e9/L    RBC Count 3.75 (L) 4.4 - 5.9 10e12/L    Hemoglobin 11.6 (L) 13.3 - 17.7 g/dL    Hematocrit 35.1 (L) 40.0 - 53.0  %    MCV 94 78 - 100 fl    MCH 30.9 26.5 - 33.0 pg    MCHC 33.0 31.5 - 36.5 g/dL    RDW 15.6 (H) 10.0 - 15.0 %    Platelet Count 152 150 - 450 10e9/L    Diff Method Manual Differential     % Neutrophils 97.4 %    % Lymphocytes 1.7 %    % Monocytes 0.9 %    % Eosinophils 0.0 %    % Basophils 0.0 %    Absolute Neutrophil 7.9 1.6 - 8.3 10e9/L    Absolute Lymphocytes 0.1 (L) 0.8 - 5.3 10e9/L    Absolute Monocytes 0.1 0.0 - 1.3 10e9/L    Absolute Eosinophils 0.0 0.0 - 0.7 10e9/L    Absolute Basophils 0.0 0.0 - 0.2 10e9/L    Anisocytosis Slight     Platelet Estimate Confirming automated cell count    Lipase   Result Value Ref Range    Lipase 80 73 - 393 U/L   Lactic acid whole blood   Result Value Ref Range    Lactic Acid 1.9 0.7 - 2.0 mmol/L   CRP inflammation   Result Value Ref Range    CRP Inflammation 24.0 (H) 0.0 - 8.0 mg/L   Erythrocyte sedimentation rate auto   Result Value Ref Range    Sed Rate 12 0 - 15 mm/h   Procalcitonin   Result Value Ref Range    Procalcitonin 0.34 ng/ml   Magnesium   Result Value Ref Range    Magnesium 2.0 1.6 - 2.3 mg/dL   Phosphorus   Result Value Ref Range    Phosphorus 3.9 2.5 - 4.5 mg/dL   CT Abdomen Pelvis w Contrast   Result Value Ref Range    Radiologist flags Appendicitis (Urgent)     Narrative    EXAMINATION: CT ABDOMEN PELVIS W CONTRAST, 6/28/2019 7:52 PM    TECHNIQUE:  Helical CT images from the lung bases through the  symphysis pubis were obtained with IV contrast. Contrast dose:  iopamidol (ISOVUE-370) solution 107 mL     COMPARISON: 6/18/2019    HISTORY: ? appy, kidney stone, pyelo (gen abd pain, then localized to  RLQ/R suprapubic)    FINDINGS:    Abdomen and pelvis:     Redemonstration of a large appendicolith within the appendix measuring  up to 1.7 cm in diameter. The appendix appears acutely inflamed,  measuring up to 2.5 cm in diameter, with fluid distention and moderate  inflammatory stranding of the adjacent mesenteric fat. There is  associated thickening of the  medial aspect of the cecum, adjacent to  the appendix, as well as mild mucosal thickening of the terminal  ileum. These findings are new from prior examination.    Scattered subcentimeter hypodensities within the liver, which are to  small to characterize. The gallbladder, pancreas, spleen, adrenal  glands, and kidneys are unremarkable in appearance. Mildly thickened  appearance of the rectosigmoid colon which may relate to the  predistention or be reactive given above appendiceal inflammatory  change. The remainder of the small and large bowel is unremarkable in  appearance, without findings to suggest small bowel obstruction.   There is no pneumatosis or portal venous gas. No intraperitoneal free  fluid or free air. The major abdominal vasculature is patent, without  evidence of infrarenal abdominal aortic aneurysm.    Lung bases: The visualized lung bases are clear. No pleural effusion.  The heart is normal in size, without pericardial effusion.    Bones and soft tissues: No acute or suspicious osseous abnormality.  Mild degenerative changes of the thoracolumbar spine.      Impression    IMPRESSION:   Acute uncomplicated appendicitis, with large calcified appendicolith.  There is reactive inflammatory thickening of the cecum and terminal  ileum. No findings to suggest perforation or abscess.    [Urgent Result: Appendicitis]    Finding was identified on 6/28/2019 7:54 PM.     Dr. Kim was contacted by Dr. Morse at 6/28/2019 9:01 PM and  verbalized understanding of the urgent finding.     I have personally reviewed the examination and initial interpretation  and I agree with the findings.    CHANDRA MORSE MD   UA with Microscopic   Result Value Ref Range    Color Urine Straw     Appearance Urine Clear     Glucose Urine Negative NEG^Negative mg/dL    Bilirubin Urine Negative NEG^Negative    Ketones Urine Negative NEG^Negative mg/dL    Specific Gravity Urine >1.035 (H) 1.003 - 1.035    Blood Urine Negative  NEG^Negative    pH Urine 7.0 5.0 - 7.0 pH    Protein Albumin Urine Negative NEG^Negative mg/dL    Urobilinogen mg/dL Normal 0.0 - 2.0 mg/dL    Nitrite Urine Negative NEG^Negative    Leukocyte Esterase Urine Negative NEG^Negative    Source Midstream Urine     WBC Urine None 0 - 5 /HPF    RBC Urine 0 0 - 2 /HPF   Blood Culture ONE site   Result Value Ref Range    Specimen Description Blood Right Arm     Special Requests Received in aerobic bottle only     Culture Micro No growth after 7 hours    Blood Culture ONE site   Result Value Ref Range    Specimen Description Blood LARM     Special Requests Received in aerobic bottle only     Culture Micro No growth after 7 hours    EKG 12-lead, tracing only   Result Value Ref Range    Interpretation ECG Click View Image link to view waveform and result    Basic metabolic panel   Result Value Ref Range    Sodium 136 133 - 144 mmol/L    Potassium 4.3 3.4 - 5.3 mmol/L    Chloride 105 94 - 109 mmol/L    Carbon Dioxide 27 20 - 32 mmol/L    Anion Gap 4 3 - 14 mmol/L    Glucose 105 (H) 70 - 99 mg/dL    Urea Nitrogen 13 7 - 30 mg/dL    Creatinine 0.89 0.66 - 1.25 mg/dL    GFR Estimate >90 >60 mL/min/[1.73_m2]    GFR Estimate If Black >90 >60 mL/min/[1.73_m2]    Calcium 8.7 8.5 - 10.1 mg/dL   CBC with platelets differential   Result Value Ref Range    WBC 2.5 (L) 4.0 - 11.0 10e9/L    RBC Count 3.48 (L) 4.4 - 5.9 10e12/L    Hemoglobin 10.6 (L) 13.3 - 17.7 g/dL    Hematocrit 33.3 (L) 40.0 - 53.0 %    MCV 96 78 - 100 fl    MCH 30.5 26.5 - 33.0 pg    MCHC 31.8 31.5 - 36.5 g/dL    RDW 15.6 (H) 10.0 - 15.0 %    Platelet Count 123 (L) 150 - 450 10e9/L    Diff Method Manual Differential     % Neutrophils 88.7 %    % Lymphocytes 9.6 %    % Monocytes 1.7 %    % Eosinophils 0.0 %    % Basophils 0.0 %    Absolute Neutrophil 2.2 1.6 - 8.3 10e9/L    Absolute Lymphocytes 0.2 (L) 0.8 - 5.3 10e9/L    Absolute Monocytes 0.0 0.0 - 1.3 10e9/L    Absolute Eosinophils 0.0 0.0 - 0.7 10e9/L    Absolute  Basophils 0.0 0.0 - 0.2 10e9/L    Anisocytosis Slight     Platelet Estimate Confirming automated cell count    CRP inflammation   Result Value Ref Range    CRP Inflammation 62.0 (H) 0.0 - 8.0 mg/L   Magnesium   Result Value Ref Range    Magnesium 2.1 1.6 - 2.3 mg/dL   INR   Result Value Ref Range    INR 1.05 0.86 - 1.14     I have seen, interviewed, and examined the patient independently.  I have reviewed the vital signs and labs.  This note reflects my assessment and plan.      Adm with acute appy, plan for resection by surgery./ He is hungry and not in a lot of pain.    Tongue coated with thrush will start nystatin S+S after the OR.    Counts recovered nicely from last epoch cycle. Will need to hold next cycle until recovers from surgery  Jessica Bauer MD/PhD

## 2019-06-29 NOTE — ANESTHESIA POSTPROCEDURE EVALUATION
Anesthesia POST Procedure Evaluation    Patient: Christiano Molina   MRN:     [id number] Gender:   male   Age:    44 year old :      1975        Preoperative Diagnosis: appendicitis   Procedure(s):  APPENDECTOMY, LAPAROSCOPIC   Postop Comments: No value filed.       Anesthesia Type:  General  No value filed.    Reportable Event: NO     PAIN: Uncomplicated   Sign Out status: Comfortable, Well controlled pain     PONV: No PONV   Sign Out status:  No Nausea or Vomiting     Neuro/Psych: Uneventful perioperative course   Sign Out Status: Preoperative baseline; Age appropriate mentation     Airway/Resp.: Uneventful perioperative course   Sign Out Status: Non labored breathing, age appropriate RR; Resp. Status within EXPECTED Parameters     CV: Uneventful perioperative course   Sign Out status: Appropriate BP and perfusion indices; Appropriate HR/Rhythm     Disposition:   Sign Out in:  PACU  Disposition:  Floor  Recovery Course: Uneventful  Follow-Up: Not required           Last Anesthesia Record Vitals:  CRNA VITALS  2019 1414 - 2019 1514      2019             Resp Rate (observed):  23          Last PACU Vitals:  Vitals Value Taken Time   /75 2019  3:10 PM   Temp 36.8  C (98.3  F) 2019  3:00 PM   Pulse 87 2019  3:10 PM   Resp 18 2019  3:15 PM   SpO2 100 % 2019  3:16 PM   Temp src     NIBP     Pulse     SpO2     Resp     Temp     Ht Rate     Temp 2     Vitals shown include unvalidated device data.      Electronically Signed By: Amanda Garcia MD, 2019, 3:17 PM

## 2019-06-29 NOTE — ANESTHESIA PREPROCEDURE EVALUATION
Anesthesia Pre-Procedure Evaluation    Patient: Christiano Molina   MRN:     [id number] Gender:   male   Age:    44 year old :      1975        Preoperative Diagnosis: appendicitis   Procedure(s):  APPENDECTOMY, LAPAROSCOPIC     Past Medical History:   Diagnosis Date     Cancer (H)     Lymphoma     Fourth CraniaNerve Palsy 2008      Past Surgical History:   Procedure Laterality Date     EYE SURGERY       IR PICC VASCULAR  3/28/2019     PICC INSERTION Right 2019    5Fr - 42cm, Basilic vein, mid SVC          Anesthesia Evaluation     .             ROS/MED HX    ENT/Pulmonary:       Neurologic:     (+)neuropathy - Right hand neuropathy,     Cardiovascular:         METS/Exercise Tolerance:     Hematologic:     (+) Anemia, -      Musculoskeletal:         GI/Hepatic:     (+) appendicitis,       Renal/Genitourinary:         Endo:         Psychiatric:         Infectious Disease:         Malignancy:   (+) Malignancy History of Lymphoma/Leukemia  Other CA Burkitt lymhoma s/p 5 cycles of DA-REPOCH (last treated on 19) status post         Other:                     [facility] AN PHYSICAL EXAM    Lab Results   Component Value Date    WBC 8.1 2019    HGB 11.6 (L) 2019    HCT 35.1 (L) 2019     2019    CRP 24.0 (H) 2019    SED 12 2019     2019    POTASSIUM 4.3 2019    CHLORIDE 104 2019    CO2 29 2019    BUN 18 2019    CR 0.96 2019    GLC 80 2019    TAMMY 9.1 2019    PHOS 3.9 2019    MAG 2.0 2019    ALBUMIN 3.7 2019    PROTTOTAL 7.1 2019    ALT 22 2019    AST 6 2019    ALKPHOS 77 2019    BILITOTAL 0.4 2019    LIPASE 80 2019    INR 1.16 (H) 2019       Preop Vitals  BP Readings from Last 3 Encounters:   19 140/71   19 119/75   19 135/69    Pulse Readings from Last 3 Encounters:   19 88   19 76   19 83      Resp Readings from  "Last 3 Encounters:   06/28/19 16   06/25/19 16   06/24/19 20    SpO2 Readings from Last 3 Encounters:   06/28/19 97%   06/25/19 96%   06/24/19 97%      Temp Readings from Last 1 Encounters:   06/28/19 36.7  C (98.1  F) (Axillary)    Ht Readings from Last 1 Encounters:   06/20/19 1.778 m (5' 10\")      Wt Readings from Last 1 Encounters:   06/28/19 80 kg (176 lb 5.9 oz)    Estimated body mass index is 25.31 kg/m  as calculated from the following:    Height as of 6/20/19: 1.778 m (5' 10\").    Weight as of this encounter: 80 kg (176 lb 5.9 oz).     LDA:  Peripheral IV 06/28/19 Right (Active)   Site Assessment WDL 6/28/2019  7:29 PM   Line Status Saline locked 6/28/2019  7:29 PM   Phlebitis Scale 0-->no symptoms 6/28/2019  7:29 PM   Infiltration Scale 0 6/28/2019  7:29 PM   Infiltration Site Treatment Method  None 6/28/2019  7:29 PM   Extravasation? No 6/28/2019  7:29 PM   Dressing Intervention New dressing  6/28/2019  7:29 PM   Number of days: 1            Assessment:   ASA SCORE: 3       Documentation: H&P complete; Preop Testing complete; Consents complete   Proceeding: Proceed without further delay  Tobacco Use:  NO Active use of Tobacco/UNKNOWN Tobacco use status     Plan:   Anes. Type:  General   Pre-Induction: Midazolam IV; Acetaminophen PO   Induction:  IV (RSI)   Airway: Oral ETT   Access/Monitoring: PIV   Maintenance: Balanced   Emergence: Procedure Site   Logistics: Same Day Surgery     Postop Pain/Sedation Strategy:  Standard-Options: Opioids PRN     PONV Management:  Adult Risk Factors:, Non-Smoker, Postop Opioids  Prevention: Ondansetron                         Dalton Alvarez MD  "

## 2019-06-29 NOTE — ED PROVIDER NOTES
History     Chief Complaint   Patient presents with     Abdominal Pain     HPI  Christiano Molina is a 44 year old male with a history of Burkitt's lymphoma of the axillary lymph nodes status post 5 cycles of DA-EPOCH; with recent admission (6/20/2019 to 6/24/2019) for cycle 5 of chemotherapy with intrathecal administration of Methotrexate + hydrocortisone (6/24/19); also history of chemotherapy induced constipation and nausea, GERD, who presents to the ED for evaluation of abdominal pain x1 day.     Patient reports that he woke up this morning with some generalized abdominal discomfort near his umbilicus.  He had a couple about bowel movements earlier in the day that were nonbloody, no diarrhea, tat somewhat improved his pain.  Over the course the day the pain seemed to move down towards his right lower quadrant.  Prior to coming to the ED he had another bowel movement, thought the pain was improved, but then continued to worsen.  With that he also had worsening nausea which she used his home Compazine and Zofran, which helped somewhat and his symptoms somewhat abated, but pain continued.    Based upon a recent PET scan, he knew he had something chronic going on with his appendix (PET showed appendicolith w/o findings for acute appendicitis), so he thought he should come in to be evaluated.  No vernon fevers but perhaps some chills. Has had the nausea, but without vomiting.  He has had bowel movements and pass gas without any blood, melena, etc.  No urine symptoms including no hematuria, dysuria, polyuria.  No traumas or falls.  No testicular, scrotal or penile symptoms.  No other new symptoms or complaints at this time.  Please see ROS for further details.      I have reviewed the Medications, Allergies, Past Medical and Surgical History, and Social History in the [facility] system.    Past Medical History:   Diagnosis Date     Cancer (H)     Lymphoma     Fourth CraniaNerve Palsy 9/16/2008       Past Surgical History:    Procedure Laterality Date     IR PICC VASCULAR  3/28/2019     PICC INSERTION Right 03/27/2019    5Fr - 42cm, Basilic vein, mid SVC       History reviewed. No pertinent family history.    Social History     Tobacco Use     Smoking status: Never Smoker     Smokeless tobacco: Never Used   Substance Use Topics     Alcohol use: Not on file     No current facility-administered medications for this encounter.      Current Outpatient Medications   Medication     acetaminophen (TYLENOL) 325 MG tablet     acyclovir (ZOVIRAX) 200 MG capsule     calcium carbonate (TUMS) 500 MG chewable tablet     dexamethasone (DECADRON) 4 MG tablet     fluconazole (DIFLUCAN) 100 MG tablet     K-PHOS-NEUTRAL 155-852-130 MG tablet     levofloxacin (LEVAQUIN) 250 MG tablet     ondansetron (ZOFRAN) 8 MG tablet     ondansetron (ZOFRAN-ODT) 8 MG ODT tab     potassium chloride ER (K-TAB) 20 MEQ CR tablet     prochlorperazine (COMPAZINE) 10 MG tablet     ranitidine (ZANTAC) 150 MG tablet     senna-docusate (SENOKOT-S/PERICOLACE) 8.6-50 MG tablet     triamcinolone (KENALOG) 0.1 % external cream      No Known Allergies    Review of Systems   Constitutional: Positive for chills. Negative for diaphoresis, fatigue and fever.   HENT: Negative for sore throat and trouble swallowing.    Respiratory: Negative for cough and shortness of breath.    Cardiovascular: Negative for chest pain and palpitations.   Gastrointestinal: Positive for abdominal pain and nausea. Negative for blood in stool, constipation, diarrhea and vomiting.   Genitourinary: Negative for discharge, dysuria, frequency, hematuria, penile pain, penile swelling, scrotal swelling and testicular pain.   Musculoskeletal: Negative for back pain, neck pain and neck stiffness.   Skin: Negative for color change and rash.   Allergic/Immunologic: Positive for immunocompromised state.   Neurological: Negative for syncope and weakness.   All other systems reviewed and are negative.      Physical Exam    BP: 140/80  Heart Rate: 107  Temp: 99.1  F (37.3  C)  Resp: 18  Weight: 79.3 kg (174 lb 12.8 oz)  SpO2: 98 %      Physical Exam  CONSTITUTIONAL: Well-developed and well-nourished. Awake and alert. Non-toxic appearance. No acute distress.   HENT:   - Head: Normocephalic and atraumatic. No hair in setting of lymphoma treatments  - Ears: Hearing and external ear grossly normal.   - Nose: Nose normal. No rhinorrhea. No epistaxis.   - Mouth/Throat: MMM  EYES: Conjunctivae and lids are normal. No scleral icterus.   NECK: Normal range of motion and phonation normal. Neck supple.  No tracheal deviation, no stridor. No edema or erythema noted.  CARDIOVASCULAR: Normal rate, regular rhythm and no appreciable abnormal heart sounds.  PULMONARY/CHEST: Normal work of breathing. No accessory muscle usage or stridor. No respiratory distress.  No appreciable abnormal breath sounds.  ABDOMEN: Soft, non-distended.  Does have tenderness in the abdomen, particularly in the right suprapubic/RLQ with guarding appears rather uncomfortable in this area, though comfortable at rest and no palpation.  No palpable masses, no vernon rigidity.  No pulsatility.  MUSCULOSKELETAL: Extremities warm and seemingly well perfused. No edema or calf tenderness.  NEUROLOGIC: Awake, alert. Not disoriented. Normal tone. No seizure activity. GCS 15  SKIN: Skin is warm and dry. No rash noted. No diaphoresis. No pallor.   PSYCHIATRIC: Normal mood and affect. Speech and behavior normal. Thought processes linear. Cognition and memory are normal.     Assessments & Plan (with Medical Decision Making)   IMPRESSION:  44 year old male with a history of Burkitt's lymphoma of the axillary lymph nodes status post 5 cycles of DA-EPOCH; with recent admission (6/20/2019 to 6/24/2019) for cycle 5 of chemotherapy with intrathecal administration of Methotrexate + hydrocortisone (6/24/19); also history of chemotherapy induced constipation and nausea, GERD, who presents to the ED  for evaluation of abdominal pain x1 day, as described further above in HPI/ROS.    Clinically, patient appears  nontoxic, NAD.  Vitals grossly Devino.  Otherwise on examination his abdominal exam is generally benign until you palpate in the right suprapubic and right lower quadrant area at which point he immediately has guarding and I am concerned for localized peritoneal type findings.    Ddx includes, but not limited to, appendicitis (which I'm clinically most concerned for), enteritis, kidney stone, other intra-abdominal infection, amongst others.    PLAN: Laboratory studies, urine studies and CT A/P, symptom management as needed (patient currently declining any pain medication, antiemetics but will continue to monitor)    RESULTS:  - Labs: Procalcitonin 0.34, CRP 24, no leukocytosis  --- Blood cultures pending.  - Urine:  Unremarkable (additional labs as an outpatient earlier today, see EMR for details) disease  - Imaging: Written preliminary reports reviewed:  --- CT A/P: Acute uncomplicated appendicitis, with large calcified appendicolith. There is reactive inflammatory thickening of the cecum and terminal ileum. No findings to suggest perforation or abscess.     INTERVENTIONS:   - IV Dilaudid   - IV Zofran   - IV Zosyn  - Surgical consult    RE-EVALUATION:  - The patient's symptoms were improved after the IV Dilaudid.  - Pt otherwise continues to do well here in the ED, no acute issues or apparent concerning changes in vitals or clinical appearance.    DISCUSSIONS:  - w/ Surgery: reviewed case, they would prefer patient admit to ONC and will follow and plan for appendectomy in the morning.   - w/ Oncology: reviewed case, they will admit and coordinate care with surgery.  - w/ Patient: I have reviewed the available findings, plan with the patient and his wife. They expressed understanding and agreement with this plan. All questions answered to the best of our ability at this time.     DISPOSITION/PLANNING:  -  IMPRESSION: Acute appendicitis  - DISPOSITION: Admit to Oncology for further management and care, Surgery to follow for appendectomy  --- Pending: Blood cultures    This part of the medical record was transcribed by Hillary Lowry,  Medical Scribe, from a dictation done by Shelby Kim MD.     ______________________________________________________________________        6/28/2019   [facility], EMERGENCY DEPARTMENT     Shelby Kim MD  06/29/19 1600

## 2019-06-29 NOTE — PROGRESS NOTES
General Surgery Progress Note    S:  No acute events overnight. Continues to have some RLQ discomfort. No nausea or emesis.    O:  Temp:  [97.9  F (36.6  C)-99.1  F (37.3  C)] 99.1  F (37.3  C)  Heart Rate:  [] 88  Resp:  [16-18] 16  BP: (116-140)/(71-80) 116/71  SpO2:  [97 %-99 %] 99 %    I/O last 3 completed shifts:  In: 800 [I.V.:800]  Out: 400 [Urine:400]    Gen: A&Ox3, NAD  Resp: non-labored breathing on RA  Abd: Soft, Non-distended, tender to palpation in RLQ  Ext: warm and well perfused    Labs:  Complete Blood Count   Recent Labs   Lab 06/29/19 0610 06/28/19 1925 06/28/19  1040 06/24/19  0608   WBC 2.5* 8.1 10.8 6.0   HGB 10.6* 11.6* 11.7* 10.3*   * 152 158 238     Basic Metabolic Panel  Recent Labs   Lab 06/29/19  0610 06/28/19  1040 06/24/19  0608 06/23/19  1620 06/23/19  0538    138 140  --  141   POTASSIUM 4.3 4.3 3.0* 3.7 3.2*   CHLORIDE 105 104 109  --  110*   CO2 27 29 24  --  26   BUN 13 18 15  --  12   CR 0.89 0.96 0.90  --  0.84   * 80 117*  --  124*     Liver Function Tests  Recent Labs   Lab 06/29/19 0622 06/28/19  1040 06/24/19  0608   AST  --  6 14   ALT  --  22 30   ALKPHOS  --  77 48   BILITOTAL  --  0.4 0.6   ALBUMIN  --  3.7 3.3*   INR 1.05  --  1.16*     Pancreatic Enzymes  Recent Labs   Lab 06/28/19  1925   LIPASE 80     Coagulation Profile  Recent Labs   Lab 06/29/19  0622 06/24/19  0608   INR 1.05 1.16*          A/P: Christiano Molina is a 44M with Burkitt's lymphoma presenting with appendicitis and large appendicolith.    - Plan for OR today for laparoscopic appendectomy  - NPO  - Continue IV antibiotics until OR    Patient seen and discussed with chief resident who discussed with staff.    Sumeet Fraser, PGY-2  General Surgery

## 2019-06-29 NOTE — PLAN OF CARE
/71   Temp 98.1  F (36.7  C) (Axillary)   Resp 16   Wt 80 kg (176 lb 5.9 oz)   SpO2 97%   BMI 25.31 kg/m      VSS. No complaints of N/V/D overnight. Pt is AOx4 and up independent. He states his abdominal pain has subsided overnight. IV infusing per orders including antibiotics. Pt has been NPO except ice chips. Plan for appendectomy today.       Problem: Adult Inpatient Plan of Care  Goal: Plan of Care Review  Outcome: No Change     Problem: Adult Inpatient Plan of Care  Goal: Optimal Comfort and Wellbeing  Outcome: Improving     Problem: Pain Acute  Goal: Optimal Pain Control  Outcome: Improving

## 2019-06-29 NOTE — ANESTHESIA CARE TRANSFER NOTE
Patient: Christiano Molina    Procedure(s):  APPENDECTOMY, LAPAROSCOPIC    Diagnosis: appendicitis  Diagnosis Additional Information: No value filed.    Anesthesia Type:   No value filed.     Note:  Airway :Face Mask  Patient transferred to:PACU  Handoff Report: Identifed the Patient, Identified the Reponsible Provider, Reviewed the pertinent medical history, Discussed the surgical course, Reviewed Intra-OP anesthesia mangement and issues during anesthesia, Set expectations for post-procedure period and Allowed opportunity for questions and acknowledgement of understanding      Vitals: (Last set prior to Anesthesia Care Transfer)    CRNA VITALS  6/29/2019 1414 - 6/29/2019 1448      6/29/2019             Resp Rate (observed):  23                Electronically Signed By: DEJA Saldana CRNA  June 29, 2019  2:48 PM

## 2019-06-29 NOTE — PROGRESS NOTES
SPIRITUAL HEALTH SERVICES  SPIRITUAL ASSESSMENT Progress Note  [facility] ([place]) 5c   ON-CALL VISIT    REFERRAL SOURCE: Patient     Prudencio was preparing for surgery for an appendicitis and requested a pre-op prayer. We prayed for a successful  Surgery and ongoing healing.    PLAN: I will inform the unit  of this visit.    Elisabeth Fontana  Chaplain Resident  Pager [phone]

## 2019-06-30 LAB
ANION GAP SERPL CALCULATED.3IONS-SCNC: 5 MMOL/L (ref 3–14)
BASOPHILS # BLD AUTO: 0 10E9/L (ref 0–0.2)
BASOPHILS NFR BLD AUTO: 0 %
BUN SERPL-MCNC: 11 MG/DL (ref 7–30)
CALCIUM SERPL-MCNC: 8.3 MG/DL (ref 8.5–10.1)
CHLORIDE SERPL-SCNC: 106 MMOL/L (ref 94–109)
CO2 SERPL-SCNC: 27 MMOL/L (ref 20–32)
CREAT SERPL-MCNC: 0.8 MG/DL (ref 0.66–1.25)
DIFFERENTIAL METHOD BLD: ABNORMAL
EOSINOPHIL # BLD AUTO: 0 10E9/L (ref 0–0.7)
EOSINOPHIL NFR BLD AUTO: 0 %
ERYTHROCYTE [DISTWIDTH] IN BLOOD BY AUTOMATED COUNT: 15.1 % (ref 10–15)
GFR SERPL CREATININE-BSD FRML MDRD: >90 ML/MIN/{1.73_M2}
GLUCOSE SERPL-MCNC: 117 MG/DL (ref 70–99)
HCT VFR BLD AUTO: 27.6 % (ref 40–53)
HGB BLD-MCNC: 8.8 G/DL (ref 13.3–17.7)
LYMPHOCYTES # BLD AUTO: 0.2 10E9/L (ref 0.8–5.3)
LYMPHOCYTES NFR BLD AUTO: 22.8 %
MAGNESIUM SERPL-MCNC: 2.2 MG/DL (ref 1.6–2.3)
MCH RBC QN AUTO: 30.6 PG (ref 26.5–33)
MCHC RBC AUTO-ENTMCNC: 31.9 G/DL (ref 31.5–36.5)
MCV RBC AUTO: 96 FL (ref 78–100)
MONOCYTES # BLD AUTO: 0.1 10E9/L (ref 0–1.3)
MONOCYTES NFR BLD AUTO: 7 %
NEUTROPHILS # BLD AUTO: 0.6 10E9/L (ref 1.6–8.3)
NEUTROPHILS NFR BLD AUTO: 70.2 %
PHOSPHATE SERPL-MCNC: 2.5 MG/DL (ref 2.5–4.5)
PLATELET # BLD AUTO: 89 10E9/L (ref 150–450)
POTASSIUM SERPL-SCNC: 4 MMOL/L (ref 3.4–5.3)
RBC # BLD AUTO: 2.88 10E12/L (ref 4.4–5.9)
RBC MORPH BLD: NORMAL
SODIUM SERPL-SCNC: 138 MMOL/L (ref 133–144)
WBC # BLD AUTO: 0.8 10E9/L (ref 4–11)

## 2019-06-30 PROCEDURE — 99233 SBSQ HOSP IP/OBS HIGH 50: CPT | Performed by: INTERNAL MEDICINE

## 2019-06-30 PROCEDURE — 25000128 H RX IP 250 OP 636: Performed by: NEUROLOGICAL SURGERY

## 2019-06-30 PROCEDURE — 25000132 ZZH RX MED GY IP 250 OP 250 PS 637: Performed by: STUDENT IN AN ORGANIZED HEALTH CARE EDUCATION/TRAINING PROGRAM

## 2019-06-30 PROCEDURE — 83735 ASSAY OF MAGNESIUM: CPT | Performed by: NEUROLOGICAL SURGERY

## 2019-06-30 PROCEDURE — 25000132 ZZH RX MED GY IP 250 OP 250 PS 637: Performed by: NURSE PRACTITIONER

## 2019-06-30 PROCEDURE — 20600000 ZZH R&B BMT

## 2019-06-30 PROCEDURE — 25000132 ZZH RX MED GY IP 250 OP 250 PS 637: Performed by: NEUROLOGICAL SURGERY

## 2019-06-30 PROCEDURE — 85025 COMPLETE CBC W/AUTO DIFF WBC: CPT | Performed by: NEUROLOGICAL SURGERY

## 2019-06-30 PROCEDURE — 84100 ASSAY OF PHOSPHORUS: CPT | Performed by: NEUROLOGICAL SURGERY

## 2019-06-30 PROCEDURE — 36415 COLL VENOUS BLD VENIPUNCTURE: CPT | Performed by: NEUROLOGICAL SURGERY

## 2019-06-30 PROCEDURE — 80048 BASIC METABOLIC PNL TOTAL CA: CPT | Performed by: NEUROLOGICAL SURGERY

## 2019-06-30 RX ORDER — LEVOFLOXACIN 250 MG/1
500 TABLET, FILM COATED ORAL DAILY
Status: DISCONTINUED | OUTPATIENT
Start: 2019-07-01 | End: 2019-07-01 | Stop reason: HOSPADM

## 2019-06-30 RX ORDER — OXYCODONE HYDROCHLORIDE 5 MG/1
5 TABLET ORAL EVERY 4 HOURS PRN
Status: DISCONTINUED | OUTPATIENT
Start: 2019-06-30 | End: 2019-07-01 | Stop reason: HOSPADM

## 2019-06-30 RX ORDER — METRONIDAZOLE 500 MG/1
500 TABLET ORAL EVERY 6 HOURS SCHEDULED
Status: DISCONTINUED | OUTPATIENT
Start: 2019-06-30 | End: 2019-07-01 | Stop reason: HOSPADM

## 2019-06-30 RX ORDER — LEVOFLOXACIN 250 MG/1
500 TABLET, FILM COATED ORAL DAILY
Status: DISCONTINUED | OUTPATIENT
Start: 2019-06-30 | End: 2019-06-30

## 2019-06-30 RX ADMIN — ACYCLOVIR 400 MG: 200 CAPSULE ORAL at 08:18

## 2019-06-30 RX ADMIN — ACETAMINOPHEN 650 MG: 325 TABLET, FILM COATED ORAL at 03:21

## 2019-06-30 RX ADMIN — OXYCODONE HYDROCHLORIDE 5 MG: 5 TABLET ORAL at 08:24

## 2019-06-30 RX ADMIN — OXYCODONE HYDROCHLORIDE 5 MG: 5 TABLET ORAL at 18:00

## 2019-06-30 RX ADMIN — METRONIDAZOLE 500 MG: 500 TABLET ORAL at 20:02

## 2019-06-30 RX ADMIN — ACYCLOVIR 400 MG: 200 CAPSULE ORAL at 20:03

## 2019-06-30 RX ADMIN — NYSTATIN 1000000 UNITS: 100000 SUSPENSION ORAL at 12:08

## 2019-06-30 RX ADMIN — NYSTATIN 1000000 UNITS: 100000 SUSPENSION ORAL at 20:02

## 2019-06-30 RX ADMIN — LEVOFLOXACIN 500 MG: 5 INJECTION, SOLUTION INTRAVENOUS at 02:00

## 2019-06-30 RX ADMIN — FLUCONAZOLE 100 MG: 100 TABLET ORAL at 08:18

## 2019-06-30 RX ADMIN — METRONIDAZOLE 500 MG: 500 INJECTION, SOLUTION INTRAVENOUS at 00:45

## 2019-06-30 RX ADMIN — NYSTATIN 1000000 UNITS: 100000 SUSPENSION ORAL at 17:14

## 2019-06-30 RX ADMIN — METRONIDAZOLE 500 MG: 500 INJECTION, SOLUTION INTRAVENOUS at 06:27

## 2019-06-30 RX ADMIN — NYSTATIN 1000000 UNITS: 100000 SUSPENSION ORAL at 08:17

## 2019-06-30 RX ADMIN — PANTOPRAZOLE SODIUM 40 MG: 40 TABLET, DELAYED RELEASE ORAL at 08:18

## 2019-06-30 RX ADMIN — POLYETHYLENE GLYCOL 3350 17 G: 17 POWDER, FOR SOLUTION ORAL at 18:54

## 2019-06-30 RX ADMIN — OXYCODONE HYDROCHLORIDE 5 MG: 5 TABLET ORAL at 13:03

## 2019-06-30 RX ADMIN — METRONIDAZOLE 500 MG: 500 INJECTION, SOLUTION INTRAVENOUS at 12:08

## 2019-06-30 ASSESSMENT — ACTIVITIES OF DAILY LIVING (ADL)
ADLS_ACUITY_SCORE: 10

## 2019-06-30 ASSESSMENT — PAIN DESCRIPTION - DESCRIPTORS
DESCRIPTORS: ACHING
DESCRIPTORS: ACHING

## 2019-06-30 NOTE — PROGRESS NOTES
General Surgery Progress Note    S:  No acute events overnight. Pain much improved since OR yesterday. Tolerating small amount of regular diet, no N/V.    O:  Temp:  [98  F (36.7  C)-99.3  F (37.4  C)] 98.1  F (36.7  C)  Pulse:  [83-90] 83  Heart Rate:  [83-98] 83  Resp:  [14-20] 18  BP: (110-139)/(70-83) 121/78  SpO2:  [95 %-100 %] 98 %    I/O last 3 completed shifts:  In: 3585 [P.O.:910; I.V.:2675]  Out: 3760 [Urine:3750; Blood:10]    Gen: A&Ox3, NAD  Resp: non-labored breathing on RA  Abd: Soft, Non-distended, appropriately tender, incisions c/d/i  Ext: warm and well perfused    Labs:  Complete Blood Count   Recent Labs   Lab 06/30/19 0446 06/29/19  0610 06/28/19 1925 06/28/19  1040   WBC 0.8* 2.5* 8.1 10.8   HGB 8.8* 10.6* 11.6* 11.7*   PLT 89* 123* 152 158     Basic Metabolic Panel  Recent Labs   Lab 06/30/19  0446 06/29/19  0610 06/28/19  1040 06/24/19  0608    136 138 140   POTASSIUM 4.0 4.3 4.3 3.0*   CHLORIDE 106 105 104 109   CO2 27 27 29 24   BUN 11 13 18 15   CR 0.80 0.89 0.96 0.90   * 105* 80 117*     Liver Function Tests  Recent Labs   Lab 06/29/19  0622 06/28/19  1040 06/24/19  0608   AST  --  6 14   ALT  --  22 30   ALKPHOS  --  77 48   BILITOTAL  --  0.4 0.6   ALBUMIN  --  3.7 3.3*   INR 1.05  --  1.16*     Pancreatic Enzymes  Recent Labs   Lab 06/28/19  1925   LIPASE 80     Coagulation Profile  Recent Labs   Lab 06/29/19  0622 06/24/19  0608   INR 1.05 1.16*          A/P: Christiano Molina is a 44M with Burkitt's lymphoma presenting with appendicitis and large appendicolith now s/p laparoscopic appendectomy on 6/29. Recovering appropriately.    - Regular diet  - OK to discharge from Surgery standpoint  - Discharge instructions added to discharge navigator  - Will receive call from clinic nursing in coming 1-2 weeks  - Surgery will sign off, please call/page with questions    Patient seen and discussed with chief resident who discussed with staff.    Sumeet Fraser, PGY-2  General  Surgery

## 2019-06-30 NOTE — PROGRESS NOTES
[facility], [place]    Hematology / Oncology Progress Note     Assessment & Plan   43 y/o male with PMHx significant for EBV+, Burkitt lymhoma s/p 5 cycles of DA-REPOCH (last treated on 06/24/19) was admitted due to acute appendicitis.     Today 6/30/19  - Day 11 s/p Cycle 5 DA-REPOCH  - POD 1 Laproscopic appendectomy.  Now with new, but expected neutropenic today, at [facility] post chemo.  S/p Neulasta 6/25/19.  Plan to monitor given s/p surgery.  - Post op pain, change Tylenol to Oxycodone in setting of neutropenia today  - Stop IVFs and regular diet  - Changed Levaquin and Flagyl to po       GASTROINTESTINAL  RLQ pain  Acute appendicitis  Clinical history and imaging findings consistent with acute appendicitis. PET/CT on 05/07/19 showed large appendicolith of 1.8cm which was seen again on CT from this admission with unchanged size. CT also showed shama-appendiceal inflammation, fluid collection and fat stranding with thickening of the terminal ileum and cecum adjacent to the appendix. No evidence of perforation or abscess. Surgical team evaluated patient and as blood counts and vital signs stable, patient taken to the OR on 06/29/19 .  - NPO on admission  - Maintenance IVFs  - On levofloxacin and metronidazole, expect to continue abx 3-5 days after surgery  - Tylenol for pain control, will escalate if needed  - CRP every 48hrs    6/30/19: Tylenol not helping with post op pain. In the setting of neutropenia, will stop Tylenol and commence Oxycodone 5 mg PRN.       HEMATOLOGY/ONCOLOGY  Burkitts lymphoma s/p 5 cycles of DA-REPOCH, EBV+, Stage IA  Follows with Dr. Ramirez.  Presented in March of this year with axillary mass.  He completed cycle 5 of DA-EPOCH-R during last admission, 06/20/19 - 06/24/19.   No complications during admission.  PT/CT on 06/18/19 showing complete response.   - Daily CBC with diff, transfuse if HGB < 7.0 or plt < 10k without bleeding or plt <20k with bleeding;  irradiated products.     Pancytopenia  - 6/30/19 Neutropenic, at [facility] post chemo.  S/p Neulasta 6/25/19  - Continue to monitor CBC.    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 Continue to monitor          FLUIDS/ELECTROLYTES/NUTRITION   NPO for surgery, post-op diet per surgery  IVF LR @ 125/hour, switch to NS after surgery  6/30/19  Stop IVFs     PROPHYLAXIS   - DVT mechanical due to surgery  - Continue home fluconazole for fungal prophylaxis  - Continue home acyclovir HSV prophylaxis    CODE: FULL    The plan was staffed with Dr. Bauer.    Nida Diallo, DEJA CNP    Interval History   POD 1 with abdominal discomfort post op. Tylenol now not effective. Ordered 5 mg oxycodone this am with effectiveness. Is slowly starting to eat.  Walking the halls.  Passing gas, but no BM yet. Is concerned about WBC today in the context of surgery yesterday and being told he can go home from a surgical standpoint.  Expressed concern for what if he developed a fever in the middle of the night, had to go to the ED, etc.        Physical Exam   Temp: 98.1  F (36.7  C) Temp src: Axillary BP: 121/78 Pulse: 83 Heart Rate: 83 Resp: 18 SpO2: 98 % O2 Device: None (Room air) Oxygen Delivery: 1 LPM  Vitals:    06/28/19 1841 06/28/19 2330 06/29/19 0740   Weight: 79.3 kg (174 lb 12.8 oz) 80 kg (176 lb 5.9 oz) 77.4 kg (170 lb 9.6 oz)     Vital Signs with Ranges  Temp:  [98  F (36.7  C)-99.3  F (37.4  C)] 98.1  F (36.7  C)  Pulse:  [83-90] 83  Heart Rate:  [83-98] 83  Resp:  [14-20] 18  BP: (110-139)/(70-83) 121/78  SpO2:  [95 %-100 %] 98 %  I/O last 3 completed shifts:  In: 3585 [P.O.:910; I.V.:2675]  Out: 3760 [Urine:3750; Blood:10]    Constitutional: Pleasant male seen laying in bed. No apparent distress, and appears stated age.  Eyes: Alopecia. Lids and lashes normal, sclera clear, conjunctiva normal.  ENT: Normocephalic, without obvious abnormality, atraumatic, sinuses nontender on  palpation, oral pharynx with moist mucus membranes, tonsils without erythema or exudates, gums normal and good dentition.   Respiratory: No increased work of breathing, good air exchange, clear to auscultation bilaterally, no crackles or wheezing.  Cardiovascular: Normal apical impulse, regular rate and rhythm, normal S1 and S2, and no murmur noted.  GI: Post op puncture scars noted.  +BS. Soft. No tenderness on palpation.  Lymph/Hematologic: No cervical lymphadenopathy and no supraclavicular lymphadenopathy.  Skin: Three appendectomy puncture site KADE with minimal bruising.   Extremities: There is no redness, warmth, or swelling of the joints. No lower extremity edema. No cyanosis.  Neurologic: Awake, alert, oriented to name, place and time.    Vascular access: PIV        Medications       acyclovir  400 mg Oral BID     fluconazole  100 mg Oral Daily     levofloxacin  500 mg Intravenous Q24H     metroNIDAZOLE  500 mg Intravenous Q6H     nystatin  1,000,000 Units Swish & Spit 4x Daily     pantoprazole  40 mg Oral QAM AC     sodium chloride (PF)  3 mL Intracatheter Q8H     sodium chloride (PF)  3 mL Intracatheter Q8H       Data   Results for orders placed or performed during the hospital encounter of 06/28/19 (from the past 24 hour(s))   Glucose by meter   Result Value Ref Range    Glucose 94 70 - 99 mg/dL   Magnesium (AM Draw)   Result Value Ref Range    Magnesium 2.2 1.6 - 2.3 mg/dL   Basic metabolic panel   Result Value Ref Range    Sodium 138 133 - 144 mmol/L    Potassium 4.0 3.4 - 5.3 mmol/L    Chloride 106 94 - 109 mmol/L    Carbon Dioxide 27 20 - 32 mmol/L    Anion Gap 5 3 - 14 mmol/L    Glucose 117 (H) 70 - 99 mg/dL    Urea Nitrogen 11 7 - 30 mg/dL    Creatinine 0.80 0.66 - 1.25 mg/dL    GFR Estimate >90 >60 mL/min/[1.73_m2]    GFR Estimate If Black >90 >60 mL/min/[1.73_m2]    Calcium 8.3 (L) 8.5 - 10.1 mg/dL   CBC with platelets differential   Result Value Ref Range    WBC 0.8 (LL) 4.0 - 11.0 10e9/L    RBC  Count 2.88 (L) 4.4 - 5.9 10e12/L    Hemoglobin 8.8 (L) 13.3 - 17.7 g/dL    Hematocrit 27.6 (L) 40.0 - 53.0 %    MCV 96 78 - 100 fl    MCH 30.6 26.5 - 33.0 pg    MCHC 31.9 31.5 - 36.5 g/dL    RDW 15.1 (H) 10.0 - 15.0 %    Platelet Count 89 (L) 150 - 450 10e9/L    Diff Method Manual Differential     % Neutrophils 70.2 %    % Lymphocytes 22.8 %    % Monocytes 7.0 %    % Eosinophils 0.0 %    % Basophils 0.0 %    Absolute Neutrophil 0.6 (L) 1.6 - 8.3 10e9/L    Absolute Lymphocytes 0.2 (L) 0.8 - 5.3 10e9/L    Absolute Monocytes 0.1 0.0 - 1.3 10e9/L    Absolute Eosinophils 0.0 0.0 - 0.7 10e9/L    Absolute Basophils 0.0 0.0 - 0.2 10e9/L    RBC Morphology Normal    Phosphorus   Result Value Ref Range    Phosphorus 2.5 2.5 - 4.5 mg/dL     I have seen, interviewed, and examined the patient independently.  I have reviewed the vital signs and labs.  This note reflects my assessment and plan.      Doing quite well post op, eating, walking    But now neutropenic from chemotherapy. Will remain inpatient to monitor     Jessica Bauer MD/PhD

## 2019-06-30 NOTE — PLAN OF CARE
Patient afebrile, other VSS. C/o abdominal discomfort with activity- given oxycodone x3 with good relief. Pt up ad jessica and walked in the triplett x2. Voiding adequate amount. Eating and drinking good. Lung sounds clear.  Given Miralax since no BM x2 days. Continue with plan of care.      Problem: Adult Inpatient Plan of Care  Goal: Plan of Care Review  6/30/2019 1846 by Felipe Avendaño, RN  Outcome: No Change     Problem: Adult Inpatient Plan of Care  Goal: Patient-Specific Goal (Individualization)  6/30/2019 1846 by Felipe Avendaño, RN  Outcome: No Change     Problem: Adult Inpatient Plan of Care  Goal: Absence of Hospital-Acquired Illness or Injury  6/30/2019 1846 by Felipe Avendaño, RN  Outcome: No Change

## 2019-06-30 NOTE — PLAN OF CARE
/78 (BP Location: Left arm)   Pulse 83   Temp 98.1  F (36.7  C) (Axillary)   Resp 18   Wt 77.4 kg (170 lb 9.6 oz)   SpO2 98%   BMI 24.48 kg/m        VSS. No complaints of N/V/D. Pt complained of abdominal pain which was relieved with tylenol. Pt is AOx4 and independent. IV is infusing LR per orders. Surgical sites on the abdomen are C/D/I, approximated, and open to air. Pt is tolerating a regular diet.  No replacements needed this AM.      Problem: Adult Inpatient Plan of Care  Goal: Optimal Comfort and Wellbeing  6/30/2019 0555 by Suzette Multani RN  Outcome: Improving  6/29/2019 1854 by Felipe Avendaño RN  Outcome: Improving     Problem: Pain Acute  Goal: Optimal Pain Control  6/30/2019 0555 by Suzette Multani, RN  Outcome: Improving  6/29/2019 1854 by Felipe Avendaño RN  Outcome: Improving

## 2019-07-01 VITALS
WEIGHT: 171.2 LBS | RESPIRATION RATE: 16 BRPM | DIASTOLIC BLOOD PRESSURE: 73 MMHG | BODY MASS INDEX: 24.56 KG/M2 | SYSTOLIC BLOOD PRESSURE: 118 MMHG | TEMPERATURE: 97.7 F | HEART RATE: 86 BPM | OXYGEN SATURATION: 98 %

## 2019-07-01 LAB
ANION GAP SERPL CALCULATED.3IONS-SCNC: 5 MMOL/L (ref 3–14)
ANISOCYTOSIS BLD QL SMEAR: SLIGHT
BASOPHILS # BLD AUTO: 0 10E9/L (ref 0–0.2)
BASOPHILS NFR BLD AUTO: 4.8 %
BUN SERPL-MCNC: 14 MG/DL (ref 7–30)
CALCIUM SERPL-MCNC: 8.6 MG/DL (ref 8.5–10.1)
CHLORIDE SERPL-SCNC: 107 MMOL/L (ref 94–109)
CO2 SERPL-SCNC: 27 MMOL/L (ref 20–32)
CREAT SERPL-MCNC: 1.02 MG/DL (ref 0.66–1.25)
CRP SERPL-MCNC: 32 MG/L (ref 0–8)
DIFFERENTIAL METHOD BLD: ABNORMAL
EOSINOPHIL # BLD AUTO: 0 10E9/L (ref 0–0.7)
EOSINOPHIL NFR BLD AUTO: 0 %
ERYTHROCYTE [DISTWIDTH] IN BLOOD BY AUTOMATED COUNT: 14.7 % (ref 10–15)
GFR SERPL CREATININE-BSD FRML MDRD: 90 ML/MIN/{1.73_M2}
GLUCOSE SERPL-MCNC: 86 MG/DL (ref 70–99)
HCT VFR BLD AUTO: 27.8 % (ref 40–53)
HGB BLD-MCNC: 8.7 G/DL (ref 13.3–17.7)
LYMPHOCYTES # BLD AUTO: 0.5 10E9/L (ref 0.8–5.3)
LYMPHOCYTES NFR BLD AUTO: 58.8 %
MAGNESIUM SERPL-MCNC: 2.1 MG/DL (ref 1.6–2.3)
MCH RBC QN AUTO: 30.7 PG (ref 26.5–33)
MCHC RBC AUTO-ENTMCNC: 31.3 G/DL (ref 31.5–36.5)
MCV RBC AUTO: 98 FL (ref 78–100)
MONOCYTES # BLD AUTO: 0.1 10E9/L (ref 0–1.3)
MONOCYTES NFR BLD AUTO: 10.1 %
NEUTROPHILS # BLD AUTO: 0.2 10E9/L (ref 1.6–8.3)
NEUTROPHILS NFR BLD AUTO: 26.3 %
NRBC # BLD AUTO: 0 10*3/UL
NRBC BLD AUTO-RTO: 0 /100
OVALOCYTES BLD QL SMEAR: SLIGHT
PLATELET # BLD AUTO: 74 10E9/L (ref 150–450)
POIKILOCYTOSIS BLD QL SMEAR: SLIGHT
POTASSIUM SERPL-SCNC: 4.2 MMOL/L (ref 3.4–5.3)
RBC # BLD AUTO: 2.83 10E12/L (ref 4.4–5.9)
SODIUM SERPL-SCNC: 139 MMOL/L (ref 133–144)
WBC # BLD AUTO: 0.8 10E9/L (ref 4–11)

## 2019-07-01 PROCEDURE — 25000132 ZZH RX MED GY IP 250 OP 250 PS 637: Performed by: NEUROLOGICAL SURGERY

## 2019-07-01 PROCEDURE — 25000128 H RX IP 250 OP 636: Performed by: PHYSICIAN ASSISTANT

## 2019-07-01 PROCEDURE — 83735 ASSAY OF MAGNESIUM: CPT | Performed by: NEUROLOGICAL SURGERY

## 2019-07-01 PROCEDURE — 25000132 ZZH RX MED GY IP 250 OP 250 PS 637: Performed by: STUDENT IN AN ORGANIZED HEALTH CARE EDUCATION/TRAINING PROGRAM

## 2019-07-01 PROCEDURE — 36415 COLL VENOUS BLD VENIPUNCTURE: CPT | Performed by: NEUROLOGICAL SURGERY

## 2019-07-01 PROCEDURE — 99239 HOSP IP/OBS DSCHRG MGMT >30: CPT | Performed by: INTERNAL MEDICINE

## 2019-07-01 PROCEDURE — 86140 C-REACTIVE PROTEIN: CPT | Performed by: NEUROLOGICAL SURGERY

## 2019-07-01 PROCEDURE — 25000132 ZZH RX MED GY IP 250 OP 250 PS 637: Performed by: NURSE PRACTITIONER

## 2019-07-01 PROCEDURE — 80048 BASIC METABOLIC PNL TOTAL CA: CPT | Performed by: NEUROLOGICAL SURGERY

## 2019-07-01 PROCEDURE — 85025 COMPLETE CBC W/AUTO DIFF WBC: CPT | Performed by: NEUROLOGICAL SURGERY

## 2019-07-01 RX ORDER — LEVOFLOXACIN 250 MG/1
250 TABLET, FILM COATED ORAL DAILY
Qty: 30 TABLET | Refills: 0 | Status: SHIPPED | OUTPATIENT
Start: 2019-07-01 | End: 2019-07-05

## 2019-07-01 RX ORDER — OXYCODONE HYDROCHLORIDE 5 MG/1
5 TABLET ORAL EVERY 4 HOURS PRN
Qty: 20 TABLET | Refills: 0 | Status: SHIPPED | OUTPATIENT
Start: 2019-07-01 | End: 2019-07-11

## 2019-07-01 RX ORDER — METRONIDAZOLE 500 MG/1
500 TABLET ORAL EVERY 6 HOURS
Qty: 21 TABLET | Refills: 0 | Status: SHIPPED | OUTPATIENT
Start: 2019-07-01 | End: 2019-07-05

## 2019-07-01 RX ORDER — LEVOFLOXACIN 500 MG/1
500 TABLET, FILM COATED ORAL DAILY
Qty: 5 TABLET | Refills: 0 | Status: SHIPPED | OUTPATIENT
Start: 2019-07-02 | End: 2019-07-05

## 2019-07-01 RX ORDER — NYSTATIN 100000/ML
1000000 SUSPENSION, ORAL (FINAL DOSE FORM) ORAL 4 TIMES DAILY
Qty: 240 ML | Refills: 0 | Status: SHIPPED | OUTPATIENT
Start: 2019-07-01 | End: 2019-07-11

## 2019-07-01 RX ADMIN — NYSTATIN 1000000 UNITS: 100000 SUSPENSION ORAL at 11:34

## 2019-07-01 RX ADMIN — SODIUM CHLORIDE 1000 ML: 9 INJECTION, SOLUTION INTRAVENOUS at 12:08

## 2019-07-01 RX ADMIN — POLYETHYLENE GLYCOL 3350 17 G: 17 POWDER, FOR SOLUTION ORAL at 07:51

## 2019-07-01 RX ADMIN — OXYCODONE HYDROCHLORIDE 5 MG: 5 TABLET ORAL at 13:06

## 2019-07-01 RX ADMIN — LEVOFLOXACIN 500 MG: 250 TABLET, FILM COATED ORAL at 07:50

## 2019-07-01 RX ADMIN — PANTOPRAZOLE SODIUM 40 MG: 40 TABLET, DELAYED RELEASE ORAL at 07:50

## 2019-07-01 RX ADMIN — ACYCLOVIR 400 MG: 200 CAPSULE ORAL at 07:50

## 2019-07-01 RX ADMIN — NYSTATIN 1000000 UNITS: 100000 SUSPENSION ORAL at 07:50

## 2019-07-01 RX ADMIN — METRONIDAZOLE 500 MG: 500 TABLET ORAL at 06:56

## 2019-07-01 RX ADMIN — METRONIDAZOLE 500 MG: 500 TABLET ORAL at 11:34

## 2019-07-01 RX ADMIN — METRONIDAZOLE 500 MG: 500 TABLET ORAL at 00:29

## 2019-07-01 RX ADMIN — FLUCONAZOLE 100 MG: 100 TABLET ORAL at 07:50

## 2019-07-01 ASSESSMENT — ACTIVITIES OF DAILY LIVING (ADL)
ADLS_ACUITY_SCORE: 10

## 2019-07-01 NOTE — PLAN OF CARE
Patient afebrile, other VSS. Denies n/v. Had x3 stools after Miralax. Received Oxycodone prior to discharged. Lung sound clear.       Problem: Adult Inpatient Plan of Care  Goal: Plan of Care Review  7/1/2019 1405 by Felipe Avendaño RN  Outcome: Adequate for Discharge     Problem: Adult Inpatient Plan of Care  Goal: Patient-Specific Goal (Individualization)  7/1/2019 1405 by Felipe Avendaoñ RN  Outcome: Adequate for Discharge     Problem: Adult Inpatient Plan of Care  Goal: Absence of Hospital-Acquired Illness or Injury  7/1/2019 1405 by Felipe Avendaño RN  Outcome: Adequate for Discharge     Problem: Adult Inpatient Plan of Care  Goal: Optimal Comfort and Wellbeing  7/1/2019 1405 by Felipe Avendaño RN  Outcome: Adequate for Discharge     Problem: Adult Inpatient Plan of Care  Goal: Readiness for Transition of Care  7/1/2019 1405 by Felipe Avendaño RN  Outcome: Adequate for Discharge

## 2019-07-01 NOTE — PROGRESS NOTES
Pt discharged to: Home  Via: Ambulatory  Accompanied by: Wife  Belongings: all belongings sent home with patient.  Teaching: discharge teaching done. Reviewed medications and restrictions with patient post surgery. Reviewed neutropenic precautions. Patient verbalized understanding.  Clinic appointment: patient is aware.

## 2019-07-01 NOTE — PLAN OF CARE
/70 (BP Location: Left arm)   Pulse 74   Temp 98  F (36.7  C) (Axillary)   Resp 18   Wt 77.7 kg (171 lb 4.8 oz)   SpO2 98%   BMI 24.58 kg/m        VSS, no complaints of N/V/D. Abdominal pain is at a tolerable level and the patient did not require medication overnight,surgical sight is C/D/I. He has a good appetite, is voiding, bowel sounds are active. He is AOx4 and independent.       Problem: Adult Inpatient Plan of Care  Goal: Optimal Comfort and Wellbeing  7/1/2019 0521 by Suzette Multani, RN  Outcome: Improving  6/30/2019 1846 by Felipe Avendaño RN  Outcome: No Change     Problem: Pain Acute  Goal: Optimal Pain Control  7/1/2019 0521 by Suzette Multani RN  Outcome: Improving  6/30/2019 1846 by Felipe Avendaño RN  Outcome: No Change

## 2019-07-02 ENCOUNTER — HOSPITAL ENCOUNTER (OUTPATIENT)
Facility: CLINIC | Age: 44
Setting detail: SPECIMEN
End: 2019-07-02
Attending: INTERNAL MEDICINE
Payer: COMMERCIAL

## 2019-07-02 LAB
COPATH REPORT: NORMAL
INTERPRETATION ECG - MUSE: NORMAL

## 2019-07-02 NOTE — DISCHARGE SUMMARY
[facility], [place]    Discharge Summary  Hematology / Oncology    Date of Admission:  6/28/2019  Date of Discharge:  7/1/2019  2:01 PM  Discharging Provider: Laura Hernandez  Date of Service (when I saw the patient): 7/1/19    Discharge Diagnoses   Acute appendicitis  Burkitt lymphoma   Pancytopenia   Neuropathy    History of Present Illness   Christiano Molina is a 45 y/o male with PMHx significant for EBV+, Burkitt lymhoma s/p 5 cycles of DA-REPOCH (last treated on 06/24/19) was admitted due to acute appendicitis.     Hospital Course   Christiano Molina was admitted on 6/28/2019.  The following problems were addressed during his hospitalization:    GI  #RLQ pain.  #Acute appendicitis.  Clinical history and imaging findings consistent with acute appendicitis. PET/CT on 05/07/19 showed large appendicolith of 1.8cm which was seen again on CT from this admission with unchanged size. CT also showed shama-appendiceal inflammation, fluid collection and fat stranding with thickening of the terminal ileum and cecum adjacent to the appendix. No evidence of perforation or abscess. Surgical team evaluated patient and as blood counts and vital signs stable, patient taken to the OR on 06/29/19 for laparoscopic appendectomy.   - On levofloxacin and metronidazole, discharged on these to complete a 7 day course. Can resume ppx if remains neutropenic.  - Prn oxycodone and tylenol for pain control. Script sent at discharge.     Heme/Onc  #Burkitt lymphoma s/p 5 cycles of DA-REPOCH, EBV+, Stage IA.  Follows with Dr. Ramirez.  Presented in March of this year with axillary mass.  He completed cycle 5 of DA-EPOCH-R during last admission, 06/20/19 - 06/24/19. No complications during admission. PT/CT on 06/18/19 showing complete response.   - Daily CBC with diff, transfuse if HGB < 7.0 or plt < 10k without bleeding or plt <20k with bleeding; irradiated products.     #Pancytopenia.  Neutropenic, at steve post  chemo.  S/p Neulasta 6/25/19.  - Continue to monitor CBC.  - Neutropenic precautions discussed with patient and wife.      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  Bilateral hand mid 3 fingers stable at this time. Continue to monitor. Did note that the neuropathy seemed to increase in severity after this round of chemotherapy. Denies changes in dexterity.     Patient and plan discussed with staff attending, Dr. Bauer.     Laura Hernandez PA-C  Hematology/Oncology  Pager #[phone]     Significant Results and Procedures   Laparoscopic appendectomy     Pending Results   These results will be followed up by Dr. Ramirez.  Unresulted Labs Ordered in the Past 30 Days of this Admission     Date and Time Order Name Status Description    6/29/2019 1425 Surgical pathology exam In process     6/28/2019 2220 Blood Culture ONE site Preliminary     6/28/2019 2220 Blood Culture ONE site Preliminary     6/21/2019 0753 Leukemia Lymphoma Evaluation (Flow Cytometry) In process           Code Status   Full Code    Primary Care Physician   Vern Ramirez    Physical Exam                      Vitals:    06/29/19 0740 06/30/19 1219 07/01/19 0700   Weight: 77.4 kg (170 lb 9.6 oz) 77.7 kg (171 lb 4.8 oz) 77.7 kg (171 lb 3.2 oz)     Vital Signs with Ranges     I/O last 3 completed shifts:  In: 3110 [P.O.:1710; I.V.:1400]  Out: 5000 [Urine:5000]    Constitutional: Pleasant male seen sitting up in bed, in NAD. Alert and interactive. Seen ambulating in halls independently.   HEENT: NCAT, anicteric sclerae, conjunctiva clear. Moist mucous membranes without lesions or thrush. Dentition intact/well cared for.  Respiratory: Non-labored breathing, good air exchange. Lungs are clear to auscultation bilaterally, without wheezing, crackles or rhonchi. No cough noted.   Cardiovascular: Regular rate and rhythm with no murmur, rub or gallop.  GI: Normoactive BS. Abdomen is soft, non-distended, and mildly tender to palpation of RLQ. No rigidity or  guarding.  Skin: Warm and dry. No rashes or lesions on exposed surfaces.  Musculoskeletal: Extremities grossly normal. No tenderness or edema present.   Neurologic: A &O x3, speech normal, answering questions appropriately. Moves all extremities spontaneously. Grossly non-focal.  Neuropsychiatric: Mentation and affect normal/appropriate.    Discharge Disposition   Discharged to home  Condition at discharge: Stable    Consultations This Hospital Stay   SURGERY GENERAL ADULT IP CONSULT    Discharge Orders      CBC with platelets differential    Last Lab Result: Hemoglobin (g/dL)       Date                     Value                 07/01/2019               8.7 (L)          ----------     Comprehensive metabolic panel     Discharge Instructions    May start general diet immediately.    Do not lift more than 20 pounds for 6 weeks after surgery.     You may shower after surgery but do not scrub incisions; simply let soapy water run over them. Pat area dry.     You have glue over the incisions, this will fall off on its own and does not need to be replaced.    Do not soak in a bath tub or pool for 6 weeks after surgery.    No driving, no using heavy machinery and no major decision-making while taking narcotic pain medication. It is illegal to drive while taking narcotic pain medication. Narcotics can cause constipation. Take stool softener while taking narcotic pain medication. If no bowel movement for 2 days add a laxative to aid in bowel movement. You can obtain a laxative from your pharmacyst over the counter.     You may take Tylenol (acetaminophen) and/or Motrin (ibuprofen) in addition or instead of narcotic medication; it is helpful to take these medications as you wean down off of the narcotic medications. If you have been prescribed Percocet, be aware that it contains Tylenol and oxycodone together. Do not take a total of more than 3500 mg of Tylenol per 24 hours to avoid liver damage.    Please call if you  experience increasing abdominal pain, nausea, vomiting, increasing drainage from your wounds, chills, or fever >101.5.    If you have questions about your follow-up appointment, please call the General Surgery Clinic at [phone].  Call [phone] and ask to speak with surgery resident if you are having troubles in the evenings, at night, or on weekends.     Reason for your hospital stay    Admitted for acute appendicitis.     Follow Up and recommended labs and tests    Follow up as scheduled:   -You will get a call regarding an ANTHONY appointment or to see Dr. Ramirez within the next week.   -Please go to your lab appt on Friday.     Activity    Your activity upon discharge: activity as tolerated     When to contact your care team    [facility] triage line at [phone] for temp >100.4, uncontrolled nausea/vomiting/diarrhea/constipation, unrelieved pain, bleeding not relieved with pressure, dizziness, chest pain, shortness of breath, loss of consciousness, and any new or concerning symptoms.     Discharge Instructions    -Take Flagyl+Levaquin 750 mg daily to complete a 7 day course. Then drop to taking prophylactic Levaquin 250 mg daily until instructed to discontinue by outpatient team.   -Continue taking fluconazole and acyclovir.   -Practice neutropenic precautions as we discussed.     Diet    Follow this diet upon discharge: Regular     Discharge Medications   Discharge Medication List as of 7/1/2019  1:24 PM      START taking these medications    Details   metroNIDAZOLE (FLAGYL) 500 MG tablet Take 1 tablet (500 mg) by mouth every 6 hours, Disp-21 tablet, R-0, E-Prescribe      nystatin (MYCOSTATIN) 382030 UNIT/ML suspension Swish and spit 10 mLs (1,000,000 Units) in mouth 4 times daily for 6 daysDisp-240 mL, [id number]      oxyCODONE (ROXICODONE) 5 MG tablet Take 1 tablet (5 mg) by mouth every 4 hours as needed for moderate to severe pain, Disp-20 tablet, R-0, Local Print         CONTINUE  these medications which have CHANGED    Details   !! levofloxacin (LEVAQUIN) 250 MG tablet Take 1 tablet (250 mg) by mouth daily . Continue until advised by clinic team to stop., Disp-30 tablet, R-0, E-Prescribe      !! levofloxacin (LEVAQUIN) 500 MG tablet Take 1 tablet (500 mg) by mouth daily for 5 days Then decrease to prophylactic dosing., Disp-5 tablet, R-0, E-Prescribe       !! - Potential duplicate medications found. Please discuss with provider.      CONTINUE these medications which have NOT CHANGED    Details   acyclovir (ZOVIRAX) 200 MG capsule Take 2 capsules (400 mg) by mouth 2 times daily, Disp-120 capsule, R-0, E-Prescribe      calcium carbonate (TUMS) 500 MG chewable tablet Take 1 chew tab by mouth 2 times daily as needed for heartburn, Historical      fluconazole (DIFLUCAN) 100 MG tablet Take 1 tablet (100 mg) by mouth daily, Disp-30 tablet, R-0, E-Prescribe      ondansetron (ZOFRAN) 8 MG tablet Take 1 tablet (8 mg) by mouth every 8 hours as needed for nausea, Disp-30 tablet, R-0, E-Prescribe      ondansetron (ZOFRAN-ODT) 8 MG ODT tab Take 1 tablet (8 mg) by mouth every 8 hours At first continue scheduled (but can back off as nausea improves), Disp-30 tablet, R-0, E-Prescribe      prochlorperazine (COMPAZINE) 10 MG tablet Take 1 tablet (10 mg) by mouth every 6 hours as needed for nausea or vomiting (Try second.), Disp-30 tablet, R-0, E-Prescribe      ranitidine (ZANTAC) 150 MG tablet Take 1 tablet (150 mg) by mouth 2 times daily, Disp-60 tablet, R-1, E-Prescribe      senna-docusate (SENOKOT-S/PERICOLACE) 8.6-50 MG tablet Take 2 tablets by mouth 2 times daily as needed for constipation, OTC      acetaminophen (TYLENOL) 325 MG tablet Take 2 tablets (650 mg) by mouth every 4 hours as needed for mild pain, Disp-30 tablet, R-0, E-Prescribe      triamcinolone (KENALOG) 0.1 % external cream Apply 1 applicator topically 2 times daily as needed for irritationHistorical         STOP taking these medications        dexamethasone (DECADRON) 4 MG tablet Comments:   Reason for Stopping:         K-PHOS-NEUTRAL [id number] MG tablet Comments:   Reason for Stopping:         potassium chloride ER (K-TAB) 20 MEQ CR tablet Comments:   Reason for Stopping:             Allergies   No Known Allergies  Data   Most Recent 3 CBC's:  Recent Labs   Lab Test 07/01/19  0324 06/30/19  0446 06/29/19  0610   WBC 0.8* 0.8* 2.5*   HGB 8.7* 8.8* 10.6*   MCV 98 96 96   PLT 74* 89* 123*      Most Recent 3 BMP's:  Recent Labs   Lab Test 07/01/19  0324 06/30/19  0446 06/29/19  0610    138 136   POTASSIUM 4.2 4.0 4.3   CHLORIDE 107 106 105   CO2 27 27 27   BUN 14 11 13   CR 1.02 0.80 0.89   ANIONGAP 5 5 4   TAMMY 8.6 8.3* 8.7   GLC 86 117* 105*     Most Recent 2 LFT's:  Recent Labs   Lab Test 06/28/19  1040 06/24/19  0608   AST 6 14   ALT 22 30   ALKPHOS 77 48   BILITOTAL 0.4 0.6     Most Recent INR's and Anticoagulation Dosing History:  Anticoagulation Dose History     Recent Dosing and Labs Latest Ref Rng & Units 5/9/2019 5/13/2019 5/31/2019 6/3/2019 6/21/2019 6/24/2019 6/29/2019    INR 0.86 - 1.14 1.06 1.10 1.08 1.09 1.04 1.16(H) 1.05        Most Recent 3 Troponin's:No lab results found.  Most Recent Cholesterol Panel:No lab results found.  Most Recent 6 Bacteria Isolates From Any Culture (See EPIC Reports for Culture Details):  Recent Labs   Lab Test 06/28/19  2204 06/28/19  2119 06/21/19  1420 06/03/19  1140 05/31/19  1100 05/13/19  1550   CULT No growth after 4 days No growth after 4 days No growth No growth No growth On day 5, isolated in broth only:  Propionibacterium (Cutibacterium) acnes  *  Critical Value/Significant Value, preliminary result only, called to and read back by  Dr. Diogo Crenshaw on 5/18/2019 at 11:18 am. NS       Most Recent TSH, T4 and A1c Labs:No lab results found.  Results for orders placed or performed during the hospital encounter of 06/28/19   CT Abdomen Pelvis w Contrast     Value    Radiologist flags Appendicitis  (Urgent)    Narrative    EXAMINATION: CT ABDOMEN PELVIS W CONTRAST, 6/28/2019 7:52 PM    TECHNIQUE:  Helical CT images from the lung bases through the  symphysis pubis were obtained with IV contrast. Contrast dose:  iopamidol (ISOVUE-370) solution 107 mL     COMPARISON: 6/18/2019    HISTORY: ? appy, kidney stone, pyelo (gen abd pain, then localized to  RLQ/R suprapubic)    FINDINGS:    Abdomen and pelvis:     Redemonstration of a large appendicolith within the appendix measuring  up to 1.7 cm in diameter. The appendix appears acutely inflamed,  measuring up to 2.5 cm in diameter, with fluid distention and moderate  inflammatory stranding of the adjacent mesenteric fat. There is  associated thickening of the medial aspect of the cecum, adjacent to  the appendix, as well as mild mucosal thickening of the terminal  ileum. These findings are new from prior examination.    Scattered subcentimeter hypodensities within the liver, which are to  small to characterize. The gallbladder, pancreas, spleen, adrenal  glands, and kidneys are unremarkable in appearance. Mildly thickened  appearance of the rectosigmoid colon which may relate to the  predistention or be reactive given above appendiceal inflammatory  change. The remainder of the small and large bowel is unremarkable in  appearance, without findings to suggest small bowel obstruction.   There is no pneumatosis or portal venous gas. No intraperitoneal free  fluid or free air. The major abdominal vasculature is patent, without  evidence of infrarenal abdominal aortic aneurysm.    Lung bases: The visualized lung bases are clear. No pleural effusion.  The heart is normal in size, without pericardial effusion.    Bones and soft tissues: No acute or suspicious osseous abnormality.  Mild degenerative changes of the thoracolumbar spine.      Impression    IMPRESSION:   Acute uncomplicated appendicitis, with large calcified appendicolith.  There is reactive inflammatory  thickening of the cecum and terminal  ileum. No findings to suggest perforation or abscess.    [Urgent Result: Appendicitis]    Finding was identified on 6/28/2019 7:54 PM.     Dr. Kim was contacted by Dr. Morse at 6/28/2019 9:01 PM and  verbalized understanding of the urgent finding.     I have personally reviewed the examination and initial interpretation  and I agree with the findings.    CHANDRA MORSE MD     I have seen, interviewed, and examined the patient independently.  I have reviewed the vital signs and labs.  This note reflects my assessment and plan.    We have spent greater than 30 minutes organizing outpatient care, follow up appointments, and medications.    Feels well, afebrile, abd feels good, eating well. Have instructed to return or call immediately if febrile or abd pain or trouble eating    Jessica Bauer MD/PhD

## 2019-07-04 LAB
BACTERIA SPEC CULT: NO GROWTH
BACTERIA SPEC CULT: NO GROWTH
Lab: NORMAL
Lab: NORMAL
SPECIMEN SOURCE: NORMAL
SPECIMEN SOURCE: NORMAL

## 2019-07-04 NOTE — PROGRESS NOTES
[facility]      Referring Provider: self     Dx:   1. BL, stage 1A, LDH slightly high, max size 6.2 cm, Dx 3/15/19    Tx:  1. Da-R-EPOCH since 3/27/19, ME after C2, CR after C4. C/w appendicitis after C5.     PMH: strasibusmus   Allergies: None     Interval Hx: Acute appendicitis with C5, neuropathy (sensory) constant in all fingers and now toes. No fever or GI symptoms but mild diarrhea. ROS otherwise neg on a 10-point review.     Lab Results   Component Value Date    WBC 12.7 (H) 07/05/2019    HGB 10.7 (L) 07/05/2019    HCT 32.6 (L) 07/05/2019     07/05/2019     07/05/2019    POTASSIUM 4.4 07/05/2019    CHLORIDE 105 07/05/2019    CO2 PENDING 07/05/2019    BUN PENDING 07/05/2019    CR PENDING 07/05/2019    GLC PENDING 07/05/2019    SED 12 06/28/2019    AST PENDING 07/05/2019    ALT PENDING 07/05/2019    ALKPHOS PENDING 07/05/2019    BILITOTAL PENDING 07/05/2019    INR 1.05 06/29/2019       Current Outpatient Medications   Medication     acetaminophen (TYLENOL) 325 MG tablet     acyclovir (ZOVIRAX) 200 MG capsule     calcium carbonate (TUMS) 500 MG chewable tablet     fluconazole (DIFLUCAN) 100 MG tablet     nystatin (MYCOSTATIN) 675363 UNIT/ML suspension     ondansetron (ZOFRAN) 8 MG tablet     ondansetron (ZOFRAN-ODT) 8 MG ODT tab     oxyCODONE (ROXICODONE) 5 MG tablet     prochlorperazine (COMPAZINE) 10 MG tablet     ranitidine (ZANTAC) 150 MG tablet     senna-docusate (SENOKOT-S/PERICOLACE) 8.6-50 MG tablet     triamcinolone (KENALOG) 0.1 % external cream     No current facility-administered medications for this visit.        Ph/E:   Vitals: /71   Pulse 94   Temp 99.2  F (37.3  C)   Resp 16   Wt 78.9 kg (174 lb)   SpO2 98%   BMI 24.97 kg/m    BMI= Body mass index is 24.97 kg/m .  ECOG PS: 0  General: NAD; H&N: no jaundice or mucosal lesions; Lungs clear; Heart RRR; Abdomen; Soft, No organomegaly; Extremities: No edema; Skin: No rash; Neuro: Nonfocal;  Mood/Affect: appropriate; Lymph: no LAP    Assessment and Plan:  1. Stage 1 BL: s/p da-r-EPOCH x5 and CR, C6 on 7/11 with 2 IT sessions of methotrexate. PET one month after C6. We discussed the option of delaying chemo for 1 week and he strongly prefers not. I think the risk is small and justified. Dose level same as with C5.   2. ID: acyclovir, fluc. Levaquin 250 to start at discharge (dose level is high; so won't wait until neutropenia) and continued until outpatient ANC > 1K. Stop levaquin and flagyl.

## 2019-07-05 ENCOUNTER — APPOINTMENT (OUTPATIENT)
Dept: LAB | Facility: CLINIC | Age: 44
End: 2019-07-05
Attending: INTERNAL MEDICINE
Payer: COMMERCIAL

## 2019-07-05 ENCOUNTER — HOSPITAL ENCOUNTER (OUTPATIENT)
Facility: CLINIC | Age: 44
Setting detail: SPECIMEN
End: 2019-07-05
Attending: INTERNAL MEDICINE
Payer: COMMERCIAL

## 2019-07-05 ENCOUNTER — OFFICE VISIT (OUTPATIENT)
Dept: TRANSPLANT | Facility: CLINIC | Age: 44
End: 2019-07-05
Attending: INTERNAL MEDICINE
Payer: COMMERCIAL

## 2019-07-05 VITALS
BODY MASS INDEX: 24.97 KG/M2 | RESPIRATION RATE: 16 BRPM | HEART RATE: 94 BPM | TEMPERATURE: 99.2 F | SYSTOLIC BLOOD PRESSURE: 114 MMHG | OXYGEN SATURATION: 98 % | DIASTOLIC BLOOD PRESSURE: 71 MMHG | WEIGHT: 174 LBS

## 2019-07-05 DIAGNOSIS — C83.74 BURKITT LYMPHOMA OF LYMPH NODES OF AXILLA (H): ICD-10-CM

## 2019-07-05 LAB
ALBUMIN SERPL-MCNC: 3.6 G/DL (ref 3.4–5)
ALP SERPL-CCNC: 62 U/L (ref 40–150)
ALT SERPL W P-5'-P-CCNC: 37 U/L (ref 0–70)
ANION GAP SERPL CALCULATED.3IONS-SCNC: 3 MMOL/L (ref 3–14)
ANISOCYTOSIS BLD QL SMEAR: SLIGHT
AST SERPL W P-5'-P-CCNC: 26 U/L (ref 0–45)
BASOPHILS # BLD AUTO: 0.1 10E9/L (ref 0–0.2)
BASOPHILS NFR BLD AUTO: 0.9 %
BILIRUB SERPL-MCNC: 0.2 MG/DL (ref 0.2–1.3)
BUN SERPL-MCNC: 12 MG/DL (ref 7–30)
BURR CELLS BLD QL SMEAR: SLIGHT
CALCIUM SERPL-MCNC: 8.9 MG/DL (ref 8.5–10.1)
CHLORIDE SERPL-SCNC: 105 MMOL/L (ref 94–109)
CO2 SERPL-SCNC: 31 MMOL/L (ref 20–32)
CREAT SERPL-MCNC: 1.21 MG/DL (ref 0.66–1.25)
DACRYOCYTES BLD QL SMEAR: SLIGHT
DIFFERENTIAL METHOD BLD: ABNORMAL
EOSINOPHIL # BLD AUTO: 0 10E9/L (ref 0–0.7)
EOSINOPHIL NFR BLD AUTO: 0 %
ERYTHROCYTE [DISTWIDTH] IN BLOOD BY AUTOMATED COUNT: 15.9 % (ref 10–15)
GFR SERPL CREATININE-BSD FRML MDRD: 73 ML/MIN/{1.73_M2}
GLUCOSE SERPL-MCNC: 129 MG/DL (ref 70–99)
HCT VFR BLD AUTO: 32.6 % (ref 40–53)
HGB BLD-MCNC: 10.7 G/DL (ref 13.3–17.7)
LYMPHOCYTES # BLD AUTO: 0.7 10E9/L (ref 0.8–5.3)
LYMPHOCYTES NFR BLD AUTO: 5.2 %
MACROCYTES BLD QL SMEAR: PRESENT
MCH RBC QN AUTO: 31.7 PG (ref 26.5–33)
MCHC RBC AUTO-ENTMCNC: 32.8 G/DL (ref 31.5–36.5)
MCV RBC AUTO: 96 FL (ref 78–100)
MONOCYTES # BLD AUTO: 0.7 10E9/L (ref 0–1.3)
MONOCYTES NFR BLD AUTO: 5.2 %
MYELOCYTES # BLD: 0.3 10E9/L
MYELOCYTES NFR BLD MANUAL: 2.6 %
NEUTROPHILS # BLD AUTO: 10.9 10E9/L (ref 1.6–8.3)
NEUTROPHILS NFR BLD AUTO: 86.1 %
NRBC # BLD AUTO: 0.1 10*3/UL
NRBC BLD AUTO-RTO: 1 /100
PLATELET # BLD AUTO: 155 10E9/L (ref 150–450)
PLATELET # BLD EST: ABNORMAL 10*3/UL
POIKILOCYTOSIS BLD QL SMEAR: SLIGHT
POLYCHROMASIA BLD QL SMEAR: SLIGHT
POTASSIUM SERPL-SCNC: 4.4 MMOL/L (ref 3.4–5.3)
PROT SERPL-MCNC: 6.6 G/DL (ref 6.8–8.8)
RBC # BLD AUTO: 3.38 10E12/L (ref 4.4–5.9)
SODIUM SERPL-SCNC: 139 MMOL/L (ref 133–144)
WBC # BLD AUTO: 12.7 10E9/L (ref 4–11)

## 2019-07-05 PROCEDURE — G0463 HOSPITAL OUTPT CLINIC VISIT: HCPCS | Mod: ZF

## 2019-07-05 PROCEDURE — 36415 COLL VENOUS BLD VENIPUNCTURE: CPT

## 2019-07-05 PROCEDURE — 80053 COMPREHEN METABOLIC PANEL: CPT | Performed by: PHYSICIAN ASSISTANT

## 2019-07-05 PROCEDURE — 85025 COMPLETE CBC W/AUTO DIFF WBC: CPT | Performed by: PHYSICIAN ASSISTANT

## 2019-07-05 ASSESSMENT — PAIN SCALES - GENERAL: PAINLEVEL: NO PAIN (0)

## 2019-07-05 NOTE — NURSING NOTE
Chief Complaint   Patient presents with     Labs Only     venipuncture, vitals checked     Marlena Arora RN on 7/5/2019 at 12:27 PM

## 2019-07-05 NOTE — NURSING NOTE
"Oncology Rooming Note    July 5, 2019 12:44 PM   Christiano Molina is a 44 year old male who presents for:    Chief Complaint   Patient presents with     Labs Only     venipuncture, vitals checked     Oncology Clinic Visit     RTN- Burkitts Lymphoma     Initial Vitals: /71   Pulse 94   Temp 99.2  F (37.3  C)   Resp 16   Wt 78.9 kg (174 lb)   SpO2 98%   BMI 24.97 kg/m   Estimated body mass index is 24.97 kg/m  as calculated from the following:    Height as of 6/20/19: 1.778 m (5' 10\").    Weight as of this encounter: 78.9 kg (174 lb). Body surface area is 1.97 meters squared.  No Pain (0) Comment: Data Unavailable   No LMP for male patient.  Allergies reviewed: Yes  Medications reviewed: Yes    Medications:   Pharmacy name entered into EPIC:    [id number] - [place], [place] - [address] [place] [address]  [id number] - [place] - [address] [facility] [address] [facility] - [place], [place] - [address] AT [facility] [place] [address]    Clinical concerns:  Anxiety- hits in waves, sometimes later in the day and for around 20 minutes.     Zoie Moulton CMA              "

## 2019-07-08 ENCOUNTER — HOSPITAL ENCOUNTER (OUTPATIENT)
Facility: CLINIC | Age: 44
Setting detail: SPECIMEN
End: 2019-07-08
Attending: INTERNAL MEDICINE
Payer: COMMERCIAL

## 2019-07-11 ENCOUNTER — HOSPITAL ENCOUNTER (INPATIENT)
Facility: CLINIC | Age: 44
LOS: 4 days | Discharge: HOME | DRG: 847 | End: 2019-07-15
Attending: INTERNAL MEDICINE | Admitting: INTERNAL MEDICINE
Payer: COMMERCIAL

## 2019-07-11 ENCOUNTER — APPOINTMENT (OUTPATIENT)
Dept: GENERAL RADIOLOGY | Facility: CLINIC | Age: 44
DRG: 847 | End: 2019-07-11
Attending: PHYSICIAN ASSISTANT
Payer: COMMERCIAL

## 2019-07-11 ENCOUNTER — ONCOLOGY VISIT (OUTPATIENT)
Dept: ONCOLOGY | Facility: CLINIC | Age: 44
DRG: 847 | End: 2019-07-11
Attending: PHYSICIAN ASSISTANT
Payer: COMMERCIAL

## 2019-07-11 ENCOUNTER — APPOINTMENT (OUTPATIENT)
Dept: LAB | Facility: CLINIC | Age: 44
DRG: 847 | End: 2019-07-11
Attending: PHYSICIAN ASSISTANT
Payer: COMMERCIAL

## 2019-07-11 VITALS
BODY MASS INDEX: 24.92 KG/M2 | SYSTOLIC BLOOD PRESSURE: 124 MMHG | DIASTOLIC BLOOD PRESSURE: 80 MMHG | HEART RATE: 89 BPM | WEIGHT: 173.7 LBS | RESPIRATION RATE: 16 BRPM | OXYGEN SATURATION: 98 % | TEMPERATURE: 98.8 F

## 2019-07-11 DIAGNOSIS — C85.10 HIGH GRADE B-CELL LYMPHOMA (H): ICD-10-CM

## 2019-07-11 DIAGNOSIS — C83.70 BURKITT LYMPHOMA, UNSPECIFIED BODY REGION (H): ICD-10-CM

## 2019-07-11 DIAGNOSIS — C85.90: ICD-10-CM

## 2019-07-11 DIAGNOSIS — C85.10 HIGH GRADE B-CELL LYMPHOMA (H): Primary | ICD-10-CM

## 2019-07-11 LAB
ALBUMIN SERPL-MCNC: 3.7 G/DL (ref 3.4–5)
ALP SERPL-CCNC: 53 U/L (ref 40–150)
ALT SERPL W P-5'-P-CCNC: 49 U/L (ref 0–70)
ANION GAP SERPL CALCULATED.3IONS-SCNC: 4 MMOL/L (ref 3–14)
AST SERPL W P-5'-P-CCNC: 22 U/L (ref 0–45)
BASOPHILS # BLD AUTO: 0 10E9/L (ref 0–0.2)
BASOPHILS NFR BLD AUTO: 0.8 %
BILIRUB SERPL-MCNC: 0.2 MG/DL (ref 0.2–1.3)
BUN SERPL-MCNC: 14 MG/DL (ref 7–30)
CALCIUM SERPL-MCNC: 8.7 MG/DL (ref 8.5–10.1)
CHLORIDE SERPL-SCNC: 107 MMOL/L (ref 94–109)
CO2 SERPL-SCNC: 29 MMOL/L (ref 20–32)
CREAT SERPL-MCNC: 1.12 MG/DL (ref 0.66–1.25)
DIFFERENTIAL METHOD BLD: ABNORMAL
EOSINOPHIL # BLD AUTO: 0 10E9/L (ref 0–0.7)
EOSINOPHIL NFR BLD AUTO: 0.2 %
ERYTHROCYTE [DISTWIDTH] IN BLOOD BY AUTOMATED COUNT: 16.8 % (ref 10–15)
GFR SERPL CREATININE-BSD FRML MDRD: 80 ML/MIN/{1.73_M2}
GLUCOSE SERPL-MCNC: 67 MG/DL (ref 70–99)
HCT VFR BLD AUTO: 35.7 % (ref 40–53)
HGB BLD-MCNC: 11.6 G/DL (ref 13.3–17.7)
IMM GRANULOCYTES # BLD: 0.1 10E9/L (ref 0–0.4)
IMM GRANULOCYTES NFR BLD: 2.6 %
LYMPHOCYTES # BLD AUTO: 0.6 10E9/L (ref 0.8–5.3)
LYMPHOCYTES NFR BLD AUTO: 12.8 %
MCH RBC QN AUTO: 31.6 PG (ref 26.5–33)
MCHC RBC AUTO-ENTMCNC: 32.5 G/DL (ref 31.5–36.5)
MCV RBC AUTO: 97 FL (ref 78–100)
MONOCYTES # BLD AUTO: 0.7 10E9/L (ref 0–1.3)
MONOCYTES NFR BLD AUTO: 13.4 %
NEUTROPHILS # BLD AUTO: 3.5 10E9/L (ref 1.6–8.3)
NEUTROPHILS NFR BLD AUTO: 70.2 %
NRBC # BLD AUTO: 0 10*3/UL
NRBC BLD AUTO-RTO: 0 /100
PLATELET # BLD AUTO: 291 10E9/L (ref 150–450)
POTASSIUM SERPL-SCNC: 4.3 MMOL/L (ref 3.4–5.3)
PROT SERPL-MCNC: 6.8 G/DL (ref 6.8–8.8)
RBC # BLD AUTO: 3.67 10E12/L (ref 4.4–5.9)
SODIUM SERPL-SCNC: 140 MMOL/L (ref 133–144)
WBC # BLD AUTO: 4.9 10E9/L (ref 4–11)

## 2019-07-11 PROCEDURE — 25000131 ZZH RX MED GY IP 250 OP 636 PS 637: Performed by: PHYSICIAN ASSISTANT

## 2019-07-11 PROCEDURE — 25000132 ZZH RX MED GY IP 250 OP 250 PS 637: Performed by: PHYSICIAN ASSISTANT

## 2019-07-11 PROCEDURE — 99214 OFFICE O/P EST MOD 30 MIN: CPT | Mod: ZP | Performed by: PHYSICIAN ASSISTANT

## 2019-07-11 PROCEDURE — 36415 COLL VENOUS BLD VENIPUNCTURE: CPT

## 2019-07-11 PROCEDURE — 27210185 ZZH KIT OPEN ENDED DOUBLE LUMEN

## 2019-07-11 PROCEDURE — 80053 COMPREHEN METABOLIC PANEL: CPT | Performed by: NURSE PRACTITIONER

## 2019-07-11 PROCEDURE — 40000986 XR CHEST PORT 1 VW

## 2019-07-11 PROCEDURE — 36569 INSJ PICC 5 YR+ W/O IMAGING: CPT

## 2019-07-11 PROCEDURE — 25000128 H RX IP 250 OP 636: Performed by: PHYSICIAN ASSISTANT

## 2019-07-11 PROCEDURE — 3E04305 INTRODUCTION OF OTHER ANTINEOPLASTIC INTO CENTRAL VEIN, PERCUTANEOUS APPROACH: ICD-10-PCS | Performed by: INTERNAL MEDICINE

## 2019-07-11 PROCEDURE — G0463 HOSPITAL OUTPT CLINIC VISIT: HCPCS | Mod: ZF

## 2019-07-11 PROCEDURE — 99222 1ST HOSP IP/OBS MODERATE 55: CPT | Performed by: INTERNAL MEDICINE

## 2019-07-11 PROCEDURE — 25800030 ZZH RX IP 258 OP 636: Performed by: PHYSICIAN ASSISTANT

## 2019-07-11 PROCEDURE — 12000012 ZZH R&B MS OVERFLOW UMMC

## 2019-07-11 PROCEDURE — 85025 COMPLETE CBC W/AUTO DIFF WBC: CPT | Performed by: NURSE PRACTITIONER

## 2019-07-11 RX ORDER — PROCHLORPERAZINE MALEATE 5 MG
10 TABLET ORAL EVERY 6 HOURS PRN
Status: DISCONTINUED | OUTPATIENT
Start: 2019-07-11 | End: 2019-07-15 | Stop reason: HOSPADM

## 2019-07-11 RX ORDER — SODIUM CHLORIDE 9 MG/ML
1000 INJECTION, SOLUTION INTRAVENOUS CONTINUOUS PRN
Status: DISCONTINUED | OUTPATIENT
Start: 2019-07-11 | End: 2019-07-15 | Stop reason: HOSPADM

## 2019-07-11 RX ORDER — OLANZAPINE 5 MG/1
5 TABLET ORAL AT BEDTIME
Status: DISCONTINUED | OUTPATIENT
Start: 2019-07-11 | End: 2019-07-15 | Stop reason: HOSPADM

## 2019-07-11 RX ORDER — LORAZEPAM 2 MG/ML
1 INJECTION INTRAMUSCULAR ONCE
Status: CANCELLED
Start: 2019-07-11

## 2019-07-11 RX ORDER — ACETAMINOPHEN 325 MG/1
650 TABLET ORAL ONCE
Status: COMPLETED | OUTPATIENT
Start: 2019-07-11 | End: 2019-07-11

## 2019-07-11 RX ORDER — DEXAMETHASONE 4 MG/1
8 TABLET ORAL DAILY
Status: CANCELLED
Start: 2019-07-17

## 2019-07-11 RX ORDER — ONDANSETRON 8 MG/1
16 TABLET, FILM COATED ORAL
Status: CANCELLED
Start: 2019-07-11

## 2019-07-11 RX ORDER — ACETAMINOPHEN 325 MG/1
650 TABLET ORAL EVERY 4 HOURS PRN
Status: DISCONTINUED | OUTPATIENT
Start: 2019-07-11 | End: 2019-07-15 | Stop reason: HOSPADM

## 2019-07-11 RX ORDER — EPINEPHRINE 1 MG/ML
0.3 INJECTION, SOLUTION INTRAMUSCULAR; SUBCUTANEOUS EVERY 5 MIN PRN
Status: CANCELLED | OUTPATIENT
Start: 2019-07-11

## 2019-07-11 RX ORDER — ALBUTEROL SULFATE 90 UG/1
1-2 AEROSOL, METERED RESPIRATORY (INHALATION)
Status: CANCELLED
Start: 2019-07-11

## 2019-07-11 RX ORDER — AMOXICILLIN 250 MG
2 CAPSULE ORAL 2 TIMES DAILY PRN
Status: DISCONTINUED | OUTPATIENT
Start: 2019-07-11 | End: 2019-07-15 | Stop reason: HOSPADM

## 2019-07-11 RX ORDER — AMOXICILLIN 250 MG
2 CAPSULE ORAL 2 TIMES DAILY
Status: CANCELLED | OUTPATIENT
Start: 2019-07-11

## 2019-07-11 RX ORDER — METHYLPREDNISOLONE SODIUM SUCCINATE 125 MG/2ML
125 INJECTION, POWDER, LYOPHILIZED, FOR SOLUTION INTRAMUSCULAR; INTRAVENOUS
Status: DISCONTINUED | OUTPATIENT
Start: 2019-07-11 | End: 2019-07-15 | Stop reason: HOSPADM

## 2019-07-11 RX ORDER — HEPARIN SODIUM,PORCINE 10 UNIT/ML
5-10 VIAL (ML) INTRAVENOUS EVERY 24 HOURS
Status: DISCONTINUED | OUTPATIENT
Start: 2019-07-11 | End: 2019-07-15 | Stop reason: HOSPADM

## 2019-07-11 RX ORDER — LIDOCAINE 40 MG/G
CREAM TOPICAL
Status: DISCONTINUED | OUTPATIENT
Start: 2019-07-11 | End: 2019-07-15 | Stop reason: HOSPADM

## 2019-07-11 RX ORDER — METHYLPREDNISOLONE SODIUM SUCCINATE 125 MG/2ML
125 INJECTION, POWDER, LYOPHILIZED, FOR SOLUTION INTRAMUSCULAR; INTRAVENOUS
Status: CANCELLED
Start: 2019-07-11

## 2019-07-11 RX ORDER — NALOXONE HYDROCHLORIDE 0.4 MG/ML
.1-.4 INJECTION, SOLUTION INTRAMUSCULAR; INTRAVENOUS; SUBCUTANEOUS
Status: DISCONTINUED | OUTPATIENT
Start: 2019-07-11 | End: 2019-07-15 | Stop reason: HOSPADM

## 2019-07-11 RX ORDER — ALBUTEROL SULFATE 0.83 MG/ML
2.5 SOLUTION RESPIRATORY (INHALATION)
Status: CANCELLED | OUTPATIENT
Start: 2019-07-11

## 2019-07-11 RX ORDER — LORAZEPAM 2 MG/ML
.5-1 INJECTION INTRAMUSCULAR EVERY 6 HOURS PRN
Status: DISCONTINUED | OUTPATIENT
Start: 2019-07-11 | End: 2019-07-15 | Stop reason: HOSPADM

## 2019-07-11 RX ORDER — LORAZEPAM 2 MG/ML
.5-1 INJECTION INTRAMUSCULAR EVERY 6 HOURS PRN
Status: CANCELLED | OUTPATIENT
Start: 2019-07-11

## 2019-07-11 RX ORDER — LORAZEPAM 2 MG/ML
1 INJECTION INTRAMUSCULAR ONCE
Status: COMPLETED | OUTPATIENT
Start: 2019-07-15 | End: 2019-07-12

## 2019-07-11 RX ORDER — PROCHLORPERAZINE MALEATE 10 MG
10 TABLET ORAL EVERY 8 HOURS
Status: CANCELLED
Start: 2019-07-11

## 2019-07-11 RX ORDER — LORAZEPAM 0.5 MG/1
.5-1 TABLET ORAL EVERY 6 HOURS PRN
Status: CANCELLED
Start: 2019-07-11

## 2019-07-11 RX ORDER — FLUCONAZOLE 100 MG/1
100 TABLET ORAL DAILY
Status: DISCONTINUED | OUTPATIENT
Start: 2019-07-11 | End: 2019-07-11

## 2019-07-11 RX ORDER — EPINEPHRINE 1 MG/ML
0.3 INJECTION, SOLUTION, CONCENTRATE INTRAVENOUS EVERY 5 MIN PRN
Status: DISCONTINUED | OUTPATIENT
Start: 2019-07-11 | End: 2019-07-15 | Stop reason: HOSPADM

## 2019-07-11 RX ORDER — ONDANSETRON 8 MG/1
16 TABLET, FILM COATED ORAL
Status: DISCONTINUED | OUTPATIENT
Start: 2019-07-11 | End: 2019-07-15 | Stop reason: HOSPADM

## 2019-07-11 RX ORDER — LANOLIN ALCOHOL/MO/W.PET/CERES
3 CREAM (GRAM) TOPICAL AT BEDTIME
Status: CANCELLED
Start: 2019-07-11

## 2019-07-11 RX ORDER — DIPHENHYDRAMINE HYDROCHLORIDE 50 MG/ML
50 INJECTION INTRAMUSCULAR; INTRAVENOUS
Status: CANCELLED
Start: 2019-07-11

## 2019-07-11 RX ORDER — DIPHENHYDRAMINE HCL 50 MG
50 CAPSULE ORAL ONCE
Status: COMPLETED | OUTPATIENT
Start: 2019-07-11 | End: 2019-07-11

## 2019-07-11 RX ORDER — MEPERIDINE HYDROCHLORIDE 25 MG/ML
25 INJECTION INTRAMUSCULAR; INTRAVENOUS; SUBCUTANEOUS EVERY 30 MIN PRN
Status: DISCONTINUED | OUTPATIENT
Start: 2019-07-11 | End: 2019-07-15 | Stop reason: HOSPADM

## 2019-07-11 RX ORDER — ALBUTEROL SULFATE 90 UG/1
1-2 AEROSOL, METERED RESPIRATORY (INHALATION)
Status: DISCONTINUED | OUTPATIENT
Start: 2019-07-11 | End: 2019-07-15 | Stop reason: HOSPADM

## 2019-07-11 RX ORDER — HEPARIN SODIUM,PORCINE 10 UNIT/ML
5-10 VIAL (ML) INTRAVENOUS
Status: DISCONTINUED | OUTPATIENT
Start: 2019-07-11 | End: 2019-07-15 | Stop reason: HOSPADM

## 2019-07-11 RX ORDER — ALBUTEROL SULFATE 0.83 MG/ML
2.5 SOLUTION RESPIRATORY (INHALATION)
Status: DISCONTINUED | OUTPATIENT
Start: 2019-07-11 | End: 2019-07-15 | Stop reason: HOSPADM

## 2019-07-11 RX ORDER — HEPARIN SODIUM,PORCINE 10 UNIT/ML
2-5 VIAL (ML) INTRAVENOUS
Status: DISCONTINUED | OUTPATIENT
Start: 2019-07-11 | End: 2019-07-15 | Stop reason: HOSPADM

## 2019-07-11 RX ORDER — SODIUM CHLORIDE 9 MG/ML
1000 INJECTION, SOLUTION INTRAVENOUS CONTINUOUS PRN
Status: CANCELLED
Start: 2019-07-11

## 2019-07-11 RX ORDER — ACETAMINOPHEN 325 MG/1
650 TABLET ORAL ONCE
Status: CANCELLED
Start: 2019-07-11

## 2019-07-11 RX ORDER — LORAZEPAM 0.5 MG/1
.5-1 TABLET ORAL EVERY 6 HOURS PRN
Status: DISCONTINUED | OUTPATIENT
Start: 2019-07-11 | End: 2019-07-15 | Stop reason: HOSPADM

## 2019-07-11 RX ORDER — LORAZEPAM 2 MG/ML
1 INJECTION INTRAMUSCULAR ONCE
Status: CANCELLED
Start: 2019-07-15

## 2019-07-11 RX ORDER — DIPHENHYDRAMINE HYDROCHLORIDE 50 MG/ML
50 INJECTION INTRAMUSCULAR; INTRAVENOUS
Status: DISCONTINUED | OUTPATIENT
Start: 2019-07-11 | End: 2019-07-15 | Stop reason: HOSPADM

## 2019-07-11 RX ORDER — ACYCLOVIR 200 MG/1
400 CAPSULE ORAL 2 TIMES DAILY
Status: DISCONTINUED | OUTPATIENT
Start: 2019-07-11 | End: 2019-07-15 | Stop reason: HOSPADM

## 2019-07-11 RX ORDER — LORAZEPAM 2 MG/ML
1 INJECTION INTRAMUSCULAR ONCE
Status: DISCONTINUED | OUTPATIENT
Start: 2019-07-12 | End: 2019-07-15

## 2019-07-11 RX ORDER — PROCHLORPERAZINE MALEATE 10 MG
10 TABLET ORAL EVERY 6 HOURS PRN
Status: CANCELLED
Start: 2019-07-11

## 2019-07-11 RX ORDER — PROCHLORPERAZINE MALEATE 5 MG
10 TABLET ORAL EVERY 8 HOURS
Status: DISCONTINUED | OUTPATIENT
Start: 2019-07-11 | End: 2019-07-15 | Stop reason: HOSPADM

## 2019-07-11 RX ORDER — DIPHENHYDRAMINE HCL 25 MG
50 CAPSULE ORAL ONCE
Status: CANCELLED
Start: 2019-07-11

## 2019-07-11 RX ORDER — DEXAMETHASONE 4 MG/1
8 TABLET ORAL DAILY
Status: DISCONTINUED | OUTPATIENT
Start: 2019-07-17 | End: 2019-07-15 | Stop reason: HOSPADM

## 2019-07-11 RX ORDER — LANOLIN ALCOHOL/MO/W.PET/CERES
3 CREAM (GRAM) TOPICAL AT BEDTIME
Status: DISCONTINUED | OUTPATIENT
Start: 2019-07-11 | End: 2019-07-15 | Stop reason: HOSPADM

## 2019-07-11 RX ORDER — CALCIUM CARBONATE 500 MG/1
500 TABLET, CHEWABLE ORAL 2 TIMES DAILY PRN
Status: DISCONTINUED | OUTPATIENT
Start: 2019-07-11 | End: 2019-07-15 | Stop reason: HOSPADM

## 2019-07-11 RX ORDER — MEPERIDINE HYDROCHLORIDE 25 MG/ML
25 INJECTION INTRAMUSCULAR; INTRAVENOUS; SUBCUTANEOUS EVERY 30 MIN PRN
Status: CANCELLED | OUTPATIENT
Start: 2019-07-11

## 2019-07-11 RX ORDER — AMOXICILLIN 250 MG
2 CAPSULE ORAL 2 TIMES DAILY
Status: DISCONTINUED | OUTPATIENT
Start: 2019-07-11 | End: 2019-07-15 | Stop reason: HOSPADM

## 2019-07-11 RX ADMIN — ACETAMINOPHEN 650 MG: 325 TABLET, FILM COATED ORAL at 15:40

## 2019-07-11 RX ADMIN — PREDNISONE 60 MG: 10 TABLET ORAL at 15:39

## 2019-07-11 RX ADMIN — OLANZAPINE 5 MG: 5 TABLET, FILM COATED ORAL at 22:20

## 2019-07-11 RX ADMIN — VINCRISTINE SULFATE 0.78 MG: 1 INJECTION, SOLUTION INTRAVENOUS at 18:25

## 2019-07-11 RX ADMIN — ACYCLOVIR 400 MG: 200 CAPSULE ORAL at 20:32

## 2019-07-11 RX ADMIN — DIPHENHYDRAMINE HYDROCHLORIDE 50 MG: 50 CAPSULE ORAL at 15:40

## 2019-07-11 RX ADMIN — ETOPOSIDE 200 MG: 20 INJECTION, SOLUTION, CONCENTRATE INTRAVENOUS at 18:25

## 2019-07-11 RX ADMIN — RITUXIMAB 700 MG: 10 INJECTION, SOLUTION INTRAVENOUS at 16:01

## 2019-07-11 RX ADMIN — PROCHLORPERAZINE MALEATE 10 MG: 5 TABLET ORAL at 17:48

## 2019-07-11 RX ADMIN — SODIUM CHLORIDE 150 MG: 9 INJECTION, SOLUTION INTRAVENOUS at 17:48

## 2019-07-11 RX ADMIN — MELATONIN TAB 3 MG 3 MG: 3 TAB at 22:20

## 2019-07-11 RX ADMIN — SENNOSIDES AND DOCUSATE SODIUM 2 TABLET: 8.6; 5 TABLET ORAL at 20:32

## 2019-07-11 RX ADMIN — ONDANSETRON HYDROCHLORIDE 16 MG: 8 TABLET, FILM COATED ORAL at 17:48

## 2019-07-11 RX ADMIN — RANITIDINE 150 MG: 150 TABLET, FILM COATED ORAL at 20:32

## 2019-07-11 ASSESSMENT — ACTIVITIES OF DAILY LIVING (ADL)
ADLS_ACUITY_SCORE: 10

## 2019-07-11 ASSESSMENT — PAIN SCALES - GENERAL: PAINLEVEL: NO PAIN (0)

## 2019-07-11 ASSESSMENT — MIFFLIN-ST. JEOR: SCORE: 1675.43

## 2019-07-11 NOTE — NURSING NOTE
Chief Complaint   Patient presents with     Labs Only     venipuncture, vitals checked     Marlena Arora RN on 7/11/2019 at 8:57 AM

## 2019-07-11 NOTE — PROGRESS NOTES
"SPIRITUAL HEALTH SERVICES  SPIRITUAL ASSESSMENT Progress Note  [facility] 5C     PRIMARY FOCUS:     Goals of care    Symptom/pain management    Emotional/spiritual/Druze distress    Support for coping     REFERRAL SOURCE: Request upon admission     ILLNESS CIRCUMSTANCES:   Reviewed documentation. Reflective conversation shared with Christiano \"Prudencio\" Tracy which integrated elements of illness and family narratives.     Context of Serious Illness/Symptom(s) - Prudencio is known to me from previous hospital admissions. He is admitted today for his \"last round of chemotherapy.\"    Resources for Support - Prudencio is  to Halie and together they have lots of  support from their home Voodoo, family and numeorus friends. They have 3 children ages 4. 7 and 8     DISTRESS:     Emotional/Spiritual/Existential Distress - Since his diagnosis, Prudencio has been focused on \"completing chemotherapy\" but lately has found himself feeling more anxious as he contemplates finishing his treatment. He is beginning to worry about his lymphoma returning.  H    Restorationist Distress - None noted    Social/Cultural/Economic Distress - Prudencio also talked about the stress of returning to work and caring for his family in addition to recovering physically from his chemotherapy.     SPIRITUAL/Pentecostal COPING:     Temple/[place] - Marcel attend an [employer] near their home in [place]. The  and Voodoo members have been in contact with Prudencio and Halie.     Spiritual Practice(s) - Prayer, Communion, Scripture     Emotional/Relational/Existential Connections - Prudencio and Halie have a strong marriage and they have focused on keeping things going smoothly for their children who seemed to do well with Prudencio's chemotherapy.      GOALS OF CARE:    Goals of Care - Prudencio is focused on making it through this last round of chemotherapy. He would like ideas for how to handle \"anxiety and worry\" about the return of cancer which sometimes " feel disruptive to his life. We explored, meditation, relaxation techniques, journaling and contemplative prayer. I also encouraged Prudencio to speak with his medical team about other options.    Meaning/Sense-Making - Prudencio's genie is an important source of coping and meaning making him for him. He would like to receive L[employer] Communion each time he is hospitalized.      PLAN: I will continue to follow Prudencio for spiritual support during his stay on unit 5C. I will also update      Stephanie Stoddard  Oncology   Pager [phone]

## 2019-07-11 NOTE — PROGRESS NOTES
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 He presented with R axillary mass. PET/CT showed stage IA disease. Bone marrow biopsy and LP done 3/25 were negative for disease. Please see Dr. Ramirez's note for further discussion of diagnostic work-up.     Plan for 2 cycles of DA-R-EPOCH followed by PET/CT. He presented for cycle 1 on 3/27/19 and was discharged on 3/31/19. He tolerated chemotherapy well apart from mild hives and facial itching from Rituxan (was able to complete dose), chemotherapy-induced nausea, and mild post-LP occipital headache.     Following discharge, his pathology was finalized here with Ki-67 positive in 95-99% of lymphoma cells. TDT negative, EBV was strongly positive. The positive findings for CD20, CD10, BCL6, very high Ki-67 with negative BCL2, as well as MYC translocation supports diagnoses of Burkitt's lymphoma.     Cycle 2 of R-EPOCH was given 4/17-4/21/19. He had a partial response seen on imaging after 2 cycles. PET/CT after 4 cycles showed CR. Cycle 5 was complicated by appendicitis following chemotherapy. He presents today for routine follow-up prior to admission for cycle 6.     Interval History: Prudencio returns today with his wife. He is feeling well. He has been recovering well from his appendectomy. He completed antibiotics last week. No abdominal pain beyond some abdominal wall tenderness with position changes. No fevers/chills, nausea/vomiting. He had bowel movement changes on antibiotics, now returning to normal. Continues to feel tired and weaker with each cycle of chemo. Has been having improved energy though the past few days.     Neuropathy continues to remain in finger pads  and feet and is constant. No functional impairment apart from he feels more tremulous and has to focus when writing. Had muscle fasciculations in lower legs the week prior to his appendectomy. None since.     He has insomnia while inpatient on steroids and feels on-edge and anxious. Wondering if there is something to help with this.    GERD symptoms have been well-controlled on H2 blocker BID and occasional TUMS.     Review of Systems:  Patient denies any of the following except if noted above: fevers, chills, vision or hearing changes, chest pain, dyspnea, abdominal pain, nausea, vomiting, diarrhea, constipation, urinary concerns, issues with sleep or mood.     No current outpatient medications on file.     No current facility-administered medications for this visit.      PHYSICAL EXAM:  General: The patient is a pleasant male in no acute distress.  /80   Pulse 89   Temp 98.8  F (37.1  C)   Resp 16   Wt 78.8 kg (173 lb 11.2 oz)   SpO2 98%   BMI 24.92 kg/m    Wt Readings from Last 10 Encounters:   07/11/19 78.8 kg (173 lb 11.2 oz)   07/05/19 78.9 kg (174 lb)   07/01/19 77.7 kg (171 lb 3.2 oz)   06/24/19 81.2 kg (179 lb)   06/20/19 81.1 kg (178 lb 11.2 oz)   06/03/19 81.6 kg (180 lb)   05/30/19 80.9 kg (178 lb 6.4 oz)   05/13/19 81.1 kg (178 lb 12.8 oz)   05/12/19 80.2 kg (176 lb 12.8 oz)   05/08/19 80.3 kg (177 lb 1.6 oz)   HEENT: EOMI, PERRL. Sclerae are anicteric. Oral mucosa is pink and moist with no lesions or thrush.   Lymph: Neck is supple with no lymphadenopathy in the cervical or supraclavicular areas.   Heart: Regular rate and rhythm.   Lungs: Clear to auscultation bilaterally.   Abdomen: Bowel sounds present, soft, nontender with no palpable hepatosplenomegaly or masses. 3 abdominal incisions are c/d/i and healing well with dermabond still present on 2.   Extremities: No lower extremity edema noted bilaterally.   Neuro: Cranial nerves II through XII are grossly intact.  Skin: No rashes,  petechiae, or bruising noted on exposed skin.    Labs:    7/11/2019 08:51   Sodium 140   Potassium 4.3   Chloride 107   Carbon Dioxide 29   Urea Nitrogen 14   Creatinine 1.12   GFR Estimate 80   GFR Estimate If Black >90   Calcium 8.7   Anion Gap 4   Albumin 3.7   Protein Total 6.8   Bilirubin Total 0.2   Alkaline Phosphatase 53   ALT 49   AST 22   Glucose 67 (L)   WBC 4.9   Hemoglobin 11.6 (L)   Hematocrit 35.7 (L)   Platelet Count 291   RBC Count 3.67 (L)   MCV 97   MCH 31.6   MCHC 32.5   RDW 16.8 (H)   Diff Method Automated Method   % Neutrophils 70.2   % Lymphocytes 12.8   % Monocytes 13.4   % Eosinophils 0.2   % Basophils 0.8   % Immature Granulocytes 2.6   Nucleated RBCs 0   Absolute Neutrophil 3.5   Absolute Lymphocytes 0.6 (L)   Absolute Monocytes 0.7   Absolute Eosinophils 0.0   Absolute Basophils 0.0   Abs Immature Granulocytes 0.1   Absolute Nucleated RBC 0.0         Assessment & Plan:     1. Stage Ia Burkitt's lymphoma: Negative marrow or CNS involvement. S/p 5 cycles of R-EPOCH with Neulasta support which he is tolerating well. PET/CT on 6/18/19 following 4 cycles showed CR. Cycle 5 complicated by appendicitis requiring appendectomy. He had ANC <500 on one lab check. Will not dose-escalate this cycle because of this. PET/CT and follow-up with Dr. Ramirez a month after treatment.     2. HEME: No transfusion needs throughout therapy. Plan to transfuse for Hgb <8 and platelets <10K. He will have labs on twice weekly at [facility]. He has signed a blood consent.     3. ID: No active concerns. Continue ppx ACV and fluconazole. Start Levaquin upon discharge and stop when ANC >1000.     4. Vascular access: PICC placements with admission.     5. NEURO: Has stable grade 1 neuropathy in fingertips and toes from vincristine, monitor.     6. GI:   -GERD. Under good control. Continue ranitidine 150 mg BID and TUMs prn.   -Nausea. Chemotherapy induced. Will continue with scheduled Compazine 10 mg tid and Zofran 16 mg  daily during his admission, along with Emend on days 1 and 5. This regimen has been working well for him since cycle 2. Continue prn antiemetics for nausea once home.   -Constipation. Chemotherapy induced. Continue stool softeners scheduled during hospital admission, then prn once home.    7. Steroid induced insomnia, mood changes: Recommend starting olanzapine 5-10 mg q hs.     8. Fatigue and deconditioning from treatment: Continue gentle exercise and weight lifting as tolerated.     Holly Wheat PA-C    [facility]  [address] [place] [id number]  [phone]

## 2019-07-11 NOTE — H&P
[facility], [place]    History & Physical  Hematology / Oncology     Date of Admission:  07/11/2019  Date of Service (when I saw the patient):  07/11/2019    Assessment & Plan   Christiano Molina is a 44 year old male with Burkitt lymphoma, now s/p 5 cycles of DA-EPOCH-R with CR after Cycle 4. He is being admitted for Cycle 6.      #Burkitt Lymphoma, EBV+, Stage IA   Follows with Dr. Ramirez. Initially presented in mid-March with a right axillary mass, found to have high-grade B-cell lymphoma. Initial pathology was not fully clear whether DLBCL or Burkitt, but subsequently this was finalized by [facility] Heme Path review as Burkitt lymphoma (EBV+). Plan is to do 6 cycles of DA-R-EPOCH with IT chemo prophylaxis during C3-6. PET/CT (done 6/18) after 4 cycles demonstrated complete response.   - place PICC on admission  - Will keep same doses as C5 due to low ANC with last cycle     Treatment Plan: Cycle 6 DA-EPOCH-R (Day 0/1=7/11/19)  -Rituxan 375mg/m2 (700mg) on D0   -Prednisone (decreased to 30mg/m2=60mg) BID on D1-5   -Etoposide 200mg CIVI on D1-4   -Vincristine 0.4mg/m2 (0.78) / Doxorubicin 40mg CIVI on D1-4   -Cytoxan 3000mg IV on D5    -Dexamethasone 8mg daily on D6,7   -IT triple therapy chemotherapy on D1 and D5. Plan to give D1 IT chemo on Friday, 7/12, with procedure being done by Inpatient Procedure team vs IR, based on team availability. Would be due again on Monday, 7/15. There is a consult for Procedure team for 7/12 currently in place along with the labs for the LP. Plan for 1L NS bolus shama-procedurally as well as Ativan 1mg premed prior to LPs. Pt brought this own [employer] products for caffeine intake after procedures since this has worked well in past.  -supportive care/premeds: Tylenol/Benadryl prior to Rituxan, Emend on D1 and D5, Compazine scheduled 10mg q8h, Zofran 16mg daily D1-5, Senna-S.   -Will try Zyprexa 5mg at bedtime for assist with sleep inpatient/steroid-induced  insomnia. This will also help with nausea which he has struggled with in previous cycles. Consider increasing to 10mg if insufficient response.    #Recent appendicitis s/p appendectomy  Admitted with appendicitis following C5, 6/28-7/1. Underwent laparoscopic appendectomy on 6/29, and was discharge on levofloxacin and metronidazole to complete a 7 day course. Surgical sites are healing well, no irritation or drainage notes. Glue still intact on 2 of 3 sites.  - Continue to monitor     #Mild anemia  2/2 chemotherapy. Do not anticipate transfusion needs during inpatient admission.      #ID PPx  - continue ppx ACV  - can hold Fluconazole during admission (ANC >1.0) and RESTART on discharge  - hold Levaquin ppx given ANC >1.0  PPx Levaquin will START on discharge per Dr. Ramirez.      #h/o Chemotherapy-induced nausea  Improved with scheduled antiemetics during chemo.   - scheduled antiemetics again with this cycle  - Adding zyprexa for steroid-induced nausea as above     #h/o Constipation, chemotherapy induced  Bowels returning to normal after completing antibiotics last Thursday. Denies current diarrhea or constipation.   - Senna-S BID  - Miralax PRN     #GERD  - continue home Zantac     #Peripheral neuropathy.   Mild numbness/tingling affecting b/l finger tips and toes. Not interfering with function.  - monitor     FEN  - no current IVFs in addition to fluids with CIVI chemo  - lyte repletion per unit protocol  - regular diet     PPx  - VTE: hold VTE ppx given plan for LP on 7/12  - GI: Zantac as above     Dispo: Anticipate discharge on D5 after Cytoxan on Monday, 7/15.  - Will need Neulasta, labs/transfusions twice weekly at [facility] PA-C  Hematology/Oncology  Pager: [phone]    Code Status : Full Code    Primary Care Physician   Vern Ramirez    History of Present Illness   History obtained from chart and discussed with the patient.    Christiano Molina is a 44 year old male with Burkitt  lymphoma, now s/p 5 cycles of DA-EPOCH-R with CR after Cycle 4. He is being admitted for Cycle 6. Prudencio reports he has been doing well since he was admitted with appendicitis. He completed his antibiotics once week ago. He denies fever or chills at home. He is eating and drinking normally, no nausea. He did has some disruption to his normal bowel habits while on the antibiotics, but they have returned closer to normal in the past couple days. He notes that his neuropathy is now involving his 5th digit on each hand. It is contained to the portion of his fingers distal to the DIP joint. He also developed neuropathy involving his distal toes and along the lateral side of his feet after cycle 5. He denies interference in function with his hands or feet. He says he is able to distinguish fine sensory details still. Denies cough and SOB. He is ready to start the last cycle of DA-R-EPOCH.    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 Taking?   acetaminophen (TYLENOL) 325 MG tablet Past Week at Unknown time Self No Yes   Sig: Take 2 tablets (650 mg) by mouth every 4 hours as needed for mild pain   acyclovir (ZOVIRAX) 200 MG capsule 7/11/2019 at Unknown time  No Yes   Sig: Take 2 capsules (400 mg) by mouth 2 times daily   calcium carbonate (TUMS) 500 MG chewable tablet Past Month at Unknown time Self Yes Yes   Sig: Take 1 chew tab by mouth 2 times daily as needed for heartburn    fluconazole (DIFLUCAN) 100 MG tablet 7/11/2019 at Unknown time  No Yes   Sig: Take 1 tablet (100 mg) by mouth daily   ondansetron (ZOFRAN) 8 MG tablet Past Month at Unknown time Self No Yes   Sig: Take 1 tablet (8 mg) by mouth every 8 hours as needed for nausea   ondansetron (ZOFRAN-ODT) 8 MG ODT tab  Self No No   Sig: Take 1 tablet (8 mg) by mouth every 8 hours At first continue scheduled (but can back off as nausea improves)   prochlorperazine (COMPAZINE) 10 MG tablet  Self No No   Sig: Take 1 tablet (10 mg) by mouth every 6 hours as needed for nausea or vomiting (Try second.)   ranitidine (ZANTAC) 150 MG tablet 7/11/2019 at Unknown time Self No Yes   Sig: Take 1 tablet (150 mg) by mouth 2 times daily   senna-docusate (SENOKOT-S/PERICOLACE) 8.6-50 MG tablet 7/10/2019 at Unknown time Self No Yes   Sig: Take 2 tablets by mouth 2 times daily as needed for constipation      Facility-Administered Medications: None     Allergies   No Known Allergies    Social History   Social History     Socioeconomic History     Marital status:      Spouse name: Not on file     Number of children: Not on file     Years of education: Not on file     Highest education level: Not on file   Occupational History     Not on file   Social Needs     Financial resource strain: Not on file     Food insecurity:     Worry: Not on file     Inability: Not on file     Transportation needs:     Medical: Not on file     Non-medical: Not on file   Tobacco Use     Smoking status: Never Smoker     Smokeless tobacco: Never Used   Substance and Sexual Activity     Alcohol use: Yes     Comment: rarely     Drug use: Never     Sexual activity: Not on file   Lifestyle     Physical activity:     Days per week: Not on file     Minutes per session: Not on file     Stress: Not on file   Relationships     Social connections:     Talks on phone: Not on file     Gets together: Not on file     Attends [employer] service: Not on file     Active member of club or  organization: Not on file     Attends meetings of clubs or organizations: Not on file     Relationship status: Not on file     Intimate partner violence:     Fear of current or ex partner: Not on file     Emotionally abused: Not on file     Physically abused: Not on file     Forced sexual activity: Not on file   Other Topics Concern     Parent/sibling w/ CABG, MI or angioplasty before 65F 55M? Not Asked   Social History Narrative     Not on file       Family History   Non-contributory    Review of Systems   A 10 point ROS is negative unless otherwise noted above in the HPI.    Physical Exam   Vital Signs with Ranges  Temp:  [98.3  F (36.8  C)-98.8  F (37.1  C)] 98.3  F (36.8  C)  Pulse:  [89] 89  Heart Rate:  [98] 98  Resp:  [16] 16  BP: (124-138)/(80-85) 138/85  SpO2:  [98 %] 98 %  170 lbs 6.4 oz  Constitutional: Pleasant and cooperative male seen up ad jessica in room. Awake, alert, NAD.  HEENT: NC/AT, EOMI, sclera clear, conjunctiva normal, OP with MMM, mild whiteness on tongue  Respiratory: No increased work of breathing, CTAB, no crackles or wheezing.  Cardiovascular: RRR, no murmur noted. No peripheral edema.  GI: Normal bowel sounds, soft, non-distended and non-tender. Laparoscopic sites intact, no erythema.  Skin: Warm, dry, well-perfused. No bruising, bleeding, rashes, or lesions on limited exam.  Neurologic: A&O. Answers questions appropriately. Moves all extremities spontaneously.  Neuropsychiatric: Calm, appropriate affect  Vascular access:  PICC on LUE CDI, non-tender, no surrounding erythema.    Recent Labs  CBC   Recent Labs   Lab 07/11/19  0851 07/05/19  1225   WBC 4.9 12.7*   RBC 3.67* 3.38*   HGB 11.6* 10.7*   HCT 35.7* 32.6*   MCV 97 96   MCH 31.6 31.7   MCHC 32.5 32.8   RDW 16.8* 15.9*    155       CMP   Recent Labs   Lab 07/11/19  0851 07/05/19  1225    139   POTASSIUM 4.3 4.4   CHLORIDE 107 105   CO2 29 31   ANIONGAP 4 3   GLC 67* 129*   BUN 14 12   CR 1.12 1.21   GFRESTIMATED 80 73    GFRESTBLACK >90 84   TAMMY 8.7 8.9   PROTTOTAL 6.8 6.6*   ALBUMIN 3.7 3.6   BILITOTAL 0.2 0.2   ALKPHOS 53 62   AST 22 26   ALT 49 37       LFTs:   Recent Labs   Lab 07/11/19  0851   PROTTOTAL 6.8   ALBUMIN 3.7   BILITOTAL 0.2   ALKPHOS 53   AST 22   ALT 49       Coagulation Studies: No lab results found in last 7 days.

## 2019-07-11 NOTE — LETTER
7/11/2019       RE: Christiano Molina  [address] [facility]  [place] [address]     Dear Colleague,    Thank you for referring your patient, Christiano Molina, to the [facility]. Please see a copy of my visit note below.    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 He presented with R axillary mass. PET/CT showed stage IA disease. Bone marrow biopsy and LP done 3/25 were negative for disease. Please see Dr. Ramirez's note for further discussion of diagnostic work-up.     Plan for 2 cycles of DA-R-EPOCH followed by PET/CT. He presented for cycle 1 on 3/27/19 and was discharged on 3/31/19. He tolerated chemotherapy well apart from mild hives and facial itching from Rituxan (was able to complete dose), chemotherapy-induced nausea, and mild post-LP occipital headache.     Following discharge, his pathology was finalized here with Ki-67 positive in 95-99% of lymphoma cells. TDT negative, EBV was strongly positive. The positive findings for CD20, CD10, BCL6, very high Ki-67 with negative BCL2, as well as MYC translocation supports diagnoses of Burkitt's lymphoma.     Cycle 2 of R-EPOCH was given 4/17-4/21/19. He had a partial response seen on imaging after 2 cycles. PET/CT after 4 cycles showed CR. Cycle 5 was complicated by appendicitis following chemotherapy. He presents today for routine follow-up prior to admission for cycle 6.     Interval History: Prudencio returns today with his wife. He is feeling well. He has been recovering well from his appendectomy. He completed antibiotics last week. No abdominal pain beyond some abdominal wall tenderness with position changes. No fevers/chills,  nausea/vomiting. He had bowel movement changes on antibiotics, now returning to normal. Continues to feel tired and weaker with each cycle of chemo. Has been having improved energy though the past few days.     Neuropathy continues to remain in finger pads and feet and is constant. No functional impairment apart from he feels more tremulous and has to focus when writing. Had muscle fasciculations in lower legs the week prior to his appendectomy. None since.     He has insomnia while inpatient on steroids and feels on-edge and anxious. Wondering if there is something to help with this.    GERD symptoms have been well-controlled on H2 blocker BID and occasional TUMS.     Review of Systems:  Patient denies any of the following except if noted above: fevers, chills, vision or hearing changes, chest pain, dyspnea, abdominal pain, nausea, vomiting, diarrhea, constipation, urinary concerns, issues with sleep or mood.     No current outpatient medications on file.     No current facility-administered medications for this visit.      PHYSICAL EXAM:  General: The patient is a pleasant male in no acute distress.  /80   Pulse 89   Temp 98.8  F (37.1  C)   Resp 16   Wt 78.8 kg (173 lb 11.2 oz)   SpO2 98%   BMI 24.92 kg/m     Wt Readings from Last 10 Encounters:   07/11/19 78.8 kg (173 lb 11.2 oz)   07/05/19 78.9 kg (174 lb)   07/01/19 77.7 kg (171 lb 3.2 oz)   06/24/19 81.2 kg (179 lb)   06/20/19 81.1 kg (178 lb 11.2 oz)   06/03/19 81.6 kg (180 lb)   05/30/19 80.9 kg (178 lb 6.4 oz)   05/13/19 81.1 kg (178 lb 12.8 oz)   05/12/19 80.2 kg (176 lb 12.8 oz)   05/08/19 80.3 kg (177 lb 1.6 oz)   HEENT: EOMI, PERRL. Sclerae are anicteric. Oral mucosa is pink and moist with no lesions or thrush.   Lymph: Neck is supple with no lymphadenopathy in the cervical or supraclavicular areas.   Heart: Regular rate and rhythm.   Lungs: Clear to auscultation bilaterally.   Abdomen: Bowel sounds present, soft, nontender with no palpable  hepatosplenomegaly or masses. 3 abdominal incisions are c/d/i and healing well with dermabond still present on 2.   Extremities: No lower extremity edema noted bilaterally.   Neuro: Cranial nerves II through XII are grossly intact.  Skin: No rashes, petechiae, or bruising noted on exposed skin.    Labs:    7/11/2019 08:51   Sodium 140   Potassium 4.3   Chloride 107   Carbon Dioxide 29   Urea Nitrogen 14   Creatinine 1.12   GFR Estimate 80   GFR Estimate If Black >90   Calcium 8.7   Anion Gap 4   Albumin 3.7   Protein Total 6.8   Bilirubin Total 0.2   Alkaline Phosphatase 53   ALT 49   AST 22   Glucose 67 (L)   WBC 4.9   Hemoglobin 11.6 (L)   Hematocrit 35.7 (L)   Platelet Count 291   RBC Count 3.67 (L)   MCV 97   MCH 31.6   MCHC 32.5   RDW 16.8 (H)   Diff Method Automated Method   % Neutrophils 70.2   % Lymphocytes 12.8   % Monocytes 13.4   % Eosinophils 0.2   % Basophils 0.8   % Immature Granulocytes 2.6   Nucleated RBCs 0   Absolute Neutrophil 3.5   Absolute Lymphocytes 0.6 (L)   Absolute Monocytes 0.7   Absolute Eosinophils 0.0   Absolute Basophils 0.0   Abs Immature Granulocytes 0.1   Absolute Nucleated RBC 0.0         Assessment & Plan:     1. Stage Ia Burkitt's lymphoma: Negative marrow or CNS involvement. S/p 5 cycles of R-EPOCH with Neulasta support which he is tolerating well. PET/CT on 6/18/19 following 4 cycles showed CR. Cycle 5 complicated by appendicitis requiring appendectomy. He had ANC <500 on one lab check. Will not dose-escalate this cycle because of this. PET/CT and follow-up with Dr. Ramirez a month after treatment.     2. HEME: No transfusion needs throughout therapy. Plan to transfuse for Hgb <8 and platelets <10K. He will have labs on twice weekly at [facility]. He has signed a blood consent.     3. ID: No active concerns. Continue ppx ACV and fluconazole. Start Levaquin upon discharge and stop when ANC >1000.     4. Vascular access: PICC placements with admission.     5. NEURO: Has stable  grade 1 neuropathy in fingertips and toes from vincristine, monitor.     6. GI:   -GERD. Under good control. Continue ranitidine 150 mg BID and TUMs prn.   -Nausea. Chemotherapy induced. Will continue with scheduled Compazine 10 mg tid and Zofran 16 mg daily during his admission, along with Emend on days 1 and 5. This regimen has been working well for him since cycle 2. Continue prn antiemetics for nausea once home.   -Constipation. Chemotherapy induced. Continue stool softeners scheduled during hospital admission, then prn once home.    7. Steroid induced insomnia, mood changes: Recommend starting olanzapine 5-10 mg q hs.     8. Fatigue and deconditioning from treatment: Continue gentle exercise and weight lifting as tolerated.     Holly Wheat PA-C    [facility]  [address] [place] [id number] [phone]

## 2019-07-12 LAB
ALBUMIN SERPL-MCNC: 3.2 G/DL (ref 3.4–5)
ALP SERPL-CCNC: 46 U/L (ref 40–150)
ALT SERPL W P-5'-P-CCNC: 41 U/L (ref 0–70)
ANION GAP SERPL CALCULATED.3IONS-SCNC: 7 MMOL/L (ref 3–14)
APPEARANCE CSF: CLEAR
APTT PPP: 31 SEC (ref 22–37)
AST SERPL W P-5'-P-CCNC: 13 U/L (ref 0–45)
BASOPHILS # BLD AUTO: 0 10E9/L (ref 0–0.2)
BASOPHILS NFR BLD AUTO: 0.2 %
BILIRUB SERPL-MCNC: 0.2 MG/DL (ref 0.2–1.3)
BUN SERPL-MCNC: 12 MG/DL (ref 7–30)
CALCIUM SERPL-MCNC: 8.8 MG/DL (ref 8.5–10.1)
CHLORIDE SERPL-SCNC: 111 MMOL/L (ref 94–109)
CO2 SERPL-SCNC: 25 MMOL/L (ref 20–32)
COLOR CSF: COLORLESS
CREAT SERPL-MCNC: 0.84 MG/DL (ref 0.66–1.25)
DIFFERENTIAL METHOD BLD: ABNORMAL
EOSINOPHIL # BLD AUTO: 0 10E9/L (ref 0–0.7)
EOSINOPHIL NFR BLD AUTO: 0 %
ERYTHROCYTE [DISTWIDTH] IN BLOOD BY AUTOMATED COUNT: 16.8 % (ref 10–15)
GFR SERPL CREATININE-BSD FRML MDRD: >90 ML/MIN/{1.73_M2}
GLUCOSE CSF-MCNC: 42 MG/DL (ref 40–70)
GLUCOSE SERPL-MCNC: 119 MG/DL (ref 70–99)
GRAM STN SPEC: NORMAL
HCT VFR BLD AUTO: 31.8 % (ref 40–53)
HGB BLD-MCNC: 10.1 G/DL (ref 13.3–17.7)
IMM GRANULOCYTES # BLD: 0.1 10E9/L (ref 0–0.4)
IMM GRANULOCYTES NFR BLD: 1.1 %
INR PPP: 1.06 (ref 0.86–1.14)
LYMPHOCYTES # BLD AUTO: 0.5 10E9/L (ref 0.8–5.3)
LYMPHOCYTES NFR BLD AUTO: 3.7 %
Lab: NORMAL
MCH RBC QN AUTO: 30.9 PG (ref 26.5–33)
MCHC RBC AUTO-ENTMCNC: 31.8 G/DL (ref 31.5–36.5)
MCV RBC AUTO: 97 FL (ref 78–100)
MONOCYTES # BLD AUTO: 0.4 10E9/L (ref 0–1.3)
MONOCYTES NFR BLD AUTO: 3.6 %
NEUTROPHILS # BLD AUTO: 11.3 10E9/L (ref 1.6–8.3)
NEUTROPHILS NFR BLD AUTO: 91.4 %
NRBC # BLD AUTO: 0 10*3/UL
NRBC BLD AUTO-RTO: 0 /100
PLATELET # BLD AUTO: 276 10E9/L (ref 150–450)
POTASSIUM SERPL-SCNC: 4 MMOL/L (ref 3.4–5.3)
PROT CSF-MCNC: 33 MG/DL (ref 15–60)
PROT SERPL-MCNC: 6 G/DL (ref 6.8–8.8)
RBC # BLD AUTO: 3.27 10E12/L (ref 4.4–5.9)
RBC # CSF MANUAL: NORMAL /UL (ref 0–2)
SODIUM SERPL-SCNC: 143 MMOL/L (ref 133–144)
SPECIMEN SOURCE: NORMAL
TUBE # CSF: 1 #
WBC # BLD AUTO: 12.3 10E9/L (ref 4–11)
WBC # CSF MANUAL: NORMAL /UL (ref 0–5)

## 2019-07-12 PROCEDURE — 25000128 H RX IP 250 OP 636: Performed by: PHYSICIAN ASSISTANT

## 2019-07-12 PROCEDURE — 40001004 ZZHCL STATISTIC FLOW INT 9-15 ABY TC 88188: Performed by: PHYSICIAN ASSISTANT

## 2019-07-12 PROCEDURE — 88184 FLOWCYTOMETRY/ TC 1 MARKER: CPT | Performed by: PHYSICIAN ASSISTANT

## 2019-07-12 PROCEDURE — 85025 COMPLETE CBC W/AUTO DIFF WBC: CPT | Performed by: INTERNAL MEDICINE

## 2019-07-12 PROCEDURE — 25000131 ZZH RX MED GY IP 250 OP 636 PS 637: Performed by: PHYSICIAN ASSISTANT

## 2019-07-12 PROCEDURE — 80053 COMPREHEN METABOLIC PANEL: CPT | Performed by: INTERNAL MEDICINE

## 2019-07-12 PROCEDURE — 25000132 ZZH RX MED GY IP 250 OP 250 PS 637: Performed by: PHYSICIAN ASSISTANT

## 2019-07-12 PROCEDURE — 84157 ASSAY OF PROTEIN OTHER: CPT | Performed by: PHYSICIAN ASSISTANT

## 2019-07-12 PROCEDURE — 87205 SMEAR GRAM STAIN: CPT | Performed by: PHYSICIAN ASSISTANT

## 2019-07-12 PROCEDURE — 99233 SBSQ HOSP IP/OBS HIGH 50: CPT | Performed by: INTERNAL MEDICINE

## 2019-07-12 PROCEDURE — 87070 CULTURE OTHR SPECIMN AEROBIC: CPT | Performed by: PHYSICIAN ASSISTANT

## 2019-07-12 PROCEDURE — 88185 FLOWCYTOMETRY/TC ADD-ON: CPT | Performed by: PHYSICIAN ASSISTANT

## 2019-07-12 PROCEDURE — 25800030 ZZH RX IP 258 OP 636: Performed by: PHYSICIAN ASSISTANT

## 2019-07-12 PROCEDURE — 82945 GLUCOSE OTHER FLUID: CPT | Performed by: PHYSICIAN ASSISTANT

## 2019-07-12 PROCEDURE — 3E0R305 INTRODUCTION OF OTHER ANTINEOPLASTIC INTO SPINAL CANAL, PERCUTANEOUS APPROACH: ICD-10-PCS | Performed by: PHYSICIAN ASSISTANT

## 2019-07-12 PROCEDURE — 96450 CHEMOTHERAPY INTO CNS: CPT | Performed by: INTERNAL MEDICINE

## 2019-07-12 PROCEDURE — 85730 THROMBOPLASTIN TIME PARTIAL: CPT | Performed by: INTERNAL MEDICINE

## 2019-07-12 PROCEDURE — 87015 SPECIMEN INFECT AGNT CONCNTJ: CPT | Performed by: PHYSICIAN ASSISTANT

## 2019-07-12 PROCEDURE — 12000012 ZZH R&B MS OVERFLOW UMMC

## 2019-07-12 PROCEDURE — 85610 PROTHROMBIN TIME: CPT | Performed by: INTERNAL MEDICINE

## 2019-07-12 PROCEDURE — 89050 BODY FLUID CELL COUNT: CPT | Performed by: PHYSICIAN ASSISTANT

## 2019-07-12 RX ADMIN — RANITIDINE 150 MG: 150 TABLET, FILM COATED ORAL at 08:29

## 2019-07-12 RX ADMIN — ONDANSETRON HYDROCHLORIDE 16 MG: 8 TABLET, FILM COATED ORAL at 15:14

## 2019-07-12 RX ADMIN — PREDNISONE 60 MG: 10 TABLET ORAL at 16:07

## 2019-07-12 RX ADMIN — ACYCLOVIR 400 MG: 200 CAPSULE ORAL at 20:21

## 2019-07-12 RX ADMIN — PROCHLORPERAZINE MALEATE 10 MG: 5 TABLET ORAL at 17:17

## 2019-07-12 RX ADMIN — Medication 5 ML: at 08:29

## 2019-07-12 RX ADMIN — LORAZEPAM 1 MG: 2 INJECTION INTRAMUSCULAR; INTRAVENOUS at 13:11

## 2019-07-12 RX ADMIN — SODIUM CHLORIDE 1000 ML: 9 INJECTION, SOLUTION INTRAVENOUS at 13:12

## 2019-07-12 RX ADMIN — VINCRISTINE SULFATE 0.78 MG: 1 INJECTION, SOLUTION INTRAVENOUS at 15:20

## 2019-07-12 RX ADMIN — OLANZAPINE 5 MG: 5 TABLET, FILM COATED ORAL at 21:46

## 2019-07-12 RX ADMIN — METHOTREXATE: 25 INJECTION, SOLUTION INTRA-ARTERIAL; INTRAMUSCULAR; INTRATHECAL; INTRAVENOUS at 13:05

## 2019-07-12 RX ADMIN — ACYCLOVIR 400 MG: 200 CAPSULE ORAL at 08:29

## 2019-07-12 RX ADMIN — RANITIDINE 150 MG: 150 TABLET, FILM COATED ORAL at 20:21

## 2019-07-12 RX ADMIN — ETOPOSIDE 200 MG: 20 INJECTION, SOLUTION, CONCENTRATE INTRAVENOUS at 15:18

## 2019-07-12 RX ADMIN — SENNOSIDES AND DOCUSATE SODIUM 2 TABLET: 8.6; 5 TABLET ORAL at 08:28

## 2019-07-12 RX ADMIN — PROCHLORPERAZINE MALEATE 10 MG: 5 TABLET ORAL at 08:28

## 2019-07-12 RX ADMIN — PREDNISONE 60 MG: 10 TABLET ORAL at 08:28

## 2019-07-12 RX ADMIN — PROCHLORPERAZINE MALEATE 10 MG: 5 TABLET ORAL at 00:48

## 2019-07-12 RX ADMIN — SENNOSIDES AND DOCUSATE SODIUM 2 TABLET: 8.6; 5 TABLET ORAL at 20:21

## 2019-07-12 RX ADMIN — Medication 5 ML: at 15:42

## 2019-07-12 ASSESSMENT — MIFFLIN-ST. JEOR: SCORE: 1690.85

## 2019-07-12 ASSESSMENT — ACTIVITIES OF DAILY LIVING (ADL)
ADLS_ACUITY_SCORE: 10

## 2019-07-12 NOTE — PLAN OF CARE
Pt A&O and up in halls frequently. Reported being drowsy/napping frequently, states it could be related to taking zyprexa last night but feels like his head isn't 'buzzing' like before. Afebrile, VSS on RA. No pain. No c/o nausea. Etoposide and Vincristine chemotherapy infusing. Friend brought in dinner. Has a diminished appetite per patient. Continue with POC.    Problem: Adult Inpatient Plan of Care  Goal: Plan of Care Review  7/12/2019 1813 by Polo Bustillo, RN  Outcome: No Change     Problem: Anemia (Chemotherapy Effects)  Goal: Anemia Symptom Improvement  7/12/2019 1813 by Polo Bustillo, RN  Outcome: No Change

## 2019-07-12 NOTE — PLAN OF CARE
Problem: Adult Inpatient Plan of Care  Goal: Plan of Care Review  7/12/2019 1434 by Aparna Her RN  Outcome: No Change  Goal: Patient-Specific Goal (Individualization)  7/12/2019 1434 by Aparna Her RN  Outcome: No Change  Goal: Absence of Hospital-Acquired Illness or Injury  Outcome: No Change  Goal: Optimal Comfort and Wellbeing  7/12/2019 1434 by Aparna Her RN  Outcome: No Change  Goal: Readiness for Transition of Care  Outcome: No Change  Goal: Rounds/Family Conference  Outcome: No Change  LP done and receiving a liter bolus over two hours. Temp max 99.0. No pain. No nausea. Etoposide and Vincristine chemotherapy infusing. Ate well. Has a diminished appetite per patient. Walked in the halls. Wife at bedside and is supportive.

## 2019-07-12 NOTE — PROGRESS NOTES
Patient admitted to: 5C   Admitted from: home  Arrived by: walked onto unit  Reason for admission: scheduled chemo  Patient accompanied by: wife  Belongings: with pt  Teaching: per unit protocol

## 2019-07-12 NOTE — PROCEDURES
Lumbar Puncture Procedure Note   Patient: Christiano Molina  : 1975  Date of Procedure: 19                DIAGNOSIS: Burkitt lymphoma  PROCEDURE: Lumbar puncture with intrathecal administration   SITE: Inpatient Room   INDICATION:  Intrathecal administration   Dr. Cobb discussed the procedure, benefits, risks and alternatives to the patient, who voiced understanding of the information and agreed to proceed with the lumbar puncture. Risks include bleeding, infection, and post-procedure headache. Verbal and written consent were obtained.   Description: See note from Dr. Jordyn MD regarding procedure details. Specimen was sent for cell count and differential, protein and glucose, gram stain, culture, and flow cytometry.  Sugey Shaver PA-C then administered hydrocortisone sodium succinate PF 50 mg, methotrexate PF 12 mg in sodium chloride PF 0.9% 6 mL without apparent complication. Chemotherapy previously double checked by two RNs and also verified by this provider. Needle stylet was replaced and then needle removed and site cleaned and dressed with a bandage.    Complications: None. Patient tolerated procedure very well. Atraumatic tap with minimal discomfort during procedure. No significant bleeding or other immediate complication observed.     Disposition: Patient was instructed that patient should rest flat on back x 1 hour. Also receiving 1L IVF and drinking a can of soda as in previous procedures. He should notify RN if HA or pain develop.    Procedure performed by: WINSTON Montanez PA-C  Hematology/Oncology  Pager: [phone]

## 2019-07-12 NOTE — PROGRESS NOTES
[facility], [place]    Hematology / Oncology Progress Note    Date of Service (when I saw the patient): 07/12/2019     Assessment & Plan   Christiano Molina is a 44 year old male with Burkitt lymphoma, now s/p 5 cycles of DA-EPOCH-R with CR after Cycle 4. He is being admitted for Cycle 6.      #Burkitt Lymphoma, EBV+, Stage IA   Follows with Dr. Ramirez. Initially presented in mid-March with a right axillary mass, found to have high-grade B-cell lymphoma. Initial pathology was not fully clear whether DLBCL or Burkitt, but subsequently this was finalized by [facility] Heme Path review as Burkitt lymphoma (EBV+). Plan is to do 6 cycles of DA-R-EPOCH with IT chemo prophylaxis during C3-6. PET/CT (done 6/18) after 4 cycles demonstrated complete response.   - PICC placed, remove on discharge     Treatment Plan: Cycle 6 DA-EPOCH-R (Day 0/1=7/11/19). Today is Day 2.  - Rituxan 375mg/m2 (700mg) on D0   - Prednisone (decreased to 30mg/m2=60mg) BID on D1-5   - Etoposide 200mg CIVI on D1-4   - Vincristine 0.4mg/m2 (0.78) / Doxorubicin 40mg CIVI on D1-4   - Cytoxan 3000mg IV on D5    - Dexamethasone 8mg daily on D6,7   - IT triple therapy chemotherapy on D1 and D5. Procedure team kindly performed LP on 7/12 and IT chemo was given, labs sent including flow.  - Supportive care/premeds: Tylenol/Benadryl prior to Rituxan, Emend on D1 and D5, Compazine scheduled 10mg q8h, Zofran 16mg daily D1-5, Senna-S.   - Trial Zyprexa 5mg at bedtime for assist with sleep inpatient/steroid-induced insomnia.     #Recent appendicitis s/p appendectomy  Admitted with appendicitis following C5, 6/28-7/1. Underwent laparoscopic appendectomy on 6/29, and was discharge on levofloxacin and metronidazole to complete a 7 day course. Surgical sites are healing well, no irritation or drainage notes. Glue still intact on 2 of 3 sites.  - Continue to monitor     #Mild anemia  2/2 chemotherapy. Do not anticipate transfusion needs during  inpatient admission.      #ID PPx  - continue ppx ACV  - can hold Fluconazole during admission (ANC >1.0) and RESTART on discharge  - hold Levaquin ppx given ANC >1.0  PPx Levaquin will START on discharge per Dr. Ramirez.      #h/o Chemotherapy-induced nausea  Improved with scheduled antiemetics during chemo.   - scheduled antiemetics again with this cycle  - Zyprexa for steroid-induced nausea as above     #h/o Constipation, chemotherapy induced  Bowels returning to normal after completing antibiotics last Thursday. Denies current diarrhea or constipation.   - Senna-S BID  - Miralax PRN     #GERD  - continue home Zantac     #Peripheral neuropathy.   Mild numbness/tingling affecting b/l finger tips and toes. Not interfering with function.  - monitor     FEN  - no current IVFs in addition to fluids with CIVI chemo  - lyte repletion per unit protocol  - regular diet     PPx  - VTE: hold VTE ppx given plan for LP on 7/12  - GI: Zantac as above     Dispo: Anticipate discharge on D5 after Cytoxan on Monday, 7/15.  - Requested Neulasta, labs/transfusions twice weekly at [facility]    Above plan discussed with staff physician, Dr. Claudio Shaver, PA-C  Hematology/Oncology  Pager: [phone]    Interval History   Feeling well this morning. Slept well overnight. More relaxed in a private room. Denies SOB, chest pain, abdominal pain, changes in bowels and LE edema.     Physical Exam   Temp: 99  F (37.2  C) Temp src: Oral BP: 113/73 Pulse: 73 Heart Rate: 78 Resp: 16 SpO2: 98 % O2 Device: None (Room air)    Vitals:    07/11/19 1156 07/12/19 0725   Weight: 77.3 kg (170 lb 6.4 oz) 78.8 kg (173 lb 12.8 oz)     Vital Signs with Ranges  Temp:  [97.7  F (36.5  C)-99.5  F (37.5  C)] 99  F (37.2  C)  Pulse:  [64-89] 73  Heart Rate:  [78-98] 78  Resp:  [16] 16  BP: (111-138)/(60-85) 113/73  SpO2:  [96 %-100 %] 98 %  I/O last 3 completed shifts:  In: 1666.2 [P.O.:350; IV Piggyback:1316.2]  Out: 2400  [Urine:2400]    Constitutional: Pleasant male seen laying in bed. No apparent distress, and appears stated age.  Eyes: Lids and lashes normal, sclera clear, conjunctiva normal.  ENT: Normocephalic, oral pharynx with moist mucus membranes, tonsils without erythema or exudates, gums normal and good dentition.   Respiratory: No increased work of breathing, good air exchange, clear to auscultation bilaterally, no crackles or wheezing.  Cardiovascular: Regular rate and rhythm, normal S1 and S2, and no murmur noted.  GI: No masses or scars. +BS. Soft. No tenderness on palpation.  Skin: No bruising or bleeding, no rashes, no lesions, no jaundice.  Extremities: There is no redness, warmth, or swelling of the joints. No lower extremity edema. No cyanosis.  Neurologic: Awake, alert, oriented to name, place and time.    Vascular access: PICC    Medications     - MEDICATION INSTRUCTIONS -       - MEDICATION INSTRUCTIONS -       sodium chloride         acyclovir  400 mg Oral BID     Chemotherapy Infusing-Continuous Infusion   Does not apply Q8H     [START ON 7/15/2019] cyclophosphamide (CYTOXAN) chemo infusion  3,000 mg Intravenous Once     [START ON 7/17/2019] dexamethasone  8 mg Oral Daily     etoposide (TOPOSAR) chemo infusion  103.68 mg/m2 (Treatment Plan Recorded) Intravenous Q22H     [START ON 7/15/2019] fosaprepitant (EMEND) 150 mg intermittent infusion  150 mg Intravenous Once     heparin lock flush  5-10 mL Intracatheter Q24H     INTRATHECAL chemo - Cytarabine and/or methotrexate and/or Hydrocortisone   Intrathecal Once     [START ON 7/15/2019] INTRATHECAL chemo - Cytarabine and/or methotrexate and/or Hydrocortisone   Intrathecal Once     [START ON 7/15/2019] LORazepam  1 mg Intravenous Once     LORazepam  1 mg Intravenous Once     melatonin  3 mg Oral At Bedtime     OLANZapine  5 mg Oral At Bedtime     ondansetron  16 mg Oral Q22H     predniSONE  30 mg/m2 (Treatment Plan Recorded) Oral BID     prochlorperazine  10 mg  Oral Q8H     ranitidine  150 mg Oral BID     senna-docusate  2 tablet Oral BID     vinCRIStine/DOXOrubicin (ONCOVIN/ADRIAMYCIN) chemo infusion  0.4 mg/m2 (Treatment Plan Recorded) Intravenous Q22H       Data   ROUTINE IP LABS (Last four results)  BMP  Recent Labs   Lab 07/12/19  0356 07/11/19  0851 07/05/19  1225    140 139   POTASSIUM 4.0 4.3 4.4   CHLORIDE 111* 107 105   TAMMY 8.8 8.7 8.9   CO2 25 29 31   BUN 12 14 12   CR 0.84 1.12 1.21   * 67* 129*     CBC  Recent Labs   Lab 07/12/19  0356 07/11/19  0851 07/05/19  1225   WBC 12.3* 4.9 12.7*   RBC 3.27* 3.67* 3.38*   HGB 10.1* 11.6* 10.7*   HCT 31.8* 35.7* 32.6*   MCV 97 97 96   MCH 30.9 31.6 31.7   MCHC 31.8 32.5 32.8   RDW 16.8* 16.8* 15.9*    291 155     INR  Recent Labs   Lab 07/12/19  0356   INR 1.06

## 2019-07-12 NOTE — CONSULTS
Procedure Note          Lumbar Puncture:      Time: 1:30 PM  Performed by: Dane Cobb  Authorized by: Dane Cobb    Indications: chemiotherapy injection    Consent given by: Patient who states understanding of the procedure being performed after discussing the risks, benefits and alternatives.    Prior to the start of the procedure and with procedural staff participation, I verbally confirmed the patient s identity using two indicators, relevant allergies, that the procedure was appropriate and matched the consent or emergent situation, and that the correct equipment/implants were available. Immediately prior to starting the procedure I conducted the Time Out with the procedural staff and re-confirmed the patient s name, procedure, and site/side. (The Joint Commission universal protocol was followed.) Yes    Under sterile conditions the patient was positioned Sitting, bent forward. Betadine solution and sterile drapes were utilized.  Local anesthetic at the site: 2 ml of lidocaine 1% without epinephrine from the LP tray  The spinal needle from the tray was inserted at the L 3-4 interspace.  Opening Pressurewas not checked.  A total of 6mL of clear and colorless spinal fluid was obtained and sent to the laboratory.   The PRIMARY TEAM PA INJECTED THE CHEMIOTHRAPY.   After the needle was removed, a bandaid and pressure were applied and the patient was instructed to stay horizontal until the results were back.    Complications:  None    Patient tolerance: Patient tolerated the procedure well with no immediate complications.    Dane Cobb MD

## 2019-07-12 NOTE — PLAN OF CARE
"Blood pressure 111/62, pulse 73, temperature 97.7  F (36.5  C), temperature source Oral, resp. rate 16, height 1.78 m (5' 10.08\"), weight 77.3 kg (170 lb 6.4 oz), SpO2 97 %.     Pt had uneventful shift. He offered no complaints.  Chemo gtts Etoposide at 25.5 ML/hr and Vincristine gtt at 48.7 ML/hour infusing and pt is tolerating it well. Positive blood returns from both port. Nausea is under control with scheduled antiemetics. Pt is up in triplett this morning. Plan id to have LP today. Continue to monitor pt and continue with plan of care.  Problem: Adult Inpatient Plan of Care  Goal: Plan of Care Review  7/12/2019 0704 by Lizabeth West RN  Outcome: No Change  7/11/2019 1913 by Adrianne Harris, RN  Outcome: No Change     Problem: Anemia (Chemotherapy Effects)  Goal: Anemia Symptom Improvement  7/11/2019 1913 by Adrianne Harris, RN  Outcome: No Change     Problem: Neurotoxicity (Chemotherapy Effects)  Goal: Neurotoxicity Symptom Control  Outcome: No Change     "

## 2019-07-12 NOTE — PLAN OF CARE
Pt afebrile, OVSS on RA. Pt denies pain, nausea, SOB. PICC placed this shift. Rituxan infused and tolerated well. Etoposide and vincristine continuously infusing, blood return present in both lumens before infusion. Pt is up ind. LP and IT chemo tomorrow.     Problem: Adult Inpatient Plan of Care  Goal: Plan of Care Review  Outcome: No Change     Problem: Adult Inpatient Plan of Care  Goal: Optimal Comfort and Wellbeing  Outcome: No Change

## 2019-07-13 LAB
ANION GAP SERPL CALCULATED.3IONS-SCNC: 5 MMOL/L (ref 3–14)
BASOPHILS # BLD AUTO: 0 10E9/L (ref 0–0.2)
BASOPHILS NFR BLD AUTO: 0.1 %
BUN SERPL-MCNC: 11 MG/DL (ref 7–30)
CALCIUM SERPL-MCNC: 8.6 MG/DL (ref 8.5–10.1)
CHLORIDE SERPL-SCNC: 112 MMOL/L (ref 94–109)
CO2 SERPL-SCNC: 26 MMOL/L (ref 20–32)
CREAT SERPL-MCNC: 0.82 MG/DL (ref 0.66–1.25)
DIFFERENTIAL METHOD BLD: ABNORMAL
EOSINOPHIL # BLD AUTO: 0 10E9/L (ref 0–0.7)
EOSINOPHIL NFR BLD AUTO: 0 %
ERYTHROCYTE [DISTWIDTH] IN BLOOD BY AUTOMATED COUNT: 16.9 % (ref 10–15)
GFR SERPL CREATININE-BSD FRML MDRD: >90 ML/MIN/{1.73_M2}
GLUCOSE SERPL-MCNC: 108 MG/DL (ref 70–99)
HCT VFR BLD AUTO: 29.9 % (ref 40–53)
HGB BLD-MCNC: 9.6 G/DL (ref 13.3–17.7)
IMM GRANULOCYTES # BLD: 0.1 10E9/L (ref 0–0.4)
IMM GRANULOCYTES NFR BLD: 1.2 %
LYMPHOCYTES # BLD AUTO: 0.4 10E9/L (ref 0.8–5.3)
LYMPHOCYTES NFR BLD AUTO: 3.7 %
MCH RBC QN AUTO: 31.1 PG (ref 26.5–33)
MCHC RBC AUTO-ENTMCNC: 32.1 G/DL (ref 31.5–36.5)
MCV RBC AUTO: 97 FL (ref 78–100)
MONOCYTES # BLD AUTO: 0.5 10E9/L (ref 0–1.3)
MONOCYTES NFR BLD AUTO: 5.5 %
NEUTROPHILS # BLD AUTO: 8.8 10E9/L (ref 1.6–8.3)
NEUTROPHILS NFR BLD AUTO: 89.5 %
NRBC # BLD AUTO: 0 10*3/UL
NRBC BLD AUTO-RTO: 0 /100
PLATELET # BLD AUTO: 268 10E9/L (ref 150–450)
POTASSIUM SERPL-SCNC: 3.7 MMOL/L (ref 3.4–5.3)
RBC # BLD AUTO: 3.09 10E12/L (ref 4.4–5.9)
SODIUM SERPL-SCNC: 142 MMOL/L (ref 133–144)
WBC # BLD AUTO: 9.9 10E9/L (ref 4–11)

## 2019-07-13 PROCEDURE — 99232 SBSQ HOSP IP/OBS MODERATE 35: CPT | Performed by: INTERNAL MEDICINE

## 2019-07-13 PROCEDURE — 25000132 ZZH RX MED GY IP 250 OP 250 PS 637: Performed by: PHYSICIAN ASSISTANT

## 2019-07-13 PROCEDURE — 25000128 H RX IP 250 OP 636: Performed by: PHYSICIAN ASSISTANT

## 2019-07-13 PROCEDURE — 25000131 ZZH RX MED GY IP 250 OP 636 PS 637: Performed by: PHYSICIAN ASSISTANT

## 2019-07-13 PROCEDURE — 85025 COMPLETE CBC W/AUTO DIFF WBC: CPT | Performed by: PHYSICIAN ASSISTANT

## 2019-07-13 PROCEDURE — 12000012 ZZH R&B MS OVERFLOW UMMC

## 2019-07-13 PROCEDURE — 25800030 ZZH RX IP 258 OP 636: Performed by: PHYSICIAN ASSISTANT

## 2019-07-13 PROCEDURE — 80048 BASIC METABOLIC PNL TOTAL CA: CPT | Performed by: PHYSICIAN ASSISTANT

## 2019-07-13 RX ADMIN — PROCHLORPERAZINE MALEATE 10 MG: 5 TABLET ORAL at 15:45

## 2019-07-13 RX ADMIN — Medication 5 ML: at 13:11

## 2019-07-13 RX ADMIN — PREDNISONE 60 MG: 10 TABLET ORAL at 15:45

## 2019-07-13 RX ADMIN — ONDANSETRON HYDROCHLORIDE 16 MG: 8 TABLET, FILM COATED ORAL at 13:11

## 2019-07-13 RX ADMIN — RANITIDINE 150 MG: 150 TABLET, FILM COATED ORAL at 07:53

## 2019-07-13 RX ADMIN — ACYCLOVIR 400 MG: 200 CAPSULE ORAL at 20:00

## 2019-07-13 RX ADMIN — PROCHLORPERAZINE MALEATE 10 MG: 5 TABLET ORAL at 23:41

## 2019-07-13 RX ADMIN — SODIUM CHLORIDE, PRESERVATIVE FREE 5 ML: 5 INJECTION INTRAVENOUS at 04:23

## 2019-07-13 RX ADMIN — ACYCLOVIR 400 MG: 200 CAPSULE ORAL at 07:53

## 2019-07-13 RX ADMIN — OLANZAPINE 5 MG: 5 TABLET, FILM COATED ORAL at 21:39

## 2019-07-13 RX ADMIN — VINCRISTINE SULFATE 0.78 MG: 1 INJECTION, SOLUTION INTRAVENOUS at 12:57

## 2019-07-13 RX ADMIN — PROCHLORPERAZINE MALEATE 10 MG: 5 TABLET ORAL at 07:52

## 2019-07-13 RX ADMIN — RANITIDINE 150 MG: 150 TABLET, FILM COATED ORAL at 20:00

## 2019-07-13 RX ADMIN — SENNOSIDES AND DOCUSATE SODIUM 2 TABLET: 8.6; 5 TABLET ORAL at 07:53

## 2019-07-13 RX ADMIN — PREDNISONE 60 MG: 10 TABLET ORAL at 07:52

## 2019-07-13 RX ADMIN — ETOPOSIDE 200 MG: 20 INJECTION, SOLUTION, CONCENTRATE INTRAVENOUS at 12:56

## 2019-07-13 ASSESSMENT — ACTIVITIES OF DAILY LIVING (ADL)
ADLS_ACUITY_SCORE: 10

## 2019-07-13 ASSESSMENT — MIFFLIN-ST. JEOR: SCORE: 1701.28

## 2019-07-13 NOTE — PLAN OF CARE
Problem: Adult Inpatient Plan of Care  Goal: Plan of Care Review  7/13/2019 1510 by Aparna Her RN  Outcome: No Change  Goal: Patient-Specific Goal (Individualization)  7/13/2019 1510 by Aparna Her RN  Outcome: No Change  Goal: Absence of Hospital-Acquired Illness or Injury  7/13/2019 1510 by Aparna Her RN  Outcome: No Change  Goal: Optimal Comfort and Wellbeing  7/13/2019 1510 by Aparna Her RN  Outcome: No Change  Goal: Readiness for Transition of Care  7/13/2019 1510 by Aparna Her RN  Outcome: No Change  Goal: Rounds/Family Conference  7/13/2019 1510 by Aparna Her RN  Outcome: No Change  Chemotherapy  D: started next bag of Etoposide and Vincristine today.  I: on scheduled zofran and compazine. Positive blood return.   A: some slight nausea.   P: continue to monitor the patient.

## 2019-07-13 NOTE — PLAN OF CARE
Problem: Adult Inpatient Plan of Care  Goal: Plan of Care Review  7/13/2019 1508 by Aparna Her RN  Outcome: No Change  Goal: Patient-Specific Goal (Individualization)  Outcome: No Change  Goal: Absence of Hospital-Acquired Illness or Injury  Outcome: No Change  Goal: Optimal Comfort and Wellbeing  Outcome: No Change  Goal: Readiness for Transition of Care  Outcome: No Change  Goal: Rounds/Family Conference  Outcome: No Change   Vss. No pain. Slight nausea and on scheduled zofran and compazine. Continuous chemotherapy of Etoposide and Vincristine is infusing. Positive blood return. Encouraged saline mouth rinses. Walked in the halls. Independent. Showered. Family at bedside and are supportive.

## 2019-07-13 NOTE — PROGRESS NOTES
Assessment & Plan     Christiano Molina is a 44 year old male with Burkitt lymphoma, now s/p 5 cycles of DA-EPOCH-R with CR after Cycle 4. He is being admitted for Cycle 6.      #Burkitt Lymphoma, EBV+, Stage IA   Follows with Dr. Ramirez. Initially presented in mid-March with a right axillary mass, found to have high-grade B-cell lymphoma. Initial pathology was not fully clear whether DLBCL or Burkitt, but subsequently this was finalized by [facility] Heme Path review as Burkitt lymphoma (EBV+). Plan is to do 6 cycles of DA-R-EPOCH with IT chemo prophylaxis during C3-6. PET/CT (done 6/18) after 4 cycles demonstrated complete response.   - PICC placed, remove on discharge     Treatment Plan: Cycle 6 DA-EPOCH-R (Day 0/1=7/11/19). Today is Day 2.  - Rituxan 375mg/m2 (700mg) on D0   - Prednisone (decreased to 30mg/m2=60mg) BID on D1-5   - Etoposide 200mg CIVI on D1-4   - Vincristine 0.4mg/m2 (0.78) / Doxorubicin 40mg CIVI on D1-4   - Cytoxan 3000mg IV on D5    - Dexamethasone 8mg daily on D6,7   - IT triple therapy chemotherapy on D1 and D5. Procedure team performed LP on 7/12 and IT chemo was given, labs sent including flow.  - Supportive care/premeds: Tylenol/Benadryl prior to Rituxan, Emend on D1 and D5, Compazine scheduled 10mg q8h, Zofran 16mg daily D1-5, Senna-S.   - Trial Zyprexa 5mg at bedtime for assist with sleep inpatient/steroid-induced insomnia.     #Recent appendicitis s/p appendectomy  Admitted with appendicitis following C5, 6/28-7/1. Underwent laparoscopic appendectomy on 6/29, and was discharge on levofloxacin and metronidazole to complete a 7 day course. Surgical sites are healing well, no irritation or drainage notes. Glue still intact on 2 of 3 sites.  - Continue to monitor     #Mild anemia  2/2 chemotherapy. Do not anticipate transfusion needs during inpatient admission.      #ID PPx  - continue ppx ACV  - can hold Fluconazole during admission (ANC >1.0) and RESTART on discharge  - hold Levaquin ppx  given ANC >1.0  PPx Levaquin will START on discharge per Dr. Ramirez.      #h/o Chemotherapy-induced nausea  Improved with scheduled antiemetics during chemo.   - scheduled antiemetics again with this cycle  - Zyprexa for steroid-induced nausea as above     #h/o Constipation, chemotherapy induced  Bowels returning to normal after completing antibiotics last Thursday. Denies current diarrhea or constipation.   - Senna-S BID  - Miralax PRN     #GERD  - continue home Zantac     #Peripheral neuropathy.   Mild numbness/tingling affecting b/l finger tips and toes. Not interfering with function.  - monitor     FEN  - no current IVFs in addition to fluids with CIVI chemo  - lyte repletion per unit protocol  - regular diet     PPx  - VTE: hold VTE ppx given plan for LP on 7/12  - GI: Zantac as above     Dispo: Anticipate discharge on D5 after Cytoxan on Monday, 7/15.  - Requested Neulasta, labs/transfusions twice weekly at [facility]     Horacio Burleson MD            Interval History     Feeling well this morning. Slept well overnight. More relaxed in a private room. Denies SOB, chest pain, abdominal pain, changes in bowels and LE edema.         Physical Exam     Temp: 98  F (36.7  C) Temp src: Oral BP: 114/61 Pulse: 81 Heart Rate: 74 Resp: 16 SpO2: 98 % O2 Device: None (Room air)         Constitutional: Pleasant male seen laying in bed. No apparent distress, and appears stated age.  Eyes: Lids and lashes normal, sclera clear, conjunctiva normal.  ENT: Normocephalic, oral pharynx with moist mucus membranes, tonsils without erythema or exudates, gums normal and good dentition.   Respiratory: No increased work of breathing, good air exchange, clear to auscultation bilaterally, no crackles or wheezing.  Cardiovascular: Regular rate and rhythm, normal S1 and S2, and no murmur noted.  GI: No masses or scars. +BS. Soft. No tenderness on palpation.  Skin: No bruising or bleeding, no rashes, no lesions, no  jaundice.  Extremities: There is no redness, warmth, or swelling of the joints. No lower extremity edema. No cyanosis.  Neurologic: Awake, alert, oriented to name, place and time.    Vascular access: PICC         Current Facility-Administered Medications:      acetaminophen (TYLENOL) tablet 650 mg, 650 mg, Oral, Q4H PRN, Yoko Velásquez PA-C     acyclovir (ZOVIRAX) capsule 400 mg, 400 mg, Oral, BID, Yoko Velásquez PA-C, 400 mg at 07/13/19 0753     albuterol (PROAIR HFA/PROVENTIL HFA/VENTOLIN HFA) 108 (90 Base) MCG/ACT inhaler 1-2 puff, 1-2 puff, Inhalation, Once PRN, Holly Wheat PA-C     albuterol (PROVENTIL) neb solution 2.5 mg, 2.5 mg, Nebulization, Once PRN, Holly Wheat PA-C     calcium carbonate (TUMS) chewable tablet 500 mg, 500 mg, Oral, BID PRN, Yoko Velásquez PA-C     Chemotherapy Infusing-Continuous Infusion, , Does not apply, Q8H, Holly Wheat PA-C, Last Rate: 74.2 mL/hr at 07/13/19 0022     [START ON 7/15/2019] cyclophosphamide (CYTOXAN) 3,000 mg in sodium chloride 0.9 % 500 mL CHEMOTHERAPY, 3,000 mg, Intravenous, Once, Holly Wheat PA-C     [START ON 7/17/2019] dexamethasone (DECADRON) tablet 8 mg, 8 mg, Oral, Daily, Holly Wheat PA-C     diphenhydrAMINE (BENADRYL) injection 50 mg, 50 mg, Intravenous, Once PRN, Holly Wheat PA-C     EPINEPHrine PF (ADRENALIN) injection 0.3 mg, 0.3 mg, Intramuscular, Q5 Min PRN, Holly Wheat PA-C     etoposide (TOPOSAR) 200 mg in sodium chloride 0.9 % 560 mL CHEMOTHERAPY, 103.68 mg/m2 (Treatment Plan Recorded), Intravenous, Q22H, Holly Wheat PA-C, Last Rate: 25.5 mL/hr at 07/12/19 1518, 200 mg at 07/12/19 1518     [START ON 7/15/2019] fosaprepitant (EMEND) 150 mg in sodium chloride 0.9 % intermittent infusion, 150 mg, Intravenous, Once, Holly Wheat PA-C     heparin lock flush 10 UNIT/ML injection 2-5 mL, 2-5 mL, Intracatheter, Once PRN, Yoko Velásquez  WINSTON FORD     heparin lock flush 10 UNIT/ML injection 5-10 mL, 5-10 mL, Intracatheter, Q24H, Yoko Velásquez PA-C, 5 mL at 07/12/19 1542     heparin lock flush 10 UNIT/ML injection 5-10 mL, 5-10 mL, Intracatheter, Q1H PRN, Yoko Velásquez PA-C, 5 mL at 07/13/19 0423     [START ON 7/15/2019] hydrocortisone sodium succinate PF (solu-CORTEF) 50 mg, methotrexate (PF) 12 mg in sodium chloride (PF) 0.9% PF 6 mL Preservative Free CHEMOTHERAPY, , Intrathecal, Once, Holly Wheat PA-C     lidocaine (LMX4) cream, , Topical, Q1H PRN, Yoko Velásquez PA-C     lidocaine 1 % 0.1-1 mL, 0.1-1 mL, Other, Q1H PRN, Yoko Velásquez PA-C     LORazepam (ATIVAN) injection 0.5-1 mg, 0.5-1 mg, Intravenous, Q6H PRN, Holly Wheat PA-C     LORazepam (ATIVAN) injection 1 mg, 1 mg, Intravenous, Once, Holly Wheat PA-C     LORazepam (ATIVAN) tablet 0.5-1 mg, 0.5-1 mg, Oral, Q6H PRN, Holly Wheat PA-C     Medication Instruction, , Does not apply, Continuous PRN, Yoko Velásquez PA-C     MEDICATION INSTRUCTION, , Does not apply, Continuous PRN, Holly Wheat PA-C     melatonin tablet 3 mg, 3 mg, Oral, At Bedtime, Holly Wheat PA-C, 3 mg at 07/11/19 2220     meperidine (DEMEROL) injection 25 mg, 25 mg, Intravenous, Q30 Min PRN, Holly Wheat PA-C     methylPREDNISolone sodium succinate (solu-MEDROL) injection 125 mg, 125 mg, Intravenous, Once PRN, Holly Wheat PA-C     naloxone (NARCAN) injection 0.1-0.4 mg, 0.1-0.4 mg, Intravenous, Q2 Min PRN, Gavin Waters MD     OLANZapine (zyPREXA) tablet 5 mg, 5 mg, Oral, At Bedtime, Yoko Velásquez PA-C, 5 mg at 07/12/19 2146     ondansetron (ZOFRAN) tablet 16 mg, 16 mg, Oral, Q22H, Holly Wheat PA-C, 16 mg at 07/12/19 0737     predniSONE (DELTASONE) tablet 60 mg, 30 mg/m2 (Treatment Plan Recorded), Oral, BID, Holly Wheat PA-C, 60 mg at 07/13/19 9415      prochlorperazine (COMPAZINE) injection 10 mg, 10 mg, Intravenous, Q6H PRN, Holly Wheat PA-C     prochlorperazine (COMPAZINE) tablet 10 mg, 10 mg, Oral, Q8H, Holly Wheat PA-C, 10 mg at 07/13/19 0752     prochlorperazine (COMPAZINE) tablet 10 mg, 10 mg, Oral, Q6H PRN, Holly Wheat PA-C     ranitidine (ZANTAC) tablet 150 mg, 150 mg, Oral, BID, Yoko Velásquez PA-C, 150 mg at 07/13/19 0753     senna-docusate (SENOKOT-S/PERICOLACE) 8.6-50 MG per tablet 2 tablet, 2 tablet, Oral, BID PRN, Yoko Velásquez PA-C     senna-docusate (SENOKOT-S/PERICOLACE) 8.6-50 MG per tablet 2 tablet, 2 tablet, Oral, BID, Holly Wheat PA-C, 2 tablet at 07/13/19 0753     sodium chloride (PF) 0.9% PF flush 10-20 mL, 10-20 mL, Intracatheter, q1 min prn, Yoko Velásquez PA-C     sodium chloride (PF) 0.9% PF flush 5-50 mL, 5-50 mL, Intracatheter, Once PRN, Yoko Velásquez PA-C     sodium chloride 0.9% infusion, 1,000 mL, Intravenous, Continuous PRN, Holly Wheat PA-C     vinCRIStine (ONCOVIN) 0.78 mg, DOXOrubicin (ADRIAMYCIN) 40 mg in sodium chloride 0.9 % 1,071 mL CHEMOTHERAPY, 0.4 mg/m2 (Treatment Plan Recorded), Intravenous, Q22H, Holly Wheat PA-C, Last Rate: 48.7 mL/hr at 07/12/19 1520, 0.78 mg at 07/12/19 1520

## 2019-07-13 NOTE — PLAN OF CARE
AVSS. Denies pain or nausea. Up ad jessica and ambulating halls. Voiding. No bowel issues. On continuous chemo. Etopside gtt @ 25.5 and Vincristine gtt @ 48.7 running through gray port. Positive blood return noted on both lumens. Continue to monitor.  No replacements needed this AM. Continue to monitor.     Problem: Adult Inpatient Plan of Care  Goal: Plan of Care Review  7/13/2019 0506 by Renetta Rodriguez RN  Outcome: No Change     Problem: Anemia (Chemotherapy Effects)  Goal: Anemia Symptom Improvement  7/13/2019 0506 by Renetta Rodriguez, RN  Outcome: No Change     Problem: Nausea and Vomiting (Chemotherapy Effects)  Goal: Fluid and Electrolyte Balance  Outcome: No Change     Problem: Neurotoxicity (Chemotherapy Effects)  Goal: Neurotoxicity Symptom Control  Outcome: No Change     Problem: Neutropenia (Chemotherapy Effects)  Goal: Absence of Infection  Outcome: No Change     Problem: Oral Mucositis (Chemotherapy Effects)  Goal: Improved Oral Mucous Membrane Integrity  Outcome: No Change     Problem: Thrombocytopenia Bleeding Risk (Chemotherapy Effects)  Goal: Absence of Bleeding  Outcome: No Change

## 2019-07-14 LAB
ANION GAP SERPL CALCULATED.3IONS-SCNC: 4 MMOL/L (ref 3–14)
BASOPHILS # BLD AUTO: 0 10E9/L (ref 0–0.2)
BASOPHILS NFR BLD AUTO: 0.1 %
BUN SERPL-MCNC: 12 MG/DL (ref 7–30)
CALCIUM SERPL-MCNC: 8.6 MG/DL (ref 8.5–10.1)
CHLORIDE SERPL-SCNC: 112 MMOL/L (ref 94–109)
CO2 SERPL-SCNC: 28 MMOL/L (ref 20–32)
CREAT SERPL-MCNC: 0.81 MG/DL (ref 0.66–1.25)
DIFFERENTIAL METHOD BLD: ABNORMAL
EOSINOPHIL # BLD AUTO: 0 10E9/L (ref 0–0.7)
EOSINOPHIL NFR BLD AUTO: 0 %
ERYTHROCYTE [DISTWIDTH] IN BLOOD BY AUTOMATED COUNT: 16.6 % (ref 10–15)
GFR SERPL CREATININE-BSD FRML MDRD: >90 ML/MIN/{1.73_M2}
GLUCOSE SERPL-MCNC: 100 MG/DL (ref 70–99)
HCT VFR BLD AUTO: 29.5 % (ref 40–53)
HGB BLD-MCNC: 9.5 G/DL (ref 13.3–17.7)
IMM GRANULOCYTES # BLD: 0.1 10E9/L (ref 0–0.4)
IMM GRANULOCYTES NFR BLD: 0.7 %
LYMPHOCYTES # BLD AUTO: 0.4 10E9/L (ref 0.8–5.3)
LYMPHOCYTES NFR BLD AUTO: 4.3 %
MCH RBC QN AUTO: 31.1 PG (ref 26.5–33)
MCHC RBC AUTO-ENTMCNC: 32.2 G/DL (ref 31.5–36.5)
MCV RBC AUTO: 97 FL (ref 78–100)
MONOCYTES # BLD AUTO: 0.5 10E9/L (ref 0–1.3)
MONOCYTES NFR BLD AUTO: 6.3 %
NEUTROPHILS # BLD AUTO: 7.1 10E9/L (ref 1.6–8.3)
NEUTROPHILS NFR BLD AUTO: 88.6 %
NRBC # BLD AUTO: 0 10*3/UL
NRBC BLD AUTO-RTO: 0 /100
PLATELET # BLD AUTO: 244 10E9/L (ref 150–450)
POTASSIUM SERPL-SCNC: 3.6 MMOL/L (ref 3.4–5.3)
RBC # BLD AUTO: 3.05 10E12/L (ref 4.4–5.9)
SODIUM SERPL-SCNC: 143 MMOL/L (ref 133–144)
WBC # BLD AUTO: 8.1 10E9/L (ref 4–11)

## 2019-07-14 PROCEDURE — 25000128 H RX IP 250 OP 636: Performed by: PHYSICIAN ASSISTANT

## 2019-07-14 PROCEDURE — 12000012 ZZH R&B MS OVERFLOW UMMC

## 2019-07-14 PROCEDURE — 85025 COMPLETE CBC W/AUTO DIFF WBC: CPT | Performed by: PHYSICIAN ASSISTANT

## 2019-07-14 PROCEDURE — 25000132 ZZH RX MED GY IP 250 OP 250 PS 637: Performed by: PHYSICIAN ASSISTANT

## 2019-07-14 PROCEDURE — 80048 BASIC METABOLIC PNL TOTAL CA: CPT | Performed by: PHYSICIAN ASSISTANT

## 2019-07-14 PROCEDURE — 25000131 ZZH RX MED GY IP 250 OP 636 PS 637: Performed by: PHYSICIAN ASSISTANT

## 2019-07-14 PROCEDURE — 99232 SBSQ HOSP IP/OBS MODERATE 35: CPT | Performed by: INTERNAL MEDICINE

## 2019-07-14 PROCEDURE — 25800030 ZZH RX IP 258 OP 636: Performed by: PHYSICIAN ASSISTANT

## 2019-07-14 RX ORDER — DEXTROSE MONOHYDRATE 25 G/50ML
INJECTION, SOLUTION INTRAVENOUS
Status: DISPENSED
Start: 2019-07-14 | End: 2019-07-14

## 2019-07-14 RX ADMIN — MELATONIN TAB 3 MG 3 MG: 3 TAB at 21:40

## 2019-07-14 RX ADMIN — PREDNISONE 60 MG: 10 TABLET ORAL at 07:51

## 2019-07-14 RX ADMIN — CALCIUM CARBONATE (ANTACID) CHEW TAB 500 MG 500 MG: 500 CHEW TAB at 15:38

## 2019-07-14 RX ADMIN — ACYCLOVIR 400 MG: 200 CAPSULE ORAL at 07:51

## 2019-07-14 RX ADMIN — RANITIDINE 150 MG: 150 TABLET, FILM COATED ORAL at 20:17

## 2019-07-14 RX ADMIN — LORAZEPAM 0.5 MG: 0.5 TABLET ORAL at 18:28

## 2019-07-14 RX ADMIN — LORAZEPAM 0.5 MG: 0.5 TABLET ORAL at 15:23

## 2019-07-14 RX ADMIN — CALCIUM CARBONATE (ANTACID) CHEW TAB 500 MG 500 MG: 500 CHEW TAB at 20:16

## 2019-07-14 RX ADMIN — ACYCLOVIR 400 MG: 200 CAPSULE ORAL at 20:17

## 2019-07-14 RX ADMIN — ETOPOSIDE 200 MG: 20 INJECTION, SOLUTION, CONCENTRATE INTRAVENOUS at 10:18

## 2019-07-14 RX ADMIN — ONDANSETRON HYDROCHLORIDE 16 MG: 8 TABLET, FILM COATED ORAL at 10:33

## 2019-07-14 RX ADMIN — PROCHLORPERAZINE MALEATE 10 MG: 5 TABLET ORAL at 07:51

## 2019-07-14 RX ADMIN — PROCHLORPERAZINE MALEATE 10 MG: 5 TABLET ORAL at 15:38

## 2019-07-14 RX ADMIN — SODIUM CHLORIDE, PRESERVATIVE FREE 5 ML: 5 INJECTION INTRAVENOUS at 03:40

## 2019-07-14 RX ADMIN — RANITIDINE 150 MG: 150 TABLET, FILM COATED ORAL at 07:51

## 2019-07-14 RX ADMIN — SODIUM CHLORIDE 1000 ML: 9 INJECTION, SOLUTION INTRAVENOUS at 23:44

## 2019-07-14 RX ADMIN — PREDNISONE 60 MG: 10 TABLET ORAL at 15:38

## 2019-07-14 RX ADMIN — VINCRISTINE SULFATE 0.78 MG: 1 INJECTION, SOLUTION INTRAVENOUS at 10:20

## 2019-07-14 RX ADMIN — PROCHLORPERAZINE MALEATE 10 MG: 5 TABLET ORAL at 23:44

## 2019-07-14 RX ADMIN — OLANZAPINE 5 MG: 5 TABLET, FILM COATED ORAL at 21:40

## 2019-07-14 ASSESSMENT — ACTIVITIES OF DAILY LIVING (ADL)
ADLS_ACUITY_SCORE: 10

## 2019-07-14 ASSESSMENT — MIFFLIN-ST. JEOR: SCORE: 1694.93

## 2019-07-14 NOTE — PROGRESS NOTES
Christiano Molina is a 44 year old male with Burkitt lymphoma, now s/p 5 cycles of DA-EPOCH-R with CR after Cycle 4. He is being admitted for Cycle 6.      #Burkitt Lymphoma, EBV+, Stage IA   Follows with Dr. Ramirez. Initially presented in mid-March with a right axillary mass, found to have high-grade B-cell lymphoma. Initial pathology was not fully clear whether DLBCL or Burkitt, but subsequently this was finalized by [facility] review as Burkitt lymphoma (EBV+). Plan is to do 6 cycles of DA-R-EPOCH with IT chemo prophylaxis during C3-6. PET/CT (done 6/18) after 4 cycles demonstrated complete response.   - PICC placed, remove on discharge     Treatment Plan: Cycle 6 DA-EPOCH-R (Day 0/1=7/11/19). Today is Day 3.  - Rituxan 375mg/m2 (700mg) on D0   - Prednisone (decreased to 30mg/m2=60mg) BID on D1-5   - Etoposide 200mg CIVI on D1-4   - Vincristine 0.4mg/m2 (0.78) / Doxorubicin 40mg CIVI on D1-4   - Cytoxan 3000mg IV on D5    - Dexamethasone 8mg daily on D6,7   - IT triple therapy chemotherapy on D1 and D5. Procedure team performed LP on 7/12 and IT chemo was given, labs sent including flow.  - Supportive care/premeds: Tylenol/Benadryl prior to Rituxan, Emend on D1 and D5, Compazine scheduled 10mg q8h, Zofran 16mg daily D1-5, Senna-S.   - Trial Zyprexa 5mg at bedtime for assist with sleep inpatient/steroid-induced insomnia.     #Recent appendicitis s/p appendectomy  Admitted with appendicitis following C5, 6/28-7/1. Underwent laparoscopic appendectomy on 6/29, and was discharge on levofloxacin and metronidazole to complete a 7 day course. Surgical sites are healing well, no irritation or drainage notes. Glue still intact on 2 of 3 sites.  - Continue to monitor     #Mild anemia  2/2 chemotherapy. Do not anticipate transfusion needs during inpatient admission.      #ID PPx  - continue ppx ACV  - can hold Fluconazole during admission (ANC >1.0) and RESTART on discharge  - hold Levaquin ppx given ANC >1.0  PPx  "Levaquin will START on discharge per Dr. Ramirez.      #h/o Chemotherapy-induced nausea  Improved with scheduled antiemetics during chemo.   - scheduled antiemetics again with this cycle  - Zyprexa for steroid-induced nausea as above     #h/o Constipation, chemotherapy induced  Bowels returning to normal after completing antibiotics last Thursday. Denies current diarrhea or constipation.   - Senna-S BID  - Miralax PRN     #GERD  - continue home Zantac     #Peripheral neuropathy.   Mild numbness/tingling affecting b/l finger tips and toes. Not interfering with function.  - monitor     FEN  - no current IVFs in addition to fluids with CIVI chemo  - lyte repletion per unit protocol  - regular diet     PPx  - VTE: hold VTE ppx given plan for LP on 7/12  - GI: Zantac as above     Dispo: Anticipate discharge on D5 after Cytoxan on Monday, 7/15.  - Requested Neulasta, labs/transfusions twice weekly at [facility]     Horacio Burleson MD             Interval History     Feeling well this morning. Slept well overnight. More relaxed in a private room. Denies SOB, chest pain, abdominal pain, changes in bowels and LE edema. His nausea is less than usual.        Physical Exam     /66 (BP Location: Right arm)   Pulse 86   Temp 99.2  F (37.3  C) (Oral)   Resp 16   Ht 1.78 m (5' 10.08\")   Wt 79.2 kg (174 lb 11.2 oz)   SpO2 97%   BMI 25.01 kg/m             Constitutional: Pleasant male seen laying in bed. No apparent distress, and appears stated age.  Eyes: Lids and lashes normal, sclera clear, conjunctiva normal.  ENT: Normocephalic, oral pharynx with moist mucus membranes, tonsils without erythema or exudates, gums normal and good dentition.   Respiratory: No increased work of breathing, good air exchange, clear to auscultation bilaterally, no crackles or wheezing.  Cardiovascular: Regular rate and rhythm, normal S1 and S2, and no murmur noted.  GI: No masses or scars. +BS. Soft. No tenderness on " palpation.  Skin: No bruising or bleeding, no rashes, no lesions, no jaundice.  Extremities: There is no redness, warmth, or swelling of the joints. No lower extremity edema. No cyanosis.  Neurologic: Awake, alert, oriented to name, place and time.    Vascular access: PICC      Current Facility-Administered Medications:      acetaminophen (TYLENOL) tablet 650 mg, 650 mg, Oral, Q4H PRN, Yoko Velásquez PA-C     acyclovir (ZOVIRAX) capsule 400 mg, 400 mg, Oral, BID, Yoko Velásquez PA-C, 400 mg at 07/14/19 0751     albuterol (PROAIR HFA/PROVENTIL HFA/VENTOLIN HFA) 108 (90 Base) MCG/ACT inhaler 1-2 puff, 1-2 puff, Inhalation, Once PRN, Holly Wheat PA-C     albuterol (PROVENTIL) neb solution 2.5 mg, 2.5 mg, Nebulization, Once PRN, Holly Wheat PA-C     calcium carbonate (TUMS) chewable tablet 500 mg, 500 mg, Oral, BID PRN, Yoko Velásquez PA-C     Chemotherapy Infusing-Continuous Infusion, , Does not apply, Q8H, Holly Wheat PA-C, Last Rate: 74.2 mL/hr at 07/13/19 0022     [START ON 7/15/2019] cyclophosphamide (CYTOXAN) 3,000 mg in sodium chloride 0.9 % 500 mL CHEMOTHERAPY, 3,000 mg, Intravenous, Once, Holly Wheat PA-C     [START ON 7/17/2019] dexamethasone (DECADRON) tablet 8 mg, 8 mg, Oral, Daily, Holly Wheat PA-C     dextrose 50 % injection, , , ,      diphenhydrAMINE (BENADRYL) injection 50 mg, 50 mg, Intravenous, Once PRN, Holly Wheat PA-C     EPINEPHrine PF (ADRENALIN) injection 0.3 mg, 0.3 mg, Intramuscular, Q5 Min PRN, Holly Wheat PA-C     etoposide (TOPOSAR) 200 mg in sodium chloride 0.9 % 560 mL CHEMOTHERAPY, 103.68 mg/m2 (Treatment Plan Recorded), Intravenous, Q22H, Holly Wheat PA-C, Last Rate: 25.5 mL/hr at 07/13/19 1256, 200 mg at 07/13/19 1256     [START ON 7/15/2019] fosaprepitant (EMEND) 150 mg in sodium chloride 0.9 % intermittent infusion, 150 mg, Intravenous, Once, Holly Wheat PA-C      heparin lock flush 10 UNIT/ML injection 2-5 mL, 2-5 mL, Intracatheter, Once PRN, Yoko Velásquez PA-C     heparin lock flush 10 UNIT/ML injection 5-10 mL, 5-10 mL, Intracatheter, Q24H, Yoko Velásquez PA-C, 5 mL at 07/13/19 1311     heparin lock flush 10 UNIT/ML injection 5-10 mL, 5-10 mL, Intracatheter, Q1H PRN, Yoko Velásquez PA-C, 5 mL at 07/14/19 0340     [START ON 7/15/2019] hydrocortisone sodium succinate PF (solu-CORTEF) 50 mg, methotrexate (PF) 12 mg in sodium chloride (PF) 0.9% PF 6 mL Preservative Free CHEMOTHERAPY, , Intrathecal, Once, Holly Wheat PA-C     lidocaine (LMX4) cream, , Topical, Q1H PRN, Yoko Velásquez PA-C     lidocaine 1 % 0.1-1 mL, 0.1-1 mL, Other, Q1H PRN, Yoko Velásquez PA-C     LORazepam (ATIVAN) injection 0.5-1 mg, 0.5-1 mg, Intravenous, Q6H PRN, Holly Wheat PA-C     LORazepam (ATIVAN) injection 1 mg, 1 mg, Intravenous, Once, Holly Wheat PA-C     LORazepam (ATIVAN) tablet 0.5-1 mg, 0.5-1 mg, Oral, Q6H PRN, Holly Wheat PA-C     Medication Instruction, , Does not apply, Continuous PRN, Yoko Velásquez PA-C     MEDICATION INSTRUCTION, , Does not apply, Continuous PRN, Holly Wheat PA-C     melatonin tablet 3 mg, 3 mg, Oral, At Bedtime, Holly Wheat PA-C, 3 mg at 07/11/19 2220     meperidine (DEMEROL) injection 25 mg, 25 mg, Intravenous, Q30 Min PRN, Holly Wheat PA-C     methylPREDNISolone sodium succinate (solu-MEDROL) injection 125 mg, 125 mg, Intravenous, Once PRN, Holly Wheat PA-C     naloxone (NARCAN) injection 0.1-0.4 mg, 0.1-0.4 mg, Intravenous, Q2 Min PRN, Gavin Waters MD     OLANZapine (zyPREXA) tablet 5 mg, 5 mg, Oral, At Bedtime, Yoko Velásquez PA-C, 5 mg at 07/13/19 2139     ondansetron (ZOFRAN) tablet 16 mg, 16 mg, Oral, Q22H, Holly Wheat PA-C, 16 mg at 07/13/19 1311     predniSONE (DELTASONE) tablet 60 mg, 30 mg/m2  (Treatment Plan Recorded), Oral, BID, Holly Wheat PA-C, 60 mg at 07/14/19 0751     prochlorperazine (COMPAZINE) injection 10 mg, 10 mg, Intravenous, Q6H PRN, Holly Wheat PA-C     prochlorperazine (COMPAZINE) tablet 10 mg, 10 mg, Oral, Q8H, Holly Wheat PA-C, 10 mg at 07/14/19 0751     prochlorperazine (COMPAZINE) tablet 10 mg, 10 mg, Oral, Q6H PRN, Holly Wheat PA-C     ranitidine (ZANTAC) tablet 150 mg, 150 mg, Oral, BID, Yoko Velásquez PA-C, 150 mg at 07/14/19 0751     senna-docusate (SENOKOT-S/PERICOLACE) 8.6-50 MG per tablet 2 tablet, 2 tablet, Oral, BID PRN, Yoko Velásquez PA-C     senna-docusate (SENOKOT-S/PERICOLACE) 8.6-50 MG per tablet 2 tablet, 2 tablet, Oral, BID, Holly Wheat PA-C, 2 tablet at 07/13/19 0753     sodium chloride (PF) 0.9% PF flush 10-20 mL, 10-20 mL, Intracatheter, q1 min prn, Yoko Velásquez PA-C     sodium chloride (PF) 0.9% PF flush 5-50 mL, 5-50 mL, Intracatheter, Once PRN, Yoko Velásquez PA-C     sodium chloride 0.9% infusion, 1,000 mL, Intravenous, Continuous PRN, Holly Wheat PA-C     vinCRIStine (ONCOVIN) 0.78 mg, DOXOrubicin (ADRIAMYCIN) 40 mg in sodium chloride 0.9 % 1,071 mL CHEMOTHERAPY, 0.4 mg/m2 (Treatment Plan Recorded), Intravenous, Q22H, Holly Wheat PA-C, Last Rate: 48.7 mL/hr at 07/13/19 1257, 0.78 mg at 07/13/19 1257      Results for orders placed or performed during the hospital encounter of 07/11/19 (from the past 24 hour(s))   Basic metabolic panel   Result Value Ref Range    Sodium 143 133 - 144 mmol/L    Potassium 3.6 3.4 - 5.3 mmol/L    Chloride 112 (H) 94 - 109 mmol/L    Carbon Dioxide 28 20 - 32 mmol/L    Anion Gap 4 3 - 14 mmol/L    Glucose 100 (H) 70 - 99 mg/dL    Urea Nitrogen 12 7 - 30 mg/dL    Creatinine 0.81 0.66 - 1.25 mg/dL    GFR Estimate >90 >60 mL/min/[1.73_m2]    GFR Estimate If Black >90 >60 mL/min/[1.73_m2]    Calcium 8.6 8.5 - 10.1 mg/dL   CBC  with platelets differential   Result Value Ref Range    WBC 8.1 4.0 - 11.0 10e9/L    RBC Count 3.05 (L) 4.4 - 5.9 10e12/L    Hemoglobin 9.5 (L) 13.3 - 17.7 g/dL    Hematocrit 29.5 (L) 40.0 - 53.0 %    MCV 97 78 - 100 fl    MCH 31.1 26.5 - 33.0 pg    MCHC 32.2 31.5 - 36.5 g/dL    RDW 16.6 (H) 10.0 - 15.0 %    Platelet Count 244 150 - 450 10e9/L    Diff Method Automated Method     % Neutrophils 88.6 %    % Lymphocytes 4.3 %    % Monocytes 6.3 %    % Eosinophils 0.0 %    % Basophils 0.1 %    % Immature Granulocytes 0.7 %    Nucleated RBCs 0 0 /100    Absolute Neutrophil 7.1 1.6 - 8.3 10e9/L    Absolute Lymphocytes 0.4 (L) 0.8 - 5.3 10e9/L    Absolute Monocytes 0.5 0.0 - 1.3 10e9/L    Absolute Eosinophils 0.0 0.0 - 0.7 10e9/L    Absolute Basophils 0.0 0.0 - 0.2 10e9/L    Abs Immature Granulocytes 0.1 0 - 0.4 10e9/L    Absolute Nucleated RBC 0.0      Recent Labs   Lab 07/14/19  0339 07/13/19  0421 07/12/19  0356 07/11/19  0851   WBC 8.1 9.9 12.3* 4.9   HGB 9.5* 9.6* 10.1* 11.6*   MCV 97 97 97 97    268 276 291   INR  --   --  1.06  --     142 143 140   POTASSIUM 3.6 3.7 4.0 4.3   CHLORIDE 112* 112* 111* 107   CO2 28 26 25 29   BUN 12 11 12 14   CR 0.81 0.82 0.84 1.12   ANIONGAP 4 5 7 4   TAMMY 8.6 8.6 8.8 8.7   * 108* 119* 67*   ALBUMIN  --   --  3.2* 3.7   PROTTOTAL  --   --  6.0* 6.8   BILITOTAL  --   --  0.2 0.2   ALKPHOS  --   --  46 53   ALT  --   --  41 49   AST  --   --  13 22

## 2019-07-14 NOTE — PLAN OF CARE
Problem: Adult Inpatient Plan of Care  Goal: Plan of Care Review  7/14/2019 1355 by Aparna Her RN  Outcome: No Change  Goal: Patient-Specific Goal (Individualization)  7/14/2019 1355 by Aparna Her RN  Outcome: No Change  Goal: Absence of Hospital-Acquired Illness or Injury  7/14/2019 1355 by Aparna Her RN  Outcome: No Change  Goal: Optimal Comfort and Wellbeing  7/14/2019 1355 by Aparna Her RN  Outcome: No Change  Goal: Readiness for Transition of Care  7/14/2019 1355 by Aparna Her RN  Outcome: No Change  Goal: Rounds/Family Conference  7/14/2019 1355 by Aparna Her RN  Outcome: No Change  Chemotherapy  D: Etoposide and Vincristine mid morning.   I: on scheduled zofran and compazine. Positive blood return.   A: no complaints of nausea.   P: continue to monitor the patient.

## 2019-07-14 NOTE — PLAN OF CARE
Pt is doing well - denied nausea or pain.  Eating well. Went outside several times this shift.  Continuous infusions of Etoposide and Vincristine running into PICC.  This is his last cycle of chemo (per pt) for this round.    Problem: Adult Inpatient Plan of Care  Goal: Plan of Care Review  7/13/2019 2217 by Veronica Obrien, RN  Outcome: No Change

## 2019-07-14 NOTE — PLAN OF CARE
Problem: Adult Inpatient Plan of Care  Goal: Plan of Care Review  7/14/2019 1349 by Aparna Her RN  Outcome: No Change  Goal: Patient-Specific Goal (Individualization)  7/14/2019 1349 by Aparna Her RN  Outcome: No Change  Goal: Absence of Hospital-Acquired Illness or Injury  7/14/2019 1349 by Aparna Her RN  Outcome: No Change  Goal: Optimal Comfort and Wellbeing  7/14/2019 1349 by Aparna Her RN  Outcome: No Change  Goal: Readiness for Transition of Care  7/14/2019 1349 by Aparna Her RN  Outcome: No Change  Goal: Rounds/Family Conference  Outcome: No Change  Temp max 99.2. No pain. No complaints of nausea and on scheduled zofran and compazine. Etoposide and Vincristine infusing. Ate well. Independent. Walked in halls. Medium bowel mvmt. Showered. Will need to coordinate the LP tomorrow between Hem Onc service and Dr. Cobb. Christiano would like Dr. Cobb to do the LP. INR needs to be drawn tomorrow morning.

## 2019-07-14 NOTE — PLAN OF CARE
AVSS. Denies pain, nausea or vomiting. Etoposide and vincristine infusing well via PICC line with good blood return. Up independently and voiding freely. Pt walked in the halls early this morning.  Problem: Adult Inpatient Plan of Care  Goal: Plan of Care Review  7/14/2019 0525 by Princess Elisabeth Ornelas RN  Outcome: No Change     Problem: Adult Inpatient Plan of Care  Goal: Patient-Specific Goal (Individualization)  7/14/2019 0525 by Princess Elisabeth Ornelas RN  Outcome: No Change     Problem: Adult Inpatient Plan of Care  Goal: Absence of Hospital-Acquired Illness or Injury  7/14/2019 0525 by Princess Elisabeth Ornleas RN  Outcome: No Change     Problem: Adult Inpatient Plan of Care  Goal: Optimal Comfort and Wellbeing  7/14/2019 0525 by Princess Elisabeth Ornelas RN  Outcome: No Change     Problem: Adult Inpatient Plan of Care  Goal: Readiness for Transition of Care  7/14/2019 0525 by Princess Elisabeth Ornelas RN  Outcome: No Change     Problem: Anemia (Chemotherapy Effects)  Goal: Anemia Symptom Improvement  7/14/2019 0525 by Princess Elisabeth Ornelas RN  Outcome: No Change     Problem: Urinary Bleeding Risk or Actual (Chemotherapy Effects)  Goal: Absence of Hematuria  7/14/2019 0525 by Princess Elisabeth Ornelas RN  Outcome: No Change     Problem: Nausea and Vomiting (Chemotherapy Effects)  Goal: Fluid and Electrolyte Balance  7/14/2019 0525 by Princess Elisabeth Ornelas RN  Outcome: No Change     Problem: Neurotoxicity (Chemotherapy Effects)  Goal: Neurotoxicity Symptom Control  7/14/2019 0525 by Princess Elisabeth Ornelas RN  Outcome: No Change     Problem: Neutropenia (Chemotherapy Effects)  Goal: Absence of Infection  7/14/2019 0525 by Princess Elisabeth Ornelas RN  Outcome: No Change     Problem: Oral Mucositis (Chemotherapy Effects)  Goal: Improved Oral Mucous Membrane Integrity  7/14/2019 0525 by Princess Elisabeth Ornelas RN  Outcome: No Change     Problem: Thrombocytopenia Bleeding Risk (Chemotherapy Effects)  Goal: Absence of  Bleeding  7/14/2019 0525 by Princess Elisabeth Ornelas, RN  Outcome: No Change

## 2019-07-15 VITALS
HEIGHT: 70 IN | SYSTOLIC BLOOD PRESSURE: 118 MMHG | BODY MASS INDEX: 24.88 KG/M2 | HEART RATE: 76 BPM | TEMPERATURE: 97.6 F | WEIGHT: 173.8 LBS | DIASTOLIC BLOOD PRESSURE: 65 MMHG | OXYGEN SATURATION: 97 % | RESPIRATION RATE: 16 BRPM

## 2019-07-15 LAB
ALBUMIN SERPL-MCNC: 3.2 G/DL (ref 3.4–5)
ALP SERPL-CCNC: 40 U/L (ref 40–150)
ALT SERPL W P-5'-P-CCNC: 32 U/L (ref 0–70)
ANION GAP SERPL CALCULATED.3IONS-SCNC: 4 MMOL/L (ref 3–14)
AST SERPL W P-5'-P-CCNC: 10 U/L (ref 0–45)
BASOPHILS # BLD AUTO: 0 10E9/L (ref 0–0.2)
BASOPHILS NFR BLD AUTO: 0 %
BILIRUB SERPL-MCNC: 0.3 MG/DL (ref 0.2–1.3)
BUN SERPL-MCNC: 13 MG/DL (ref 7–30)
CALCIUM SERPL-MCNC: 8.6 MG/DL (ref 8.5–10.1)
CHLORIDE SERPL-SCNC: 110 MMOL/L (ref 94–109)
CO2 SERPL-SCNC: 29 MMOL/L (ref 20–32)
COPATH REPORT: NORMAL
CREAT SERPL-MCNC: 0.8 MG/DL (ref 0.66–1.25)
DIFFERENTIAL METHOD BLD: ABNORMAL
EOSINOPHIL # BLD AUTO: 0 10E9/L (ref 0–0.7)
EOSINOPHIL NFR BLD AUTO: 0 %
ERYTHROCYTE [DISTWIDTH] IN BLOOD BY AUTOMATED COUNT: 16 % (ref 10–15)
GFR SERPL CREATININE-BSD FRML MDRD: >90 ML/MIN/{1.73_M2}
GLUCOSE CSF-MCNC: 71 MG/DL (ref 40–70)
GLUCOSE SERPL-MCNC: 95 MG/DL (ref 70–99)
GRAM STN SPEC: NORMAL
HCT VFR BLD AUTO: 29 % (ref 40–53)
HGB BLD-MCNC: 9.6 G/DL (ref 13.3–17.7)
IMM GRANULOCYTES # BLD: 0.1 10E9/L (ref 0–0.4)
IMM GRANULOCYTES NFR BLD: 1.3 %
INR PPP: 1.12 (ref 0.86–1.14)
LYMPHOCYTES # BLD AUTO: 0.3 10E9/L (ref 0.8–5.3)
LYMPHOCYTES NFR BLD AUTO: 4.1 %
Lab: NORMAL
MCH RBC QN AUTO: 31.4 PG (ref 26.5–33)
MCHC RBC AUTO-ENTMCNC: 33.1 G/DL (ref 31.5–36.5)
MCV RBC AUTO: 95 FL (ref 78–100)
MONOCYTES # BLD AUTO: 0.2 10E9/L (ref 0–1.3)
MONOCYTES NFR BLD AUTO: 2.9 %
NEUTROPHILS # BLD AUTO: 5.8 10E9/L (ref 1.6–8.3)
NEUTROPHILS NFR BLD AUTO: 91.7 %
NRBC # BLD AUTO: 0 10*3/UL
NRBC BLD AUTO-RTO: 0 /100
PLATELET # BLD AUTO: 250 10E9/L (ref 150–450)
POTASSIUM SERPL-SCNC: 3.5 MMOL/L (ref 3.4–5.3)
PROT CSF-MCNC: 46 MG/DL (ref 15–60)
PROT SERPL-MCNC: 6 G/DL (ref 6.8–8.8)
RBC # BLD AUTO: 3.06 10E12/L (ref 4.4–5.9)
SODIUM SERPL-SCNC: 144 MMOL/L (ref 133–144)
SPECIMEN SOURCE: NORMAL
WBC # BLD AUTO: 6.3 10E9/L (ref 4–11)

## 2019-07-15 PROCEDURE — 40001004 ZZHCL STATISTIC FLOW INT 9-15 ABY TC 88188: Performed by: PHYSICIAN ASSISTANT

## 2019-07-15 PROCEDURE — 25000132 ZZH RX MED GY IP 250 OP 250 PS 637: Performed by: PHYSICIAN ASSISTANT

## 2019-07-15 PROCEDURE — 82945 GLUCOSE OTHER FLUID: CPT | Performed by: PHYSICIAN ASSISTANT

## 2019-07-15 PROCEDURE — 89050 BODY FLUID CELL COUNT: CPT | Performed by: PHYSICIAN ASSISTANT

## 2019-07-15 PROCEDURE — 84157 ASSAY OF PROTEIN OTHER: CPT | Performed by: PHYSICIAN ASSISTANT

## 2019-07-15 PROCEDURE — 87070 CULTURE OTHR SPECIMN AEROBIC: CPT | Performed by: PHYSICIAN ASSISTANT

## 2019-07-15 PROCEDURE — 85610 PROTHROMBIN TIME: CPT | Performed by: INTERNAL MEDICINE

## 2019-07-15 PROCEDURE — 87015 SPECIMEN INFECT AGNT CONCNTJ: CPT | Performed by: PHYSICIAN ASSISTANT

## 2019-07-15 PROCEDURE — 80053 COMPREHEN METABOLIC PANEL: CPT | Performed by: INTERNAL MEDICINE

## 2019-07-15 PROCEDURE — 88184 FLOWCYTOMETRY/ TC 1 MARKER: CPT | Performed by: PHYSICIAN ASSISTANT

## 2019-07-15 PROCEDURE — 25000128 H RX IP 250 OP 636: Performed by: PHYSICIAN ASSISTANT

## 2019-07-15 PROCEDURE — 62270 DX LMBR SPI PNXR: CPT | Performed by: INTERNAL MEDICINE

## 2019-07-15 PROCEDURE — 87205 SMEAR GRAM STAIN: CPT | Performed by: PHYSICIAN ASSISTANT

## 2019-07-15 PROCEDURE — 25800030 ZZH RX IP 258 OP 636: Performed by: PHYSICIAN ASSISTANT

## 2019-07-15 PROCEDURE — 88185 FLOWCYTOMETRY/TC ADD-ON: CPT | Performed by: PHYSICIAN ASSISTANT

## 2019-07-15 PROCEDURE — 25000131 ZZH RX MED GY IP 250 OP 636 PS 637: Performed by: PHYSICIAN ASSISTANT

## 2019-07-15 PROCEDURE — 85025 COMPLETE CBC W/AUTO DIFF WBC: CPT | Performed by: INTERNAL MEDICINE

## 2019-07-15 PROCEDURE — 99239 HOSP IP/OBS DSCHRG MGMT >30: CPT | Performed by: INTERNAL MEDICINE

## 2019-07-15 PROCEDURE — 3E0R305 INTRODUCTION OF OTHER ANTINEOPLASTIC INTO SPINAL CANAL, PERCUTANEOUS APPROACH: ICD-10-PCS | Performed by: PHYSICIAN ASSISTANT

## 2019-07-15 RX ORDER — LORAZEPAM 2 MG/ML
1 INJECTION INTRAMUSCULAR ONCE
Status: COMPLETED | OUTPATIENT
Start: 2019-07-15 | End: 2019-07-15

## 2019-07-15 RX ORDER — PREDNISONE 20 MG/1
30 TABLET ORAL 2 TIMES DAILY
Qty: 6 TABLET | Refills: 0 | Status: SHIPPED | OUTPATIENT
Start: 2019-07-15 | End: 2019-07-16

## 2019-07-15 RX ORDER — LEVOFLOXACIN 250 MG/1
250 TABLET, FILM COATED ORAL DAILY
Qty: 15 TABLET | Refills: 0 | Status: SHIPPED | OUTPATIENT
Start: 2019-07-15 | End: 2019-08-15

## 2019-07-15 RX ORDER — FLUCONAZOLE 100 MG/1
100 TABLET ORAL DAILY
Qty: 30 TABLET | Refills: 0
Start: 2019-07-15 | End: 2019-08-15

## 2019-07-15 RX ORDER — DEXAMETHASONE 4 MG/1
8 TABLET ORAL DAILY
Qty: 4 TABLET | Refills: 0 | Status: SHIPPED | OUTPATIENT
Start: 2019-07-17 | End: 2019-08-15

## 2019-07-15 RX ORDER — LORAZEPAM 0.5 MG/1
.5-1 TABLET ORAL EVERY 6 HOURS PRN
Qty: 30 TABLET | Refills: 0 | Status: SHIPPED | OUTPATIENT
Start: 2019-07-15 | End: 2019-07-22

## 2019-07-15 RX ADMIN — SODIUM CHLORIDE 1000 ML: 9 INJECTION, SOLUTION INTRAVENOUS at 11:13

## 2019-07-15 RX ADMIN — METHOTREXATE: 25 INJECTION, SOLUTION INTRA-ARTERIAL; INTRAMUSCULAR; INTRATHECAL; INTRAVENOUS at 11:08

## 2019-07-15 RX ADMIN — PREDNISONE 60 MG: 10 TABLET ORAL at 08:24

## 2019-07-15 RX ADMIN — CYCLOPHOSPHAMIDE 3000 MG: 2 INJECTION, POWDER, FOR SOLUTION INTRAVENOUS; ORAL at 09:45

## 2019-07-15 RX ADMIN — LORAZEPAM 1 MG: 2 INJECTION INTRAMUSCULAR; INTRAVENOUS at 11:09

## 2019-07-15 RX ADMIN — SODIUM CHLORIDE, PRESERVATIVE FREE 5 ML: 5 INJECTION INTRAVENOUS at 07:19

## 2019-07-15 RX ADMIN — RANITIDINE 150 MG: 150 TABLET, FILM COATED ORAL at 08:24

## 2019-07-15 RX ADMIN — PROCHLORPERAZINE MALEATE 10 MG: 5 TABLET ORAL at 08:23

## 2019-07-15 RX ADMIN — SODIUM CHLORIDE 150 MG: 9 INJECTION, SOLUTION INTRAVENOUS at 09:12

## 2019-07-15 RX ADMIN — ACYCLOVIR 400 MG: 200 CAPSULE ORAL at 08:23

## 2019-07-15 RX ADMIN — SENNOSIDES AND DOCUSATE SODIUM 2 TABLET: 8.6; 5 TABLET ORAL at 08:24

## 2019-07-15 RX ADMIN — LORAZEPAM 0.5 MG: 0.5 TABLET ORAL at 02:24

## 2019-07-15 ASSESSMENT — ACTIVITIES OF DAILY LIVING (ADL)
ADLS_ACUITY_SCORE: 10

## 2019-07-15 ASSESSMENT — MIFFLIN-ST. JEOR: SCORE: 1690.85

## 2019-07-15 NOTE — CONSULTS
[facility], [place]  Consult Note - Hospitalist Service, Gold 6     Date of Admission:  7/11/2019  Consult Requested by: Sugey Shaver  Reason for Consult: Indications: spinal chemiotherapy injection         Lumbar Puncture:      Time: 11:00 PM  Performed by: Dane Cobb  Authorized by: Dane Cobb     Indications: chemiotherapy injection     Consent given by: Patient who states understanding of the procedure being performed after discussing the risks, benefits and alternatives.     Prior to the start of the procedure and with procedural staff participation, I verbally confirmed the patient s identity using two indicators, relevant allergies, that the procedure was appropriate and matched the consent or emergent situation, and that the correct equipment/implants were available. Immediately prior to starting the procedure I conducted the Time Out with the procedural staff and re-confirmed the patient s name, procedure, and site/side. (The Joint Commission universal protocol was followed.) Yes     Under sterile conditions the patient was positioned Sitting, bent forward. Betadine solution and sterile drapes were utilized.  Local anesthetic at the site: 5 ml of lidocaine 1% without epinephrine from the LP tray  The spinal needle from the tray was inserted at the L 3-4 interspace.  Opening Pressurewas not checked.  A total of 6mL of clear and colorless spinal fluid was obtained and sent to the laboratory.   The PRIMARY TEAM PA INJECTED THE CHEMIOTHRAPY.   After the needle was removed, a bandaid and pressure were applied and the patient was instructed to stay horizontal until the results were back.    Complications:  None    Patient tolerance: Patient tolerated the procedure well with no immediate complications.   Dane Cobb MD

## 2019-07-15 NOTE — DISCHARGE SUMMARY
[facility], [address]    Discharge Summary  Hematology / Oncology    Date of Admission:  7/11/2019  Date of Discharge:  7/15/2019  Discharging Provider: Sugey Shaver  Date of Service (when I saw the patient): 07/15/19    Discharge Diagnoses   Burkitt lymphoma, unspecified body region    History of Present Illness   Christiano Molina is a 44 year old male with Burkitt lymphoma, now s/p 5 cycles of DA-EPOCH-R with CR after Cycle 4. He was admitted for Cycle 6.      Please see H&P for further detail of history.    Hospital Course   Christiano Molina was admitted on 7/11/2019.  The following problems were addressed during his hospitalization:    #Burkitt Lymphoma, EBV+, Stage IA   Follows with Dr. Ramirez. Initially presented in mid-March with a right axillary mass, found to have high-grade B-cell lymphoma. Initial pathology was not fully clear whether DLBCL or Burkitt, but subsequently this was finalized by [facility] Heme Path review as Burkitt lymphoma (EBV+). Plan is to do 6 cycles of DA-R-EPOCH with IT chemo prophylaxis during C3-6. PET/CT (done 6/18) after 4 cycles demonstrated complete response.     Prudencio was admitted on 7/11/19 for cycle 6 of DA-REPOCH. Patient received ppx IT chemotherapy on 7/12 and 7/15. Flow negative from 7/12 and pending from 7/15. Tolerated chemotherapy well with insomnia due to steroids being the most significant side effect. Labs/vitals are stable on discharge.  - Prednisone 60 mg on 7/15 PM and 7/16 AM  - Dexamethasone 8 mg on 7/17 and 7/18  - Neulasta scheduled for 7/16  - Labs on 7/23 and 7/26  - PET scan and f/u with Dr. Ramirez requested for 1 month     #ID PPx  - Continue ppx ACV. Will start Fluconazole 200 mg daily and Levofloxacin 250 mg daily on discharge. Advised to continue until ANC >1000.     #Recent appendicitis s/p appendectomy  Admitted with appendicitis following C5, 6/28-7/1. Underwent laparoscopic appendectomy on 6/29, and was discharge on  levofloxacin and metronidazole to complete a 7 day course. Surgical sites are healing well, no irritation or drainage notes.      #Mild anemia  2/2 chemotherapy. No transfusions needed during inpatient admission.      #GERD  - Continue home Zantac     #Peripheral neuropathy.   Mild numbness/tingling affecting b/l finger tips and toes. Not interfering with function.     Dispo: Discharged to home on 7/15.     Above plan discussed with staff physician, Dr. Claudio Shaver PA-C  Hematology/Oncology  Pager: [phone]    Significant Results and Procedures   Results for orders placed or performed during the hospital encounter of 07/11/19   XR Chest Port 1 View    Narrative    EXAM: XR CHEST PORT 1 VW  7/11/2019 2:59 PM     HISTORY:  PICC placement    COMPARISON: Chest radiograph dated 6/20/2019    FINDINGS: A single AP view of the chest is obtained. Left upper  extremity PICC tip projects over the low SVC. Trachea is midline.  Cardiac silhouette and pulmonary vasculature are within normal limits.  No pneumothorax. The costophrenic angles are collimated off the  field-of-view, however there is no large pleural effusion. No focal  airspace opacity.      Impression    IMPRESSION:   Left upper extremity PICC tip projects over the low SVC.    I have personally reviewed the examination and initial interpretation  and I agree with the findings.    CHITRA CAMP MD     Pending Results   These results will be followed up in clinic  Unresulted Labs Ordered in the Past 30 Days of this Admission     Date and Time Order Name Status Description    7/11/2019 1800 Leukemia Lymphoma Evaluation CSF In process     7/11/2019 1800 CSF Culture Aerobic Bacterial Tube 2 Preliminary     6/21/2019 0753 Leukemia Lymphoma Evaluation (Flow Cytometry) In process           Code Status   Full Code    Primary Care Physician   Vern Ramirez    Physical Exam   Temp: 97.6  F (36.4  C) Temp src: Oral BP: 118/65 Pulse: 76 Heart Rate: 87 Resp: 16  SpO2: 97 % O2 Device: None (Room air)    Vitals:    07/13/19 0900 07/14/19 0933 07/15/19 0756   Weight: 79.9 kg (176 lb 1.6 oz) 79.2 kg (174 lb 11.2 oz) 78.8 kg (173 lb 12.8 oz)     Vital Signs with Ranges  Temp:  [97.1  F (36.2  C)-99.5  F (37.5  C)] 97.6  F (36.4  C)  Pulse:  [56-76] 76  Heart Rate:  [63-87] 87  Resp:  [16] 16  BP: (115-131)/(65-79) 118/65  SpO2:  [96 %-98 %] 97 %  I/O last 3 completed shifts:  In: 4070.8 [P.O.:1590; IV Piggyback:2480.8]  Out: 4625 [Urine:4625]    Time Spent on this Encounter   I, Sugey Shaver, personally saw the patient today and spent greater than 30 minutes discharging this patient.    Discharge Disposition   Discharged to home  Condition at discharge: Stable    Consultations This Hospital Stay   VASCULAR ACCESS ADULT IP CONSULT  INTERNAL MEDICINE PROCEDURE TEAM ADULT IP CONSULT [facility] - LUMBAR PUNCTURE  INTERNAL MEDICINE PROCEDURE TEAM ADULT IP CONSULT [facility] - LUMBAR PUNCTURE    Discharge Orders      PET Oncology Whole Body     Reason for your hospital stay    You were admitted for your last cycle of chemotherapy. You tolerated this well. Your labs/vitals are stable and you are safe to discharge home.     Adult [facility] [email or web address] Follow-up and recommended labs and tests    - I have requested the following appointments at [facility]: Neulasta on 7/16 afternoon and labs on 7/23 and 7/26.  - I have requested a PET scan and follow-up with Dr. Ramirez in 1 month.    Appointments on [place] and/or [facility] (with [facility] [email or web address]] or [facility] provider or service). Call [phone] if you haven't heard regarding these appointments within 7 days of discharge.     Activity    Your activity upon discharge: Regular activity as tolerated.     When to contact your care team    Call the [facility] 24-hour triage line at [phone] or [phone] for temp >100.4, uncontrolled nausea/vomiting/diarrhea/constipation, unrelieved pain, bleeding not relieved with pressure,  dizziness, chest pain, shortness of breath, loss of consciousness, and any new or concerning symptoms.     Call [phone] and ask for the care coordinator that works with your oncologist if you have questions about scans, appointments, hospital follow-up, or other concerns.     Diet    Follow this diet upon discharge: Regular diet as tolerated.     Discharge Medications   Current Discharge Medication List      START taking these medications    Details   dexamethasone (DECADRON) 4 MG tablet Take 2 tablets (8 mg) by mouth daily for 2 days . Take on 7/17 and 7/18.  Qty: 4 tablet, Refills: 0    Associated Diagnoses: High grade B-cell lymphoma (H)      levofloxacin (LEVAQUIN) 250 MG tablet Take 1 tablet (250 mg) by mouth daily . Start taking on 7/16 and take until your ANC is greater than 1000.  Qty: 15 tablet, Refills: 0    Associated Diagnoses: Burkitt lymphoma, unspecified body region (H)      LORazepam (ATIVAN) 0.5 MG tablet Take 1-2 tablets (0.5-1 mg) by mouth every 6 hours as needed for anxiety or nausea  Qty: 30 tablet, Refills: 0    Associated Diagnoses: High grade B-cell lymphoma (H)      predniSONE (DELTASONE) 20 MG tablet Take 3 tablets (60 mg) by mouth 2 times daily for 2 doses . Take on 7/15 afternoon and 7/16 morning.  Qty: 6 tablet, Refills: 0    Associated Diagnoses: High grade B-cell lymphoma (H)         CONTINUE these medications which have CHANGED    Details   fluconazole (DIFLUCAN) 100 MG tablet Take 1 tablet (100 mg) by mouth daily . Start taking on 7/16 and take until your ANC is greater than 1000.  Qty: 30 tablet, Refills: 0    Associated Diagnoses: High grade malignant lymphoma (H)         CONTINUE these medications which have NOT CHANGED    Details   acetaminophen (TYLENOL) 325 MG tablet Take 2 tablets (650 mg) by mouth every 4 hours as needed for mild pain  Qty: 30 tablet, Refills: 0    Associated Diagnoses: Burkitt lymphoma, unspecified body region (H)      acyclovir (ZOVIRAX) 200 MG capsule  Take 2 capsules (400 mg) by mouth 2 times daily  Qty: 120 capsule, Refills: 0    Associated Diagnoses: High grade malignant lymphoma (H)      calcium carbonate (TUMS) 500 MG chewable tablet Take 1 chew tab by mouth 2 times daily as needed for heartburn      ondansetron (ZOFRAN) 8 MG tablet Take 1 tablet (8 mg) by mouth every 8 hours as needed for nausea  Qty: 30 tablet, Refills: 0    Associated Diagnoses: High grade B-cell lymphoma (H)      ranitidine (ZANTAC) 150 MG tablet Take 1 tablet (150 mg) by mouth 2 times daily  Qty: 60 tablet, Refills: 1    Associated Diagnoses: Gastroesophageal reflux disease without esophagitis      senna-docusate (SENOKOT-S/PERICOLACE) 8.6-50 MG tablet Take 2 tablets by mouth 2 times daily as needed for constipation    Associated Diagnoses: High grade B-cell lymphoma (H)      ondansetron (ZOFRAN-ODT) 8 MG ODT tab Take 1 tablet (8 mg) by mouth every 8 hours At first continue scheduled (but can back off as nausea improves)  Qty: 30 tablet, Refills: 0    Associated Diagnoses: High grade malignant lymphoma (H)      prochlorperazine (COMPAZINE) 10 MG tablet Take 1 tablet (10 mg) by mouth every 6 hours as needed for nausea or vomiting (Try second.)  Qty: 30 tablet, Refills: 0    Associated Diagnoses: High grade B-cell lymphoma (H)           Allergies   No Known Allergies  Data   ROUTINE IP LABS (Last four results)  BMP  Recent Labs   Lab 07/15/19  0225 07/14/19  0339 07/13/19  0421 07/12/19  0356    143 142 143   POTASSIUM 3.5 3.6 3.7 4.0   CHLORIDE 110* 112* 112* 111*   TAMMY 8.6 8.6 8.6 8.8   CO2 29 28 26 25   BUN 13 12 11 12   CR 0.80 0.81 0.82 0.84   GLC 95 100* 108* 119*     CBC  Recent Labs   Lab 07/15/19  0225 07/14/19  0339 07/13/19  0421 07/12/19  0356   WBC 6.3 8.1 9.9 12.3*   RBC 3.06* 3.05* 3.09* 3.27*   HGB 9.6* 9.5* 9.6* 10.1*   HCT 29.0* 29.5* 29.9* 31.8*   MCV 95 97 97 97   MCH 31.4 31.1 31.1 30.9   MCHC 33.1 32.2 32.1 31.8   RDW 16.0* 16.6* 16.9* 16.8*    244 268  276     INR  Recent Labs   Lab 07/15/19  0225 07/12/19  0356   INR 1.12 1.06

## 2019-07-15 NOTE — PROGRESS NOTES
Pt discharged to: home  Via: car  Accompanied by: family  Belongings: with pt  Teaching: per unit protocol  Clinic appointment: tomorrow   Report called/faxed: URIEL  Local housing: NA

## 2019-07-15 NOTE — PROCEDURES
Lumbar Puncture Procedure Note     Patient: Christiano Molina  : 1975  Date of Procedure: 7/15/19                DIAGNOSIS: Burkitt lymphoma  PROCEDURE: Lumbar puncture with intrathecal administration   SITE: Inpatient Room   INDICATION:  Intrathecal administration     Dr. Cobb discussed the procedure, benefits, risks and alternatives to the patient, who voiced understanding of the information and agreed to proceed with the lumbar puncture. Risks include bleeding, infection, and post-procedure headache. Verbal and written consent were obtained.   Description: See note from Dr. Jordyn MD regarding procedure details. Specimen was sent for cell count and differential, protein and glucose, gram stain, culture, and flow cytometry.    Sugey Shaver PA-C then administered hydrocortisone sodium succinate PF 50 mg, methotrexate PF 12 mg in sodium chloride PF 0.9% 6 mL without apparent complication. Chemotherapy previously double checked by two RNs and also verified by this provider. Needle stylet was replaced and then needle removed and site cleaned and dressed with a bandage.     Complications: None. Patient tolerated procedure very well. Atraumatic tap with minimal discomfort during procedure. No significant bleeding or other immediate complication observed.      Disposition: Patient was instructed that patient should rest flat on back x 1 hour. Also receiving 1L IVF and drinking a can of soda as in previous procedures. He should notify RN if HA or pain develop.     Procedure performed by: WINSTON Montanez PA-C  Hematology/Oncology  Pager: [phone]

## 2019-07-15 NOTE — PLAN OF CARE
"Blood pressure 131/77, pulse 56, temperature 97.1  F (36.2  C), temperature source Axillary, resp. rate 16, height 1.78 m (5' 10.08\"), weight 79.2 kg (174 lb 11.2 oz), SpO2 97 %.    Pt has Chemotherapy gtts, Etoposide and VinCRIstine gtts going. Etoposide gtt is finished. MIVF as pre flush for Cytoxan NS 0.9/L  at 2100 ML/hr was started at midnight. Pt c/o not able to sleep and he had 0.5 mg of ativan with good result. Pt has scheduled LP today and he is requesting to have Dr. Néstor Cobb do it. CN notified. Pt ate some Cheerios this morning.Pt is up in the triplett. Pt is request to have Cytoxan done early. Asked pt to scar for the day shift to start. Nausea under control with scheduled compazine.Positive blood returns from both ports. Pt is voiding large amounts. Continue to monitor pt and continue with plan.  Problem: Adult Inpatient Plan of Care  Goal: Plan of Care Review  7/15/2019 0700 by Lizabeth West RN  Outcome: No Change  7/14/2019 2214 by Veronica Obrien RN  Outcome: No Change     Problem: Nausea and Vomiting (Chemotherapy Effects)  Goal: Fluid and Electrolyte Balance  7/15/2019 0700 by Lizabeth West RN  Outcome: No Change  7/14/2019 2214 by Veronica Obrien RN  Outcome: No Change     Problem: Thrombocytopenia Bleeding Risk (Chemotherapy Effects)  Goal: Absence of Bleeding  7/15/2019 0700 by Lizabeth West RN  Outcome: No Change  7/14/2019 2214 by Veronica Obrien RN  Outcome: No Change    of care.  "

## 2019-07-15 NOTE — PLAN OF CARE
Pt afebrile, OVSS. Pt received cytoxan chemotherapy and tolerated it well. Blood return present before and after infusion. LP competed this shift and IT chemo infused. Pt reports no issues after LP. PICC pulled. No further complaints.    Problem: Adult Inpatient Plan of Care  Goal: Plan of Care Review  7/15/2019 1408 by Adrianne Harris, RN  Outcome: Adequate for Discharge     Problem: Adult Inpatient Plan of Care  Goal: Patient-Specific Goal (Individualization)  7/15/2019 1408 by Adrianne Harris, RN  Outcome: Adequate for Discharge

## 2019-07-15 NOTE — PLAN OF CARE
Pt denied nausea or pain.  Continuous chemo infusion of Vincristine and Etoposide are running - good blood return.  Will discontinue tomorrow after he gets his LP with IT chemo.  See sticky notes for details.  Is going to get IV Cytoxan 3 gms at 11 tomorrow - calling for flush orders.  Problem: Adult Inpatient Plan of Care  Goal: Plan of Care Review  7/14/2019 2214 by Veronica Obrien, RN  Outcome: No Change

## 2019-07-16 ENCOUNTER — INFUSION THERAPY VISIT (OUTPATIENT)
Dept: INFUSION THERAPY | Facility: CLINIC | Age: 44
DRG: 847 | End: 2019-07-16
Attending: INTERNAL MEDICINE
Payer: COMMERCIAL

## 2019-07-16 VITALS
HEART RATE: 74 BPM | OXYGEN SATURATION: 98 % | TEMPERATURE: 98.5 F | RESPIRATION RATE: 16 BRPM | SYSTOLIC BLOOD PRESSURE: 123 MMHG | DIASTOLIC BLOOD PRESSURE: 80 MMHG

## 2019-07-16 DIAGNOSIS — C85.10 HIGH GRADE B-CELL LYMPHOMA (H): Primary | ICD-10-CM

## 2019-07-16 LAB
APPEARANCE CSF: CLEAR
APPEARANCE CSF: CLEAR
COLOR CSF: COLORLESS
COLOR CSF: COLORLESS
COPATH REPORT: NORMAL
RBC # CSF MANUAL: 0 /UL (ref 0–2)
RBC # CSF MANUAL: 7 /UL (ref 0–2)
TUBE # CSF: 4 #
TUBE # CSF: 4 #
WBC # CSF MANUAL: 0 /UL (ref 0–5)
WBC # CSF MANUAL: 4 /UL (ref 0–5)

## 2019-07-16 PROCEDURE — 25000128 H RX IP 250 OP 636: Performed by: PHYSICIAN ASSISTANT

## 2019-07-16 RX ADMIN — PEGFILGRASTIM 6 MG: 6 INJECTION SUBCUTANEOUS at 15:12

## 2019-07-16 ASSESSMENT — PAIN SCALES - GENERAL: PAINLEVEL: NO PAIN (0)

## 2019-07-16 NOTE — PROGRESS NOTES
Infusion Nursing Note:  Christiano Molina presents today for neulasta.    Patient seen by provider today: No   present during visit today: Not Applicable.    Note: N/A.    Intravenous Access:  No Intravenous access/labs at this visit.    Treatment Conditions:  Not Applicable.      Post Infusion Assessment:  Patient tolerated injection without incident.  Site patent and intact, free from redness, edema or discomfort.       Discharge Plan:   AVS to patient via MYCHART.  Patient will return as prev byron for peripheral labs for next appointment.   Patient discharged in stable condition accompanied by: self.  Departure Mode: Ambulatory.    Greg Amador RN

## 2019-07-17 LAB
BACTERIA SPEC CULT: NO GROWTH
Lab: NORMAL
SPECIMEN SOURCE: NORMAL

## 2019-07-18 ENCOUNTER — OFFICE VISIT (OUTPATIENT)
Dept: SURGERY | Facility: CLINIC | Age: 44
End: 2019-07-18
Payer: COMMERCIAL

## 2019-07-18 VITALS
HEART RATE: 76 BPM | DIASTOLIC BLOOD PRESSURE: 68 MMHG | HEIGHT: 70 IN | BODY MASS INDEX: 25.27 KG/M2 | WEIGHT: 176.5 LBS | SYSTOLIC BLOOD PRESSURE: 114 MMHG | OXYGEN SATURATION: 98 %

## 2019-07-18 DIAGNOSIS — K35.30 ACUTE APPENDICITIS WITH LOCALIZED PERITONITIS, WITHOUT PERFORATION, ABSCESS, OR GANGRENE: Primary | ICD-10-CM

## 2019-07-18 ASSESSMENT — ENCOUNTER SYMPTOMS
WEIGHT GAIN: 0
SPEECH CHANGE: 0
INCREASED ENERGY: 1
RECTAL PAIN: 0
INSOMNIA: 1
DIZZINESS: 0
NAIL CHANGES: 0
ARTHRALGIAS: 0
ABDOMINAL PAIN: 1
TINGLING: 1
ALTERED TEMPERATURE REGULATION: 0
NERVOUS/ANXIOUS: 1
VOMITING: 0
BLOATING: 0
DECREASED CONCENTRATION: 0
SINUS PAIN: 0
NAUSEA: 1
POOR WOUND HEALING: 0
TROUBLE SWALLOWING: 0
NUMBNESS: 0
DEPRESSION: 0
BLOOD IN STOOL: 0
JOINT SWELLING: 0
NECK MASS: 0
HOARSE VOICE: 1
CONSTIPATION: 0
PANIC: 0
HEARTBURN: 1
MUSCLE CRAMPS: 1
MEMORY LOSS: 0
SORE THROAT: 0
SEIZURES: 0
TREMORS: 0
POLYPHAGIA: 0
TASTE DISTURBANCE: 1
WEAKNESS: 1
MUSCLE WEAKNESS: 1
BOWEL INCONTINENCE: 0
POLYDIPSIA: 0
DECREASED APPETITE: 1
JAUNDICE: 0
SWOLLEN GLANDS: 0
FATIGUE: 1
MYALGIAS: 1
LOSS OF CONSCIOUSNESS: 0
BACK PAIN: 0
SMELL DISTURBANCE: 0
STIFFNESS: 0
DISTURBANCES IN COORDINATION: 0
PARALYSIS: 0
WEIGHT LOSS: 0
DIARRHEA: 0
CHILLS: 0
NIGHT SWEATS: 0
BRUISES/BLEEDS EASILY: 1
HEADACHES: 0
NECK PAIN: 0
SKIN CHANGES: 0
HALLUCINATIONS: 0
SINUS CONGESTION: 0
FEVER: 0

## 2019-07-18 ASSESSMENT — MIFFLIN-ST. JEOR: SCORE: 1703.12

## 2019-07-18 ASSESSMENT — PAIN SCALES - GENERAL: PAINLEVEL: NO PAIN (0)

## 2019-07-18 NOTE — LETTER
"7/18/2019       RE: Christiano Molina  [address]  [place] [address]     Dear Colleague,    Thank you for referring your patient, Christiano Molina, to the [facility] GENERAL SURGERY at [facility]. Please see a copy of my visit note below.    Patient underwent prior laparoscopic appendectomy surgery on 6/29. He just completed 6th and final cycle of chemotherapy for Burkitt's lymphoma.    Christiano Molina overall has the following complaints: decreased appetite and energy that waxes and wanes which is normal following chemotherapy for him. No fevers, chills, nausea, vomiting. Tolerating PO. Incisions have had no drainage and healing well.    On exam:  /68   Pulse 76   Ht 1.78 m (5' 10.08\")   Wt 80.1 kg (176 lb 8 oz)   SpO2 98%   BMI 25.27 kg/m     Lung exam: breathing unlabored  Abdominal exam: soft; nontender; nondistended; incisions c/d/i    Plan:  Doing well post-oepratively  Does not require follow-up. Can follow-up as needed    Mann Mart MD (PGY-6)  Chief Resident - General Surgery  Pager #[phone]    Physician Attestation  I, Joel Valdes, saw and evaluated this patient as part of a shared visit.  I have reviewed and discussed with the advanced practice provider and/or resident their history, physical and plan.    I personally reviewed the vital signs, medications, labs and imaging.    My key history or physical exam findings: as noted above.  Incisions c/d/i.    Key management decisions made by me: f/u prn.  All pathology negative for malignancy.    Joel Valdes MD  Date of Service (when I saw the patient): 07/18/19        "

## 2019-07-18 NOTE — NURSING NOTE
"  Chief Complaint   Patient presents with     Surgical Followup     Post op Appy      Vitals:    07/18/19 0945   BP: 114/68   Pulse: 76   SpO2: 98%   Weight: 80.1 kg (176 lb 8 oz)   Height: 1.78 m (5' 10.08\")     Body mass index is 25.27 kg/m .   Patient tempeture was not taken, patient just had coffee.  Iraj Weir CMA    "

## 2019-07-18 NOTE — PROGRESS NOTES
"Patient underwent prior laparoscopic appendectomy surgery on 6/29. He just completed 6th and final cycle of chemotherapy for Burkitt's lymphoma.    Christiano Molina overall has the following complaints: decreased appetite and energy that waxes and wanes which is normal following chemotherapy for him. No fevers, chills, nausea, vomiting. Tolerating PO. Incisions have had no drainage and healing well.    On exam:  /68   Pulse 76   Ht 1.78 m (5' 10.08\")   Wt 80.1 kg (176 lb 8 oz)   SpO2 98%   BMI 25.27 kg/m    Lung exam: breathing unlabored  Abdominal exam: soft; nontender; nondistended; incisions c/d/i    Plan:  Doing well post-oepratively  Does not require follow-up. Can follow-up as needed    Mann Mart MD (PGY-6)  Chief Resident - General Surgery  Pager #[phone]    Physician Attestation  I, Joel Valdes, saw and evaluated this patient as part of a shared visit.  I have reviewed and discussed with the advanced practice provider and/or resident their history, physical and plan.    I personally reviewed the vital signs, medications, labs and imaging.    My key history or physical exam findings: as noted above.  Incisions c/d/i.    Key management decisions made by me: f/u prn.  All pathology negative for malignancy.    Joel Valdes MD  Date of Service (when I saw the patient): 07/18/19                  Answers for HPI/ROS submitted by the patient on 7/18/2019   General Symptoms: Yes  Skin Symptoms: Yes  HENT Symptoms: Yes  EYE SYMPTOMS: No  HEART SYMPTOMS: No  LUNG SYMPTOMS: No  INTESTINAL SYMPTOMS: Yes  URINARY SYMPTOMS: No  REPRODUCTIVE SYMPTOMS: Yes  SKELETAL SYMPTOMS: Yes  BLOOD SYMPTOMS: Yes  NERVOUS SYSTEM SYMPTOMS: Yes  MENTAL HEALTH SYMPTOMS: Yes  Fever: No  Loss of appetite: Yes  Weight loss: No  Weight gain: No  Fatigue: Yes  Night sweats: No  Chills: No  Increased stress: Yes  Excessive hunger: No  Excessive thirst: No  Feeling hot or cold when others believe the temperature " is normal: No  Loss of height: No  Post-operative complications: No  Surgical site pain: No  Hallucinations: No  Change in or Loss of Energy: Yes  Hyperactivity: No  Confusion: No  Changes in hair: No  Changes in moles/birth marks: No  Itching: No  Rashes: No  Changes in nails: No  Acne: No  Change in facial hair: No  Warts: No  Non-healing sores: No  Scarring: No  Flaking of skin: No  Color changes of hands/feet in cold : No  Sun sensitivity: No  Skin thickening: No  Ear pain: No  Ear discharge: No  Hearing loss: No  Tinnitus: No  Nosebleeds: No  Congestion: No  Sinus pain: No  Trouble swallowing: No   Voice hoarseness: Yes  Mouth sores: No  Sore throat: No  Tooth pain: No  Gum tenderness: No  Bleeding gums: No  Change in taste: Yes  Change in sense of smell: No  Dry mouth: No  Hearing aid used: No  Neck lump: No  Heart burn or indigestion: Yes  Nausea: Yes  Vomiting: No  Abdominal pain: Yes  Bloating: No  Constipation: No  Diarrhea: No  Blood in stool: No  Black stools: No  Rectal or Anal pain: No  Fecal incontinence: No  Yellowing of skin or eyes: No  Vomit with blood: No  Change in stools: Yes  Back pain: No  Muscle aches: Yes  Neck pain: No  Swollen joints: No  Joint pain: No  Bone pain: No  Muscle cramps: Yes  Muscle weakness: Yes  Joint stiffness: No  Bone fracture: No  Anemia: No  Swollen glands: No  Easy bleeding or bruising: Yes  Edema or swelling: No  Trouble with coordination: No  Dizziness or trouble with balance: No  Fainting or black-out spells: No  Memory loss: No  Headache: No  Seizures: No  Speech problems: No  Tingling: Yes  Tremor: No  Weakness: Yes  Difficulty walking: No  Paralysis: No  Numbness: No  Scrotal pain or swelling: No  Erectile dysfunction: Yes  Penile discharge: No  Genital ulcers: No  Reduced libido: Yes  Nervous or Anxious: Yes  Depression: No  Trouble sleeping: Yes  Trouble thinking or concentrating: No  Mood changes: Yes  Panic attacks: No

## 2019-07-20 LAB
BACTERIA SPEC CULT: NO GROWTH
Lab: NORMAL
SPECIMEN SOURCE: NORMAL

## 2019-07-22 DIAGNOSIS — C85.10 HIGH GRADE B-CELL LYMPHOMA (H): ICD-10-CM

## 2019-07-22 RX ORDER — LORAZEPAM 0.5 MG/1
.5-1 TABLET ORAL EVERY 6 HOURS PRN
Qty: 30 TABLET | Refills: 0 | Status: SHIPPED | OUTPATIENT
Start: 2019-07-22 | End: 2019-08-15

## 2019-07-23 ENCOUNTER — INFUSION THERAPY VISIT (OUTPATIENT)
Dept: INFUSION THERAPY | Facility: CLINIC | Age: 44
End: 2019-07-23
Attending: INTERNAL MEDICINE
Payer: COMMERCIAL

## 2019-07-23 ENCOUNTER — HOSPITAL ENCOUNTER (OUTPATIENT)
Facility: CLINIC | Age: 44
Setting detail: SPECIMEN
Discharge: HOME | End: 2019-07-23
Attending: INTERNAL MEDICINE | Admitting: PHYSICIAN ASSISTANT
Payer: COMMERCIAL

## 2019-07-23 ENCOUNTER — TELEPHONE (OUTPATIENT)
Dept: ONCOLOGY | Facility: CLINIC | Age: 44
End: 2019-07-23

## 2019-07-23 ENCOUNTER — PATIENT OUTREACH (OUTPATIENT)
Dept: ONCOLOGY | Facility: CLINIC | Age: 44
End: 2019-07-23

## 2019-07-23 DIAGNOSIS — C85.90 LYMPHOMA (H): ICD-10-CM

## 2019-07-23 DIAGNOSIS — C85.10 HIGH GRADE B-CELL LYMPHOMA (H): ICD-10-CM

## 2019-07-23 DIAGNOSIS — C85.90 LYMPHOMA (H): Primary | ICD-10-CM

## 2019-07-23 LAB
ALBUMIN SERPL-MCNC: 3.7 G/DL (ref 3.4–5)
ALP SERPL-CCNC: 54 U/L (ref 40–150)
ALT SERPL W P-5'-P-CCNC: 46 U/L (ref 0–70)
ANION GAP SERPL CALCULATED.3IONS-SCNC: 3 MMOL/L (ref 3–14)
AST SERPL W P-5'-P-CCNC: 22 U/L (ref 0–45)
BASOPHILS # BLD AUTO: 0 10E9/L (ref 0–0.2)
BASOPHILS NFR BLD AUTO: 2 %
BILIRUB SERPL-MCNC: 0.2 MG/DL (ref 0.2–1.3)
BUN SERPL-MCNC: 12 MG/DL (ref 7–30)
CALCIUM SERPL-MCNC: 9.1 MG/DL (ref 8.5–10.1)
CHLORIDE SERPL-SCNC: 109 MMOL/L (ref 94–109)
CO2 SERPL-SCNC: 28 MMOL/L (ref 20–32)
CREAT SERPL-MCNC: 0.92 MG/DL (ref 0.66–1.25)
DACRYOCYTES BLD QL SMEAR: SLIGHT
DIFFERENTIAL METHOD BLD: ABNORMAL
DIFFERENTIAL METHOD BLD: NORMAL
EOSINOPHIL # BLD AUTO: 0 10E9/L (ref 0–0.7)
EOSINOPHIL NFR BLD AUTO: 0 %
ERYTHROCYTE [DISTWIDTH] IN BLOOD BY AUTOMATED COUNT: 14.4 % (ref 10–15)
ERYTHROCYTE [DISTWIDTH] IN BLOOD BY AUTOMATED COUNT: NORMAL % (ref 10–15)
GFR SERPL CREATININE-BSD FRML MDRD: >90 ML/MIN/{1.73_M2}
GLUCOSE SERPL-MCNC: 108 MG/DL (ref 70–99)
HCT VFR BLD AUTO: 31.8 % (ref 40–53)
HCT VFR BLD AUTO: NORMAL % (ref 40–53)
HGB BLD-MCNC: 10.9 G/DL (ref 13.3–17.7)
HGB BLD-MCNC: NORMAL G/DL (ref 13.3–17.7)
LYMPHOCYTES # BLD AUTO: 0.4 10E9/L (ref 0.8–5.3)
LYMPHOCYTES NFR BLD AUTO: 25 %
MCH RBC QN AUTO: 32 PG (ref 26.5–33)
MCH RBC QN AUTO: NORMAL PG (ref 26.5–33)
MCHC RBC AUTO-ENTMCNC: 34.3 G/DL (ref 31.5–36.5)
MCHC RBC AUTO-ENTMCNC: NORMAL G/DL (ref 31.5–36.5)
MCV RBC AUTO: 93 FL (ref 78–100)
MCV RBC AUTO: NORMAL FL (ref 78–100)
MONOCYTES # BLD AUTO: 0.4 10E9/L (ref 0–1.3)
MONOCYTES NFR BLD AUTO: 27 %
MYELOCYTES # BLD: 0 10E9/L
MYELOCYTES NFR BLD MANUAL: 2 %
NEUTROPHILS # BLD AUTO: 0.7 10E9/L (ref 1.6–8.3)
NEUTROPHILS NFR BLD AUTO: 44 %
NRBC # BLD AUTO: 0 10*3/UL
NRBC BLD AUTO-RTO: 1 /100
PLATELET # BLD AUTO: 72 10E9/L (ref 150–450)
PLATELET # BLD AUTO: NORMAL 10E9/L (ref 150–450)
PLATELET # BLD EST: ABNORMAL 10*3/UL
POTASSIUM SERPL-SCNC: 4.1 MMOL/L (ref 3.4–5.3)
PROT SERPL-MCNC: 7 G/DL (ref 6.8–8.8)
RBC # BLD AUTO: 3.41 10E12/L (ref 4.4–5.9)
RBC # BLD AUTO: NORMAL 10E12/L (ref 4.4–5.9)
SODIUM SERPL-SCNC: 140 MMOL/L (ref 133–144)
WBC # BLD AUTO: 1.6 10E9/L (ref 4–11)
WBC # BLD AUTO: NORMAL 10E9/L (ref 4–11)

## 2019-07-23 PROCEDURE — 85025 COMPLETE CBC W/AUTO DIFF WBC: CPT | Performed by: PHYSICIAN ASSISTANT

## 2019-07-23 PROCEDURE — 80053 COMPREHEN METABOLIC PANEL: CPT | Performed by: PHYSICIAN ASSISTANT

## 2019-07-23 PROCEDURE — 36415 COLL VENOUS BLD VENIPUNCTURE: CPT

## 2019-07-23 NOTE — PROGRESS NOTES
Spoke to Prudencio to review neutropenic precautions as his ANC is 0.7 .  He is currently taking his Levaquin as prescribed alonf with acyclovir and fluconazole.  He reports feeling better today then over the weekend, he feels his counts are recovering.  He verbalized understanding of s/sx to report.  Encouraged him to call with any questions or concerns.

## 2019-07-25 ENCOUNTER — MYC REFILL (OUTPATIENT)
Dept: ONCOLOGY | Facility: CLINIC | Age: 44
End: 2019-07-25

## 2019-07-25 DIAGNOSIS — K21.9 GASTROESOPHAGEAL REFLUX DISEASE WITHOUT ESOPHAGITIS: ICD-10-CM

## 2019-07-26 ENCOUNTER — INFUSION THERAPY VISIT (OUTPATIENT)
Dept: INFUSION THERAPY | Facility: CLINIC | Age: 44
End: 2019-07-26
Attending: INTERNAL MEDICINE
Payer: COMMERCIAL

## 2019-07-26 ENCOUNTER — HOSPITAL ENCOUNTER (OUTPATIENT)
Facility: CLINIC | Age: 44
Setting detail: SPECIMEN
Discharge: HOME | End: 2019-07-26
Attending: INTERNAL MEDICINE | Admitting: PHYSICIAN ASSISTANT
Payer: COMMERCIAL

## 2019-07-26 ENCOUNTER — PATIENT OUTREACH (OUTPATIENT)
Dept: ONCOLOGY | Facility: CLINIC | Age: 44
End: 2019-07-26

## 2019-07-26 DIAGNOSIS — C83.30 DIFFUSE LARGE B-CELL LYMPHOMA, UNSPECIFIED BODY REGION (H): Primary | ICD-10-CM

## 2019-07-26 LAB
ALBUMIN SERPL-MCNC: 3.6 G/DL (ref 3.4–5)
ALP SERPL-CCNC: 66 U/L (ref 40–150)
ALT SERPL W P-5'-P-CCNC: 46 U/L (ref 0–70)
ANION GAP SERPL CALCULATED.3IONS-SCNC: 3 MMOL/L (ref 3–14)
AST SERPL W P-5'-P-CCNC: 22 U/L (ref 0–45)
BASOPHILS # BLD AUTO: 0.2 10E9/L (ref 0–0.2)
BASOPHILS NFR BLD AUTO: 1 %
BILIRUB SERPL-MCNC: 0.2 MG/DL (ref 0.2–1.3)
BUN SERPL-MCNC: 12 MG/DL (ref 7–30)
CALCIUM SERPL-MCNC: 8.7 MG/DL (ref 8.5–10.1)
CHLORIDE SERPL-SCNC: 108 MMOL/L (ref 94–109)
CO2 SERPL-SCNC: 31 MMOL/L (ref 20–32)
CREAT SERPL-MCNC: 1.03 MG/DL (ref 0.66–1.25)
DACRYOCYTES BLD QL SMEAR: SLIGHT
DIFFERENTIAL METHOD BLD: ABNORMAL
EOSINOPHIL # BLD AUTO: 0 10E9/L (ref 0–0.7)
EOSINOPHIL NFR BLD AUTO: 0 %
ERYTHROCYTE [DISTWIDTH] IN BLOOD BY AUTOMATED COUNT: 15.8 % (ref 10–15)
GFR SERPL CREATININE-BSD FRML MDRD: 88 ML/MIN/{1.73_M2}
GLUCOSE SERPL-MCNC: 113 MG/DL (ref 70–99)
HCT VFR BLD AUTO: 31.8 % (ref 40–53)
HGB BLD-MCNC: 10.3 G/DL (ref 13.3–17.7)
LYMPHOCYTES # BLD AUTO: 0.8 10E9/L (ref 0.8–5.3)
LYMPHOCYTES NFR BLD AUTO: 5 %
MCH RBC QN AUTO: 32 PG (ref 26.5–33)
MCHC RBC AUTO-ENTMCNC: 32.4 G/DL (ref 31.5–36.5)
MCV RBC AUTO: 99 FL (ref 78–100)
METAMYELOCYTES # BLD: 0.8 10E9/L
METAMYELOCYTES NFR BLD MANUAL: 5 %
MONOCYTES # BLD AUTO: 1.2 10E9/L (ref 0–1.3)
MONOCYTES NFR BLD AUTO: 8 %
MYELOCYTES # BLD: 0.8 10E9/L
MYELOCYTES NFR BLD MANUAL: 5 %
NEUTROPHILS # BLD AUTO: 11.4 10E9/L (ref 1.6–8.3)
NEUTROPHILS NFR BLD AUTO: 76 %
NRBC # BLD AUTO: 0.2 10*3/UL
NRBC BLD AUTO-RTO: 1 /100
PLATELET # BLD AUTO: 127 10E9/L (ref 150–450)
PLATELET # BLD EST: ABNORMAL 10*3/UL
POTASSIUM SERPL-SCNC: 3.9 MMOL/L (ref 3.4–5.3)
PROT SERPL-MCNC: 6.9 G/DL (ref 6.8–8.8)
RBC # BLD AUTO: 3.22 10E12/L (ref 4.4–5.9)
SODIUM SERPL-SCNC: 142 MMOL/L (ref 133–144)
TOXIC GRANULES BLD QL SMEAR: PRESENT
WBC # BLD AUTO: 15 10E9/L (ref 4–11)

## 2019-07-26 PROCEDURE — 36415 COLL VENOUS BLD VENIPUNCTURE: CPT

## 2019-07-26 PROCEDURE — 85025 COMPLETE CBC W/AUTO DIFF WBC: CPT | Performed by: PHYSICIAN ASSISTANT

## 2019-07-26 PROCEDURE — 80053 COMPREHEN METABOLIC PANEL: CPT | Performed by: PHYSICIAN ASSISTANT

## 2019-07-26 NOTE — PROGRESS NOTES
Medical Assistant Note:  Christiano Molina presents today for blood draw.    Patient seen by provider today: No.   present during visit today: Not Applicable.    Concerns: No Concerns.    Procedure:  Lab draw site: LAC, Needle type: BF, Gauge: 23.    Post Assessment:  Labs drawn without difficulty: Yes.    Discharge Plan:  Departure Mode: Ambulatory.    Face to Face Time: 5 MIN  .    Omaira Reyes

## 2019-08-01 ENCOUNTER — MYC MEDICAL ADVICE (OUTPATIENT)
Dept: TRANSPLANT | Facility: CLINIC | Age: 44
End: 2019-08-01

## 2019-08-01 NOTE — LETTER
Date: August 2, 2019        To whom it may concern:    Christiano Molina is ready to return to work. I treated him for his lymphoma and his treatment is now complete.          Sincerely,      Vern Ramirez MD

## 2019-08-05 ENCOUNTER — MYC REFILL (OUTPATIENT)
Dept: ONCOLOGY | Facility: CLINIC | Age: 44
End: 2019-08-05

## 2019-08-05 DIAGNOSIS — C85.90: ICD-10-CM

## 2019-08-05 DIAGNOSIS — C85.10 HIGH GRADE B-CELL LYMPHOMA (H): ICD-10-CM

## 2019-08-06 RX ORDER — ACYCLOVIR 200 MG/1
400 CAPSULE ORAL 2 TIMES DAILY
Qty: 120 CAPSULE | Refills: 0 | Status: SHIPPED | OUTPATIENT
Start: 2019-08-06 | End: 2019-08-15

## 2019-08-06 RX ORDER — LORAZEPAM 0.5 MG/1
.5-1 TABLET ORAL EVERY 6 HOURS PRN
Qty: 30 TABLET | Refills: 0 | OUTPATIENT
Start: 2019-08-06

## 2019-08-06 NOTE — TELEPHONE ENCOUNTER
Writer contacted patient as Rx was filled 7/22/19 #30 with 0 refills.     Patient reports he was using Ativan more frequently when he was recently discharge, but is now using only 2-3 tablets/week. Patient reports he still has ~15 tablets left at this time and was trying to be proactive with refill request.     Patient agrees to have Rx refilled at upcoming visit if needed at that time.     Barbara Dumont RN   [facility]

## 2019-08-06 NOTE — TELEPHONE ENCOUNTER
"Per 7/11/19 visit note,   \"3. ID: No active concerns. Continue ppx ACV and fluconazole. Start Levaquin upon discharge and stop when ANC >1000.\"    Rx refilled as requested    Barbara Dumont RN   [facility] Triage     "

## 2019-08-12 ENCOUNTER — HOSPITAL ENCOUNTER (OUTPATIENT)
Dept: PET IMAGING | Facility: CLINIC | Age: 44
End: 2019-08-12
Attending: PHYSICIAN ASSISTANT
Payer: COMMERCIAL

## 2019-08-12 ENCOUNTER — HOSPITAL ENCOUNTER (OUTPATIENT)
Dept: PET IMAGING | Facility: CLINIC | Age: 44
Discharge: HOME | End: 2019-08-12
Attending: PHYSICIAN ASSISTANT | Admitting: PHYSICIAN ASSISTANT
Payer: COMMERCIAL

## 2019-08-12 DIAGNOSIS — C83.70 BURKITT LYMPHOMA, UNSPECIFIED BODY REGION (H): ICD-10-CM

## 2019-08-12 PROCEDURE — 34300033 ZZH RX 343: Performed by: PHYSICIAN ASSISTANT

## 2019-08-12 PROCEDURE — 25000128 H RX IP 250 OP 636: Performed by: PHYSICIAN ASSISTANT

## 2019-08-12 PROCEDURE — 70491 CT SOFT TISSUE NECK W/DYE: CPT

## 2019-08-12 PROCEDURE — A9552 F18 FDG: HCPCS | Performed by: PHYSICIAN ASSISTANT

## 2019-08-12 PROCEDURE — 78816 PET IMAGE W/CT FULL BODY: CPT | Mod: PS

## 2019-08-12 PROCEDURE — 71260 CT THORAX DX C+: CPT

## 2019-08-12 RX ORDER — IOPAMIDOL 755 MG/ML
10-140 INJECTION, SOLUTION INTRAVASCULAR ONCE
Status: COMPLETED | OUTPATIENT
Start: 2019-08-12 | End: 2019-08-12

## 2019-08-12 RX ADMIN — IOPAMIDOL 108 ML: 755 INJECTION, SOLUTION INTRAVENOUS at 10:03

## 2019-08-12 RX ADMIN — FLUDEOXYGLUCOSE F-18 10.52 MCI.: 500 INJECTION, SOLUTION INTRAVENOUS at 09:09

## 2019-08-14 NOTE — PROGRESS NOTES
"[facility]      Referring Provider: self     Dx:   1. BL, stage 1A, LDH slightly high, max size 6.2 cm, Dx 3/15/19    Tx:  1. Da-R-EPOCH x6, 3/27/19 to mid July 2019, AK after C2, CR after C4/6.     PMH: strasibusmus   Allergies: None     Interval Hx: Intermittent tingling in fingertips and toes. ROS otherwise neg on a 10-point review.     Lab Results   Component Value Date    WBC 15.0 (H) 07/26/2019    HGB 10.3 (L) 07/26/2019    HCT 31.8 (L) 07/26/2019     (L) 07/26/2019     07/26/2019    POTASSIUM 3.9 07/26/2019    CHLORIDE 108 07/26/2019    CO2 31 07/26/2019    BUN 12 07/26/2019    CR 1.03 07/26/2019     (H) 07/26/2019    SED 12 06/28/2019    AST 22 07/26/2019    ALT 46 07/26/2019    ALKPHOS 66 07/26/2019    BILITOTAL 0.2 07/26/2019    INR 1.12 07/15/2019       Current Outpatient Medications   Medication     acetaminophen (TYLENOL) 325 MG tablet     ranitidine (ZANTAC) 150 MG tablet     No current facility-administered medications for this visit.      Ph/E:   Vitals: /82 (BP Location: Right arm, Patient Position: Sitting, Cuff Size: Adult Regular)   Pulse 70   Temp 98.3  F (36.8  C) (Oral)   Resp 16   Ht 1.78 m (5' 10.08\")   Wt 82.6 kg (182 lb)   SpO2 99%   BMI 26.05 kg/m    BMI= Body mass index is 26.05 kg/m .  ECOG PS: 0  General: NAD; H&N: no jaundice or mucosal lesions; Lungs clear; Heart RRR; Abdomen; Soft, No organomegaly; Extremities: No edema; Skin: No rash; Neuro: Nonfocal; Mood/Affect: appropriate; Lymph: no LAP    Assessment and Plan:  1. BL: CR. See us in 3m with labs and in 6m with PET (no CT).   2. ID: Stop acyclovir.               "

## 2019-08-15 ENCOUNTER — OFFICE VISIT (OUTPATIENT)
Dept: TRANSPLANT | Facility: CLINIC | Age: 44
End: 2019-08-15
Attending: INTERNAL MEDICINE
Payer: COMMERCIAL

## 2019-08-15 VITALS
OXYGEN SATURATION: 99 % | SYSTOLIC BLOOD PRESSURE: 129 MMHG | WEIGHT: 182 LBS | HEIGHT: 70 IN | RESPIRATION RATE: 16 BRPM | BODY MASS INDEX: 26.05 KG/M2 | HEART RATE: 70 BPM | DIASTOLIC BLOOD PRESSURE: 82 MMHG | TEMPERATURE: 98.3 F

## 2019-08-15 DIAGNOSIS — C83.74 BURKITT LYMPHOMA OF LYMPH NODES OF AXILLA (H): Primary | ICD-10-CM

## 2019-08-15 PROCEDURE — G0463 HOSPITAL OUTPT CLINIC VISIT: HCPCS | Mod: ZF

## 2019-08-15 ASSESSMENT — PAIN SCALES - GENERAL: PAINLEVEL: NO PAIN (0)

## 2019-08-15 ASSESSMENT — MIFFLIN-ST. JEOR: SCORE: 1728.07

## 2019-08-15 NOTE — NURSING NOTE
"Oncology Rooming Note    August 15, 2019 5:24 PM   Christiano Molina is a 44 year old male who presents for:    Chief Complaint   Patient presents with     Oncology Clinic Visit     Return: High grade malignant lymphoma      Initial Vitals: /82 (BP Location: Right arm, Patient Position: Sitting, Cuff Size: Adult Regular)   Pulse 70   Temp 98.3  F (36.8  C) (Oral)   Resp 16   Ht 1.78 m (5' 10.08\")   Wt 82.6 kg (182 lb)   SpO2 99%   BMI 26.05 kg/m   Estimated body mass index is 26.05 kg/m  as calculated from the following:    Height as of this encounter: 1.78 m (5' 10.08\").    Weight as of this encounter: 82.6 kg (182 lb). Body surface area is 2.02 meters squared.  No Pain (0) Comment: Data Unavailable   No LMP for male patient.  Allergies reviewed: Yes  Medications reviewed: Yes    Medications: Medication refills not needed today.  Pharmacy name entered into [employer]: [id number] - [place] - [id number] AT [facility] OF [address]    Clinical concerns: N/A      Iqra Collins CMA              "

## 2019-09-06 ENCOUNTER — MYC MEDICAL ADVICE (OUTPATIENT)
Dept: TRANSPLANT | Facility: CLINIC | Age: 44
End: 2019-09-06

## 2019-09-09 DIAGNOSIS — C83.70 BURKITT LYMPHOMA (H): Primary | ICD-10-CM

## 2019-09-09 DIAGNOSIS — C85.10 HIGH GRADE B-CELL LYMPHOMA (H): ICD-10-CM

## 2019-09-09 DIAGNOSIS — F32.A DEPRESSION: ICD-10-CM

## 2019-09-09 DIAGNOSIS — F41.9 ANXIETY: ICD-10-CM

## 2019-09-10 ENCOUNTER — MYC MEDICAL ADVICE (OUTPATIENT)
Dept: TRANSPLANT | Facility: CLINIC | Age: 44
End: 2019-09-10

## 2019-09-11 DIAGNOSIS — F32.A DEPRESSION: ICD-10-CM

## 2019-09-11 DIAGNOSIS — F41.9 ANXIETY: ICD-10-CM

## 2019-09-11 DIAGNOSIS — C83.70 BURKITT LYMPHOMA (H): ICD-10-CM

## 2019-09-11 DIAGNOSIS — C85.10 HIGH GRADE B-CELL LYMPHOMA (H): Primary | ICD-10-CM

## 2019-09-16 ENCOUNTER — ONCOLOGY VISIT (OUTPATIENT)
Dept: ONCOLOGY | Facility: CLINIC | Age: 44
End: 2019-09-16
Attending: INTERNAL MEDICINE
Payer: COMMERCIAL

## 2019-09-16 DIAGNOSIS — C83.70 BURKITT LYMPHOMA (H): ICD-10-CM

## 2019-09-16 DIAGNOSIS — F32.A DEPRESSION: ICD-10-CM

## 2019-09-16 DIAGNOSIS — F41.9 ANXIETY: ICD-10-CM

## 2019-09-16 DIAGNOSIS — C85.10 HIGH GRADE B-CELL LYMPHOMA (H): ICD-10-CM

## 2019-09-16 PROCEDURE — G0463 HOSPITAL OUTPT CLINIC VISIT: HCPCS

## 2019-09-16 PROCEDURE — 90791 PSYCH DIAGNOSTIC EVALUATION: CPT | Performed by: PSYCHOLOGIST

## 2019-09-17 NOTE — PROGRESS NOTES
Confidential Summary of Oncology Psychology Initial Visit    Christiano Molina is a 44 year old male who was referred by Dr. Ramirez for anxiousness following his blood cancer treatmetn. The patient was seen for an initial 45 minute evaluation on 9/16/2019.    Presenting Concerns: He reported anxiousness, worry, slowed mental processing, cancer ruminations, fatigue, worry related to leaving his house, worry about being away from family, some sleep difficulty, and uncertainty.     Mental Status/Interview:   Orientation: Christiano Molina was alert, attentive, and oriented to time, place, and person  Affect: Affect was appropriate to the situation and showed normal range and stability.  Speech: Speech was clear with normal fluency, rate, tone, and volume.  Memory: Although not formally assessed, immediate, recent, and remote memory appeared to be intact.   Insight and Judgement: Christiano Molina demonstrates adequate awareness of the issues discussed and was able to come to reasonable conclusions.  Thought: Thought patterns were coherent and logical. Hallucinations were denied and delusions were not evident.   Lethality: Christiano Molina denied suicidal or homicidal ideation or intention.        Impression: His usual disposition is that of an introvert so he gain energy from being alone or with a small group of people. During his cancer treatment he always felt safe at home with his family so he learned the habit of not wanting to leave his house. He knows he needs to go to work and wants to go to his [employer] activities but sometime struggles with feeling anxious leaving his home and family. The thought patterns promoting his anxiousness were discussed and he was shown an intervention. He was encouraged to return for additional appointments.     Diagnosis:   Encounter Diagnoses   Name Primary?     High grade B-cell lymphoma (H)      Burkitt lymphoma (H)      Anxiety      Depression      Recommendations/Plan:  1. Use the  thought stopping and active avoidance tools to decrease anxiousness related to leaving his family and home.   2. Return for follow-up as needed.     Thank you for this opportunity to participate in your care of this patient.    Rubens Landers Psy.D., L.P.  Director, Oncology Supportive Care

## 2019-09-30 ENCOUNTER — ONCOLOGY VISIT (OUTPATIENT)
Dept: ONCOLOGY | Facility: CLINIC | Age: 44
End: 2019-09-30
Attending: PSYCHOLOGIST
Payer: COMMERCIAL

## 2019-09-30 DIAGNOSIS — F41.9 ANXIETY: Primary | ICD-10-CM

## 2019-09-30 PROCEDURE — 90834 PSYTX W PT 45 MINUTES: CPT | Performed by: PSYCHOLOGIST

## 2019-10-07 NOTE — PROGRESS NOTES
Confidential Summary of Oncology Psychology Followup Visit    Christiano Molina is a 44 year old male who presents for anxiety. The patient was seen for a 44 minute appointment on 9/30/2019.    Subjective: He reported doing much better since our last appointment. He has been able to implement the behavioral techniques in his daily life and feels less anxiety and less depressed mood. The focus on this appointment was strengthening those skills and increasing the areas of his life where he uses them.     Objective: He demonstrated an appropriate range of affect during this appointment.     Diagnosis:   Encounter Diagnosis   Name Primary?     Anxiety Yes     Recommendations/Plan:  1. Increase the usage of the behavioral tools discussed during this appointment.   2. Return for additional appointments as needed.     Thank you for this opportunity to participate in your care of this patient.    Rubens Landers Psy.D., L.P.  Director, Oncology Supportive Care

## 2019-11-06 ENCOUNTER — HEALTH MAINTENANCE LETTER (OUTPATIENT)
Age: 44
End: 2019-11-06

## 2019-11-06 DIAGNOSIS — C83.30 DLBCL (DIFFUSE LARGE B CELL LYMPHOMA) (H): Primary | ICD-10-CM

## 2019-11-15 ENCOUNTER — APPOINTMENT (OUTPATIENT)
Dept: LAB | Facility: CLINIC | Age: 44
End: 2019-11-15
Attending: INTERNAL MEDICINE
Payer: COMMERCIAL

## 2019-11-15 ENCOUNTER — ONCOLOGY VISIT (OUTPATIENT)
Dept: ONCOLOGY | Facility: CLINIC | Age: 44
End: 2019-11-15
Attending: INTERNAL MEDICINE
Payer: COMMERCIAL

## 2019-11-15 VITALS
HEART RATE: 69 BPM | SYSTOLIC BLOOD PRESSURE: 129 MMHG | OXYGEN SATURATION: 97 % | DIASTOLIC BLOOD PRESSURE: 78 MMHG | TEMPERATURE: 98.9 F | WEIGHT: 178.9 LBS | RESPIRATION RATE: 16 BRPM | BODY MASS INDEX: 25.61 KG/M2

## 2019-11-15 DIAGNOSIS — C83.30 DLBCL (DIFFUSE LARGE B CELL LYMPHOMA) (H): ICD-10-CM

## 2019-11-15 LAB
ALBUMIN SERPL-MCNC: 4.1 G/DL (ref 3.4–5)
ALP SERPL-CCNC: 65 U/L (ref 40–150)
ALT SERPL W P-5'-P-CCNC: 26 U/L (ref 0–70)
ANION GAP SERPL CALCULATED.3IONS-SCNC: 3 MMOL/L (ref 3–14)
AST SERPL W P-5'-P-CCNC: 12 U/L (ref 0–45)
BASOPHILS # BLD AUTO: 0 10E9/L (ref 0–0.2)
BASOPHILS NFR BLD AUTO: 0.6 %
BILIRUB SERPL-MCNC: 0.3 MG/DL (ref 0.2–1.3)
BUN SERPL-MCNC: 14 MG/DL (ref 7–30)
CALCIUM SERPL-MCNC: 8.9 MG/DL (ref 8.5–10.1)
CHLORIDE SERPL-SCNC: 108 MMOL/L (ref 94–109)
CO2 SERPL-SCNC: 28 MMOL/L (ref 20–32)
CREAT SERPL-MCNC: 1.22 MG/DL (ref 0.66–1.25)
DIFFERENTIAL METHOD BLD: ABNORMAL
EOSINOPHIL # BLD AUTO: 0 10E9/L (ref 0–0.7)
EOSINOPHIL NFR BLD AUTO: 0.8 %
ERYTHROCYTE [DISTWIDTH] IN BLOOD BY AUTOMATED COUNT: 12.1 % (ref 10–15)
GFR SERPL CREATININE-BSD FRML MDRD: 72 ML/MIN/{1.73_M2}
GLUCOSE SERPL-MCNC: 94 MG/DL (ref 70–99)
HCT VFR BLD AUTO: 44 % (ref 40–53)
HGB BLD-MCNC: 14.7 G/DL (ref 13.3–17.7)
IMM GRANULOCYTES # BLD: 0 10E9/L (ref 0–0.4)
IMM GRANULOCYTES NFR BLD: 0.3 %
LYMPHOCYTES # BLD AUTO: 0.7 10E9/L (ref 0.8–5.3)
LYMPHOCYTES NFR BLD AUTO: 19 %
MCH RBC QN AUTO: 29.6 PG (ref 26.5–33)
MCHC RBC AUTO-ENTMCNC: 33.4 G/DL (ref 31.5–36.5)
MCV RBC AUTO: 89 FL (ref 78–100)
MONOCYTES # BLD AUTO: 0.3 10E9/L (ref 0–1.3)
MONOCYTES NFR BLD AUTO: 8.1 %
NEUTROPHILS # BLD AUTO: 2.6 10E9/L (ref 1.6–8.3)
NEUTROPHILS NFR BLD AUTO: 71.2 %
NRBC # BLD AUTO: 0 10*3/UL
NRBC BLD AUTO-RTO: 0 /100
PLATELET # BLD AUTO: 159 10E9/L (ref 150–450)
POTASSIUM SERPL-SCNC: 4.6 MMOL/L (ref 3.4–5.3)
PROT SERPL-MCNC: 7.3 G/DL (ref 6.8–8.8)
RBC # BLD AUTO: 4.96 10E12/L (ref 4.4–5.9)
SODIUM SERPL-SCNC: 139 MMOL/L (ref 133–144)
WBC # BLD AUTO: 3.6 10E9/L (ref 4–11)

## 2019-11-15 PROCEDURE — 99214 OFFICE O/P EST MOD 30 MIN: CPT | Mod: ZP | Performed by: PHYSICIAN ASSISTANT

## 2019-11-15 PROCEDURE — 80053 COMPREHEN METABOLIC PANEL: CPT | Performed by: INTERNAL MEDICINE

## 2019-11-15 PROCEDURE — G0463 HOSPITAL OUTPT CLINIC VISIT: HCPCS | Mod: ZF

## 2019-11-15 PROCEDURE — 36415 COLL VENOUS BLD VENIPUNCTURE: CPT

## 2019-11-15 PROCEDURE — 85025 COMPLETE CBC W/AUTO DIFF WBC: CPT | Performed by: INTERNAL MEDICINE

## 2019-11-15 RX ORDER — TRAZODONE HYDROCHLORIDE 100 MG/1
100 TABLET ORAL AT BEDTIME
COMMUNITY

## 2019-11-15 ASSESSMENT — PAIN SCALES - GENERAL: PAINLEVEL: NO PAIN (0)

## 2019-11-15 NOTE — NURSING NOTE
"Oncology Rooming Note    November 15, 2019 1:57 PM   Christiano Molina is a 44 year old male who presents for:    Chief Complaint   Patient presents with     Lab Only     venipuncture, vitals checked     Oncology Clinic Visit     Return visit; lymphoma     Initial Vitals: /78   Pulse 69   Temp 98.9  F (37.2  C)   Resp 16   Wt 81.1 kg (178 lb 14.4 oz)   SpO2 97%   BMI 25.61 kg/m   Estimated body mass index is 25.61 kg/m  as calculated from the following:    Height as of 8/15/19: 1.78 m (5' 10.08\").    Weight as of this encounter: 81.1 kg (178 lb 14.4 oz). Body surface area is 2 meters squared.  No Pain (0) Comment: Data Unavailable   No LMP for male patient.  Allergies reviewed: Yes  Medications reviewed: Yes    Medications: Medication refills not needed today.  Pharmacy name entered into [employer]: [facility] #[id number] - [place] [facility] [place] - [address] AT [facility] [address]    Clinical concerns: No new concerns today. Holly Wheat was notified.      CORIN SANCHEZ, JAMES            "

## 2019-11-15 NOTE — PROGRESS NOTES
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 He presented with R axillary mass. PET/CT showed stage IA disease. Bone marrow biopsy and LP done 3/25 were negative for disease. Please see Dr. Ramirez's note for further discussion of diagnostic work-up.     Plan for 2 cycles of DA-R-EPOCH followed by PET/CT. He presented for cycle 1 on 3/27/19 and was discharged on 3/31/19. He tolerated chemotherapy well apart from mild hives and facial itching from Rituxan (was able to complete dose), chemotherapy-induced nausea, and mild post-LP occipital headache.     Following discharge, his pathology was finalized here with Ki-67 positive in 95-99% of lymphoma cells. TDT negative, EBV was strongly positive. The positive findings for CD20, CD10, BCL6, very high Ki-67 with negative BCL2, as well as MYC translocation supports diagnoses of Burkitt's lymphoma.     Cycle 2 of R-EPOCH was given 4/17-4/21/19. He had a partial response seen on imaging after 2 cycles. PET/CT after 4 cycles showed CR. Cycle 5 was complicated by appendicitis following chemotherapy. He received cycle 6 at same dose level as cycle 5. Cycle 6 was given 7/11-7/15/19. Post-therapy PET showed continued CR.     He presents today in routine 3 month follow-up on surveillance.     Interval History: Prudencio returns today alone. He is feeling well. He has been recovering well apart from experiencing anxiety and depression following end of treatment when he started his job again and got back into \"real life\". His PCP put him on zoloft which has greatly improved his mood. He saw Mukund Landers a few times as well. He has been slowly exercising again and has been building his " stamina. Currently walking 30+ minutes per day. Feels like energy level is at about 70%. Neuropathy is 80-95% gone. No other residual symptoms.     No fevers/chills/sweats, adenopathy, persistent new or different pain, anorexia. ROS otherwise negative.     Current Outpatient Medications   Medication     acetaminophen (TYLENOL) 325 MG tablet     ranitidine (ZANTAC) 150 MG tablet     sertraline (ZOLOFT) 50 MG tablet     traZODone (DESYREL) 100 MG tablet     No current facility-administered medications for this visit.      PHYSICAL EXAM:  General: The patient is a pleasant male in no acute distress.  /78   Pulse 69   Temp 98.9  F (37.2  C)   Resp 16   Wt 81.1 kg (178 lb 14.4 oz)   SpO2 97%   BMI 25.61 kg/m    Wt Readings from Last 10 Encounters:   11/15/19 81.1 kg (178 lb 14.4 oz)   08/15/19 82.6 kg (182 lb)   07/18/19 80.1 kg (176 lb 8 oz)   07/15/19 78.8 kg (173 lb 12.8 oz)   07/11/19 78.8 kg (173 lb 11.2 oz)   07/05/19 78.9 kg (174 lb)   07/01/19 77.7 kg (171 lb 3.2 oz)   06/24/19 81.2 kg (179 lb)   06/20/19 81.1 kg (178 lb 11.2 oz)   06/03/19 81.6 kg (180 lb)   HEENT: EOMI, PERRL. Sclerae are anicteric. Oral mucosa is pink and moist with no lesions or thrush.   Lymph: Neck is supple with no lymphadenopathy in the cervical, supraclavicular, or axillary areas.   Heart: Regular rate and rhythm.   Lungs: Clear to auscultation bilaterally.   Abdomen: Bowel sounds present, soft, nontender with no palpable hepatosplenomegaly or masses.   Extremities: No lower extremity edema noted bilaterally.   Neuro: Cranial nerves II through XII are grossly intact.  Skin: No rashes, petechiae, or bruising noted on exposed skin.    Labs:    11/15/2019 13:37   Sodium 139   Potassium 4.6   Chloride 108   Carbon Dioxide 28   Urea Nitrogen 14   Creatinine 1.22   GFR Estimate 72   GFR Estimate If Black 83   Calcium 8.9   Anion Gap 3   Albumin 4.1   Protein Total 7.3   Bilirubin Total 0.3   Alkaline Phosphatase 65   ALT 26   AST  12   Glucose 94   WBC 3.6 (L)   Hemoglobin 14.7   Hematocrit 44.0   Platelet Count 159   RBC Count 4.96   MCV 89   MCH 29.6   MCHC 33.4   RDW 12.1   Diff Method Automated Method   % Neutrophils 71.2   % Lymphocytes 19.0   % Monocytes 8.1   % Eosinophils 0.8   % Basophils 0.6   % Immature Granulocytes 0.3   Nucleated RBCs 0   Absolute Neutrophil 2.6   Absolute Lymphocytes 0.7 (L)   Absolute Monocytes 0.3   Absolute Eosinophils 0.0   Absolute Basophils 0.0   Abs Immature Granulocytes 0.0   Absolute Nucleated RBC 0.0           Assessment & Plan:     1. Stage Ia Burkitt's lymphoma: Negative marrow or CNS involvement. S/p 6 cycles of R-EPOCH with Neulasta support. He achieved CR after 4 cycles and post-therapy PET confirmed this. He is feeling well and lab work is normal today besides some leukopenia which should improve with time. Reviewed surveillance plan moving forward and s/s to call about. Follow-up in 3 months with Dr. Ramirez with PET.      Greater than 25 minutes was spent with this patient with greater than 50% spent in counseling and coordination of care.    Holly Wheat PA-C    [facility]  [address] [place] [id number]  [phone]

## 2019-11-15 NOTE — LETTER
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 He presented with R axillary mass. PET/CT showed stage IA disease. Bone marrow biopsy and LP done 3/25 were negative for disease. Please see Dr. Ramirez's note for further discussion of diagnostic work-up.     Plan for 2 cycles of DA-R-EPOCH followed by PET/CT. He presented for cycle 1 on 3/27/19 and was discharged on 3/31/19. He tolerated chemotherapy well apart from mild hives and facial itching from Rituxan (was able to complete dose), chemotherapy-induced nausea, and mild post-LP occipital headache.     Following discharge, his pathology was finalized here with Ki-67 positive in 95-99% of lymphoma cells. TDT negative, EBV was strongly positive. The positive findings for CD20, CD10, BCL6, very high Ki-67 with negative BCL2, as well as MYC translocation supports diagnoses of Burkitt's lymphoma.     Cycle 2 of R-EPOCH was given 4/17-4/21/19. He had a partial response seen on imaging after 2 cycles. PET/CT after 4 cycles showed CR. Cycle 5 was complicated by appendicitis following chemotherapy. He received cycle 6 at same dose level as cycle 5. Cycle 6 was given 7/11-7/15/19. Post-therapy PET showed continued CR.     He presents today in routine 3 month follow-up on surveillance.     Interval History: Prudencio returns today alone. He is feeling well. He has been recovering well apart from experiencing anxiety and depression following end of treatment when he started his job again and got back into \"real life\". His PCP put him on zoloft which has greatly improved his mood. He saw Mukund " Anshul a few times as well. He has been slowly exercising again and has been building his stamina. Currently walking 30+ minutes per day. Feels like energy level is at about 70%. Neuropathy is 80-95% gone. No other residual symptoms.     No fevers/chills/sweats, adenopathy, persistent new or different pain, anorexia. ROS otherwise negative.     Current Outpatient Medications   Medication     acetaminophen (TYLENOL) 325 MG tablet     ranitidine (ZANTAC) 150 MG tablet     sertraline (ZOLOFT) 50 MG tablet     traZODone (DESYREL) 100 MG tablet     No current facility-administered medications for this visit.      PHYSICAL EXAM:  General: The patient is a pleasant male in no acute distress.  /78   Pulse 69   Temp 98.9  F (37.2  C)   Resp 16   Wt 81.1 kg (178 lb 14.4 oz)   SpO2 97%   BMI 25.61 kg/m     Wt Readings from Last 10 Encounters:   11/15/19 81.1 kg (178 lb 14.4 oz)   08/15/19 82.6 kg (182 lb)   07/18/19 80.1 kg (176 lb 8 oz)   07/15/19 78.8 kg (173 lb 12.8 oz)   07/11/19 78.8 kg (173 lb 11.2 oz)   07/05/19 78.9 kg (174 lb)   07/01/19 77.7 kg (171 lb 3.2 oz)   06/24/19 81.2 kg (179 lb)   06/20/19 81.1 kg (178 lb 11.2 oz)   06/03/19 81.6 kg (180 lb)   HEENT: EOMI, PERRL. Sclerae are anicteric. Oral mucosa is pink and moist with no lesions or thrush.   Lymph: Neck is supple with no lymphadenopathy in the cervical, supraclavicular, or axillary areas.   Heart: Regular rate and rhythm.   Lungs: Clear to auscultation bilaterally.   Abdomen: Bowel sounds present, soft, nontender with no palpable hepatosplenomegaly or masses.   Extremities: No lower extremity edema noted bilaterally.   Neuro: Cranial nerves II through XII are grossly intact.  Skin: No rashes, petechiae, or bruising noted on exposed skin.    Labs:    11/15/2019 13:37   Sodium 139   Potassium 4.6   Chloride 108   Carbon Dioxide 28   Urea Nitrogen 14   Creatinine 1.22   GFR Estimate 72   GFR Estimate If Black 83   Calcium 8.9   Anion Gap 3    Albumin 4.1   Protein Total 7.3   Bilirubin Total 0.3   Alkaline Phosphatase 65   ALT 26   AST 12   Glucose 94   WBC 3.6 (L)   Hemoglobin 14.7   Hematocrit 44.0   Platelet Count 159   RBC Count 4.96   MCV 89   MCH 29.6   MCHC 33.4   RDW 12.1   Diff Method Automated Method   % Neutrophils 71.2   % Lymphocytes 19.0   % Monocytes 8.1   % Eosinophils 0.8   % Basophils 0.6   % Immature Granulocytes 0.3   Nucleated RBCs 0   Absolute Neutrophil 2.6   Absolute Lymphocytes 0.7 (L)   Absolute Monocytes 0.3   Absolute Eosinophils 0.0   Absolute Basophils 0.0   Abs Immature Granulocytes 0.0   Absolute Nucleated RBC 0.0           Assessment & Plan:     1. Stage Ia Burkitt's lymphoma: Negative marrow or CNS involvement. S/p 6 cycles of R-EPOCH with Neulasta support. He achieved CR after 4 cycles and post-therapy PET confirmed this. He is feeling well and lab work is normal today besides some leukopenia which should improve with time. Reviewed surveillance plan moving forward and s/s to call about. Follow-up in 3 months with Dr. Ramirez with PET.      Greater than 25 minutes was spent with this patient with greater than 50% spent in counseling and coordination of care.    Holly Wheat PA-C    [facility]  [address] [place] [id number] [phone]

## 2020-01-27 ENCOUNTER — MYC MEDICAL ADVICE (OUTPATIENT)
Dept: TRANSPLANT | Facility: CLINIC | Age: 45
End: 2020-01-27

## 2020-02-06 DIAGNOSIS — C85.10 HIGH GRADE B-CELL LYMPHOMA (H): Primary | ICD-10-CM

## 2020-02-10 ENCOUNTER — MYC REFILL (OUTPATIENT)
Dept: ONCOLOGY | Facility: CLINIC | Age: 45
End: 2020-02-10

## 2020-02-10 DIAGNOSIS — K21.9 GASTROESOPHAGEAL REFLUX DISEASE WITHOUT ESOPHAGITIS: ICD-10-CM

## 2020-02-11 RX ORDER — FAMOTIDINE 20 MG/1
20 TABLET, FILM COATED ORAL DAILY
Qty: 30 TABLET | Refills: 1 | Status: SHIPPED | OUTPATIENT
Start: 2020-02-11

## 2020-02-11 NOTE — TELEPHONE ENCOUNTER
"Pt request for Zantac. Probably changing meds due to recall. Routing to a provider for another appropriate medication.    Per Dorina  \"Pepcid 20 mg once a day is fine.\"  Ok with Pt, filling.  "

## 2020-02-17 ENCOUNTER — HOSPITAL ENCOUNTER (OUTPATIENT)
Dept: LAB | Facility: CLINIC | Age: 45
End: 2020-02-17
Attending: PHYSICIAN ASSISTANT
Payer: COMMERCIAL

## 2020-02-17 ENCOUNTER — HOSPITAL ENCOUNTER (OUTPATIENT)
Dept: CT IMAGING | Facility: CLINIC | Age: 45
End: 2020-02-17
Attending: PHYSICIAN ASSISTANT
Payer: COMMERCIAL

## 2020-02-17 ENCOUNTER — HOSPITAL ENCOUNTER (OUTPATIENT)
Dept: CT IMAGING | Facility: CLINIC | Age: 45
Discharge: HOME | End: 2020-02-17
Attending: PHYSICIAN ASSISTANT | Admitting: PHYSICIAN ASSISTANT
Payer: COMMERCIAL

## 2020-02-17 DIAGNOSIS — C83.30 DLBCL (DIFFUSE LARGE B CELL LYMPHOMA) (H): ICD-10-CM

## 2020-02-17 DIAGNOSIS — C85.10 HIGH GRADE B-CELL LYMPHOMA (H): ICD-10-CM

## 2020-02-17 LAB
ALBUMIN SERPL-MCNC: 3.8 G/DL (ref 3.4–5)
ALP SERPL-CCNC: 63 U/L (ref 40–150)
ALT SERPL W P-5'-P-CCNC: 24 U/L (ref 0–70)
ANION GAP SERPL CALCULATED.3IONS-SCNC: 3 MMOL/L (ref 3–14)
AST SERPL W P-5'-P-CCNC: 18 U/L (ref 0–45)
BASOPHILS # BLD AUTO: 0 10E9/L (ref 0–0.2)
BASOPHILS NFR BLD AUTO: 0.4 %
BILIRUB SERPL-MCNC: 0.3 MG/DL (ref 0.2–1.3)
BUN SERPL-MCNC: 18 MG/DL (ref 7–30)
CALCIUM SERPL-MCNC: 8.9 MG/DL (ref 8.5–10.1)
CHLORIDE SERPL-SCNC: 107 MMOL/L (ref 94–109)
CO2 SERPL-SCNC: 29 MMOL/L (ref 20–32)
CREAT SERPL-MCNC: 1.07 MG/DL (ref 0.66–1.25)
DIFFERENTIAL METHOD BLD: NORMAL
EOSINOPHIL # BLD AUTO: 0.2 10E9/L (ref 0–0.7)
EOSINOPHIL NFR BLD AUTO: 3.2 %
ERYTHROCYTE [DISTWIDTH] IN BLOOD BY AUTOMATED COUNT: 13.3 % (ref 10–15)
GFR SERPL CREATININE-BSD FRML MDRD: 84 ML/MIN/{1.73_M2}
GLUCOSE SERPL-MCNC: 92 MG/DL (ref 70–99)
HCT VFR BLD AUTO: 40.7 % (ref 40–53)
HGB BLD-MCNC: 13.7 G/DL (ref 13.3–17.7)
IMM GRANULOCYTES # BLD: 0 10E9/L (ref 0–0.4)
IMM GRANULOCYTES NFR BLD: 0 %
LYMPHOCYTES # BLD AUTO: 1.2 10E9/L (ref 0.8–5.3)
LYMPHOCYTES NFR BLD AUTO: 22.2 %
MCH RBC QN AUTO: 30.4 PG (ref 26.5–33)
MCHC RBC AUTO-ENTMCNC: 33.7 G/DL (ref 31.5–36.5)
MCV RBC AUTO: 90 FL (ref 78–100)
MONOCYTES # BLD AUTO: 0.3 10E9/L (ref 0–1.3)
MONOCYTES NFR BLD AUTO: 5 %
NEUTROPHILS # BLD AUTO: 3.9 10E9/L (ref 1.6–8.3)
NEUTROPHILS NFR BLD AUTO: 69.2 %
NRBC # BLD AUTO: 0 10*3/UL
NRBC BLD AUTO-RTO: 0 /100
PLATELET # BLD AUTO: 185 10E9/L (ref 150–450)
POTASSIUM SERPL-SCNC: 4.2 MMOL/L (ref 3.4–5.3)
PROT SERPL-MCNC: 7 G/DL (ref 6.8–8.8)
RBC # BLD AUTO: 4.51 10E12/L (ref 4.4–5.9)
SODIUM SERPL-SCNC: 139 MMOL/L (ref 133–144)
WBC # BLD AUTO: 5.6 10E9/L (ref 4–11)

## 2020-02-17 PROCEDURE — 70492 CT SFT TSUE NCK W/O & W/DYE: CPT

## 2020-02-17 PROCEDURE — 74177 CT ABD & PELVIS W/CONTRAST: CPT

## 2020-02-17 PROCEDURE — 25000125 ZZHC RX 250: Performed by: PHYSICIAN ASSISTANT

## 2020-02-17 PROCEDURE — 25000128 H RX IP 250 OP 636: Performed by: PHYSICIAN ASSISTANT

## 2020-02-17 PROCEDURE — 80053 COMPREHEN METABOLIC PANEL: CPT | Performed by: PHYSICIAN ASSISTANT

## 2020-02-17 PROCEDURE — 85025 COMPLETE CBC W/AUTO DIFF WBC: CPT | Performed by: PHYSICIAN ASSISTANT

## 2020-02-17 RX ORDER — IOPAMIDOL 755 MG/ML
125 INJECTION, SOLUTION INTRAVASCULAR ONCE
Status: COMPLETED | OUTPATIENT
Start: 2020-02-17 | End: 2020-02-17

## 2020-02-17 RX ADMIN — IOPAMIDOL 125 ML: 755 INJECTION, SOLUTION INTRAVENOUS at 15:49

## 2020-02-17 RX ADMIN — SODIUM CHLORIDE 60 ML: 9 INJECTION, SOLUTION INTRAVENOUS at 15:50

## 2020-02-26 NOTE — PROGRESS NOTES
"[facility]      Referring Provider: self     Dx:   1. BL, stage 1A, LDH slightly high, max size 6.2 cm, Dx 3/15/19    Tx:  1. Da-R-EPOCH x6, 3/27/19 to mid July 2019, [place] after C2, CR after C4/6.     PMH: strasibusmus   Allergies: None     Interval Hx: ROS neg on a 10-point review.     Lab Results   Component Value Date    WBC 5.6 02/17/2020    HGB 13.7 02/17/2020    HCT 40.7 02/17/2020     02/17/2020     02/17/2020    POTASSIUM 4.2 02/17/2020    CHLORIDE 107 02/17/2020    CO2 29 02/17/2020    BUN 18 02/17/2020    CR 1.07 02/17/2020    GLC 92 02/17/2020    SED 12 06/28/2019    AST 18 02/17/2020    ALT 24 02/17/2020    ALKPHOS 63 02/17/2020    BILITOTAL 0.3 02/17/2020    INR 1.12 07/15/2019       Current Outpatient Medications   Medication     acetaminophen (TYLENOL) 325 MG tablet     famotidine (PEPCID) 20 MG tablet     ranitidine (ZANTAC) 150 MG tablet     sertraline (ZOLOFT) 50 MG tablet     traZODone (DESYREL) 100 MG tablet     No current facility-administered medications for this visit.      Ph/E:   Vitals: /82 (BP Location: Left arm, Patient Position: Sitting, Cuff Size: Adult Regular)   Pulse 67   Temp 98.7  F (37.1  C) (Oral)   Resp 18   Ht 1.78 m (5' 10.08\")   Wt 80.6 kg (177 lb 11.1 oz)   SpO2 99%   BMI 25.44 kg/m    BMI= Body mass index is 25.44 kg/m .  ECOG PS: 0  General: NAD; H&N: no jaundice or mucosal lesions; Lungs clear; Heart RRR; Abdomen; Soft, No organomegaly; Extremities: No edema; Skin: No rash; Neuro: Nonfocal; Mood/Affect: appropriate; Lymph: no LAP    Assessment and Plan:  1. BL: CR. See us in 3m with labs and in 6m with PET (no CT). If PET not approved, then N/C/A/P CT.               "

## 2020-02-27 ENCOUNTER — OFFICE VISIT (OUTPATIENT)
Dept: TRANSPLANT | Facility: CLINIC | Age: 45
End: 2020-02-27
Attending: PHYSICIAN ASSISTANT
Payer: COMMERCIAL

## 2020-02-27 VITALS
HEIGHT: 70 IN | SYSTOLIC BLOOD PRESSURE: 120 MMHG | WEIGHT: 177.69 LBS | DIASTOLIC BLOOD PRESSURE: 82 MMHG | HEART RATE: 67 BPM | BODY MASS INDEX: 25.44 KG/M2 | TEMPERATURE: 98.7 F | OXYGEN SATURATION: 99 % | RESPIRATION RATE: 18 BRPM

## 2020-02-27 DIAGNOSIS — C83.74 BURKITT LYMPHOMA OF LYMPH NODES OF AXILLA (H): Primary | ICD-10-CM

## 2020-02-27 PROCEDURE — G0463 HOSPITAL OUTPT CLINIC VISIT: HCPCS | Mod: ZF

## 2020-02-27 ASSESSMENT — MIFFLIN-ST. JEOR: SCORE: 1708.5

## 2020-02-27 ASSESSMENT — PAIN SCALES - GENERAL: PAINLEVEL: NO PAIN (0)

## 2020-02-27 NOTE — NURSING NOTE
"Oncology Rooming Note    February 27, 2020 10:00 AM   Christiano Molina is a 44 year old male who presents for:    Chief Complaint   Patient presents with     Oncology Clinic Visit     Patient with High grade B-cell lymphoma here for provider visit     Initial Vitals: /82 (BP Location: Left arm, Patient Position: Sitting, Cuff Size: Adult Regular)   Pulse 67   Temp 98.7  F (37.1  C) (Oral)   Resp 18   Ht 1.78 m (5' 10.08\")   Wt 80.6 kg (177 lb 11.1 oz)   SpO2 99%   BMI 25.44 kg/m   Estimated body mass index is 25.44 kg/m  as calculated from the following:    Height as of this encounter: 1.78 m (5' 10.08\").    Weight as of this encounter: 80.6 kg (177 lb 11.1 oz). Body surface area is 2 meters squared.  No Pain (0) Comment: Data Unavailable   No LMP for male patient.  Allergies reviewed: Yes  Medications reviewed: Yes    Medications: Medication refills not needed today.  Pharmacy name entered into [employer]: [facility] #[id number] - [place], [place] - [address] AT [facility] OF [place] [address]    Clinical concerns:     Sophia Tirado CMA              "

## 2020-05-26 ENCOUNTER — TELEPHONE (OUTPATIENT)
Dept: ONCOLOGY | Facility: CLINIC | Age: 45
End: 2020-05-26

## 2020-05-26 NOTE — TELEPHONE ENCOUNTER
"Patient is currently scheduled for an appointment at [facility] in [place].  Called patient to review current visitor restrictions and complete COVID-19 Patient Infection/Travel Screening Tool.     Due to the recent public health concerns around COVID-19 and in an effort to keep our patients and staff safe and healthy, we are implementing a screening process for the patients that come to our clinic.      I am going to ask you a few questions, please answer yes or no.  Your honesty about any symptoms is critical, as it keeps patients and staff healthy.      Do you have a:  Fever (or reported chills)?  No  New cough (started within the past 14 days)?  No  New shortness of breath (started within the past 14 days)?  No  Rash?  No    In the last month, have you been in contact with someone who was confirmed or suspected to have Coronavirus/COVID-19?  No    Have you traveled internationally in the last month?  No  If so, where?  N/A     I also wanted to let you know that to protect our patients from the flu and other common illnesses, [facility] enforce visitor restrictions year round, but due to the community spread of COVID-19 in [place], we are taking additional precautionary steps to ensure the health of our patients.  At this time, NO visitors are allowed on our hospital and clinic campuses.     Patient PASSED the screening assessment.    Patient instructed to come to the clinic as planned for their scheduled appointment and to call the clinic if any symptoms develop prior to their appointment.    \"Per CDC Guidelines, we are asking all patients that are coming into the building to wear a cloth covering that covers your mouth and nose.  You will be screened again at the entrance to the clinic for any Covid 19 symptoms. If you screen positive to any Covid 19 symptoms during our screening process you will be provided a surgical mask to wear during your time in the " "building.\"    \"COVID-19 is contagious and can be dangerous for our patients and staff.  Please send us a [employer] message or call our clinic before coming in if you feel any of the following symptoms: fever, cough, congestion, runny nose, sore throat, muscle aches and pains, or shortness of breath.  If you are already at our clinic, it is very important that you be honest about any symptoms you are experiencing to ensure your safety and that of other patients and staff who treat you.  If you do have symptoms, we will have a nurse and/or provider asses you to determine next steps.\"    Marce Dallas, JAMES on 5/26/2020 at 1:48 PM      "

## 2020-05-27 ENCOUNTER — INFUSION THERAPY VISIT (OUTPATIENT)
Dept: INFUSION THERAPY | Facility: CLINIC | Age: 45
End: 2020-05-27
Attending: INTERNAL MEDICINE
Payer: COMMERCIAL

## 2020-05-27 ENCOUNTER — VIRTUAL VISIT (OUTPATIENT)
Dept: ONCOLOGY | Facility: CLINIC | Age: 45
End: 2020-05-27
Attending: INTERNAL MEDICINE
Payer: COMMERCIAL

## 2020-05-27 ENCOUNTER — HOSPITAL ENCOUNTER (OUTPATIENT)
Facility: CLINIC | Age: 45
Setting detail: SPECIMEN
Discharge: HOME | End: 2020-05-27
Attending: INTERNAL MEDICINE | Admitting: INTERNAL MEDICINE
Payer: COMMERCIAL

## 2020-05-27 DIAGNOSIS — C85.10 HIGH GRADE B-CELL LYMPHOMA (H): Primary | ICD-10-CM

## 2020-05-27 DIAGNOSIS — C83.74 BURKITT LYMPHOMA OF LYMPH NODES OF AXILLA (H): ICD-10-CM

## 2020-05-27 LAB
ALBUMIN SERPL-MCNC: 4 G/DL (ref 3.4–5)
ALP SERPL-CCNC: 55 U/L (ref 40–150)
ALT SERPL W P-5'-P-CCNC: 28 U/L (ref 0–70)
ANION GAP SERPL CALCULATED.3IONS-SCNC: 3 MMOL/L (ref 3–14)
AST SERPL W P-5'-P-CCNC: 19 U/L (ref 0–45)
BASOPHILS # BLD AUTO: 0 10E9/L (ref 0–0.2)
BASOPHILS NFR BLD AUTO: 0.6 %
BILIRUB SERPL-MCNC: 0.4 MG/DL (ref 0.2–1.3)
BUN SERPL-MCNC: 15 MG/DL (ref 7–30)
CALCIUM SERPL-MCNC: 9 MG/DL (ref 8.5–10.1)
CHLORIDE SERPL-SCNC: 109 MMOL/L (ref 94–109)
CO2 SERPL-SCNC: 28 MMOL/L (ref 20–32)
CREAT SERPL-MCNC: 1.12 MG/DL (ref 0.66–1.25)
DIFFERENTIAL METHOD BLD: ABNORMAL
EOSINOPHIL # BLD AUTO: 0.1 10E9/L (ref 0–0.7)
EOSINOPHIL NFR BLD AUTO: 2.9 %
ERYTHROCYTE [DISTWIDTH] IN BLOOD BY AUTOMATED COUNT: 13 % (ref 10–15)
GFR SERPL CREATININE-BSD FRML MDRD: 79 ML/MIN/{1.73_M2}
GLUCOSE SERPL-MCNC: 90 MG/DL (ref 70–99)
HCT VFR BLD AUTO: 43.7 % (ref 40–53)
HGB BLD-MCNC: 14.8 G/DL (ref 13.3–17.7)
IMM GRANULOCYTES # BLD: 0 10E9/L (ref 0–0.4)
IMM GRANULOCYTES NFR BLD: 0 %
LDH SERPL L TO P-CCNC: 163 U/L (ref 85–227)
LYMPHOCYTES # BLD AUTO: 1 10E9/L (ref 0.8–5.3)
LYMPHOCYTES NFR BLD AUTO: 28.1 %
MCH RBC QN AUTO: 29.5 PG (ref 26.5–33)
MCHC RBC AUTO-ENTMCNC: 33.9 G/DL (ref 31.5–36.5)
MCV RBC AUTO: 87 FL (ref 78–100)
MONOCYTES # BLD AUTO: 0.3 10E9/L (ref 0–1.3)
MONOCYTES NFR BLD AUTO: 9.1 %
NEUTROPHILS # BLD AUTO: 2 10E9/L (ref 1.6–8.3)
NEUTROPHILS NFR BLD AUTO: 59.3 %
PLATELET # BLD AUTO: 153 10E9/L (ref 150–450)
POTASSIUM SERPL-SCNC: 4.4 MMOL/L (ref 3.4–5.3)
PROT SERPL-MCNC: 7.1 G/DL (ref 6.8–8.8)
RBC # BLD AUTO: 5.02 10E12/L (ref 4.4–5.9)
SODIUM SERPL-SCNC: 140 MMOL/L (ref 133–144)
WBC # BLD AUTO: 3.4 10E9/L (ref 4–11)

## 2020-05-27 PROCEDURE — 99213 OFFICE O/P EST LOW 20 MIN: CPT | Mod: GT | Performed by: PHYSICIAN ASSISTANT

## 2020-05-27 PROCEDURE — 80053 COMPREHEN METABOLIC PANEL: CPT | Performed by: INTERNAL MEDICINE

## 2020-05-27 PROCEDURE — 83615 LACTATE (LD) (LDH) ENZYME: CPT | Performed by: INTERNAL MEDICINE

## 2020-05-27 PROCEDURE — 36415 COLL VENOUS BLD VENIPUNCTURE: CPT

## 2020-05-27 PROCEDURE — 85025 COMPLETE CBC W/AUTO DIFF WBC: CPT | Performed by: INTERNAL MEDICINE

## 2020-05-27 PROCEDURE — 40000114 ZZH STATISTIC NO CHARGE CLINIC VISIT

## 2020-05-27 NOTE — PROGRESS NOTES
"Christiano Molina is a 44 year old male who is being evaluated via a billable video visit.      The patient has been notified of following:     \"This video visit will be conducted via a call between you and your physician/provider. We have found that certain health care needs can be provided without the need for an in-person physical exam.  This service lets us provide the care you need with a video conversation.  If a prescription is necessary we can send it directly to your pharmacy.  If lab work is needed we can place an order for that and you can then stop by our lab to have the test done at a later time.    Video visits are billed at different rates depending on your insurance coverage.  Please reach out to your insurance provider with any questions.    If during the course of the call the physician/provider feels a video visit is not appropriate, you will not be charged for this service.\"    Patient has given verbal consent for Video visit? Yes    How would you like to obtain your AVS? Kellyhart    Patient would like the video invitation sent by: Send to e-mail at: [email or web address]    Will anyone else be joining your video visit? No          Secondary Video Option (Doximity), send text message to:  [phone]    I have reviewed and updated the patient's allergies and medication list.    Concerns: Patient has no new concerns.      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 He presented with R axillary mass. PET/CT showed stage IA disease. Bone marrow biopsy and LP done 3/25 were negative for disease. Please see Dr. Ramirez's note for further discussion of diagnostic work-up. "      Plan for 2 cycles of DA-R-EPOCH followed by PET/CT. He presented for cycle 1 on 3/27/19 and was discharged on 3/31/19. He tolerated chemotherapy well apart from mild hives and facial itching from Rituxan (was able to complete dose), chemotherapy-induced nausea, and mild post-LP occipital headache.      Following discharge, his pathology was finalized here with Ki-67 positive in 95-99% of lymphoma cells. TDT negative, EBV was strongly positive. The positive findings for CD20, CD10, BCL6, very high Ki-67 with negative BCL2, as well as MYC translocation supports diagnoses of Burkitt's lymphoma.      Cycle 2 of R-EPOCH was given 4/17-4/21/19. He had a partial response seen on imaging after 2 cycles. PET/CT after 4 cycles showed CR. Cycle 5 was complicated by appendicitis following chemotherapy. He received cycle 6 at same dose level as cycle 5. Cycle 6 was given 7/11-7/15/19. Post-therapy PET showed continued CR. Last imaging showed continued CR as well.      He presents today in routine 3 month follow-up on surveillance.      Interval History: Prudencio is feeling well. He has been working at home during the COVID-19 pandemic and has been at home with his wife and 3 kids as well. Everyone is feeling well. No fevers/chills or URI symptoms. He had a cold in the winter which resolved without antibiotics. No other infectious symptoms. He has intermittent looser stools for a few days which occurs every few weeks to months. Then bowels return to baseline. He does have some reflux and dyspepsia and will take Pepcid AC PRN with relief. No abdominal pain, vomiting, blood in stool or other symptoms. No lumps/bumps, night sweats, lose of energy, or appetite. Weight is generally stable. He continues to have some fatigue following treatment but overall this is slowly improving. His neuropathy has continued to improve as well.     Objective:     Video physical exam  General: Patient appears well in no acute distress. Normal body  "habitus.  Skin: No visualized rash or lesions on visualized skin  Eyes: EOMI, no erythema, sclera icterus or discharge noted  Resp: Appears to be breathing comfortably without accessory muscle usage, speaking in full sentences, no cough  MSK: Appears to have normal range of motion based on visualized movements  Neurologic: No apparent tremors, facial movements symmetric  Psych: affect and mood congruent, bright affect, alert and oriented  The rest of a comprehensive physical examination is deferred due to PHE (public health emergency) video restrictions\"    Labs:    5/27/2020 11:39   Sodium 140   Potassium 4.4   Chloride 109   Carbon Dioxide 28   Urea Nitrogen 15   Creatinine 1.12   GFR Estimate 79   GFR Estimate If Black >90   Calcium 9.0   Anion Gap 3   Albumin 4.0   Protein Total 7.1   Bilirubin Total 0.4   Alkaline Phosphatase 55   ALT 28   AST 19   Lactate Dehydrogenase 163   Glucose 90   WBC 3.4 (L)   Hemoglobin 14.8   Hematocrit 43.7   Platelet Count 153   RBC Count 5.02   MCV 87   MCH 29.5   MCHC 33.9   RDW 13.0   Diff Method Automated Method   % Neutrophils 59.3   % Lymphocytes 28.1   % Monocytes 9.1   % Eosinophils 2.9   % Basophils 0.6   % Immature Granulocytes 0.0   Absolute Neutrophil 2.0   Absolute Lymphocytes 1.0   Absolute Monocytes 0.3   Absolute Eosinophils 0.1   Absolute Basophils 0.0   Abs Immature Granulocytes 0.0          Assessment & Plan:      1. Stage Ia Burkitt's lymphoma: Negative marrow or CNS involvement. S/p 6 cycles of R-EPOCH with Neulasta support. He achieved CR after 4 cycles and post-therapy PET confirmed this. He is feeling well and lab work is normal today. Follow-up in 3 months with Dr. Ramirez with CT N/CAP and labs (insurance did not cover PET last time even after peer to peer). He will call with any interval concerns prior to then.     2. Intermittent loose stools: Does not appear to be obstructive pattern to worry about abdominal LAD. Discussed keeping a food dairy and " continuing to use Pepcid as needed for GERD.       Video-Visit Details    Type of service:  Video Visit    Video Start Time: 2:02 pm   Video End Time: 2:19 pm     Originating Location (pt. Location): Home    Distant Location (provider location):  [facility]     Platform used for Video Visit: Maykel Wheat PA-C

## 2020-05-27 NOTE — LETTER
"5/27/2020     RE: Christiano Molina  [address]  [place] [address]    Dear Colleague,    Thank you for referring your patient, Christiano Molina, to the [facility]. Please see a copy of my visit note below.    Christiano Molina is a 44 year old male who is being evaluated via a billable video visit.      The patient has been notified of following:     \"This video visit will be conducted via a call between you and your physician/provider. We have found that certain health care needs can be provided without the need for an in-person physical exam.  This service lets us provide the care you need with a video conversation.  If a prescription is necessary we can send it directly to your pharmacy.  If lab work is needed we can place an order for that and you can then stop by our lab to have the test done at a later time.    Video visits are billed at different rates depending on your insurance coverage.  Please reach out to your insurance provider with any questions.    If during the course of the call the physician/provider feels a video visit is not appropriate, you will not be charged for this service.\"    Patient has given verbal consent for Video visit? Yes    How would you like to obtain your AVS? MyChart    Patient would like the video invitation sent by: Send to e-mail at: [email or web address]    Will anyone else be joining your video visit? No          Secondary Video Option (Doximity), send text message to:  [phone]    I have reviewed and updated the patient's allergies and medication list.    Concerns: Patient has no new concerns.      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 He presented with R axillary mass. PET/CT showed stage IA disease. Bone marrow biopsy and LP done 3/25 were negative for disease. Please see Dr. Ramirez's note for further discussion of diagnostic work-up.      Plan for 2 cycles of DA-R-EPOCH followed by PET/CT. He presented for cycle 1 on 3/27/19 and was discharged on 3/31/19. He tolerated chemotherapy well apart from mild hives and facial itching from Rituxan (was able to complete dose), chemotherapy-induced nausea, and mild post-LP occipital headache.      Following discharge, his pathology was finalized here with Ki-67 positive in 95-99% of lymphoma cells. TDT negative, EBV was strongly positive. The positive findings for CD20, CD10, BCL6, very high Ki-67 with negative BCL2, as well as MYC translocation supports diagnoses of Burkitt's lymphoma.      Cycle 2 of R-EPOCH was given 4/17-4/21/19. He had a partial response seen on imaging after 2 cycles. PET/CT after 4 cycles showed CR. Cycle 5 was complicated by appendicitis following chemotherapy. He received cycle 6 at same dose level as cycle 5. Cycle 6 was given 7/11-7/15/19. Post-therapy PET showed continued CR. Last imaging showed continued CR as well.      He presents today in routine 3 month follow-up on surveillance.      Interval History: Prudencio is feeling well. He has been working at home during the COVID-19 pandemic and has been at home with his wife and 3 kids as well. Everyone is feeling well. No fevers/chills or URI symptoms. He had a cold in the winter which resolved without antibiotics. No other infectious symptoms. He has intermittent looser stools for a few days which occurs every few weeks to months. Then bowels return to baseline. He does have some reflux and dyspepsia and will take Pepcid AC PRN with relief. No abdominal pain, vomiting, blood in stool or other symptoms. No lumps/bumps, night sweats, lose of energy, or appetite. Weight is generally stable. He  "continues to have some fatigue following treatment but overall this is slowly improving. His neuropathy has continued to improve as well.     Objective:     Video physical exam  General: Patient appears well in no acute distress. Normal body habitus.  Skin: No visualized rash or lesions on visualized skin  Eyes: EOMI, no erythema, sclera icterus or discharge noted  Resp: Appears to be breathing comfortably without accessory muscle usage, speaking in full sentences, no cough  MSK: Appears to have normal range of motion based on visualized movements  Neurologic: No apparent tremors, facial movements symmetric  Psych: affect and mood congruent, bright affect, alert and oriented  The rest of a comprehensive physical examination is deferred due to PHE (public health emergency) video restrictions\"    Labs:    5/27/2020 11:39   Sodium 140   Potassium 4.4   Chloride 109   Carbon Dioxide 28   Urea Nitrogen 15   Creatinine 1.12   GFR Estimate 79   GFR Estimate If Black >90   Calcium 9.0   Anion Gap 3   Albumin 4.0   Protein Total 7.1   Bilirubin Total 0.4   Alkaline Phosphatase 55   ALT 28   AST 19   Lactate Dehydrogenase 163   Glucose 90   WBC 3.4 (L)   Hemoglobin 14.8   Hematocrit 43.7   Platelet Count 153   RBC Count 5.02   MCV 87   MCH 29.5   MCHC 33.9   RDW 13.0   Diff Method Automated Method   % Neutrophils 59.3   % Lymphocytes 28.1   % Monocytes 9.1   % Eosinophils 2.9   % Basophils 0.6   % Immature Granulocytes 0.0   Absolute Neutrophil 2.0   Absolute Lymphocytes 1.0   Absolute Monocytes 0.3   Absolute Eosinophils 0.1   Absolute Basophils 0.0   Abs Immature Granulocytes 0.0          Assessment & Plan:      1. Stage Ia Burkitt's lymphoma: Negative marrow or CNS involvement. S/p 6 cycles of R-EPOCH with Neulasta support. He achieved CR after 4 cycles and post-therapy PET confirmed this. He is feeling well and lab work is normal today. Follow-up in 3 months with Dr. Ramirez with CT N/CAP and labs (insurance did not cover " PET last time even after peer to peer). He will call with any interval concerns prior to then.     2. Intermittent loose stools: Does not appear to be obstructive pattern to worry about abdominal LAD. Discussed keeping a food dairy and continuing to use Pepcid as needed for GERD.       Video-Visit Details    Type of service:  Video Visit    Video Start Time: 2:02 pm   Video End Time: 2:19 pm     Originating Location (pt. Location): Home    Distant Location (provider location):  [facility]     Platform used for Video Visit: Maykel Wheat PA-C

## 2020-05-27 NOTE — PROGRESS NOTES
Medical Assistant Note:  Christiano Molina presents today for lab draw.    Patient seen by provider today: No.   present during visit today: Not Applicable.    Concerns: No Concerns.    Procedure:  Lab draw site: LAC, Needle type: Butterfly, Gauge: 23.    Post Assessment:  Labs drawn without difficulty: Yes.    Discharge Plan:  Departure Mode: Ambulatory.    Face to Face Time: 4 min.    Shari Schoenberger, CMA

## 2020-09-03 ENCOUNTER — HOSPITAL ENCOUNTER (OUTPATIENT)
Dept: LAB | Facility: CLINIC | Age: 45
End: 2020-09-03
Payer: COMMERCIAL

## 2020-09-03 ENCOUNTER — HOSPITAL ENCOUNTER (OUTPATIENT)
Dept: CT IMAGING | Facility: CLINIC | Age: 45
End: 2020-09-03
Attending: PHYSICIAN ASSISTANT
Payer: COMMERCIAL

## 2020-09-03 DIAGNOSIS — C85.10 HIGH GRADE B-CELL LYMPHOMA (H): ICD-10-CM

## 2020-09-03 LAB
ALBUMIN SERPL-MCNC: 4 G/DL (ref 3.4–5)
ALP SERPL-CCNC: 54 U/L (ref 40–150)
ALT SERPL W P-5'-P-CCNC: 35 U/L (ref 0–70)
ANION GAP SERPL CALCULATED.3IONS-SCNC: 2 MMOL/L (ref 3–14)
AST SERPL W P-5'-P-CCNC: 21 U/L (ref 0–45)
BASOPHILS # BLD AUTO: 0 10E9/L (ref 0–0.2)
BASOPHILS NFR BLD AUTO: 0.4 %
BILIRUB SERPL-MCNC: 0.3 MG/DL (ref 0.2–1.3)
BUN SERPL-MCNC: 17 MG/DL (ref 7–30)
CALCIUM SERPL-MCNC: 9 MG/DL (ref 8.5–10.1)
CHLORIDE SERPL-SCNC: 107 MMOL/L (ref 94–109)
CO2 SERPL-SCNC: 32 MMOL/L (ref 20–32)
CREAT SERPL-MCNC: 1.23 MG/DL (ref 0.66–1.25)
DIFFERENTIAL METHOD BLD: NORMAL
EOSINOPHIL # BLD AUTO: 0.1 10E9/L (ref 0–0.7)
EOSINOPHIL NFR BLD AUTO: 1.3 %
ERYTHROCYTE [DISTWIDTH] IN BLOOD BY AUTOMATED COUNT: 12.6 % (ref 10–15)
GFR SERPL CREATININE-BSD FRML MDRD: 71 ML/MIN/{1.73_M2}
GLUCOSE SERPL-MCNC: 88 MG/DL (ref 70–99)
HCT VFR BLD AUTO: 44 % (ref 40–53)
HGB BLD-MCNC: 15.3 G/DL (ref 13.3–17.7)
IMM GRANULOCYTES # BLD: 0 10E9/L (ref 0–0.4)
IMM GRANULOCYTES NFR BLD: 0.2 %
LDH SERPL L TO P-CCNC: 161 U/L (ref 85–227)
LYMPHOCYTES # BLD AUTO: 1.3 10E9/L (ref 0.8–5.3)
LYMPHOCYTES NFR BLD AUTO: 28 %
MCH RBC QN AUTO: 31.4 PG (ref 26.5–33)
MCHC RBC AUTO-ENTMCNC: 34.8 G/DL (ref 31.5–36.5)
MCV RBC AUTO: 90 FL (ref 78–100)
MONOCYTES # BLD AUTO: 0.3 10E9/L (ref 0–1.3)
MONOCYTES NFR BLD AUTO: 7.2 %
NEUTROPHILS # BLD AUTO: 2.9 10E9/L (ref 1.6–8.3)
NEUTROPHILS NFR BLD AUTO: 62.9 %
NRBC # BLD AUTO: 0 10*3/UL
NRBC BLD AUTO-RTO: 0 /100
PLATELET # BLD AUTO: 177 10E9/L (ref 150–450)
POTASSIUM SERPL-SCNC: 4.6 MMOL/L (ref 3.4–5.3)
PROT SERPL-MCNC: 7.3 G/DL (ref 6.8–8.8)
RBC # BLD AUTO: 4.88 10E12/L (ref 4.4–5.9)
SODIUM SERPL-SCNC: 141 MMOL/L (ref 133–144)
WBC # BLD AUTO: 4.6 10E9/L (ref 4–11)

## 2020-09-03 PROCEDURE — 83615 LACTATE (LD) (LDH) ENZYME: CPT | Performed by: PHYSICIAN ASSISTANT

## 2020-09-03 PROCEDURE — 25000125 ZZHC RX 250: Performed by: PHYSICIAN ASSISTANT

## 2020-09-03 PROCEDURE — 25000128 H RX IP 250 OP 636: Performed by: PHYSICIAN ASSISTANT

## 2020-09-03 PROCEDURE — 70491 CT SOFT TISSUE NECK W/DYE: CPT

## 2020-09-03 PROCEDURE — 71260 CT THORAX DX C+: CPT

## 2020-09-03 PROCEDURE — 80053 COMPREHEN METABOLIC PANEL: CPT | Performed by: PHYSICIAN ASSISTANT

## 2020-09-03 PROCEDURE — 85025 COMPLETE CBC W/AUTO DIFF WBC: CPT | Performed by: PHYSICIAN ASSISTANT

## 2020-09-03 RX ORDER — IOPAMIDOL 755 MG/ML
125 INJECTION, SOLUTION INTRAVASCULAR ONCE
Status: COMPLETED | OUTPATIENT
Start: 2020-09-03 | End: 2020-09-03

## 2020-09-03 RX ADMIN — IOPAMIDOL 125 ML: 755 INJECTION, SOLUTION INTRAVENOUS at 14:23

## 2020-09-03 RX ADMIN — SODIUM CHLORIDE 70 ML: 9 INJECTION, SOLUTION INTRAVENOUS at 14:23

## 2020-09-23 NOTE — PROGRESS NOTES
"Christiano Molina is a 45 year old male who is being evaluated via a billable video visit.      The patient has been notified of following:     \"This video visit will be conducted via a call between you and your physician/provider. We have found that certain health care needs can be provided without the need for an in-person physical exam.  This service lets us provide the care you need with a video conversation.  If a prescription is necessary we can send it directly to your pharmacy.  If lab work is needed we can place an order for that and you can then stop by our lab to have the test done at a later time.    Video visits are billed at different rates depending on your insurance coverage.  Please reach out to your insurance provider with any questions.    If during the course of the call the physician/provider feels a video visit is not appropriate, you will not be charged for this service.\"    Patient has given verbal consent for Video visit? Yes  How would you like to obtain your AVS? MyChart  If you are dropped from the video visit, the video invite should be resent to: Text to cell phone: [phone]  Will anyone else be joining your video visit? No      Vitals - Patient Reported 9/24/2020   Height (Patient Reported) 5' 10\"   Weight (Patient Reported) 175 lb   BMI (Based on Pt Reported Ht/Wt) 25.11 kg/m2     0/10 on pain scale.     No refills needed.   No additional concerns.     Hui Peralta, [facility]      Video-Visit Details    Type of service:  Video Visit    Video Start Time: 2:30 pm   Video End Time: 2:55 pm     Originating Location (pt. Location): Home    Distant Location (provider location):   [facility]     Platform used for Video Visit: GetYourGuide    [facility]      Referring Provider: self     Dx:   1. BL, stage 1A, LDH slightly high, max size 6.2 cm, Dx 3/15/19    Tx:  1. Da-R-EPOCH x6, 3/27/19 to mid July 2019, UT after C2, CR after C4/6. "     PMH: strasibusmus   Allergies: None     Interval Hx: Recent URI, getting better. ROS otherwise neg on a 10-point review.     Lab Results   Component Value Date    WBC 4.6 09/03/2020    HGB 15.3 09/03/2020    HCT 44.0 09/03/2020     09/03/2020     09/03/2020    POTASSIUM 4.6 09/03/2020    CHLORIDE 107 09/03/2020    CO2 32 09/03/2020    BUN 17 09/03/2020    CR 1.23 09/03/2020    GLC 88 09/03/2020    SED 12 06/28/2019    AST 21 09/03/2020    ALT 35 09/03/2020    ALKPHOS 54 09/03/2020    BILITOTAL 0.3 09/03/2020    INR 1.12 07/15/2019       Current Outpatient Medications   Medication     acetaminophen (TYLENOL) 325 MG tablet     famotidine (PEPCID) 20 MG tablet     LORazepam (ATIVAN) 0.5 MG tablet     sertraline (ZOLOFT) 50 MG tablet     traZODone (DESYREL) 100 MG tablet     No current facility-administered medications for this visit.      Assessment and Plan:  1. BL: CR. See us in 3m with labs and in 6m with N/C/A/P CT.  2. ID: flu shot (getting locally)

## 2020-09-24 ENCOUNTER — VIRTUAL VISIT (OUTPATIENT)
Dept: TRANSPLANT | Facility: CLINIC | Age: 45
End: 2020-09-24
Attending: INTERNAL MEDICINE
Payer: COMMERCIAL

## 2020-09-24 DIAGNOSIS — C83.74 BURKITT LYMPHOMA OF LYMPH NODES OF AXILLA (H): Primary | ICD-10-CM

## 2020-09-24 PROCEDURE — 40001009 ZZH VIDEO/TELEPHONE VISIT; NO CHARGE

## 2020-09-24 RX ORDER — LORAZEPAM 0.5 MG/1
0.5 TABLET ORAL EVERY 6 HOURS PRN
COMMUNITY

## 2020-09-24 NOTE — LETTER
"    9/24/2020         RE: Christiano Molina  [address]  [place] [address]        Dear Colleague,    Thank you for referring your patient, Christiano Molina, to the [facility]. Please see a copy of my visit note below.    Christiano Molina is a 45 year old male who is being evaluated via a billable video visit.      The patient has been notified of following:     \"This video visit will be conducted via a call between you and your physician/provider. We have found that certain health care needs can be provided without the need for an in-person physical exam.  This service lets us provide the care you need with a video conversation.  If a prescription is necessary we can send it directly to your pharmacy.  If lab work is needed we can place an order for that and you can then stop by our lab to have the test done at a later time.    Video visits are billed at different rates depending on your insurance coverage.  Please reach out to your insurance provider with any questions.    If during the course of the call the physician/provider feels a video visit is not appropriate, you will not be charged for this service.\"    Patient has given verbal consent for Video visit? Yes  How would you like to obtain your AVS? MyChart  If you are dropped from the video visit, the video invite should be resent to: Text to cell phone: [phone]  Will anyone else be joining your video visit? No      Vitals - Patient Reported 9/24/2020   Height (Patient Reported) 5' 10\"   Weight (Patient Reported) 175 lb   BMI (Based on Pt Reported Ht/Wt) 25.11 kg/m2     0/10 on pain scale.     No refills needed.   No additional concerns.     Hui Peralta CMA      Video-Visit Details    Type of service:  Video Visit    Video Start Time: 2:30 pm   Video End Time: 2:55 pm     Originating Location (pt. Location): Home    Distant Location (provider location):  [facility]     Platform used " for Video Visit: [facility]      Referring Provider: self     Dx:   1. BL, stage 1A, LDH slightly high, max size 6.2 cm, Dx 3/15/19    Tx:  1. Da-R-EPOCH x6, 3/27/19 to mid July 2019, [place] after C2, CR after C4/6.     PMH: strasibusmus   Allergies: None     Interval Hx: Recent URI, getting better. ROS otherwise neg on a 10-point review.     Lab Results   Component Value Date    WBC 4.6 09/03/2020    HGB 15.3 09/03/2020    HCT 44.0 09/03/2020     09/03/2020     09/03/2020    POTASSIUM 4.6 09/03/2020    CHLORIDE 107 09/03/2020    CO2 32 09/03/2020    BUN 17 09/03/2020    CR 1.23 09/03/2020    GLC 88 09/03/2020    SED 12 06/28/2019    AST 21 09/03/2020    ALT 35 09/03/2020    ALKPHOS 54 09/03/2020    BILITOTAL 0.3 09/03/2020    INR 1.12 07/15/2019       Current Outpatient Medications   Medication     acetaminophen (TYLENOL) 325 MG tablet     famotidine (PEPCID) 20 MG tablet     LORazepam (ATIVAN) 0.5 MG tablet     sertraline (ZOLOFT) 50 MG tablet     traZODone (DESYREL) 100 MG tablet     No current facility-administered medications for this visit.      Assessment and Plan:  1. BL: CR. See us in 3m with labs and in 6m with N/C/A/P CT.  2. ID: flu shot (getting locally)       Again, thank you for allowing me to participate in the care of your patient.        Sincerely,        Vern Ramirez MD

## 2020-11-29 ENCOUNTER — HEALTH MAINTENANCE LETTER (OUTPATIENT)
Age: 45
End: 2020-11-29

## 2020-12-11 ENCOUNTER — OFFICE VISIT (OUTPATIENT)
Dept: INFUSION THERAPY | Facility: CLINIC | Age: 45
End: 2020-12-11
Attending: INTERNAL MEDICINE
Payer: COMMERCIAL

## 2020-12-11 ENCOUNTER — HOSPITAL ENCOUNTER (OUTPATIENT)
Facility: CLINIC | Age: 45
Setting detail: SPECIMEN
Discharge: HOME | End: 2020-12-11
Attending: INTERNAL MEDICINE | Admitting: INTERNAL MEDICINE
Payer: COMMERCIAL

## 2020-12-11 DIAGNOSIS — C83.74 BURKITT LYMPHOMA OF LYMPH NODES OF AXILLA (H): ICD-10-CM

## 2020-12-11 LAB
ALBUMIN SERPL-MCNC: 3.8 G/DL (ref 3.4–5)
ALP SERPL-CCNC: 53 U/L (ref 40–150)
ALT SERPL W P-5'-P-CCNC: 31 U/L (ref 0–70)
ANION GAP SERPL CALCULATED.3IONS-SCNC: 4 MMOL/L (ref 3–14)
AST SERPL W P-5'-P-CCNC: 22 U/L (ref 0–45)
BASOPHILS # BLD AUTO: 0 10E9/L (ref 0–0.2)
BASOPHILS NFR BLD AUTO: 0.6 %
BILIRUB SERPL-MCNC: 0.5 MG/DL (ref 0.2–1.3)
BUN SERPL-MCNC: 20 MG/DL (ref 7–30)
CALCIUM SERPL-MCNC: 9 MG/DL (ref 8.5–10.1)
CHLORIDE SERPL-SCNC: 106 MMOL/L (ref 94–109)
CO2 SERPL-SCNC: 31 MMOL/L (ref 20–32)
CREAT SERPL-MCNC: 1.29 MG/DL (ref 0.66–1.25)
DIFFERENTIAL METHOD BLD: NORMAL
EOSINOPHIL # BLD AUTO: 0.1 10E9/L (ref 0–0.7)
EOSINOPHIL NFR BLD AUTO: 2.8 %
ERYTHROCYTE [DISTWIDTH] IN BLOOD BY AUTOMATED COUNT: 12.5 % (ref 10–15)
GFR SERPL CREATININE-BSD FRML MDRD: 67 ML/MIN/{1.73_M2}
GLUCOSE SERPL-MCNC: 77 MG/DL (ref 70–99)
HCT VFR BLD AUTO: 44.2 % (ref 40–53)
HGB BLD-MCNC: 14.8 G/DL (ref 13.3–17.7)
IMM GRANULOCYTES # BLD: 0 10E9/L (ref 0–0.4)
IMM GRANULOCYTES NFR BLD: 0.2 %
LDH SERPL L TO P-CCNC: 233 U/L (ref 85–227)
LYMPHOCYTES # BLD AUTO: 1.5 10E9/L (ref 0.8–5.3)
LYMPHOCYTES NFR BLD AUTO: 31 %
MCH RBC QN AUTO: 30.3 PG (ref 26.5–33)
MCHC RBC AUTO-ENTMCNC: 33.5 G/DL (ref 31.5–36.5)
MCV RBC AUTO: 90 FL (ref 78–100)
MONOCYTES # BLD AUTO: 0.3 10E9/L (ref 0–1.3)
MONOCYTES NFR BLD AUTO: 6.8 %
NEUTROPHILS # BLD AUTO: 2.7 10E9/L (ref 1.6–8.3)
NEUTROPHILS NFR BLD AUTO: 58.6 %
NRBC # BLD AUTO: 0 10*3/UL
NRBC BLD AUTO-RTO: 0 /100
PLATELET # BLD AUTO: 184 10E9/L (ref 150–450)
POTASSIUM SERPL-SCNC: 4.3 MMOL/L (ref 3.4–5.3)
PROT SERPL-MCNC: 7.2 G/DL (ref 6.8–8.8)
RBC # BLD AUTO: 4.89 10E12/L (ref 4.4–5.9)
SODIUM SERPL-SCNC: 141 MMOL/L (ref 133–144)
WBC # BLD AUTO: 4.7 10E9/L (ref 4–11)

## 2020-12-11 PROCEDURE — 80053 COMPREHEN METABOLIC PANEL: CPT | Performed by: INTERNAL MEDICINE

## 2020-12-11 PROCEDURE — 85025 COMPLETE CBC W/AUTO DIFF WBC: CPT | Performed by: INTERNAL MEDICINE

## 2020-12-11 PROCEDURE — 36415 COLL VENOUS BLD VENIPUNCTURE: CPT

## 2020-12-11 PROCEDURE — 83615 LACTATE (LD) (LDH) ENZYME: CPT | Performed by: INTERNAL MEDICINE

## 2020-12-11 NOTE — LETTER
12/11/2020         RE: Christiano Molina  [address] [place] [address]        Dear Colleague,    Thank you for referring your patient, Christiano Molina, to the [facility]. Please see a copy of my visit note below.    Medical Assistant Note:  Christiano Molina presents today for blood draw.    Patient seen by provider today: No.   present during visit today: Not Applicable.    Concerns: No Concerns.    Procedure:  Lab draw site: lac, Needle type: bf, Gauge: 23.    Post Assessment:  Labs drawn without difficulty: Yes.    Discharge Plan:  Departure Mode: Ambulatory.    Face to Face Time: 5 min  .    Omaira Reyes [facility]              Again, thank you for allowing me to participate in the care of your patient.        Sincerely,         Lab Draw

## 2020-12-11 NOTE — PROGRESS NOTES
Medical Assistant Note:  Christiano Molina presents today for blood draw.    Patient seen by provider today: No.   present during visit today: Not Applicable.    Concerns: No Concerns.    Procedure:  Lab draw site: lac, Needle type: bf, Gauge: 23.    Post Assessment:  Labs drawn without difficulty: Yes.    Discharge Plan:  Departure Mode: Ambulatory.    Face to Face Time: 5 min  .    Omaira Reyes, CMA

## 2020-12-16 NOTE — PROGRESS NOTES
"Christiano Molina is a 45 year old male who is being evaluated via a billable video visit.      The patient has been notified of following:     \"This video visit will be conducted via a call between you and your physician/provider. We have found that certain health care needs can be provided without the need for an in-person physical exam.  This service lets us provide the care you need with a video conversation.  If a prescription is necessary we can send it directly to your pharmacy.  If lab work is needed we can place an order for that and you can then stop by our lab to have the test done at a later time.    Video visits are billed at different rates depending on your insurance coverage.  Please reach out to your insurance provider with any questions.    If during the course of the call the physician/provider feels a video visit is not appropriate, you will not be charged for this service.\"    Patient has given verbal consent for Video visit? Yes  How would you like to obtain your AVS? MyChart  If you are dropped from the video visit, the video invite should be resent to: Text to cell phone: [phone]  Will anyone else be joining your video visit? 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 He presented with R axillary mass. PET/CT showed stage IA disease. Bone marrow biopsy and LP done 3/25 were negative for disease. Please see Dr. Ramirez's note for further discussion of diagnostic work-up.      Plan " for 2 cycles of DA-R-EPOCH followed by PET/CT. He presented for cycle 1 on 3/27/19 and was discharged on 3/31/19. He tolerated chemotherapy well apart from mild hives and facial itching from Rituxan (was able to complete dose), chemotherapy-induced nausea, and mild post-LP occipital headache.      Following discharge, his pathology was finalized here with Ki-67 positive in 95-99% of lymphoma cells. TDT negative, EBV was strongly positive. The positive findings for CD20, CD10, BCL6, very high Ki-67 with negative BCL2, as well as MYC translocation supports diagnoses of Burkitt's lymphoma.      Cycle 2 of R-EPOCH was given 4/17-4/21/19. He had a partial response seen on imaging after 2 cycles. PET/CT after 4 cycles showed CR. Cycle 5 was complicated by appendicitis following chemotherapy. He received cycle 6 at same dose level as cycle 5. Cycle 6 was given 7/11-7/15/19. Post-therapy PET showed continued CR. Last imaging showed continued CR as well.      He presents today in routine 3 month follow-up on surveillance.      Interval History:  Prudencio is feeling well. He has not had any issues or concerns. Energy and appetite are great. No new or different pain or lumps/bumps. No fevers/chills/sweats. No recent infectious issues. He has been active and is working full time. He still has some intermittent loose stools but this has overall been better the past few months the further he is out from treatment. He has some intermittent heartburn and avoids tomato based foods or eating sugary foods close to bedtime. He uses Pepcid AC 1-2x per day as needed.     Current Outpatient Medications   Medication     acetaminophen (TYLENOL) 325 MG tablet     famotidine (PEPCID) 20 MG tablet     LORazepam (ATIVAN) 0.5 MG tablet     sertraline (ZOLOFT) 50 MG tablet     traZODone (DESYREL) 100 MG tablet     No current facility-administered medications for this visit.       No Known Allergies      Objective:     Video physical exam  General:  "Patient appears well in no acute distress. Normal body habitus.  Skin: No visualized rash or lesions on visualized skin  Eyes: EOMI, no erythema, sclera icterus or discharge noted  Resp: Appears to be breathing comfortably without accessory muscle usage, speaking in full sentences, no cough  MSK: Appears to have normal range of motion based on visualized movements  Neurologic: No apparent tremors, facial movements symmetric  Psych: affect and mood congruent, bright affect, alert and oriented  The rest of a comprehensive physical examination is deferred due to PHE (public health emergency) video restrictions\"    Labs:    12/11/2020 14:00   Sodium 141   Potassium 4.3   Chloride 106   Carbon Dioxide 31   Urea Nitrogen 20   Creatinine 1.29 (H)   GFR Estimate 67   GFR Estimate If Black 77   Calcium 9.0   Anion Gap 4   Albumin 3.8   Protein Total 7.2   Bilirubin Total 0.5   Alkaline Phosphatase 53   ALT 31   AST 22   Lactate Dehydrogenase 233 (H)   Glucose 77   WBC 4.7   Hemoglobin 14.8   Hematocrit 44.2   Platelet Count 184   RBC Count 4.89   MCV 90   MCH 30.3   MCHC 33.5   RDW 12.5   Diff Method Automated Method   % Neutrophils 58.6   % Lymphocytes 31.0   % Monocytes 6.8   % Eosinophils 2.8   % Basophils 0.6   % Immature Granulocytes 0.2   Nucleated RBCs 0   Absolute Neutrophil 2.7   Absolute Lymphocytes 1.5   Absolute Monocytes 0.3   Absolute Eosinophils 0.1   Absolute Basophils 0.0   Abs Immature Granulocytes 0.0   Absolute Nucleated RBC 0.0            Assessment & Plan:      1. Stage Ia Burkitt's lymphoma: Negative marrow or CNS involvement. S/p 6 cycles of R-EPOCH with Neulasta support. He achieved CR after 4 cycles and post-therapy PET confirmed this. He is feeling well and lab work is normal today with exception of mild elevation of LDH. Will plan to recheck this in about 3 weeks (a month after last draw). Follow-up in 3 months with Dr. Ramirez with CT N/CAP and labs (insurance did not cover PET last time even " after peer to peer). He will call with any interval concerns prior to then.     2. GI: GERD is well controlled with Pepcid intermittently and avoiding certain foods. Intermittent loose stools slowly improving. No abdominal pain.           Video-Visit Details    Type of service:  Video Visit    Video Start Time: 8:36 am   Video End Time: 8:47 AM    Originating Location (pt. Location): Home    Distant Location (provider location):  [facility]     Platform used for Video Visit: Maykel Wheat PA-C

## 2020-12-17 ENCOUNTER — VIRTUAL VISIT (OUTPATIENT)
Dept: ONCOLOGY | Facility: CLINIC | Age: 45
End: 2020-12-17
Attending: PHYSICIAN ASSISTANT
Payer: COMMERCIAL

## 2020-12-17 DIAGNOSIS — C85.10 HIGH GRADE B-CELL LYMPHOMA (H): Primary | ICD-10-CM

## 2020-12-17 PROCEDURE — 99213 OFFICE O/P EST LOW 20 MIN: CPT | Mod: 95 | Performed by: PHYSICIAN ASSISTANT

## 2020-12-17 NOTE — LETTER
"    12/17/2020         RE: Christiano Molina  [address]  [place] [address]        Dear Colleague,    Thank you for referring your patient, Christiano Molina, to the [facility]. Please see a copy of my visit note below.    Christiano Molina is a 45 year old male who is being evaluated via a billable video visit.      The patient has been notified of following:     \"This video visit will be conducted via a call between you and your physician/provider. We have found that certain health care needs can be provided without the need for an in-person physical exam.  This service lets us provide the care you need with a video conversation.  If a prescription is necessary we can send it directly to your pharmacy.  If lab work is needed we can place an order for that and you can then stop by our lab to have the test done at a later time.    Video visits are billed at different rates depending on your insurance coverage.  Please reach out to your insurance provider with any questions.    If during the course of the call the physician/provider feels a video visit is not appropriate, you will not be charged for this service.\"    Patient has given verbal consent for Video visit? Yes  How would you like to obtain your AVS? MyChart  If you are dropped from the video visit, the video invite should be resent to: Text to cell phone: [phone]  Will anyone else be joining your video visit? 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 He presented with R axillary mass. PET/CT showed stage IA disease. Bone marrow biopsy and LP done 3/25 were negative for disease. Please see Dr. Ramirez's note for further discussion of diagnostic work-up.      Plan for 2 cycles of DA-R-EPOCH followed by PET/CT. He presented for cycle 1 on 3/27/19 and was discharged on 3/31/19. He tolerated chemotherapy well apart from mild hives and facial itching from Rituxan (was able to complete dose), chemotherapy-induced nausea, and mild post-LP occipital headache.      Following discharge, his pathology was finalized here with Ki-67 positive in 95-99% of lymphoma cells. TDT negative, EBV was strongly positive. The positive findings for CD20, CD10, BCL6, very high Ki-67 with negative BCL2, as well as MYC translocation supports diagnoses of Burkitt's lymphoma.      Cycle 2 of R-EPOCH was given 4/17-4/21/19. He had a partial response seen on imaging after 2 cycles. PET/CT after 4 cycles showed CR. Cycle 5 was complicated by appendicitis following chemotherapy. He received cycle 6 at same dose level as cycle 5. Cycle 6 was given 7/11-7/15/19. Post-therapy PET showed continued CR. Last imaging showed continued CR as well.      He presents today in routine 3 month follow-up on surveillance.      Interval History:  Prudencio is feeling well. He has not had any issues or concerns. Energy and appetite are great. No new or different pain or lumps/bumps. No fevers/chills/sweats. No recent infectious issues. He has been active and is working full time. He still has some intermittent loose stools but this has overall been better the past few months the further he is out from treatment. He has some intermittent heartburn and avoids tomato based foods or eating sugary foods close to bedtime. He uses Pepcid AC 1-2x per day as needed.     Current Outpatient Medications   Medication     acetaminophen (TYLENOL) 325 MG tablet     famotidine (PEPCID)  "20 MG tablet     LORazepam (ATIVAN) 0.5 MG tablet     sertraline (ZOLOFT) 50 MG tablet     traZODone (DESYREL) 100 MG tablet     No current facility-administered medications for this visit.       No Known Allergies      Objective:     Video physical exam  General: Patient appears well in no acute distress. Normal body habitus.  Skin: No visualized rash or lesions on visualized skin  Eyes: EOMI, no erythema, sclera icterus or discharge noted  Resp: Appears to be breathing comfortably without accessory muscle usage, speaking in full sentences, no cough  MSK: Appears to have normal range of motion based on visualized movements  Neurologic: No apparent tremors, facial movements symmetric  Psych: affect and mood congruent, bright affect, alert and oriented  The rest of a comprehensive physical examination is deferred due to PHE (public health emergency) video restrictions\"    Labs:    12/11/2020 14:00   Sodium 141   Potassium 4.3   Chloride 106   Carbon Dioxide 31   Urea Nitrogen 20   Creatinine 1.29 (H)   GFR Estimate 67   GFR Estimate If Black 77   Calcium 9.0   Anion Gap 4   Albumin 3.8   Protein Total 7.2   Bilirubin Total 0.5   Alkaline Phosphatase 53   ALT 31   AST 22   Lactate Dehydrogenase 233 (H)   Glucose 77   WBC 4.7   Hemoglobin 14.8   Hematocrit 44.2   Platelet Count 184   RBC Count 4.89   MCV 90   MCH 30.3   MCHC 33.5   RDW 12.5   Diff Method Automated Method   % Neutrophils 58.6   % Lymphocytes 31.0   % Monocytes 6.8   % Eosinophils 2.8   % Basophils 0.6   % Immature Granulocytes 0.2   Nucleated RBCs 0   Absolute Neutrophil 2.7   Absolute Lymphocytes 1.5   Absolute Monocytes 0.3   Absolute Eosinophils 0.1   Absolute Basophils 0.0   Abs Immature Granulocytes 0.0   Absolute Nucleated RBC 0.0            Assessment & Plan:      1. Stage Ia Burkitt's lymphoma: Negative marrow or CNS involvement. S/p 6 cycles of R-EPOCH with Neulasta support. He achieved CR after 4 cycles and post-therapy PET confirmed this. He " is feeling well and lab work is normal today with exception of mild elevation of LDH. Will plan to recheck this in about 3 weeks (a month after last draw). Follow-up in 3 months with Dr. Ramirez with CT N/CAP and labs (insurance did not cover PET last time even after peer to peer). He will call with any interval concerns prior to then.     2. GI: GERD is well controlled with Pepcid intermittently and avoiding certain foods. Intermittent loose stools slowly improving. No abdominal pain.           Video-Visit Details    Type of service:  Video Visit    Video Start Time: 8:36 am   Video End Time: 8:47 AM    Originating Location (pt. Location): Home    Distant Location (provider location):  [facility]     Platform used for Video Visit: Maykel Wheat PA-C            Again, thank you for allowing me to participate in the care of your patient.        Sincerely,        Holly Wheat PA-C

## 2021-01-11 ENCOUNTER — HOSPITAL ENCOUNTER (OUTPATIENT)
Facility: CLINIC | Age: 46
Setting detail: SPECIMEN
Discharge: HOME | End: 2021-01-11
Attending: INTERNAL MEDICINE | Admitting: PHYSICIAN ASSISTANT
Payer: COMMERCIAL

## 2021-01-11 ENCOUNTER — OFFICE VISIT (OUTPATIENT)
Dept: INFUSION THERAPY | Facility: CLINIC | Age: 46
End: 2021-01-11
Attending: INTERNAL MEDICINE
Payer: COMMERCIAL

## 2021-01-11 DIAGNOSIS — C85.10 HIGH GRADE B-CELL LYMPHOMA (H): ICD-10-CM

## 2021-01-11 LAB — LDH SERPL L TO P-CCNC: 190 U/L (ref 85–227)

## 2021-01-11 PROCEDURE — 83615 LACTATE (LD) (LDH) ENZYME: CPT | Performed by: PHYSICIAN ASSISTANT

## 2021-01-11 PROCEDURE — 36415 COLL VENOUS BLD VENIPUNCTURE: CPT

## 2021-01-11 NOTE — PROGRESS NOTES
Medical Assistant Note:    Christiano Molina presents today for blood draw.    Patient seen by provider today: No.   present during visit today: Not Applicable.    Concerns: No Concerns.    Procedure:  Lab draw site: lac, Needle type: bf, Gauge: 23.    Post Assessment:  Labs drawn without difficulty: Yes.    Discharge Plan:  Departure Mode: Ambulatory.    Face to Face Time: 5 minutes.    Lynne Garland CMA  1/11/2021      3:09 PM

## 2021-01-11 NOTE — LETTER
1/11/2021         RE: Christian[address] Molina  [address] [place] [address]        Dear Colleague,    Thank you for referring your patient, Christiano Molina, to the [facility]. Please see a copy of my visit note below.    Medical Assistant Note:    Christiano Molina presents today for blood draw.    Patient seen by provider today: No.   present during visit today: Not Applicable.    Concerns: No Concerns.    Procedure:  Lab draw site: lac, Needle type: bf, Gauge: 23.    Post Assessment:  Labs drawn without difficulty: Yes.    Discharge Plan:  Departure Mode: Ambulatory.    Face to Face Time: 5 minutes.    Lynne Garland CMA  1/11/2021      3:09 PM                Again, thank you for allowing me to participate in the care of your patient.        Sincerely,         Lab Draw

## 2021-01-14 NOTE — PLAN OF CARE
0963-1923: VSS. Denies pain. Per patient, scheduled compazine and walking in the halls helping to control nausea.     [id number]: VSS. Slept well overnight. CIVI chemo with good blood return. Continue with plan of care.    green carlson to CDU bed 7 KEESHA Huntley

## 2021-03-11 ENCOUNTER — HOSPITAL ENCOUNTER (OUTPATIENT)
Dept: CT IMAGING | Facility: CLINIC | Age: 46
End: 2021-03-11
Attending: PHYSICIAN ASSISTANT
Payer: COMMERCIAL

## 2021-03-11 DIAGNOSIS — C85.10 HIGH GRADE B-CELL LYMPHOMA (H): ICD-10-CM

## 2021-03-11 PROCEDURE — 70491 CT SOFT TISSUE NECK W/DYE: CPT

## 2021-03-11 PROCEDURE — 250N000011 HC RX IP 250 OP 636: Performed by: PHYSICIAN ASSISTANT

## 2021-03-11 PROCEDURE — 71260 CT THORAX DX C+: CPT

## 2021-03-11 PROCEDURE — 250N000009 HC RX 250: Performed by: PHYSICIAN ASSISTANT

## 2021-03-11 RX ORDER — IOPAMIDOL 755 MG/ML
125 INJECTION, SOLUTION INTRAVASCULAR ONCE
Status: COMPLETED | OUTPATIENT
Start: 2021-03-11 | End: 2021-03-11

## 2021-03-11 RX ADMIN — SODIUM CHLORIDE 70 ML: 9 INJECTION, SOLUTION INTRAVENOUS at 12:21

## 2021-03-11 RX ADMIN — IOPAMIDOL 125 ML: 755 INJECTION, SOLUTION INTRAVENOUS at 12:21

## 2021-03-17 NOTE — PROGRESS NOTES
Prudencio is a 45 year old who is being evaluated via a billable video visit.      How would you like to obtain your AVS? MyChart  If the video visit is dropped, the invitation should be resent by: Text to cell phone: [phone]  Will anyone else be joining your video visit? No      Video Start Time: 2:15 pm   Video-Visit Details    Type of service:  Video Visit    Video End Time: 2:40 pm    Originating Location (pt. Location): Home    Distant Location (provider location):  [facility] BLOOD AND [facility] PROGRAM [place]     Platform used for Video Visit: Secrette    [facility]      Referring Provider: self     Dx:   1. BL, stage 1A, LDH slightly high, max size 6.2 cm, Dx 3/15/19    Tx:  1. Da-R-EPOCH x6, 3/27/19 to mid July 2019, [place] after C2, CR after C4/6.     PMH: strasibusmus   Allergies: None     Interval Hx: ROS neg on a 10-point review.     Lab Results   Component Value Date    WBC 4.7 12/11/2020    HGB 14.8 12/11/2020    HCT 44.2 12/11/2020     12/11/2020     12/11/2020    POTASSIUM 4.3 12/11/2020    CHLORIDE 106 12/11/2020    CO2 31 12/11/2020    BUN 20 12/11/2020    CR 1.29 (H) 12/11/2020    GLC 77 12/11/2020    SED 12 06/28/2019    AST 22 12/11/2020    ALT 31 12/11/2020    ALKPHOS 53 12/11/2020    BILITOTAL 0.5 12/11/2020    INR 1.12 07/15/2019       Current Outpatient Medications   Medication     acetaminophen (TYLENOL) 325 MG tablet     famotidine (PEPCID) 20 MG tablet     LORazepam (ATIVAN) 0.5 MG tablet     sertraline (ZOLOFT) 50 MG tablet     traZODone (DESYREL) 100 MG tablet     No current facility-administered medications for this visit.      Assessment and Plan:  1. BL: CR. See us in 3m with labs and in 6m with N/C/A/P CT (last scheduled scans). We will trend his Crt and if we find a true upward trend, will refer him to nephrology. If Crt rises more, we will have to replace our last CT with a targeted US to avoid worsening of renal  function.   2. ID: can get covid vaccines

## 2021-03-18 ENCOUNTER — HOSPITAL ENCOUNTER (OUTPATIENT)
Facility: CLINIC | Age: 46
Setting detail: SPECIMEN
Discharge: HOME | End: 2021-03-18
Attending: INTERNAL MEDICINE | Admitting: PHYSICIAN ASSISTANT
Payer: COMMERCIAL

## 2021-03-18 ENCOUNTER — INFUSION THERAPY VISIT (OUTPATIENT)
Dept: INFUSION THERAPY | Facility: CLINIC | Age: 46
End: 2021-03-18
Attending: INTERNAL MEDICINE
Payer: COMMERCIAL

## 2021-03-18 ENCOUNTER — VIRTUAL VISIT (OUTPATIENT)
Dept: TRANSPLANT | Facility: CLINIC | Age: 46
End: 2021-03-18
Attending: INTERNAL MEDICINE
Payer: COMMERCIAL

## 2021-03-18 DIAGNOSIS — C85.10 HIGH GRADE B-CELL LYMPHOMA (H): ICD-10-CM

## 2021-03-18 DIAGNOSIS — C83.74 BURKITT LYMPHOMA OF LYMPH NODES OF AXILLA (H): Primary | ICD-10-CM

## 2021-03-18 LAB
ALBUMIN SERPL-MCNC: 3.9 G/DL (ref 3.4–5)
ALP SERPL-CCNC: 47 U/L (ref 40–150)
ALT SERPL W P-5'-P-CCNC: 24 U/L (ref 0–70)
ANION GAP SERPL CALCULATED.3IONS-SCNC: 2 MMOL/L (ref 3–14)
AST SERPL W P-5'-P-CCNC: 15 U/L (ref 0–45)
BASOPHILS # BLD AUTO: 0 10E9/L (ref 0–0.2)
BASOPHILS NFR BLD AUTO: 0.8 %
BILIRUB SERPL-MCNC: 0.5 MG/DL (ref 0.2–1.3)
BUN SERPL-MCNC: 15 MG/DL (ref 7–30)
CALCIUM SERPL-MCNC: 8.5 MG/DL (ref 8.5–10.1)
CHLORIDE SERPL-SCNC: 107 MMOL/L (ref 94–109)
CO2 SERPL-SCNC: 30 MMOL/L (ref 20–32)
CREAT SERPL-MCNC: 1.28 MG/DL (ref 0.66–1.25)
DIFFERENTIAL METHOD BLD: ABNORMAL
EOSINOPHIL # BLD AUTO: 0.1 10E9/L (ref 0–0.7)
EOSINOPHIL NFR BLD AUTO: 2.3 %
ERYTHROCYTE [DISTWIDTH] IN BLOOD BY AUTOMATED COUNT: 12.4 % (ref 10–15)
GFR SERPL CREATININE-BSD FRML MDRD: 67 ML/MIN/{1.73_M2}
GLUCOSE SERPL-MCNC: 93 MG/DL (ref 70–99)
HCT VFR BLD AUTO: 42.7 % (ref 40–53)
HGB BLD-MCNC: 14.2 G/DL (ref 13.3–17.7)
IMM GRANULOCYTES # BLD: 0 10E9/L (ref 0–0.4)
IMM GRANULOCYTES NFR BLD: 0.3 %
LDH SERPL L TO P-CCNC: 160 U/L (ref 85–227)
LYMPHOCYTES # BLD AUTO: 1.3 10E9/L (ref 0.8–5.3)
LYMPHOCYTES NFR BLD AUTO: 32.4 %
MCH RBC QN AUTO: 30.1 PG (ref 26.5–33)
MCHC RBC AUTO-ENTMCNC: 33.3 G/DL (ref 31.5–36.5)
MCV RBC AUTO: 91 FL (ref 78–100)
MONOCYTES # BLD AUTO: 0.3 10E9/L (ref 0–1.3)
MONOCYTES NFR BLD AUTO: 7.2 %
NEUTROPHILS # BLD AUTO: 2.2 10E9/L (ref 1.6–8.3)
NEUTROPHILS NFR BLD AUTO: 57 %
NRBC # BLD AUTO: 0 10*3/UL
NRBC BLD AUTO-RTO: 0 /100
PLATELET # BLD AUTO: 170 10E9/L (ref 150–450)
POTASSIUM SERPL-SCNC: 4.3 MMOL/L (ref 3.4–5.3)
PROT SERPL-MCNC: 7.1 G/DL (ref 6.8–8.8)
RBC # BLD AUTO: 4.71 10E12/L (ref 4.4–5.9)
SODIUM SERPL-SCNC: 139 MMOL/L (ref 133–144)
WBC # BLD AUTO: 3.9 10E9/L (ref 4–11)

## 2021-03-18 PROCEDURE — 83615 LACTATE (LD) (LDH) ENZYME: CPT | Performed by: PHYSICIAN ASSISTANT

## 2021-03-18 PROCEDURE — 99213 OFFICE O/P EST LOW 20 MIN: CPT | Mod: 95 | Performed by: INTERNAL MEDICINE

## 2021-03-18 PROCEDURE — 80053 COMPREHEN METABOLIC PANEL: CPT | Performed by: PHYSICIAN ASSISTANT

## 2021-03-18 PROCEDURE — 85025 COMPLETE CBC W/AUTO DIFF WBC: CPT | Performed by: PHYSICIAN ASSISTANT

## 2021-03-18 PROCEDURE — 36415 COLL VENOUS BLD VENIPUNCTURE: CPT

## 2021-03-18 NOTE — LETTER
Date:October 16, 2021      Provider requested that no letter be sent. Do not send.       [facility]

## 2021-03-18 NOTE — LETTER
3/18/2021         RE: Christiano Molina  [address]  [place] [address]        Dear Colleague,    Thank you for referring your patient, Christiano Molina, to the [facility]. Please see a copy of my visit note below.    Prudencio is a 45 year old who is being evaluated via a billable video visit.      How would you like to obtain your AVS? MyChart  If the video visit is dropped, the invitation should be resent by: Text to cell phone: [phone]  Will anyone else be joining your video visit? No      Video Start Time: 2:15 pm   Video-Visit Details    Type of service:  Video Visit    Video End Time: 2:40 pm    Originating Location (pt. Location): Home    Distant Location (provider location):  [facility] BLOOD AND [facility]PL[facility] PROGRAM [place]     Platform used for Video Visit: [facility]      Referring Provider: self     Dx:   1. BL, stage 1A, LDH slightly high, max size 6.2 cm, Dx 3/15/19    Tx:  1. Da-R-EPOCH x6, 3/27/19 to mid July 2019, [place] after C2, CR after C4/6.     PMH: strasibusmus   Allergies: None     Interval Hx: ROS neg on a 10-point review.     Lab Results   Component Value Date    WBC 4.7 12/11/2020    HGB 14.8 12/11/2020    HCT 44.2 12/11/2020     12/11/2020     12/11/2020    POTASSIUM 4.3 12/11/2020    CHLORIDE 106 12/11/2020    CO2 31 12/11/2020    BUN 20 12/11/2020    CR 1.29 (H) 12/11/2020    GLC 77 12/11/2020    SED 12 06/28/2019    AST 22 12/11/2020    ALT 31 12/11/2020    ALKPHOS 53 12/11/2020    BILITOTAL 0.5 12/11/2020    INR 1.12 07/15/2019       Current Outpatient Medications   Medication     acetaminophen (TYLENOL) 325 MG tablet     famotidine (PEPCID) 20 MG tablet     LORazepam (ATIVAN) 0.5 MG tablet     sertraline (ZOLOFT) 50 MG tablet     traZODone (DESYREL) 100 MG tablet     No current facility-administered medications for this visit.      Assessment  and Plan:  1. BL: CR. See us in 3m with labs and in 6m with N/C/A/P CT (last scheduled scans). We will trend his Crt and if we find a true upward trend, will refer him to nephrology. If Crt rises more, we will have to replace our last CT with a targeted US to avoid worsening of renal function.   2. ID: can get covid vaccines                Again, thank you for allowing me to participate in the care of your patient.        Sincerely,        Vern Ramirez MD

## 2021-03-18 NOTE — PROGRESS NOTES
Medical Assistant Note:  Christiano Molina presents today for lab draw.    Patient seen by provider today: No.   present during visit today: Not Applicable.    Concerns: No Concerns.    Procedure:  Lab draw site: LAC, Needle type: BF, Gauge: 21. Gauze and coban applied    Post Assessment:  Labs drawn without difficulty: Yes.    Discharge Plan:  Departure Mode: Ambulatory.    Face to Face Time: 5.    Marce Dallas CMA

## 2021-06-15 ENCOUNTER — HOSPITAL ENCOUNTER (OUTPATIENT)
Facility: CLINIC | Age: 46
Setting detail: SPECIMEN
Discharge: HOME | End: 2021-06-15
Attending: INTERNAL MEDICINE | Admitting: INTERNAL MEDICINE
Payer: COMMERCIAL

## 2021-06-15 ENCOUNTER — INFUSION THERAPY VISIT (OUTPATIENT)
Dept: INFUSION THERAPY | Facility: CLINIC | Age: 46
End: 2021-06-15
Attending: INTERNAL MEDICINE
Payer: COMMERCIAL

## 2021-06-15 DIAGNOSIS — C83.74 BURKITT LYMPHOMA OF LYMPH NODES OF AXILLA (H): ICD-10-CM

## 2021-06-15 LAB
ALBUMIN SERPL-MCNC: 3.9 G/DL (ref 3.4–5)
ALP SERPL-CCNC: 45 U/L (ref 40–150)
ALT SERPL W P-5'-P-CCNC: 25 U/L (ref 0–70)
ANION GAP SERPL CALCULATED.3IONS-SCNC: 1 MMOL/L (ref 3–14)
AST SERPL W P-5'-P-CCNC: 20 U/L (ref 0–45)
BASOPHILS # BLD AUTO: 0 10E9/L (ref 0–0.2)
BASOPHILS NFR BLD AUTO: 0.5 %
BILIRUB SERPL-MCNC: 0.4 MG/DL (ref 0.2–1.3)
BUN SERPL-MCNC: 18 MG/DL (ref 7–30)
CALCIUM SERPL-MCNC: 8.9 MG/DL (ref 8.5–10.1)
CHLORIDE SERPL-SCNC: 108 MMOL/L (ref 94–109)
CO2 SERPL-SCNC: 31 MMOL/L (ref 20–32)
CREAT SERPL-MCNC: 1.24 MG/DL (ref 0.66–1.25)
DIFFERENTIAL METHOD BLD: NORMAL
EOSINOPHIL # BLD AUTO: 0.1 10E9/L (ref 0–0.7)
EOSINOPHIL NFR BLD AUTO: 2.9 %
ERYTHROCYTE [DISTWIDTH] IN BLOOD BY AUTOMATED COUNT: 12.4 % (ref 10–15)
GFR SERPL CREATININE-BSD FRML MDRD: 70 ML/MIN/{1.73_M2}
GLUCOSE SERPL-MCNC: 76 MG/DL (ref 70–99)
HCT VFR BLD AUTO: 42.1 % (ref 40–53)
HGB BLD-MCNC: 14.1 G/DL (ref 13.3–17.7)
IMM GRANULOCYTES # BLD: 0 10E9/L (ref 0–0.4)
IMM GRANULOCYTES NFR BLD: 0 %
LDH SERPL L TO P-CCNC: 197 U/L (ref 85–227)
LYMPHOCYTES # BLD AUTO: 1.4 10E9/L (ref 0.8–5.3)
LYMPHOCYTES NFR BLD AUTO: 33.3 %
MCH RBC QN AUTO: 30 PG (ref 26.5–33)
MCHC RBC AUTO-ENTMCNC: 33.5 G/DL (ref 31.5–36.5)
MCV RBC AUTO: 90 FL (ref 78–100)
MONOCYTES # BLD AUTO: 0.4 10E9/L (ref 0–1.3)
MONOCYTES NFR BLD AUTO: 9.2 %
NEUTROPHILS # BLD AUTO: 2.2 10E9/L (ref 1.6–8.3)
NEUTROPHILS NFR BLD AUTO: 54.1 %
NRBC # BLD AUTO: 0 10*3/UL
NRBC BLD AUTO-RTO: 0 /100
PLATELET # BLD AUTO: 152 10E9/L (ref 150–450)
POTASSIUM SERPL-SCNC: 4.5 MMOL/L (ref 3.4–5.3)
PROT SERPL-MCNC: 7.1 G/DL (ref 6.8–8.8)
RBC # BLD AUTO: 4.7 10E12/L (ref 4.4–5.9)
SODIUM SERPL-SCNC: 140 MMOL/L (ref 133–144)
WBC # BLD AUTO: 4.1 10E9/L (ref 4–11)

## 2021-06-15 PROCEDURE — 83615 LACTATE (LD) (LDH) ENZYME: CPT | Performed by: INTERNAL MEDICINE

## 2021-06-15 PROCEDURE — 36415 COLL VENOUS BLD VENIPUNCTURE: CPT

## 2021-06-15 PROCEDURE — 80053 COMPREHEN METABOLIC PANEL: CPT | Performed by: INTERNAL MEDICINE

## 2021-06-15 PROCEDURE — 85025 COMPLETE CBC W/AUTO DIFF WBC: CPT | Performed by: INTERNAL MEDICINE

## 2021-06-15 NOTE — PROGRESS NOTES
Prudencio is a 46 year old who is being evaluated via a billable video visit.      How would you like to obtain your AVS? MyChart  If the video visit is dropped, the invitation should be resent by: Send to e-mail at: [email or web address]  Will anyone else be joining your video visit? No       DONNA Alan      Video Start Time: 1:29 PM  Video-Visit Details    Type of service:  Video Visit    Video End Time:1:47 PM    Originating Location (pt. 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 He presented with R axillary mass. PET/CT showed stage IA disease. Bone marrow biopsy and LP done 3/25 were negative for disease. Please see Dr. Ramirez's note for further discussion of diagnostic work-up.      Plan for 2 cycles of DA-R-EPOCH followed by PET/CT. He presented for cycle 1 on 3/27/19 and was discharged on 3/31/19. He tolerated chemotherapy well apart from mild hives and facial itching from Rituxan (was able to complete dose), chemotherapy-induced nausea, and mild post-LP occipital headache.      Following discharge, his pathology was finalized here with Ki-67 positive in 95-99% of lymphoma cells. TDT negative, EBV was strongly positive. The positive findings for CD20, CD10, BCL6, very high Ki-67 with negative BCL2, as well as MYC translocation supports diagnoses of Burkitt's lymphoma.      Cycle 2 of R-EPOCH was given 4/17-4/21/19. He had a partial response seen on imaging after 2 cycles. PET/CT after 4 cycles showed CR. Cycle 5 was complicated by appendicitis following chemotherapy. He received cycle 6 at same dose  "level as cycle 5. Cycle 6 was given 7/11-7/15/19. Post-therapy PET showed continued CR. Last imaging showed continued CR as well.      He presents today in routine 3 month follow-up on surveillance.      Interval History:  Prudencio is feeling well today. He does not have any concerns. He has had less GERD and abdominal symptoms since taking pepcid daily and reducing tomato/spicy foods in his diet. He has a long history of n/v a few times per year without a clear trigger. He has been using zofran ODT left over from chemo with good relief of this. No issues with abdominal pain or bowels. No recent fevers/chills/drenching sweats or infections. No new or different pain. No lumps/bumps. Energy and appetite are good.     Current Outpatient Medications   Medication     acetaminophen (TYLENOL) 325 MG tablet     famotidine (PEPCID) 20 MG tablet     LORazepam (ATIVAN) 0.5 MG tablet     sertraline (ZOLOFT) 50 MG tablet     traZODone (DESYREL) 100 MG tablet     No current facility-administered medications for this visit.       No Known Allergies      Objective:     Video physical exam  General: Patient appears well in no acute distress. Normal body habitus.  Skin: No visualized rash or lesions on visualized skin  Eyes: EOMI, no erythema, sclera icterus or discharge noted  Resp: Appears to be breathing comfortably without accessory muscle usage, speaking in full sentences, no cough  MSK: Appears to have normal range of motion based on visualized movements  Neurologic: No apparent tremors, facial movements symmetric  Psych: affect and mood congruent, bright affect, alert and oriented  The rest of a comprehensive physical examination is deferred due to PHE (public health emergency) video restrictions\"    Labs:    6/15/2021 11:22   Sodium 140   Potassium 4.5   Chloride 108   Carbon Dioxide 31   Urea Nitrogen 18   Creatinine 1.24   GFR Estimate 70   GFR Estimate If Black 81   Calcium 8.9   Anion Gap 1 (L)   Albumin 3.9   Protein Total " 7.1   Bilirubin Total 0.4   Alkaline Phosphatase 45   ALT 25   AST 20   Lactate Dehydrogenase 197   Glucose 76   WBC 4.1   Hemoglobin 14.1   Hematocrit 42.1   Platelet Count 152   RBC Count 4.70   MCV 90   MCH 30.0   MCHC 33.5   RDW 12.4   Diff Method Automated Method   % Neutrophils 54.1   % Lymphocytes 33.3   % Monocytes 9.2   % Eosinophils 2.9   % Basophils 0.5   % Immature Granulocytes 0.0   Nucleated RBCs 0   Absolute Neutrophil 2.2   Absolute Lymphocytes 1.4   Absolute Monocytes 0.4   Absolute Eosinophils 0.1   Absolute Basophils 0.0   Abs Immature Granulocytes 0.0   Absolute Nucleated RBC 0.0              Assessment & Plan:      1. Stage Ia Burkitt's lymphoma: Negative marrow or CNS involvement. S/p 6 cycles of R-EPOCH with Neulasta support. He achieved CR after 4 cycles and post-therapy PET confirmed this. He is feeling well and lab work is normal. Follow-up in 3 months with Dr. Ramirez with CT N/CAP and labs (insurance did not cover PET even after peer to peer). He will call with any interval concerns prior to then.     2. GI: GERD is well controlled with Pepcid and avoiding spicy/tomato based foods. Refilled zofran ODT for rare n/v.     25 minutes spent on the date of the encounter doing chart review, review of test results, interpretation of tests, patient visit and documentation     Holly Wheat PA-C

## 2021-06-15 NOTE — PROGRESS NOTES
Medical Assistant Note:  Christiano Molina presents today for blood draw.    Patient seen by provider today: No.   present during visit today: Not Applicable.    Concerns: No Concerns.    Procedure:  Lab draw site: rac, Needle type: bf, Gauge: 23.    Post Assessment:  Labs drawn without difficulty: Yes.    Discharge Plan:  Departure Mode: Ambulatory.    Face to Face Time: 5 min  .    Omaira Reyes, CMA

## 2021-06-16 ENCOUNTER — VIRTUAL VISIT (OUTPATIENT)
Dept: ONCOLOGY | Facility: CLINIC | Age: 46
End: 2021-06-16
Attending: INTERNAL MEDICINE
Payer: COMMERCIAL

## 2021-06-16 DIAGNOSIS — C83.70 BURKITT LYMPHOMA, UNSPECIFIED BODY REGION (H): Primary | ICD-10-CM

## 2021-06-16 DIAGNOSIS — R11.2 NAUSEA AND VOMITING, INTRACTABILITY OF VOMITING NOT SPECIFIED, UNSPECIFIED VOMITING TYPE: ICD-10-CM

## 2021-06-16 PROCEDURE — 999N001193 HC VIDEO/TELEPHONE VISIT; NO CHARGE

## 2021-06-16 PROCEDURE — 99214 OFFICE O/P EST MOD 30 MIN: CPT | Mod: 95 | Performed by: PHYSICIAN ASSISTANT

## 2021-06-16 RX ORDER — ONDANSETRON 8 MG/1
8 TABLET, ORALLY DISINTEGRATING ORAL EVERY 8 HOURS PRN
Qty: 15 TABLET | Refills: 0 | Status: SHIPPED | OUTPATIENT
Start: 2021-06-16

## 2021-06-16 NOTE — LETTER
6/16/2021         RE: Christiano Molina  [address] [facility]  [place] [address]        Dear Colleague,    Thank you for referring your patient, Christiano Molina, to the [facility] CANCER [facility]. Please see a copy of my visit note below.    Prudencio is a 46 year old who is being evaluated via a billable video visit.      How would you like to obtain your AVS? MyChart  If the video visit is dropped, the invitation should be resent by: Send to e-mail at: [email or web address]  Will anyone else be joining your video visit? DONNA Chaney      Video Start Time: 1:29 PM  Video-Visit Details    Type of service:  Video Visit    Video End Time:1:47 PM    Originating Location (pt. 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 He presented with R axillary mass. PET/CT showed stage IA disease. Bone marrow biopsy and LP done 3/25 were negative for disease. Please see Dr. Ramirez's note for further discussion of diagnostic work-up.      Plan for 2 cycles of DA-R-EPOCH followed by PET/CT. He presented for cycle 1 on 3/27/19 and was discharged on 3/31/19. He tolerated chemotherapy well apart from mild hives and facial itching from Rituxan (was able to complete dose), chemotherapy-induced nausea, and mild post-LP occipital headache.      Following discharge, his pathology was finalized here with Ki-67 positive in 95-99% of lymphoma cells. TDT negative, EBV was strongly positive. The positive findings for CD20, CD10, BCL6, very high Ki-67 with negative BCL2, as well as MYC translocation supports  diagnoses of Burkitt's lymphoma.      Cycle 2 of R-EPOCH was given 4/17-4/21/19. He had a partial response seen on imaging after 2 cycles. PET/CT after 4 cycles showed CR. Cycle 5 was complicated by appendicitis following chemotherapy. He received cycle 6 at same dose level as cycle 5. Cycle 6 was given 7/11-7/15/19. Post-therapy PET showed continued CR. Last imaging showed continued CR as well.      He presents today in routine 3 month follow-up on surveillance.      Interval History:  Prudencio is feeling well today. He does not have any concerns. He has had less GERD and abdominal symptoms since taking pepcid daily and reducing tomato/spicy foods in his diet. He has a long history of n/v a few times per year without a clear trigger. He has been using zofran ODT left over from chemo with good relief of this. No issues with abdominal pain or bowels. No recent fevers/chills/drenching sweats or infections. No new or different pain. No lumps/bumps. Energy and appetite are good.     Current Outpatient Medications   Medication     acetaminophen (TYLENOL) 325 MG tablet     famotidine (PEPCID) 20 MG tablet     LORazepam (ATIVAN) 0.5 MG tablet     sertraline (ZOLOFT) 50 MG tablet     traZODone (DESYREL) 100 MG tablet     No current facility-administered medications for this visit.       No Known Allergies      Objective:     Video physical exam  General: Patient appears well in no acute distress. Normal body habitus.  Skin: No visualized rash or lesions on visualized skin  Eyes: EOMI, no erythema, sclera icterus or discharge noted  Resp: Appears to be breathing comfortably without accessory muscle usage, speaking in full sentences, no cough  MSK: Appears to have normal range of motion based on visualized movements  Neurologic: No apparent tremors, facial movements symmetric  Psych: affect and mood congruent, bright affect, alert and oriented  The rest of a comprehensive physical examination is deferred due to [facility] (public health  "emergency) video restrictions\"    Labs:    6/15/2021 11:22   Sodium 140   Potassium 4.5   Chloride 108   Carbon Dioxide 31   Urea Nitrogen 18   Creatinine 1.24   GFR Estimate 70   GFR Estimate If Black 81   Calcium 8.9   Anion Gap 1 (L)   Albumin 3.9   Protein Total 7.1   Bilirubin Total 0.4   Alkaline Phosphatase 45   ALT 25   AST 20   Lactate Dehydrogenase 197   Glucose 76   WBC 4.1   Hemoglobin 14.1   Hematocrit 42.1   Platelet Count 152   RBC Count 4.70   MCV 90   MCH 30.0   MCHC 33.5   RDW 12.4   Diff Method Automated Method   % Neutrophils 54.1   % Lymphocytes 33.3   % Monocytes 9.2   % Eosinophils 2.9   % Basophils 0.5   % Immature Granulocytes 0.0   Nucleated RBCs 0   Absolute Neutrophil 2.2   Absolute Lymphocytes 1.4   Absolute Monocytes 0.4   Absolute Eosinophils 0.1   Absolute Basophils 0.0   Abs Immature Granulocytes 0.0   Absolute Nucleated RBC 0.0              Assessment & Plan:      1. Stage Ia Burkitt's lymphoma: Negative marrow or CNS involvement. S/p 6 cycles of R-EPOCH with Neulasta support. He achieved CR after 4 cycles and post-therapy PET confirmed this. He is feeling well and lab work is normal. Follow-up in 3 months with Dr. Ramirez with CT N/CAP and labs (insurance did not cover PET even after peer to peer). He will call with any interval concerns prior to then.     2. GI: GERD is well controlled with Pepcid and avoiding spicy/tomato based foods. Refilled zofran ODT for rare n/v.     25 minutes spent on the date of the encounter doing chart review, review of test results, interpretation of tests, patient visit and documentation     Holly Whaet PA-C             Again, thank you for allowing me to participate in the care of your patient.        Sincerely,        Holly Wheat PA-C  "

## 2021-09-15 ENCOUNTER — LAB (OUTPATIENT)
Dept: INFUSION THERAPY | Facility: CLINIC | Age: 46
End: 2021-09-15
Attending: INTERNAL MEDICINE
Payer: COMMERCIAL

## 2021-09-15 ENCOUNTER — HOSPITAL ENCOUNTER (OUTPATIENT)
Dept: CT IMAGING | Facility: CLINIC | Age: 46
End: 2021-09-15
Attending: PHYSICIAN ASSISTANT
Payer: COMMERCIAL

## 2021-09-15 ENCOUNTER — HOSPITAL ENCOUNTER (OUTPATIENT)
Facility: CLINIC | Age: 46
End: 2021-09-15
Attending: INTERNAL MEDICINE
Payer: COMMERCIAL

## 2021-09-15 DIAGNOSIS — C83.70 BURKITT LYMPHOMA, UNSPECIFIED BODY REGION (H): ICD-10-CM

## 2021-09-15 LAB
ALBUMIN SERPL-MCNC: 3.9 G/DL (ref 3.4–5)
ALP SERPL-CCNC: 43 U/L (ref 40–150)
ALT SERPL W P-5'-P-CCNC: 24 U/L (ref 0–70)
ANION GAP SERPL CALCULATED.3IONS-SCNC: 4 MMOL/L (ref 3–14)
AST SERPL W P-5'-P-CCNC: 18 U/L (ref 0–45)
BASOPHILS # BLD AUTO: 0 10E3/UL (ref 0–0.2)
BASOPHILS NFR BLD AUTO: 1 %
BILIRUB SERPL-MCNC: 0.5 MG/DL (ref 0.2–1.3)
BUN SERPL-MCNC: 19 MG/DL (ref 7–30)
CALCIUM SERPL-MCNC: 9.2 MG/DL (ref 8.5–10.1)
CHLORIDE BLD-SCNC: 108 MMOL/L (ref 94–109)
CO2 SERPL-SCNC: 29 MMOL/L (ref 20–32)
CREAT SERPL-MCNC: 1.28 MG/DL (ref 0.66–1.25)
EOSINOPHIL # BLD AUTO: 0 10E3/UL (ref 0–0.7)
EOSINOPHIL NFR BLD AUTO: 1 %
ERYTHROCYTE [DISTWIDTH] IN BLOOD BY AUTOMATED COUNT: 12.3 % (ref 10–15)
GFR SERPL CREATININE-BSD FRML MDRD: 67 ML/MIN/1.73M2
GLUCOSE BLD-MCNC: 80 MG/DL (ref 70–99)
HCT VFR BLD AUTO: 42.4 % (ref 40–53)
HGB BLD-MCNC: 14.2 G/DL (ref 13.3–17.7)
IMM GRANULOCYTES # BLD: 0 10E3/UL
IMM GRANULOCYTES NFR BLD: 1 %
LDH SERPL L TO P-CCNC: 185 U/L (ref 85–227)
LYMPHOCYTES # BLD AUTO: 1.2 10E3/UL (ref 0.8–5.3)
LYMPHOCYTES NFR BLD AUTO: 28 %
MCH RBC QN AUTO: 30 PG (ref 26.5–33)
MCHC RBC AUTO-ENTMCNC: 33.5 G/DL (ref 31.5–36.5)
MCV RBC AUTO: 90 FL (ref 78–100)
MONOCYTES # BLD AUTO: 0.2 10E3/UL (ref 0–1.3)
MONOCYTES NFR BLD AUTO: 6 %
NEUTROPHILS # BLD AUTO: 2.7 10E3/UL (ref 1.6–8.3)
NEUTROPHILS NFR BLD AUTO: 63 %
NRBC # BLD AUTO: 0 10E3/UL
NRBC BLD AUTO-RTO: 0 /100
PLATELET # BLD AUTO: 173 10E3/UL (ref 150–450)
POTASSIUM BLD-SCNC: 4.7 MMOL/L (ref 3.4–5.3)
PROT SERPL-MCNC: 7.2 G/DL (ref 6.8–8.8)
RBC # BLD AUTO: 4.73 10E6/UL (ref 4.4–5.9)
SODIUM SERPL-SCNC: 141 MMOL/L (ref 133–144)
WBC # BLD AUTO: 4.2 10E3/UL (ref 4–11)

## 2021-09-15 PROCEDURE — 36415 COLL VENOUS BLD VENIPUNCTURE: CPT

## 2021-09-15 PROCEDURE — 83615 LACTATE (LD) (LDH) ENZYME: CPT | Performed by: PHYSICIAN ASSISTANT

## 2021-09-15 PROCEDURE — 250N000011 HC RX IP 250 OP 636: Performed by: PHYSICIAN ASSISTANT

## 2021-09-15 PROCEDURE — 85025 COMPLETE CBC W/AUTO DIFF WBC: CPT | Performed by: PHYSICIAN ASSISTANT

## 2021-09-15 PROCEDURE — 71260 CT THORAX DX C+: CPT

## 2021-09-15 PROCEDURE — 70491 CT SOFT TISSUE NECK W/DYE: CPT

## 2021-09-15 PROCEDURE — 250N000009 HC RX 250: Performed by: PHYSICIAN ASSISTANT

## 2021-09-15 PROCEDURE — 80053 COMPREHEN METABOLIC PANEL: CPT | Performed by: PHYSICIAN ASSISTANT

## 2021-09-15 RX ORDER — IOPAMIDOL 755 MG/ML
125 INJECTION, SOLUTION INTRAVASCULAR ONCE
Status: COMPLETED | OUTPATIENT
Start: 2021-09-15 | End: 2021-09-15

## 2021-09-15 RX ADMIN — SODIUM CHLORIDE 70 ML: 9 INJECTION, SOLUTION INTRAVENOUS at 13:57

## 2021-09-15 RX ADMIN — IOPAMIDOL 125 ML: 755 INJECTION, SOLUTION INTRAVENOUS at 13:57

## 2021-09-15 NOTE — PROGRESS NOTES
Prudencio is a 46 year old who is being evaluated via a billable video visit.      How would you like to obtain your AVS? MyChart  If the video visit is dropped, the invitation should be resent by: Text to cell phone: [phone]  Will anyone else be joining your video visit? No      Video Start Time: 2 pm  Video-Visit Details    Type of service:  Video Visit    Video End Time: 2:20 pm    Originating Location (pt. Location): Home    Distant Location (provider location):  [facility] BLOOD AND MARROW TRANSPLANT PROGRAM [place]     Platform used for Video Visit: [employer]  [facility]      Referring Provider: self     Dx:   1. BL, stage 1A, LDH slightly high, max size 6.2 cm, Dx 3/15/19    Tx:  1. Da-R-EPOCH x6, 3/27/19 to mid July 2019, KS after C2, CR after C4/6.     PMH: strasibusmus   Allergies: None     Interval Hx: ROS neg on a 10-point review except recently with coughs or sneezes he gets a brief episode of generalized HA.     Lab Results   Component Value Date    WBC 4.2 09/15/2021    HGB 14.2 09/15/2021    HCT 42.4 09/15/2021     09/15/2021     09/15/2021    POTASSIUM 4.7 09/15/2021    CHLORIDE 108 09/15/2021    CO2 29 09/15/2021    BUN 19 09/15/2021    CR 1.28 (H) 09/15/2021    GLC 80 09/15/2021    SED 12 06/28/2019    AST 18 09/15/2021    ALT 24 09/15/2021    ALKPHOS 43 09/15/2021    BILITOTAL 0.5 09/15/2021    INR 1.12 07/15/2019       Current Outpatient Medications   Medication     acetaminophen (TYLENOL) 325 MG tablet     famotidine (PEPCID) 20 MG tablet     LORazepam (ATIVAN) 0.5 MG tablet     ondansetron (ZOFRAN-ODT) 8 MG ODT tab     sertraline (ZOLOFT) 50 MG tablet     traZODone (DESYREL) 100 MG tablet     No current facility-administered medications for this visit.     Assessment and Plan:  1. BL: CR. Brain MRI to make sure HAs are not disease-related. See us every 4m for 1 more year, then discharge from clinic. No more scheduled scans.    2. ID: s/p  covid vaccines. Get flu shot.

## 2021-09-15 NOTE — PROGRESS NOTES
Medical Assistant Note:  Christiano Molina presents today for blood draw.    Patient seen by provider today: No.   present during visit today: Not Applicable.    Concerns: No Concerns.    Procedure:  Lab draw site: rac, Needle type: bf, Gauge: 23.    Post Assessment:  Labs drawn without difficulty: Yes.    Discharge Plan:  Departure Mode: Ambulatory.    Face to Face Time: 5 min.    Omaira Reyes, CMA

## 2021-09-16 ENCOUNTER — VIRTUAL VISIT (OUTPATIENT)
Dept: TRANSPLANT | Facility: CLINIC | Age: 46
End: 2021-09-16
Attending: INTERNAL MEDICINE
Payer: COMMERCIAL

## 2021-09-16 ENCOUNTER — MYC MEDICAL ADVICE (OUTPATIENT)
Dept: ONCOLOGY | Facility: CLINIC | Age: 46
End: 2021-09-16

## 2021-09-16 DIAGNOSIS — C83.74 BURKITT LYMPHOMA OF LYMPH NODES OF AXILLA (H): Primary | ICD-10-CM

## 2021-09-16 PROCEDURE — 99213 OFFICE O/P EST LOW 20 MIN: CPT | Mod: 95 | Performed by: INTERNAL MEDICINE

## 2021-09-16 NOTE — LETTER
9/16/2021         RE: Christiano Molina  [address] [facility]  [place] [address]        Dear Colleague,    Thank you for referring your patient, Christiano Molina, to the [facility] BLOOD AND MARROW TRANSPLANT [facility]. Please see a copy of my visit note below.    Prudencio is a 46 year old who is being evaluated via a billable video visit.      How would you like to obtain your AVS? MyChart  If the video visit is dropped, the invitation should be resent by: Text to cell phone: [phone]  Will anyone else be joining your video visit? No      Video Start Time: 2 pm  Video-Visit Details    Type of service:  Video Visit    Video End Time: 2:20 pm    Originating Location (pt. Location): Home    Distant Location (provider location):  [facility] BLOOD AND MARROW TRANSPLANT PROGRAM [place]     Platform used for Video Visit: [employer]  [facility]      Referring Provider: self     Dx:   1. BL, stage 1A, LDH slightly high, max size 6.2 cm, Dx 3/15/19    Tx:  1. Da-R-EPOCH x6, 3/27/19 to mid July 2019, AK after C2, CR after C4/6.     PMH: strasibusmus   Allergies: None     Interval Hx: ROS neg on a 10-point review except recently with coughs or sneezes he gets a brief episode of generalized HA.     Lab Results   Component Value Date    WBC 4.2 09/15/2021    HGB 14.2 09/15/2021    HCT 42.4 09/15/2021     09/15/2021     09/15/2021    POTASSIUM 4.7 09/15/2021    CHLORIDE 108 09/15/2021    CO2 29 09/15/2021    BUN 19 09/15/2021    CR 1.28 (H) 09/15/2021    GLC 80 09/15/2021    SED 12 06/28/2019    AST 18 09/15/2021    ALT 24 09/15/2021    ALKPHOS 43 09/15/2021    BILITOTAL 0.5 09/15/2021    INR 1.12 07/15/2019       Current Outpatient Medications   Medication     acetaminophen (TYLENOL) 325 MG tablet     famotidine (PEPCID) 20 MG tablet     LORazepam (ATIVAN) 0.5 MG tablet     ondansetron (ZOFRAN-ODT) 8 MG ODT tab     sertraline (ZOLOFT) 50 MG tablet      traZODone (DESYREL) 100 MG tablet     No current facility-administered medications for this visit.     Assessment and Plan:  1. BL: CR. Brain MRI to make sure HAs are not disease-related. See us every 4m for 1 more year, then discharge from clinic. No more scheduled scans.    2. ID: s/p covid vaccines. Get flu shot.              Again, thank you for allowing me to participate in the care of your patient.        Sincerely,        Vern Ramirez MD

## 2021-09-17 ENCOUNTER — HOSPITAL ENCOUNTER (OUTPATIENT)
Dept: MRI IMAGING | Facility: CLINIC | Age: 46
Discharge: HOME | End: 2021-09-17
Attending: INTERNAL MEDICINE | Admitting: INTERNAL MEDICINE
Payer: COMMERCIAL

## 2021-09-17 DIAGNOSIS — C83.74 BURKITT LYMPHOMA OF LYMPH NODES OF AXILLA (H): ICD-10-CM

## 2021-09-17 PROCEDURE — 70553 MRI BRAIN STEM W/O & W/DYE: CPT

## 2021-09-17 PROCEDURE — 255N000002 HC RX 255 OP 636: Performed by: INTERNAL MEDICINE

## 2021-09-17 PROCEDURE — A9585 GADOBUTROL INJECTION: HCPCS | Performed by: INTERNAL MEDICINE

## 2021-09-17 RX ORDER — GADOBUTROL 604.72 MG/ML
10 INJECTION INTRAVENOUS ONCE
Status: COMPLETED | OUTPATIENT
Start: 2021-09-17 | End: 2021-09-17

## 2021-09-17 RX ADMIN — GADOBUTROL 8 ML: 604.72 INJECTION INTRAVENOUS at 16:16

## 2021-09-19 ENCOUNTER — HEALTH MAINTENANCE LETTER (OUTPATIENT)
Age: 46
End: 2021-09-19

## 2021-09-20 ENCOUNTER — PATIENT OUTREACH (OUTPATIENT)
Dept: TRANSPLANT | Facility: CLINIC | Age: 46
End: 2021-09-20

## 2021-09-20 NOTE — PROGRESS NOTES
Per Dr. Ramirez: MR brain normal.    Called pt with results, he appreciated call and stated he already read results on [facility], no further questions.

## 2021-11-18 NOTE — PROGRESS NOTES
Checked band aid after lumbar puncture and Biopsy, both looked dry, no bleeding.   IV removed successfully without any complications. Zoie Moulton CMA     Face Mask

## 2022-01-09 ENCOUNTER — HEALTH MAINTENANCE LETTER (OUTPATIENT)
Age: 47
End: 2022-01-09

## 2022-01-18 NOTE — PROGRESS NOTES
Prudencio is a 46 year old who is being evaluated via a billable video visit.      How would you like to obtain your AVS? MyChart  If the video visit is dropped, the invitation should be resent by: Send to e-mail at: [email or web address]  Will anyone else be joining your video visit? 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 He presented with R axillary mass. PET/CT showed stage IA disease. Bone marrow biopsy and LP done 3/25 were negative for disease. Please see Dr. Ramirez's note for further discussion of diagnostic work-up.      Plan for 2 cycles of DA-R-EPOCH followed by PET/CT. He presented for cycle 1 on 3/27/19 and was discharged on 3/31/19. He tolerated chemotherapy well apart from mild hives and facial itching from Rituxan (was able to complete dose), chemotherapy-induced nausea, and mild post-LP occipital headache.      Following discharge, his pathology was finalized here with Ki-67 positive in 95-99% of lymphoma cells. TDT negative, EBV was strongly positive. The positive findings for CD20, CD10, BCL6, very high Ki-67 with negative BCL2, as well as MYC translocation supports diagnoses of Burkitt's lymphoma.      Cycle 2 of R-EPOCH was given 4/17-4/21/19. He had a partial response seen on imaging after 2 cycles. PET/CT after 4 cycles showed CR. Cycle 5 was complicated by appendicitis following chemotherapy. He received cycle 6 at same dose level as cycle 5. Cycle 6 was given 7/11-7/15/19. Post-therapy PET showed continued CR. Last imaging showed continued CR as well.      He presents today in routine 4 month follow-up on surveillance.      Interval  "History: Prudencio is feeling really well. He has no concerns today. He and his family had COVID over thanksgiving. He had mild cold symptoms for 3-5 days. No fevers or other infections. No lumps/bumps, new or different pain. Appetite and energy are stable. No recurrent pressure headaches in a few months.     Current Outpatient Medications   Medication     acetaminophen (TYLENOL) 325 MG tablet     famotidine (PEPCID) 20 MG tablet     LORazepam (ATIVAN) 0.5 MG tablet     ondansetron (ZOFRAN-ODT) 8 MG ODT tab     sertraline (ZOLOFT) 50 MG tablet     traZODone (DESYREL) 100 MG tablet     No current facility-administered medications for this visit.      No Known Allergies      Objective:     Video physical exam  General: Patient appears well in no acute distress. Normal body habitus.  Skin: No visualized rash or lesions on visualized skin  Eyes: EOMI, no erythema, sclera icterus or discharge noted  Resp: Appears to be breathing comfortably without accessory muscle usage, speaking in full sentences, no cough  MSK: Appears to have normal range of motion based on visualized movements  Neurologic: No apparent tremors, facial movements symmetric  Psych: affect and mood congruent, bright affect, alert and oriented  The rest of a comprehensive physical examination is deferred due to PHE (public health emergency) video restrictions\"    Labs:    1/20/2022 10:23   Sodium 138   Potassium 4.3   Chloride 105   Carbon Dioxide 30   Urea Nitrogen 15   Creatinine 1.25   GFR Estimate 72   Calcium 9.1   Anion Gap 3   Albumin 4.1   Protein Total 7.6   Bilirubin Total 0.5   Alkaline Phosphatase 46   ALT 21   AST 15   Lactate Dehydrogenase 165   Glucose 90      1/20/2022 10:23   WBC 3.8 (L)   Hemoglobin 14.8   Hematocrit 45.4   Platelet Count 162   RBC Count 4.92   MCV 92   MCH 30.1   MCHC 32.6   RDW 12.4   % Neutrophils 48   % Lymphocytes 38   % Monocytes 9   % Eosinophils 4   % Basophils 1   Absolute Basophils 0.0   Absolute Eosinophils 0.2 "   Absolute Immature Granulocytes 0.0   Absolute Lymphocytes 1.4   Absolute Monocytes 0.4   % Immature Granulocytes 0   Absolute Neutrophils 1.8   Absolute NRBCs 0.0   NRBCs per 100 WBC 0          Assessment & Plan:      1. Stage Ia Burkitt's lymphoma: Negative marrow or CNS involvement. S/p 6 cycles of R-EPOCH with Neulasta support. He achieved CR after 4 cycles and post-therapy PET confirmed this. He is feeling well and lab work is normal. Follow-up in 4 months with me. He will discharge from clinic 9/2022.     2. GI: GERD is well controlled with Pepcid and avoiding spicy/tomato based foods. Takes zofran ODT for rare n/v.     3. Pressure headaches: Negative brain MRI. No recurrence for 1-3 months.     Holly Wheat PA-C

## 2022-01-20 ENCOUNTER — VIRTUAL VISIT (OUTPATIENT)
Dept: ONCOLOGY | Facility: CLINIC | Age: 47
End: 2022-01-20
Attending: INTERNAL MEDICINE
Payer: COMMERCIAL

## 2022-01-20 ENCOUNTER — PATIENT OUTREACH (OUTPATIENT)
Dept: ONCOLOGY | Facility: CLINIC | Age: 47
End: 2022-01-20

## 2022-01-20 ENCOUNTER — LAB (OUTPATIENT)
Dept: INFUSION THERAPY | Facility: CLINIC | Age: 47
End: 2022-01-20
Attending: INTERNAL MEDICINE
Payer: COMMERCIAL

## 2022-01-20 DIAGNOSIS — C83.74 BURKITT LYMPHOMA OF LYMPH NODES OF AXILLA (H): ICD-10-CM

## 2022-01-20 DIAGNOSIS — C85.10 HIGH GRADE B-CELL LYMPHOMA (H): Primary | ICD-10-CM

## 2022-01-20 LAB
ALBUMIN SERPL-MCNC: 4.1 G/DL (ref 3.4–5)
ALP SERPL-CCNC: 46 U/L (ref 40–150)
ALT SERPL W P-5'-P-CCNC: 21 U/L (ref 0–70)
ANION GAP SERPL CALCULATED.3IONS-SCNC: 3 MMOL/L (ref 3–14)
AST SERPL W P-5'-P-CCNC: 15 U/L (ref 0–45)
BASOPHILS # BLD AUTO: 0 10E3/UL (ref 0–0.2)
BASOPHILS NFR BLD AUTO: 1 %
BILIRUB SERPL-MCNC: 0.5 MG/DL (ref 0.2–1.3)
BUN SERPL-MCNC: 15 MG/DL (ref 7–30)
CALCIUM SERPL-MCNC: 9.1 MG/DL (ref 8.5–10.1)
CHLORIDE BLD-SCNC: 105 MMOL/L (ref 94–109)
CO2 SERPL-SCNC: 30 MMOL/L (ref 20–32)
CREAT SERPL-MCNC: 1.25 MG/DL (ref 0.66–1.25)
EOSINOPHIL # BLD AUTO: 0.2 10E3/UL (ref 0–0.7)
EOSINOPHIL NFR BLD AUTO: 4 %
ERYTHROCYTE [DISTWIDTH] IN BLOOD BY AUTOMATED COUNT: 12.4 % (ref 10–15)
GFR SERPL CREATININE-BSD FRML MDRD: 72 ML/MIN/1.73M2
GLUCOSE BLD-MCNC: 90 MG/DL (ref 70–99)
HCT VFR BLD AUTO: 45.4 % (ref 40–53)
HGB BLD-MCNC: 14.8 G/DL (ref 13.3–17.7)
IMM GRANULOCYTES # BLD: 0 10E3/UL
IMM GRANULOCYTES NFR BLD: 0 %
LDH SERPL L TO P-CCNC: 165 U/L (ref 85–227)
LYMPHOCYTES # BLD AUTO: 1.4 10E3/UL (ref 0.8–5.3)
LYMPHOCYTES NFR BLD AUTO: 38 %
MCH RBC QN AUTO: 30.1 PG (ref 26.5–33)
MCHC RBC AUTO-ENTMCNC: 32.6 G/DL (ref 31.5–36.5)
MCV RBC AUTO: 92 FL (ref 78–100)
MONOCYTES # BLD AUTO: 0.4 10E3/UL (ref 0–1.3)
MONOCYTES NFR BLD AUTO: 9 %
NEUTROPHILS # BLD AUTO: 1.8 10E3/UL (ref 1.6–8.3)
NEUTROPHILS NFR BLD AUTO: 48 %
NRBC # BLD AUTO: 0 10E3/UL
NRBC BLD AUTO-RTO: 0 /100
PLATELET # BLD AUTO: 162 10E3/UL (ref 150–450)
POTASSIUM BLD-SCNC: 4.3 MMOL/L (ref 3.4–5.3)
PROT SERPL-MCNC: 7.6 G/DL (ref 6.8–8.8)
RBC # BLD AUTO: 4.92 10E6/UL (ref 4.4–5.9)
SODIUM SERPL-SCNC: 138 MMOL/L (ref 133–144)
WBC # BLD AUTO: 3.8 10E3/UL (ref 4–11)

## 2022-01-20 PROCEDURE — 36415 COLL VENOUS BLD VENIPUNCTURE: CPT

## 2022-01-20 PROCEDURE — 99214 OFFICE O/P EST MOD 30 MIN: CPT | Mod: 95 | Performed by: PHYSICIAN ASSISTANT

## 2022-01-20 PROCEDURE — 83615 LACTATE (LD) (LDH) ENZYME: CPT | Performed by: INTERNAL MEDICINE

## 2022-01-20 PROCEDURE — 80053 COMPREHEN METABOLIC PANEL: CPT | Performed by: INTERNAL MEDICINE

## 2022-01-20 PROCEDURE — 82040 ASSAY OF SERUM ALBUMIN: CPT | Performed by: INTERNAL MEDICINE

## 2022-01-20 PROCEDURE — 85025 COMPLETE CBC W/AUTO DIFF WBC: CPT | Performed by: INTERNAL MEDICINE

## 2022-01-20 NOTE — PROGRESS NOTES
"Per request from Holly Wheat PA-C, called [facility] lab and spoke with Cinthia.  Yesenia, core processing area, found CBC results were already done.  She was able to \"receive\" specimen and push results to Epic.    Update sent to Holly Wheat PA-C.    "

## 2022-01-20 NOTE — LETTER
1/20/2022         RE: Christiano Molina  [address]  [place] [address]        Dear Colleague,    Thank you for referring your patient, Christiano Molina, to the [facility]. Please see a copy of my visit note below.    Prudencio is a 46 year old who is being evaluated via a billable video visit.      How would you like to obtain your AVS? MyChart  If the video visit is dropped, the invitation should be resent by: Send to e-mail at: [email or web address]  Will anyone else be joining your video visit? 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 He presented with R axillary mass. PET/CT showed stage IA disease. Bone marrow biopsy and LP done 3/25 were negative for disease. Please see Dr. Ramirez's note for further discussion of diagnostic work-up.      Plan for 2 cycles of DA-R-EPOCH followed by PET/CT. He presented for cycle 1 on 3/27/19 and was discharged on 3/31/19. He tolerated chemotherapy well apart from mild hives and facial itching from Rituxan (was able to complete dose), chemotherapy-induced nausea, and mild post-LP occipital headache.      Following discharge, his pathology was finalized here with Ki-67 positive in 95-99% of lymphoma cells. TDT negative, EBV was strongly positive. The positive findings for CD20, CD10, BCL6, very high Ki-67 with negative BCL2, as well as MYC translocation supports diagnoses of Burkitt's lymphoma.      Cycle 2 of R-EPOCH was given 4/17-4/21/19. He had a partial response seen on imaging after 2 cycles. PET/CT after 4 cycles showed CR. Cycle 5 was complicated by appendicitis  "following chemotherapy. He received cycle 6 at same dose level as cycle 5. Cycle 6 was given 7/11-7/15/19. Post-therapy PET showed continued CR. Last imaging showed continued CR as well.      He presents today in routine 4 month follow-up on surveillance.      Interval History: Prudencio is feeling really well. He has no concerns today. He and his family had COVID over thanksgiving. He had mild cold symptoms for 3-5 days. No fevers or other infections. No lumps/bumps, new or different pain. Appetite and energy are stable. No recurrent pressure headaches in a few months.     Current Outpatient Medications   Medication     acetaminophen (TYLENOL) 325 MG tablet     famotidine (PEPCID) 20 MG tablet     LORazepam (ATIVAN) 0.5 MG tablet     ondansetron (ZOFRAN-ODT) 8 MG ODT tab     sertraline (ZOLOFT) 50 MG tablet     traZODone (DESYREL) 100 MG tablet     No current facility-administered medications for this visit.      No Known Allergies      Objective:     Video physical exam  General: Patient appears well in no acute distress. Normal body habitus.  Skin: No visualized rash or lesions on visualized skin  Eyes: EOMI, no erythema, sclera icterus or discharge noted  Resp: Appears to be breathing comfortably without accessory muscle usage, speaking in full sentences, no cough  MSK: Appears to have normal range of motion based on visualized movements  Neurologic: No apparent tremors, facial movements symmetric  Psych: affect and mood congruent, bright affect, alert and oriented  The rest of a comprehensive physical examination is deferred due to PHE (public health emergency) video restrictions\"    Labs:    1/20/2022 10:23   Sodium 138   Potassium 4.3   Chloride 105   Carbon Dioxide 30   Urea Nitrogen 15   Creatinine 1.25   GFR Estimate 72   Calcium 9.1   Anion Gap 3   Albumin 4.1   Protein Total 7.6   Bilirubin Total 0.5   Alkaline Phosphatase 46   ALT 21   AST 15   Lactate Dehydrogenase 165   Glucose 90      1/20/2022 10:23 "   WBC 3.8 (L)   Hemoglobin 14.8   Hematocrit 45.4   Platelet Count 162   RBC Count 4.92   MCV 92   MCH 30.1   MCHC 32.6   RDW 12.4   % Neutrophils 48   % Lymphocytes 38   % Monocytes 9   % Eosinophils 4   % Basophils 1   Absolute Basophils 0.0   Absolute Eosinophils 0.2   Absolute Immature Granulocytes 0.0   Absolute Lymphocytes 1.4   Absolute Monocytes 0.4   % Immature Granulocytes 0   Absolute Neutrophils 1.8   Absolute NRBCs 0.0   NRBCs per 100 WBC 0          Assessment & Plan:      1. Stage Ia Burkitt's lymphoma: Negative marrow or CNS involvement. S/p 6 cycles of R-EPOCH with Neulasta support. He achieved CR after 4 cycles and post-therapy PET confirmed this. He is feeling well and lab work is normal. Follow-up in 4 months with me. He will discharge from clinic 9/2022.     2. GI: GERD is well controlled with Pepcid and avoiding spicy/tomato based foods. Takes zofran ODT for rare n/v.     3. Pressure headaches: Negative brain MRI. No recurrence for 1-3 months.     Holly Wheat PA-C           Again, thank you for allowing me to participate in the care of your patient.        Sincerely,        Holly Wheat PA-C

## 2022-04-29 NOTE — ED TRIAGE NOTES
Problem: Depression/Self Harm  Goal: Effect of psychiatric condition will be minimized and patient will be protected from self harm  Description: INTERVENTIONS:  1. Assess impact of patient's symptoms on level of functioning, self care needs and offer support as indicated  2. Assess patient/family knowledge of depression, impact on illness and need for teaching  3. Provide emotional support, presence and reassurance  4. Assess for possible suicidal thoughts or ideation. If patient expresses suicidal thoughts or statements do not leave alone, initiate Suicide Precautions, move to a room close to the nursing station and obtain sitter  5. Initiate consults as appropriate with Mental Health Professional, Spiritual Care, Psychosocial CNS, and consider a recommendation to the [facility] for a Psychiatric Consultation  4/29/2022 1036 by Bassem Watkins RN  Outcome: Completed  4/29/2022 1032 by Bassem Watkins RN  Outcome: Adequate for Discharge  Flowsheets (Taken 4/27/2022 0979 by Aneta Gaona RN)  Effect of psychiatric condition will be minimized and patient will be protected from self harm: Assess patient/family knowledge of depression, impact on illness and need for teaching  Note: Patient voices readiness for discharge. Denies suicidal or homicidal ideations. Voices improved mood. Able to voice warning signs of depression.       Problem: Depression/Self Harm  Goal: STG-Able to verbalize suicidal ideations  4/29/2022 1036 by Bassem Watkins RN  Outcome: Completed  4/29/2022 1032 by Bassem Watkins RN  Outcome: Adequate for Discharge  Note: Patient denies suicidal ideations  4/28/2022 4318 by Denilson Murdock RN  Outcome: Progressing     Problem: Self Harm/Suicidality  Goal: Will have no self-injury during hospital stay  Description: INTERVENTIONS:  1. Q 30 MINUTES: Routine safety checks  2. Q SHIFT & PRN: Assess risk to determine if routine checks are adequate to maintain patient safety  Outcome: Completed Prudencio presents from home with c/o mid abdominal pain that started at 6AM today and now has radiated to his RLQ, chills and nausea. Reports three BMs today, denies diarrhea or dysuria. History of lymphoma and chronically inflamed appendix. Temp 99.1 in triage. Labs drawn today and in [facility].

## 2022-05-18 NOTE — PROGRESS NOTES
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 He presented with R axillary mass. PET/CT showed stage IA disease. Bone marrow biopsy and LP done 3/25 were negative for disease. Please see Dr. Ramirez's note for further discussion of diagnostic work-up.      Plan for 2 cycles of DA-R-EPOCH followed by PET/CT. He presented for cycle 1 on 3/27/19 and was discharged on 3/31/19. He tolerated chemotherapy well apart from mild hives and facial itching from Rituxan (was able to complete dose), chemotherapy-induced nausea, and mild post-LP occipital headache.      Following discharge, his pathology was finalized here with Ki-67 positive in 95-99% of lymphoma cells. TDT negative, EBV was strongly positive. The positive findings for CD20, CD10, BCL6, very high Ki-67 with negative BCL2, as well as MYC translocation supports diagnoses of Burkitt's lymphoma.      Cycle 2 of R-EPOCH was given 4/17-4/21/19. He had a partial response seen on imaging after 2 cycles. PET/CT after 4 cycles showed CR. Cycle 5 was complicated by appendicitis following chemotherapy. He received cycle 6 at same dose level as cycle 5. Cycle 6 was given 7/11-7/15/19. Post-therapy PET showed continued CR. Last imaging showed continued CR as well.      He presents today in routine 4 month follow-up on surveillance.      Interval History: Prudencio is feeling well. He has had no concerns or questions. He continues to work hybrid schedule, 3 days in the office, and 2 days at home. No recent fevers or infections. No sweats. No lumps/bumps or new or different pain. He is walking regularly for exercise. He has had 1-2 episodes of vomiting in past 4 months which is triggered by indigestion. This is chronic for him.  Zofran does help.     Has established with a PCP and saw about 9 months ago for a med check.     Current Outpatient Medications   Medication     acetaminophen (TYLENOL) 325 MG tablet     famotidine (PEPCID) 20 MG tablet     LORazepam (ATIVAN) 0.5 MG tablet     ondansetron (ZOFRAN-ODT) 8 MG ODT tab     sertraline (ZOLOFT) 50 MG tablet     traZODone (DESYREL) 100 MG tablet     No current facility-administered medications for this visit.      No Known Allergies      PHYSICAL EXAM:  /81 (BP Location: Right arm, Patient Position: Sitting, Cuff Size: Adult Regular)   Pulse 60   Temp 99.2  F (37.3  C) (Oral)   Wt 83.5 kg (184 lb)   SpO2 99%   BMI 26.34 kg/m    Wt Readings from Last 4 Encounters:   05/19/22 83.5 kg (184 lb)   02/27/20 80.6 kg (177 lb 11.1 oz)   11/15/19 81.1 kg (178 lb 14.4 oz)   08/15/19 82.6 kg (182 lb)     General: Alert, oriented, pleasant, NAD  HEENT: Normocephalic, atraumatic, no icterus.   Neck: No cervical or supraclavicular LAD.  Axillary: No LAD  Lungs: CTA bilaterally, normal work of breathing  Cardiac: RRR  Abdomen: Soft, nontender, nondistended. Normoactive bowel sounds. No hepatosplenomegaly, masses  Neuro: CNII-XII grossly intact  Extremities: No pedal edema        Labs:    05/19/22 10:19   Sodium 142   Potassium 4.5   Chloride 108   Carbon Dioxide 28   Urea Nitrogen 14   Creatinine 1.20   GFR Estimate 76 [1]   Calcium 9.2   Anion Gap 6   Albumin 3.8   Protein Total 7.1   Bilirubin Total 0.4   Alkaline Phosphatase 47   ALT 25   AST 17   Lactate Dehydrogenase 165   Glucose 86   WBC 3.9 (L)   Hemoglobin 14.4   Hematocrit 42.9   Platelet Count 154   RBC Count 4.75   MCV 90   MCH 30.3   MCHC 33.6   RDW 12.7   % Neutrophils 48   % Lymphocytes 38   % Monocytes 9   % Eosinophils 4   % Basophils 1   Absolute Basophils 0.0   Absolute Eosinophils 0.1   Absolute Immature Granulocytes 0.0   Absolute Lymphocytes 1.5   Absolute Monocytes 0.3   % Immature Granulocytes 0   Absolute Neutrophils 1.9    Absolute NRBCs 0.0   NRBCs per 100 WBC 0        Assessment & Plan:      1. Stage Ia Burkitt's lymphoma: Negative marrow or CNS involvement. S/p 6 cycles of R-EPOCH with Neulasta support. He achieved CR after 4 cycles and post-therapy PET confirmed this. He is feeling well and lab work is normal. Follow-up in 4 months with me. He will discharge from clinic 9/2022.     2. GI: GERD is chronic. Takes Pepcid once daily. Discussed trying BID versus avoiding triggers or taking second dose with onset of GERD. Has zofran for PRN.     3. Pressure headaches: Negative brain MRI. No recurrence since the fall.     Holly Wheat PA-C

## 2022-05-19 ENCOUNTER — LAB (OUTPATIENT)
Dept: LAB | Facility: CLINIC | Age: 47
End: 2022-05-19
Payer: COMMERCIAL

## 2022-05-19 ENCOUNTER — ONCOLOGY VISIT (OUTPATIENT)
Dept: ONCOLOGY | Facility: CLINIC | Age: 47
End: 2022-05-19
Attending: INTERNAL MEDICINE
Payer: COMMERCIAL

## 2022-05-19 VITALS
SYSTOLIC BLOOD PRESSURE: 124 MMHG | OXYGEN SATURATION: 99 % | TEMPERATURE: 99.2 F | BODY MASS INDEX: 26.34 KG/M2 | WEIGHT: 184 LBS | HEART RATE: 60 BPM | DIASTOLIC BLOOD PRESSURE: 81 MMHG

## 2022-05-19 DIAGNOSIS — C85.10 HIGH GRADE B-CELL LYMPHOMA (H): ICD-10-CM

## 2022-05-19 DIAGNOSIS — C83.30 DIFFUSE LARGE B-CELL LYMPHOMA, UNSPECIFIED BODY REGION (H): Primary | ICD-10-CM

## 2022-05-19 LAB
ALBUMIN SERPL-MCNC: 3.8 G/DL (ref 3.4–5)
ALP SERPL-CCNC: 47 U/L (ref 40–150)
ALT SERPL W P-5'-P-CCNC: 25 U/L (ref 0–70)
ANION GAP SERPL CALCULATED.3IONS-SCNC: 6 MMOL/L (ref 3–14)
AST SERPL W P-5'-P-CCNC: 17 U/L (ref 0–45)
BASOPHILS # BLD AUTO: 0 10E3/UL (ref 0–0.2)
BASOPHILS NFR BLD AUTO: 1 %
BILIRUB SERPL-MCNC: 0.4 MG/DL (ref 0.2–1.3)
BUN SERPL-MCNC: 14 MG/DL (ref 7–30)
CALCIUM SERPL-MCNC: 9.2 MG/DL (ref 8.5–10.1)
CHLORIDE BLD-SCNC: 108 MMOL/L (ref 94–109)
CO2 SERPL-SCNC: 28 MMOL/L (ref 20–32)
CREAT SERPL-MCNC: 1.2 MG/DL (ref 0.66–1.25)
EOSINOPHIL # BLD AUTO: 0.1 10E3/UL (ref 0–0.7)
EOSINOPHIL NFR BLD AUTO: 4 %
ERYTHROCYTE [DISTWIDTH] IN BLOOD BY AUTOMATED COUNT: 12.7 % (ref 10–15)
GFR SERPL CREATININE-BSD FRML MDRD: 76 ML/MIN/1.73M2
GLUCOSE BLD-MCNC: 86 MG/DL (ref 70–99)
HCT VFR BLD AUTO: 42.9 % (ref 40–53)
HGB BLD-MCNC: 14.4 G/DL (ref 13.3–17.7)
IMM GRANULOCYTES # BLD: 0 10E3/UL
IMM GRANULOCYTES NFR BLD: 0 %
LDH SERPL L TO P-CCNC: 165 U/L (ref 85–227)
LYMPHOCYTES # BLD AUTO: 1.5 10E3/UL (ref 0.8–5.3)
LYMPHOCYTES NFR BLD AUTO: 38 %
MCH RBC QN AUTO: 30.3 PG (ref 26.5–33)
MCHC RBC AUTO-ENTMCNC: 33.6 G/DL (ref 31.5–36.5)
MCV RBC AUTO: 90 FL (ref 78–100)
MONOCYTES # BLD AUTO: 0.3 10E3/UL (ref 0–1.3)
MONOCYTES NFR BLD AUTO: 9 %
NEUTROPHILS # BLD AUTO: 1.9 10E3/UL (ref 1.6–8.3)
NEUTROPHILS NFR BLD AUTO: 48 %
NRBC # BLD AUTO: 0 10E3/UL
NRBC BLD AUTO-RTO: 0 /100
PLATELET # BLD AUTO: 154 10E3/UL (ref 150–450)
POTASSIUM BLD-SCNC: 4.5 MMOL/L (ref 3.4–5.3)
PROT SERPL-MCNC: 7.1 G/DL (ref 6.8–8.8)
RBC # BLD AUTO: 4.75 10E6/UL (ref 4.4–5.9)
SODIUM SERPL-SCNC: 142 MMOL/L (ref 133–144)
WBC # BLD AUTO: 3.9 10E3/UL (ref 4–11)

## 2022-05-19 PROCEDURE — 85025 COMPLETE CBC W/AUTO DIFF WBC: CPT | Performed by: PATHOLOGY

## 2022-05-19 PROCEDURE — 83615 LACTATE (LD) (LDH) ENZYME: CPT | Performed by: PATHOLOGY

## 2022-05-19 PROCEDURE — 36415 COLL VENOUS BLD VENIPUNCTURE: CPT | Performed by: PATHOLOGY

## 2022-05-19 PROCEDURE — 80053 COMPREHEN METABOLIC PANEL: CPT | Performed by: PATHOLOGY

## 2022-05-19 PROCEDURE — G0463 HOSPITAL OUTPT CLINIC VISIT: HCPCS

## 2022-05-19 PROCEDURE — 99214 OFFICE O/P EST MOD 30 MIN: CPT | Performed by: PHYSICIAN ASSISTANT

## 2022-05-19 ASSESSMENT — PAIN SCALES - GENERAL: PAINLEVEL: NO PAIN (0)

## 2022-05-19 NOTE — LETTER
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 He presented with R axillary mass. PET/CT showed stage IA disease. Bone marrow biopsy and LP done 3/25 were negative for disease. Please see Dr. Ramirez's note for further discussion of diagnostic work-up.      Plan for 2 cycles of DA-R-EPOCH followed by PET/CT. He presented for cycle 1 on 3/27/19 and was discharged on 3/31/19. He tolerated chemotherapy well apart from mild hives and facial itching from Rituxan (was able to complete dose), chemotherapy-induced nausea, and mild post-LP occipital headache.      Following discharge, his pathology was finalized here with Ki-67 positive in 95-99% of lymphoma cells. TDT negative, EBV was strongly positive. The positive findings for CD20, CD10, BCL6, very high Ki-67 with negative BCL2, as well as MYC translocation supports diagnoses of Burkitt's lymphoma.      Cycle 2 of R-EPOCH was given 4/17-4/21/19. He had a partial response seen on imaging after 2 cycles. PET/CT after 4 cycles showed CR. Cycle 5 was complicated by appendicitis following chemotherapy. He received cycle 6 at same dose level as cycle 5. Cycle 6 was given 7/11-7/15/19. Post-therapy PET showed continued CR. Last imaging showed continued CR as well.      He presents today in routine 4 month follow-up on surveillance.      Interval History: Prudencio is feeling well. He has had no concerns or questions. He continues to work hybrid schedule, 3 days in the office, and 2 days at home. No recent fevers or infections. No sweats. No lumps/bumps or new or different pain. He is walking regularly for exercise. He has had 1-2  episodes of vomiting in past 4 months which is triggered by indigestion. This is chronic for him. Zofran does help.     Has established with a PCP and saw about 9 months ago for a med check.     Current Outpatient Medications   Medication     acetaminophen (TYLENOL) 325 MG tablet     famotidine (PEPCID) 20 MG tablet     LORazepam (ATIVAN) 0.5 MG tablet     ondansetron (ZOFRAN-ODT) 8 MG ODT tab     sertraline (ZOLOFT) 50 MG tablet     traZODone (DESYREL) 100 MG tablet     No current facility-administered medications for this visit.      No Known Allergies      PHYSICAL EXAM:  /81 (BP Location: Right arm, Patient Position: Sitting, Cuff Size: Adult Regular)   Pulse 60   Temp 99.2  F (37.3  C) (Oral)   Wt 83.5 kg (184 lb)   SpO2 99%   BMI 26.34 kg/m    Wt Readings from Last 4 Encounters:   05/19/22 83.5 kg (184 lb)   02/27/20 80.6 kg (177 lb 11.1 oz)   11/15/19 81.1 kg (178 lb 14.4 oz)   08/15/19 82.6 kg (182 lb)     General: Alert, oriented, pleasant, NAD  HEENT: Normocephalic, atraumatic, no icterus.   Neck: No cervical or supraclavicular LAD.  Axillary: No LAD  Lungs: CTA bilaterally, normal work of breathing  Cardiac: RRR  Abdomen: Soft, nontender, nondistended. Normoactive bowel sounds. No hepatosplenomegaly, masses  Neuro: CNII-XII grossly intact  Extremities: No pedal edema        Labs:    05/19/22 10:19   Sodium 142   Potassium 4.5   Chloride 108   Carbon Dioxide 28   Urea Nitrogen 14   Creatinine 1.20   GFR Estimate 76 [1]   Calcium 9.2   Anion Gap 6   Albumin 3.8   Protein Total 7.1   Bilirubin Total 0.4   Alkaline Phosphatase 47   ALT 25   AST 17   Lactate Dehydrogenase 165   Glucose 86   WBC 3.9 (L)   Hemoglobin 14.4   Hematocrit 42.9   Platelet Count 154   RBC Count 4.75   MCV 90   MCH 30.3   MCHC 33.6   RDW 12.7   % Neutrophils 48   % Lymphocytes 38   % Monocytes 9   % Eosinophils 4   % Basophils 1   Absolute Basophils 0.0   Absolute Eosinophils 0.1   Absolute Immature Granulocytes 0.0    Absolute Lymphocytes 1.5   Absolute Monocytes 0.3   % Immature Granulocytes 0   Absolute Neutrophils 1.9   Absolute NRBCs 0.0   NRBCs per 100 WBC 0        Assessment & Plan:      1. Stage Ia Burkitt's lymphoma: Negative marrow or CNS involvement. S/p 6 cycles of R-EPOCH with Neulasta support. He achieved CR after 4 cycles and post-therapy PET confirmed this. He is feeling well and lab work is normal. Follow-up in 4 months with me. He will discharge from clinic 9/2022.     2. GI: GERD is chronic. Takes Pepcid once daily. Discussed trying BID versus avoiding triggers or taking second dose with onset of GERD. Has zofran for PRN.     3. Pressure headaches: Negative brain MRI. No recurrence since the fall.     WINSTON Newsome PA-C

## 2022-05-19 NOTE — NURSING NOTE
"Oncology Rooming Note    May 19, 2022 10:32 AM   Christiano Molina is a 46 year old male who presents for:    Chief Complaint   Patient presents with     Oncology Clinic Visit     High grade B-cell lymphoma     Initial Vitals: /81 (BP Location: Right arm, Patient Position: Sitting, Cuff Size: Adult Regular)   Pulse 60   Temp 99.2  F (37.3  C) (Oral)   Wt 83.5 kg (184 lb)   SpO2 99%   BMI 26.34 kg/m   Estimated body mass index is 26.34 kg/m  as calculated from the following:    Height as of 2/27/20: 1.78 m (5' 10.08\").    Weight as of this encounter: 83.5 kg (184 lb). Body surface area is 2.03 meters squared.  No Pain (0) Comment: Data Unavailable   No LMP for male patient.  Allergies reviewed: Yes  Medications reviewed: Yes    Medications: Medication refills not needed today.  Pharmacy name entered into [facility]: [id number] - [place], [place] - [address] AT [facility] [address] [place] [address]    Clinical concerns: none       Antwon Scales            "

## 2022-07-14 NOTE — PROGRESS NOTES
[facility]   Follow-up Visit s Feb 17, 2017    PCP: Layton Weir      Subjective:  Christiano is a 41 year old male who is seen in follow up for evaluation of   Chief Complaint   Patient presents with     RECHECK     L shoulder decreased ROM     His last visit was on 1/2/2017 he had a left shoulder intra-articular cortisone injection at that visit and reports that he got great relief of pain from the injection but it started to wear off in the past week. He has been using flexeril as well to sleep but ran out of that about a week ago. He did 2 sessions of physical therapy but hasn't been doing the exercises because it seems to make the pain worse. Follow up today for further evaluation.    Patient's past medical, surgical, social and family histories are reviewed today.      History from previous visit on 1/2/2017    Hx of shoulder injury  20 years ago, skiing injury.  Fell on outstretched arm and reports posterior dislocation.  Has had some persistent weakness with weightbearing, I.e. Yoga.      Got a flu shot about 2 weeks prior to onset and had persistent soreness, has had 2 fevers over that time and now has pain with overhead motions, limited ROM, and pain with rotational motions against resistance. 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      Social History     Social History     Marital Status: Single     Spouse Name: N/A     Number of Children: N/A     Years of Education: N/A  "    Social History Main Topics     Smoking status: Never Smoker      Smokeless tobacco: Not on file     Alcohol Use: Not on file     PAST SURGICAL HISTORY  No surgeries reported.     Review of Systems   Musculoskeletal: Positive for joint pain.   Neurological: Negative for focal weakness.   All other systems reviewed and are negative.        Physical Exam  /64  Ht 5' 9.75\" (1.772 m)  Wt 177 lb (80.3 kg)  BMI 25.58 kg/m2  Constitutional:well-developed, well-nourished, and in no distress.   Cardiovascular: Intact distal pulses.    Neurological: alert. Gait normal.   Skin: Skin is warm and dry.   Psychiatric: Mood and affect normal.   Respiratory: unlabored, speaks in full sentences  Lymph: no LAD, no lymphangitis      Left Shoulder Exam     Tenderness   None    Range of Motion   Active Abduction:                       120  Passive Abduction:                    90  Extension:                                  Normal  Forward Flexion:                        90  External Rotation:                      30  Internal Rotation 0 degrees:      Sacrum  Internal Rotation 90 degrees:    N/t    Muscle Strength   Abduction:            5/5  Internal Rotation:  5/5  External Rotation: 5/5  Supraspinatus:     5/5  Subscapularis:     5/5  Biceps:                 5/5    Tests   Impingement:   Positive  Norton:          Positive  Cross Arm:      Negative  Drop Arm:        Negative  Apprehension: Negative  Sulcus:            Negative    Comments:  Active pt motion intact, absolute strength testing limited by pain.              ICD-10-CM    1. Adhesive capsulitis of left shoulder M75.02 cyclobenzaprine (FLEXERIL) 5 MG tablet     DRAIN/INJECT LARGE JOINT/BURSA     METHYLPREDNISOLONE 40 MG INJ     methylPREDNISolone acetate (DEPO-MEDROL) 40 MG/ML injection     lidocaine 1 % injection     Pt feels some overall improvement but objectively appears to be about the same. Reviewed options, encouraged to consistently steadily but gently " pursue ROM exercises. ]    Ok for refill of flexeril.     He asks about repeat injections. given initial benefit, this was deemed reasonable.     US utilized for proficiency purposes but not billable at this visit.     Left Glenohumeral Joint Corticosteroid Injection     Diagnoses (preoperative and postoperative): Left shoulder pain / Adhesive capsulitis of left shoulder  Current Procedure (include preoperative): Sonographically guided left glenohumeral joint corticosteroid injection  Current Indication (include preoperative): Alleviation of pain    REASON FOR PROCEDURE:  a left glenohumeral joint corticosteroid injection for alleviation of pain. Sonographic guidance will be used to ensure accurate placement of the medication within the joint space.  PATIENT EDUCATION: Ready to learn with no apparent learning barriers identified. Learning preferences include listening. Explained diagnosis and treatment plan as well as treatment alternatives. Patient expressed understanding of the content.  Following denial of allergy and review of potential side effects and complications including but not necessarily limited to infection, bleeding, allergic reaction, post-injection flare, local tissue breakdown (including but not limited to potential for skin depigmentation and/or subcutaneous fat atrophy), systemic effects of corticosteroids, elevation of blood glucose, injury to soft tissue and/or nerves and seizure, patient indicated their understanding and agreed to proceed. Written and signed consent form has been obtained and is scanned into the chart.  PROCEDURE: Prior to the procedure, the left posterior glenohumeral joint was examined with a 4 MHz curvilinear transducer to visualize the joint space and determine the approach for the procedure.  Procedure was carried out using sterile technique including ChloraPrep, a sterile transducer cover, and a sterile transducer gel. A simple surgical tray was used.  PROCEDURAL PAUSE:  Procedural pause conducted to verify correct patient identity, procedure to be performed, and as applicable, correct side/site, correct patient position, availability of implants, special equipment, or special requirements.  Patient position: right lateral recumbent  Transducer type: 12 MHz linear transducer  Approach: Lateral to medial parallel to long axis of transducer  Injectate:  Sonographically guided  22-gauge 1.5 inch needle was entered down into the glenohumeral joint between the labrum and articular cartilage.  After confirming needle tip position a solution of 1 ml of 40 mg/ml Depo Medrol and 4 ml of 1% Lidocaine was injected after reconfirming needle tip position under low resistance and seen flowing into the joint space.  AFTERCARE:  Patient tolerated the procedure without complication. After a short observation period, patient was discharged under their own power and in excellent condition.         Cephalexin Pregnancy And Lactation Text: This medication is Pregnancy Category B and considered safe during pregnancy.  It is also excreted in breast milk but can be used safely for shorter doses.

## 2022-09-14 NOTE — PROGRESS NOTES
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 He presented with R axillary mass. PET/CT showed stage IA disease. Bone marrow biopsy and LP done 3/25 were negative for disease. Please see Dr. Ramirez's note for further discussion of diagnostic work-up.      Plan for 2 cycles of DA-R-EPOCH followed by PET/CT. He presented for cycle 1 on 3/27/19 and was discharged on 3/31/19. He tolerated chemotherapy well apart from mild hives and facial itching from Rituxan (was able to complete dose), chemotherapy-induced nausea, and mild post-LP occipital headache.      Following discharge, his pathology was finalized here with Ki-67 positive in 95-99% of lymphoma cells. TDT negative, EBV was strongly positive. The positive findings for CD20, CD10, BCL6, very high Ki-67 with negative BCL2, as well as MYC translocation supports diagnoses of Burkitt's lymphoma.      Cycle 2 of R-EPOCH was given 4/17-4/21/19. He had a partial response seen on imaging after 2 cycles. PET/CT after 4 cycles showed CR. Cycle 5 was complicated by appendicitis following chemotherapy. He received cycle 6 at same dose level as cycle 5. Cycle 6 was given 7/11-7/15/19. Post-therapy PET showed continued CR. Last imaging showed continued CR as well.      He presents today in routine 4 month follow-up on surveillance.      Interval History: Prudencio has been feeling well. He had a good summer with his family. He denies any issues or concerns. No fevers or infections. No changes to energy or appetite. No abdominal pain or digestion changes besides his baseline intermittent dyspepsia and vomiting. This has improved since he has consistently taken Pepcid daily. No lumps/bumps or new or different pain. Only had  one headache in recent months which was relieved with tylenol.     Current Outpatient Medications   Medication     acetaminophen (TYLENOL) 325 MG tablet     famotidine (PEPCID) 20 MG tablet     LORazepam (ATIVAN) 0.5 MG tablet     ondansetron (ZOFRAN-ODT) 8 MG ODT tab     sertraline (ZOLOFT) 50 MG tablet     traZODone (DESYREL) 100 MG tablet     No current facility-administered medications for this visit.      No Known Allergies      PHYSICAL EXAM:  /75   Pulse 65   Temp 98.3  F (36.8  C) (Oral)   Wt 83.6 kg (184 lb 4.8 oz)   SpO2 97%   BMI 26.38 kg/m    Wt Readings from Last 4 Encounters:   09/15/22 83.6 kg (184 lb 4.8 oz)   05/19/22 83.5 kg (184 lb)   02/27/20 80.6 kg (177 lb 11.1 oz)   11/15/19 81.1 kg (178 lb 14.4 oz)     General: Alert, oriented, pleasant, NAD  HEENT: Normocephalic, atraumatic, no icterus.   Neck: No cervical or supraclavicular LAD.  Axillary: No LAD  Lungs: CTA bilaterally, normal work of breathing  Cardiac: RRR  Abdomen: Soft, nontender, nondistended. Normoactive bowel sounds. No hepatosplenomegaly, masses  Neuro: CNII-XII grossly intact  Extremities: No pedal edema        Labs:    09/15/22 11:22   Sodium 140   Potassium 5.2   Chloride 105   Carbon Dioxide (CO2) 27   Urea Nitrogen 18.6   Creatinine 1.31 (H)   GFR Estimate 68   Calcium 9.6   Anion Gap 8   Albumin 4.3   Protein Total 6.8   Alkaline Phosphatase 47   ALT 14   AST 22   Bilirubin Total 0.4   Glucose 76   WBC 3.7 (L)   Hemoglobin 14.3   Hematocrit 42.2   Platelet Count 167   RBC Count 4.74   MCV 89   MCH 30.2   MCHC 33.9   RDW 12.3   % Neutrophils 54   % Lymphocytes 34   % Monocytes 9   % Eosinophils 2   % Basophils 1   Absolute Basophils 0.0   Absolute Eosinophils 0.1   Absolute Immature Granulocytes 0.0   Absolute Lymphocytes 1.3   Absolute Monocytes 0.3   % Immature Granulocytes 0   Absolute Neutrophils 2.0   Absolute NRBCs 0.0   NRBCs per 100 WBC 0     LDH pending      Assessment & Plan:      1. Stage Ia Burkitt's  lymphoma: Negative marrow or CNS involvement. S/p 6 cycles of R-EPOCH with Neulasta support. He achieved CR after 4 cycles and post-therapy PET confirmed this. He has now completed all recommended oncology surveillance. Congratulated him on this. Follow-up can now be PRN.     2. GI: GERD is chronic. Takes Pepcid once to twice daily. PCP is okay refilling zofran PRN.     Holly Wheat PA-C

## 2022-09-15 ENCOUNTER — LAB (OUTPATIENT)
Dept: LAB | Facility: CLINIC | Age: 47
End: 2022-09-15
Attending: INTERNAL MEDICINE
Payer: COMMERCIAL

## 2022-09-15 ENCOUNTER — ONCOLOGY VISIT (OUTPATIENT)
Dept: ONCOLOGY | Facility: CLINIC | Age: 47
End: 2022-09-15
Attending: INTERNAL MEDICINE
Payer: COMMERCIAL

## 2022-09-15 VITALS
DIASTOLIC BLOOD PRESSURE: 75 MMHG | TEMPERATURE: 98.3 F | HEART RATE: 65 BPM | WEIGHT: 184.3 LBS | BODY MASS INDEX: 26.38 KG/M2 | OXYGEN SATURATION: 97 % | SYSTOLIC BLOOD PRESSURE: 115 MMHG

## 2022-09-15 DIAGNOSIS — C83.70 BURKITT LYMPHOMA, UNSPECIFIED BODY REGION (H): Primary | ICD-10-CM

## 2022-09-15 DIAGNOSIS — C83.30 DIFFUSE LARGE B-CELL LYMPHOMA, UNSPECIFIED BODY REGION (H): ICD-10-CM

## 2022-09-15 LAB
ALBUMIN SERPL BCG-MCNC: 4.3 G/DL (ref 3.5–5.2)
ALP SERPL-CCNC: 47 U/L (ref 40–129)
ALT SERPL W P-5'-P-CCNC: 14 U/L (ref 10–50)
ANION GAP SERPL CALCULATED.3IONS-SCNC: 8 MMOL/L (ref 7–15)
AST SERPL W P-5'-P-CCNC: 22 U/L (ref 10–50)
BASOPHILS # BLD AUTO: 0 10E3/UL (ref 0–0.2)
BASOPHILS NFR BLD AUTO: 1 %
BILIRUB SERPL-MCNC: 0.4 MG/DL
BUN SERPL-MCNC: 18.6 MG/DL (ref 6–20)
CALCIUM SERPL-MCNC: 9.6 MG/DL (ref 8.6–10)
CHLORIDE SERPL-SCNC: 105 MMOL/L (ref 98–107)
CREAT SERPL-MCNC: 1.31 MG/DL (ref 0.67–1.17)
DEPRECATED HCO3 PLAS-SCNC: 27 MMOL/L (ref 22–29)
EOSINOPHIL # BLD AUTO: 0.1 10E3/UL (ref 0–0.7)
EOSINOPHIL NFR BLD AUTO: 2 %
ERYTHROCYTE [DISTWIDTH] IN BLOOD BY AUTOMATED COUNT: 12.3 % (ref 10–15)
GFR SERPL CREATININE-BSD FRML MDRD: 68 ML/MIN/1.73M2
GLUCOSE SERPL-MCNC: 76 MG/DL (ref 70–99)
HCT VFR BLD AUTO: 42.2 % (ref 40–53)
HGB BLD-MCNC: 14.3 G/DL (ref 13.3–17.7)
IMM GRANULOCYTES # BLD: 0 10E3/UL
IMM GRANULOCYTES NFR BLD: 0 %
LDH SERPL L TO P-CCNC: 173 U/L (ref 0–250)
LYMPHOCYTES # BLD AUTO: 1.3 10E3/UL (ref 0.8–5.3)
LYMPHOCYTES NFR BLD AUTO: 34 %
MCH RBC QN AUTO: 30.2 PG (ref 26.5–33)
MCHC RBC AUTO-ENTMCNC: 33.9 G/DL (ref 31.5–36.5)
MCV RBC AUTO: 89 FL (ref 78–100)
MONOCYTES # BLD AUTO: 0.3 10E3/UL (ref 0–1.3)
MONOCYTES NFR BLD AUTO: 9 %
NEUTROPHILS # BLD AUTO: 2 10E3/UL (ref 1.6–8.3)
NEUTROPHILS NFR BLD AUTO: 54 %
NRBC # BLD AUTO: 0 10E3/UL
NRBC BLD AUTO-RTO: 0 /100
PLATELET # BLD AUTO: 167 10E3/UL (ref 150–450)
POTASSIUM SERPL-SCNC: 5.2 MMOL/L (ref 3.4–5.3)
PROT SERPL-MCNC: 6.8 G/DL (ref 6.4–8.3)
RBC # BLD AUTO: 4.74 10E6/UL (ref 4.4–5.9)
SODIUM SERPL-SCNC: 140 MMOL/L (ref 136–145)
WBC # BLD AUTO: 3.7 10E3/UL (ref 4–11)

## 2022-09-15 PROCEDURE — 36415 COLL VENOUS BLD VENIPUNCTURE: CPT | Performed by: PATHOLOGY

## 2022-09-15 PROCEDURE — 80053 COMPREHEN METABOLIC PANEL: CPT | Performed by: PATHOLOGY

## 2022-09-15 PROCEDURE — 99000 SPECIMEN HANDLING OFFICE-LAB: CPT | Performed by: PATHOLOGY

## 2022-09-15 PROCEDURE — 83615 LACTATE (LD) (LDH) ENZYME: CPT | Mod: 90 | Performed by: PATHOLOGY

## 2022-09-15 PROCEDURE — G0463 HOSPITAL OUTPT CLINIC VISIT: HCPCS

## 2022-09-15 PROCEDURE — 85025 COMPLETE CBC W/AUTO DIFF WBC: CPT | Performed by: PATHOLOGY

## 2022-09-15 PROCEDURE — 99213 OFFICE O/P EST LOW 20 MIN: CPT | Performed by: PHYSICIAN ASSISTANT

## 2022-09-15 ASSESSMENT — PAIN SCALES - GENERAL: PAINLEVEL: NO PAIN (0)

## 2022-09-15 NOTE — NURSING NOTE
"Oncology Rooming Note    September 15, 2022 11:45 AM   Christiano Molina is a 47 year old male who presents for:    Chief Complaint   Patient presents with     Oncology Clinic Visit     Burkitt lymphoma     Initial Vitals: /75   Pulse 65   Temp 98.3  F (36.8  C) (Oral)   Wt 83.6 kg (184 lb 4.8 oz)   SpO2 97%   BMI 26.38 kg/m   Estimated body mass index is 26.38 kg/m  as calculated from the following:    Height as of 2/27/20: 1.78 m (5' 10.08\").    Weight as of this encounter: 83.6 kg (184 lb 4.8 oz). Body surface area is 2.03 meters squared.  No Pain (0) Comment: Data Unavailable   No LMP for male patient.  Allergies reviewed: Yes  Medications reviewed: Yes    Medications: Medication refills not needed today.  Pharmacy name entered into [employer]: [id number] - [place], [place] - [address] AT [facility] [address] [place] [address]    Clinical concerns: none       Meme Sierra            "

## 2022-09-15 NOTE — LETTER
9/15/2022         RE: Christiano Molina  [address]  [place] [address]        Dear Colleague,    Thank you for referring your patient, Christiano Molina, to the [facility]. Please see a copy of my visit note below.     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 He presented with R axillary mass. PET/CT showed stage IA disease. Bone marrow biopsy and LP done 3/25 were negative for disease. Please see Dr. Ramirez's note for further discussion of diagnostic work-up.      Plan for 2 cycles of DA-R-EPOCH followed by PET/CT. He presented for cycle 1 on 3/27/19 and was discharged on 3/31/19. He tolerated chemotherapy well apart from mild hives and facial itching from Rituxan (was able to complete dose), chemotherapy-induced nausea, and mild post-LP occipital headache.      Following discharge, his pathology was finalized here with Ki-67 positive in 95-99% of lymphoma cells. TDT negative, EBV was strongly positive. The positive findings for CD20, CD10, BCL6, very high Ki-67 with negative BCL2, as well as MYC translocation supports diagnoses of Burkitt's lymphoma.      Cycle 2 of R-EPOCH was given 4/17-4/21/19. He had a partial response seen on imaging after 2 cycles. PET/CT after 4 cycles showed CR. Cycle 5 was complicated by appendicitis following chemotherapy. He received cycle 6 at same dose level as cycle 5. Cycle 6 was given 7/11-7/15/19. Post-therapy PET showed continued CR. Last imaging showed continued CR as well.      He presents today in routine 4 month follow-up on surveillance.      Interval History: Prudencio has been feeling well. He had a good summer with his family. He denies any issues or concerns. No  fevers or infections. No changes to energy or appetite. No abdominal pain or digestion changes besides his baseline intermittent dyspepsia and vomiting. This has improved since he has consistently taken Pepcid daily. No lumps/bumps or new or different pain. Only had one headache in recent months which was relieved with tylenol.     Current Outpatient Medications   Medication     acetaminophen (TYLENOL) 325 MG tablet     famotidine (PEPCID) 20 MG tablet     LORazepam (ATIVAN) 0.5 MG tablet     ondansetron (ZOFRAN-ODT) 8 MG ODT tab     sertraline (ZOLOFT) 50 MG tablet     traZODone (DESYREL) 100 MG tablet     No current facility-administered medications for this visit.      No Known Allergies      PHYSICAL EXAM:  /75   Pulse 65   Temp 98.3  F (36.8  C) (Oral)   Wt 83.6 kg (184 lb 4.8 oz)   SpO2 97%   BMI 26.38 kg/m    Wt Readings from Last 4 Encounters:   09/15/22 83.6 kg (184 lb 4.8 oz)   05/19/22 83.5 kg (184 lb)   02/27/20 80.6 kg (177 lb 11.1 oz)   11/15/19 81.1 kg (178 lb 14.4 oz)     General: Alert, oriented, pleasant, NAD  HEENT: Normocephalic, atraumatic, no icterus.   Neck: No cervical or supraclavicular LAD.  Axillary: No LAD  Lungs: CTA bilaterally, normal work of breathing  Cardiac: RRR  Abdomen: Soft, nontender, nondistended. Normoactive bowel sounds. No hepatosplenomegaly, masses  Neuro: CNII-XII grossly intact  Extremities: No pedal edema        Labs:    09/15/22 11:22   Sodium 140   Potassium 5.2   Chloride 105   Carbon Dioxide (CO2) 27   Urea Nitrogen 18.6   Creatinine 1.31 (H)   GFR Estimate 68   Calcium 9.6   Anion Gap 8   Albumin 4.3   Protein Total 6.8   Alkaline Phosphatase 47   ALT 14   AST 22   Bilirubin Total 0.4   Glucose 76   WBC 3.7 (L)   Hemoglobin 14.3   Hematocrit 42.2   Platelet Count 167   RBC Count 4.74   MCV 89   MCH 30.2   MCHC 33.9   RDW 12.3   % Neutrophils 54   % Lymphocytes 34   % Monocytes 9   % Eosinophils 2   % Basophils 1   Absolute Basophils 0.0   Absolute  Eosinophils 0.1   Absolute Immature Granulocytes 0.0   Absolute Lymphocytes 1.3   Absolute Monocytes 0.3   % Immature Granulocytes 0   Absolute Neutrophils 2.0   Absolute NRBCs 0.0   NRBCs per 100 WBC 0     LDH pending      Assessment & Plan:      1. Stage Ia Burkitt's lymphoma: Negative marrow or CNS involvement. S/p 6 cycles of R-EPOCH with Neulasta support. He achieved CR after 4 cycles and post-therapy PET confirmed this. He has now completed all recommended oncology surveillance. Congratulated him on this. Follow-up can now be PRN.     2. GI: GERD is chronic. Takes Pepcid once to twice daily. PCP is okay refilling zofran PRN.       Holly Wheat PA-C

## 2022-10-14 NOTE — TELEPHONE ENCOUNTER
3 images pushed to PACS from [facility] and are now attached/viewable.   Noted.  Nahid Lilly sent.  -WS

## 2022-11-21 ENCOUNTER — HEALTH MAINTENANCE LETTER (OUTPATIENT)
Age: 47
End: 2022-11-21

## 2023-04-16 ENCOUNTER — HEALTH MAINTENANCE LETTER (OUTPATIENT)
Age: 48
End: 2023-04-16

## 2024-11-10 ENCOUNTER — HEALTH MAINTENANCE LETTER (OUTPATIENT)
Age: 49
End: 2024-11-10

## 2025-05-31 ENCOUNTER — HEALTH MAINTENANCE LETTER (OUTPATIENT)
Age: 50
End: 2025-05-31

## 2025-06-03 PROCEDURE — 88304 TISSUE EXAM BY PATHOLOGIST: CPT | Mod: 26 | Performed by: PATHOLOGY

## 2025-06-03 PROCEDURE — 88304 TISSUE EXAM BY PATHOLOGIST: CPT | Mod: TC,ORL | Performed by: OPHTHALMOLOGY

## 2025-06-04 ENCOUNTER — LAB REQUISITION (OUTPATIENT)
Dept: LAB | Facility: CLINIC | Age: 50
End: 2025-06-04
Payer: COMMERCIAL

## 2025-06-06 LAB
PATH REPORT.COMMENTS IMP SPEC: NORMAL
PATH REPORT.COMMENTS IMP SPEC: NORMAL
PATH REPORT.FINAL DX SPEC: NORMAL
PATH REPORT.GROSS SPEC: NORMAL
PATH REPORT.MICROSCOPIC SPEC OTHER STN: NORMAL
PATH REPORT.RELEVANT HX SPEC: NORMAL
PHOTO IMAGE: NORMAL

## (undated) DEVICE — STPL RELOAD REG TISSUE ECHELON 45 X 3.6MM BLUE GST45B

## (undated) DEVICE — SU VICRYL 0 UR-6 27" J603H

## (undated) DEVICE — SU MONOCRYL 4-0 PS-2 18" UND Y496G

## (undated) DEVICE — ENDO TROCAR SLEEVE KII Z-THREADED 05X100MM CTS02

## (undated) DEVICE — ESU LIGASURE MARYLAND LAPAROSCOPIC SLR/DVDR 5MMX37CM LF1937

## (undated) DEVICE — NDL INSUFFLATION 13GA 150MM C2202

## (undated) DEVICE — CLIP APPLIER ENDO ROTATING 10MM MED/LG ER320

## (undated) DEVICE — ANTIFOG SOLUTION W/FOAM PAD CF-1001

## (undated) DEVICE — STPL POWERED ECHELON 45MM PSEE45A

## (undated) DEVICE — ENDO DISSECTOR BLUNT 05MM  BTD05

## (undated) DEVICE — SU VICRYL 0 TIE 12X18" J906G

## (undated) DEVICE — SUCTION IRR STRYKERFLOW II W/TIP 250-070-520

## (undated) DEVICE — DEVICE SUTURE PASSER 14GA WECK EFX EFXSP2

## (undated) DEVICE — DECANTER VIAL 2006S

## (undated) DEVICE — ENDO TROCAR FIRST ENTRY KII FIOS Z-THRD 05X100MM CTF03

## (undated) DEVICE — ESU GROUND PAD ADULT W/CORD E7507

## (undated) DEVICE — ENDO TROCAR FIRST ENTRY KII FIOS Z-THRD 12X100MM CTF73

## (undated) DEVICE — SOL NACL 0.9% INJ 1000ML BAG 2B1324X

## (undated) DEVICE — SOL WATER IRRIG 1000ML BOTTLE 2F7114

## (undated) DEVICE — ADH SKIN CLOSURE PREMIERPRO EXOFIN 1.0ML 3470

## (undated) DEVICE — STPL RELOAD REG TISSUE ECHELON 60 X 3.6MM BLUE GST60B

## (undated) DEVICE — LINEN TOWEL PACK X6 WHITE 5487

## (undated) DEVICE — CATH TRAY FOLEY SURESTEP 16FR W/URNE MTR STLK LATEX A303316A

## (undated) DEVICE — Device

## (undated) DEVICE — ENDO POUCH UNIV RETRIEVAL SYSTEM INZII 10MM CD001

## (undated) DEVICE — PREP CHLORAPREP 26ML TINTED ORANGE  260815

## (undated) DEVICE — LINEN TOWEL PACK X5 5464

## (undated) RX ORDER — PROPOFOL 10 MG/ML
INJECTION, EMULSION INTRAVENOUS
Status: DISPENSED
Start: 2019-06-29

## (undated) RX ORDER — LIDOCAINE HYDROCHLORIDE AND EPINEPHRINE 10; 10 MG/ML; UG/ML
INJECTION, SOLUTION INFILTRATION; PERINEURAL
Status: DISPENSED
Start: 2019-06-29

## (undated) RX ORDER — DEXAMETHASONE SODIUM PHOSPHATE 4 MG/ML
INJECTION, SOLUTION INTRA-ARTICULAR; INTRALESIONAL; INTRAMUSCULAR; INTRAVENOUS; SOFT TISSUE
Status: DISPENSED
Start: 2019-06-29

## (undated) RX ORDER — GLYCOPYRROLATE 0.2 MG/ML
INJECTION, SOLUTION INTRAMUSCULAR; INTRAVENOUS
Status: DISPENSED
Start: 2019-06-29

## (undated) RX ORDER — ONDANSETRON 2 MG/ML
INJECTION INTRAMUSCULAR; INTRAVENOUS
Status: DISPENSED
Start: 2019-06-29

## (undated) RX ORDER — LIDOCAINE HYDROCHLORIDE 10 MG/ML
INJECTION, SOLUTION EPIDURAL; INFILTRATION; INTRACAUDAL; PERINEURAL
Status: DISPENSED
Start: 2019-03-27

## (undated) RX ORDER — LIDOCAINE HYDROCHLORIDE 10 MG/ML
INJECTION, SOLUTION EPIDURAL; INFILTRATION; INTRACAUDAL; PERINEURAL
Status: DISPENSED
Start: 2019-05-30

## (undated) RX ORDER — LIDOCAINE HYDROCHLORIDE 10 MG/ML
INJECTION, SOLUTION EPIDURAL; INFILTRATION; INTRACAUDAL; PERINEURAL
Status: DISPENSED
Start: 2019-07-11

## (undated) RX ORDER — FENTANYL CITRATE 50 UG/ML
INJECTION, SOLUTION INTRAMUSCULAR; INTRAVENOUS
Status: DISPENSED
Start: 2019-06-29

## (undated) RX ORDER — LIDOCAINE HYDROCHLORIDE 10 MG/ML
INJECTION, SOLUTION EPIDURAL; INFILTRATION; INTRACAUDAL; PERINEURAL
Status: DISPENSED
Start: 2019-04-17

## (undated) RX ORDER — HEPARIN SODIUM,PORCINE 10 UNIT/ML
VIAL (ML) INTRAVENOUS
Status: DISPENSED
Start: 2019-03-27

## (undated) RX ORDER — BUPIVACAINE HYDROCHLORIDE 2.5 MG/ML
INJECTION, SOLUTION EPIDURAL; INFILTRATION; INTRACAUDAL
Status: DISPENSED
Start: 2019-06-29